# Patient Record
Sex: MALE | Race: WHITE
[De-identification: names, ages, dates, MRNs, and addresses within clinical notes are randomized per-mention and may not be internally consistent; named-entity substitution may affect disease eponyms.]

---

## 2017-01-17 ENCOUNTER — HOSPITAL ENCOUNTER (INPATIENT)
Dept: HOSPITAL 74 - JER | Age: 77
LOS: 10 days | Discharge: NURSING, REHAB OR LONG-TERM CARE | DRG: 871 | End: 2017-01-27
Attending: INTERNAL MEDICINE | Admitting: INTERNAL MEDICINE
Payer: COMMERCIAL

## 2017-01-17 VITALS — BODY MASS INDEX: 35.7 KG/M2

## 2017-01-17 DIAGNOSIS — D69.6: ICD-10-CM

## 2017-01-17 DIAGNOSIS — E11.22: ICD-10-CM

## 2017-01-17 DIAGNOSIS — E11.65: ICD-10-CM

## 2017-01-17 DIAGNOSIS — E87.0: ICD-10-CM

## 2017-01-17 DIAGNOSIS — G93.41: ICD-10-CM

## 2017-01-17 DIAGNOSIS — E66.9: ICD-10-CM

## 2017-01-17 DIAGNOSIS — N17.9: ICD-10-CM

## 2017-01-17 DIAGNOSIS — I25.10: ICD-10-CM

## 2017-01-17 DIAGNOSIS — J45.909: ICD-10-CM

## 2017-01-17 DIAGNOSIS — N18.3: ICD-10-CM

## 2017-01-17 DIAGNOSIS — F03.90: ICD-10-CM

## 2017-01-17 DIAGNOSIS — J18.9: ICD-10-CM

## 2017-01-17 DIAGNOSIS — A41.9: Primary | ICD-10-CM

## 2017-01-17 DIAGNOSIS — E78.5: ICD-10-CM

## 2017-01-17 DIAGNOSIS — N40.0: ICD-10-CM

## 2017-01-17 DIAGNOSIS — R33.9: ICD-10-CM

## 2017-01-17 DIAGNOSIS — I50.9: ICD-10-CM

## 2017-01-17 DIAGNOSIS — E11.319: ICD-10-CM

## 2017-01-17 DIAGNOSIS — I13.0: ICD-10-CM

## 2017-01-17 DIAGNOSIS — E86.0: ICD-10-CM

## 2017-01-17 DIAGNOSIS — Z79.4: ICD-10-CM

## 2017-01-17 DIAGNOSIS — I12.9: ICD-10-CM

## 2017-01-17 DIAGNOSIS — Z95.1: ICD-10-CM

## 2017-01-17 NOTE — PDOC
History of Present Illness





- General


History Source: Family, Spouse (wife)


Exam Limitations: Dementia





- History of Present Illness


Initial Comments: 


01/18/17 00:16


The patient is a 76 year old male with significant past medical history of 

dementia, CAD, hyperlipidemia, hypertension, chronic kidney disease, diabetes, 

asthma who presents to the ED BIBA from home with worsening AMS. As per family, 

at bedside, patient is confused and AMS at baseline. However, over the past 24 

hours, they noted patients AMS has worsened. Wife also noted frequent 

urination. According to the family, patient is administered tylenol every 4 

hours for pain, but note that the patient felt warm at home. Patients 

temperature was checked here in the ED and patient was found to have a rectal 

temp of 100.2. 





Allergies: NKDA


Social History: No alcohol, tobacco, or drug use reported. 


Past Surgical History: CABG, ORIF right hip


PCP: Dr. Adrienne Truong 


Cardio: Dr. Felicity Rust 


Nephro: Dr. Addie Cruz 











<Kelsey Alcala - Last Filed: 01/18/17 04:34>





<Maude Berrios - Last Filed: 01/18/17 06:22>





- General


Chief Complaint: SIRS, Suspected/Possible


Stated Complaint: FEVER


Time Seen by Provider: 01/17/17 23:48





Past History





<Kelsey Alcala - Last Filed: 01/18/17 04:34>





- Past Medical History


Anemia: No


Asthma: Yes


Cancer: No


Cardiac Disorders: Yes (cabg)


CVA: No


COPD: No


CHF: No


Dementia: No


Diabetes: Yes


GI Disorders: No


 Disorders: No


HTN: Yes


Hypercholesterolemia: Yes


Liver Disease: No


Suicide Attempt (Hx): No


Seizures: No


Thyroid Disease: No





- Surgical History


Abdominal Surgery: No


Appendectomy: No


Cardiac Surgery: Yes ('96)


Cholecystectomy: No


Lung Surgery: No


Neurologic Surgery: No


Orthopedic Surgery: No (rt  hip orif)





- Immunization History


Immunization Up to Date: Yes





- Psycho/Social/Smoking Cessation Hx


Anxiety: No


Suicidal Ideation: No


Smoking Status: No


Smoking History: Never smoked


Have you smoked in the past 12 months: No


Number of Cigarettes Smoked Daily: 0


If you are a former smoker, when did you quit?: '96


Hx Alcohol Use: No


Drug/Substance Use Hx: No


Substance Use Type: None


Hx Substance Use Treatment: No





<Maude Berrios - Last Filed: 01/18/17 06:22>





- Past Medical History


Allergies/Adverse Reactions: 


 Allergies











Allergy/AdvReac Type Severity Reaction Status Date / Time


 


No Known Allergies Allergy   Verified 01/17/17 23:28











Home Medications: 


Ambulatory Orders





Clopidogrel Bisulfate [Plavix -] 75 mg PO DAILY 04/01/12 


Nebivolol HCl [Bystolic] 5 mg PO HS 07/28/14 


Tamsulosin HCl 0.4 mg PO DAILY 07/28/14 


Aspirin [ASA -] 81 mg PO DAILY 01/18/17 


Atorvastatin Ca [Lipitor] 80 mg PO HS 01/18/17 


Bimatoprost [Lumigan] 1 drop IO DAILY 01/18/17 


Cyanocobalamin [Vitamin B12 -] 1,000 mcg PO DAILY 01/18/17 


Dorzolamide HCl [Trusopt 2% -] 1 drop OU TID 01/18/17 


Ezetimibe [Zetia] 10 mg PO DAILY 01/18/17 


Furosemide [Lasix] 80 mg PO DAILY 01/18/17 











**Review of Systems





- Review of Systems


Able to Perform ROS?: No


Comments:: 





01/18/17 00:16


Unable to obtain as patient has dementia





<Kelsey Alcala - Last Filed: 01/18/17 04:34>





*Physical Exam





- Vital Signs


 Last Vital Signs











Temp Pulse Resp BP Pulse Ox


 


 98.1 F   85   18   135/65   92 L


 


 01/17/17 23:28  01/17/17 23:28  01/17/17 23:28  01/17/17 23:28  01/17/17 23:28














- Physical Exam


Comments: 


01/18/17 01:51


GENERAL:


Pt is febrile. Well developed, well nourished. No acute distress.


HEENT:


Normocephalic, atraumatic. PERRLA, EOMI. Right eye cloudy which is chronic per 

wife. No conjunctival pallor. Sclera are non-icteric. Moist mucous membranes. 

Oropharynx is clear.


NECK: 


Supple. Full ROM. No JVD. Carotid pulses 2+ and symmetric, without bruits. No 

thyromegaly. No lymphadenopathy.


CARDIOVASCULAR:


Regular rate and rhythm. No murmurs, rubs, or gallops. Distal pulses are 2+ and 

symmetric. 


PULMONARY: 


No evidence of respiratory distress. Lungs clear to auscultation bilaterally. 

No wheezing, rales or rhonchi.


ABDOMINAL:


Obese. Non-tender. Non-distended. No rebound or guarding. No organomegaly. 

Normoactive bowel sounds. 


MUSCULOSKELETAL 


Normal range of motion at all joints. No bony deformities or tenderness. No CVA 

tenderness.


EXTREMITIES: 


No cyanosis. No clubbing. No edema. No calf tenderness.


SKIN: 


Warm and dry. Normal capillary refill. No rashes. No jaundice. 


NEUROLOGICAL: 


Confused and somnolent, but arousable. Unable to answer questions. Moving all 

extremities. 


PSYCHIATRIC: 


Cooperative. Good eye contact. Appropriate mood and affect.











<Kelsey Alcala - Last Filed: 01/18/17 04:34>





- Vital Signs


 Last Vital Signs











Temp Pulse Resp BP Pulse Ox


 


 98.1 F   85   18   135/65   92 L


 


 01/17/17 23:28  01/17/17 23:28  01/17/17 23:28  01/17/17 23:28  01/17/17 23:28














<Maude Berrios - Last Filed: 01/18/17 06:22>





**Heart Score/ECG Review





- ECG Impressions


Comment:: 





01/18/17 03:59


NSR @84bpm


Nonspecific ST abnormality 


Abnormal ECG 





<Kelsey Alcala - Last Filed: 01/18/17 04:34>





ED Treatment Course





- LABORATORY


CBC & Chemistry Diagram: 


 01/18/17 00:01





 01/18/17 03:40





- RADIOLOGY


Radiograph Interpretation: 


01/18/17 04:32


EXAM: X-RAY CHEST


Reviewed by Imaging on Call: Exam slightly motion degraded and rotated since 4/ 18/16. No definite lung 


consolidation or pleural effusions. Cardiomegaly and/or pericardial effusion. 


Median sternotomy.











<Kelsey Alcala - Last Filed: 01/18/17 04:34>





- LABORATORY


CBC & Chemistry Diagram: 


 01/18/17 00:01





 01/18/17 03:40





<Maude Berrios - Last Filed: 01/18/17 06:22>





Medical Decision Making





- Medical Decision Making


01/18/17 01:33


Pt comes with worsening mental status. He also has a fever. Family takes care 

of him at home. Pt has  a baseline dementia.





01/18/17 03:02


Pt's urine is clear.  Only glucose and ketones.





01/18/17 06:17


Pt has no UTI on straight cath specimen even though his wife notes that he has 

been urinating frequently. Pt has RLL haziness on cxr.  Pt will be treated with 

levaquin.  He received tylenol ofirmev IV. We treated him with zosyn.





01/18/17 06:21


Pt was admitted to hospitalist and they hydrated him.





<Maude Berrios - Last Filed: 01/18/17 06:22>





*DC/Admit/Observation/Transfer





- Attestations


Scribe Attestion: 





01/18/17 00:16





Documentation prepared by Kelsey Alcala, acting as medical scribe for Maude Berrios MD





<Kelsey Alcala - Last Filed: 01/18/17 04:34>





- Discharge Dispostion


Admit: Yes





<Maude Berrios - Last Filed: 01/18/17 06:22>


Diagnosis at time of Disposition: 


 Sepsis, Type 2 diabetes mellitus, Pneumonia





- Referrals

## 2017-01-18 LAB
ALBUMIN SERPL-MCNC: 3.9 G/DL (ref 3.4–5)
ALBUMIN SERPL-MCNC: 4 G/DL (ref 3.4–5)
ALP SERPL-CCNC: 134 U/L (ref 45–117)
ALP SERPL-CCNC: 136 U/L (ref 45–117)
ALT SERPL-CCNC: 22 U/L (ref 12–78)
ALT SERPL-CCNC: 25 U/L (ref 12–78)
ANION GAP SERPL CALC-SCNC: 12 MMOL/L (ref 8–16)
ANION GAP SERPL CALC-SCNC: 13 MMOL/L (ref 8–16)
ANION GAP SERPL CALC-SCNC: 16 MMOL/L (ref 8–16)
APPEARANCE UR: CLEAR
AST SERPL-CCNC: 15 U/L (ref 15–37)
AST SERPL-CCNC: 27 U/L (ref 15–37)
BASOPHILS # BLD: 0.4 % (ref 0–2)
BASOPHILS # BLD: 0.5 % (ref 0–2)
BILIRUB SERPL-MCNC: 0.8 MG/DL (ref 0.2–1)
BILIRUB SERPL-MCNC: 0.8 MG/DL (ref 0.2–1)
BILIRUB UR STRIP.AUTO-MCNC: NEGATIVE MG/DL
CALCIUM SERPL-MCNC: 9.6 MG/DL (ref 8.5–10.1)
CALCIUM SERPL-MCNC: 9.7 MG/DL (ref 8.5–10.1)
CALCIUM SERPL-MCNC: 9.9 MG/DL (ref 8.5–10.1)
CO2 SERPL-SCNC: 26 MMOL/L (ref 21–32)
CO2 SERPL-SCNC: 27 MMOL/L (ref 21–32)
CO2 SERPL-SCNC: 27 MMOL/L (ref 21–32)
COLOR UR: (no result)
CREAT SERPL-MCNC: 2.1 MG/DL (ref 0.7–1.3)
CREAT SERPL-MCNC: 2.2 MG/DL (ref 0.7–1.3)
CREAT SERPL-MCNC: 2.3 MG/DL (ref 0.7–1.3)
DEPRECATED RDW RBC AUTO: 13.4 % (ref 11.9–15.9)
DEPRECATED RDW RBC AUTO: 13.5 % (ref 11.9–15.9)
EOSINOPHIL # BLD: 0 % (ref 0–4.5)
EOSINOPHIL # BLD: 0.1 % (ref 0–4.5)
GLUCOSE SERPL-MCNC: 480 MG/DL (ref 74–106)
GLUCOSE SERPL-MCNC: 491 MG/DL (ref 74–106)
GLUCOSE SERPL-MCNC: 595 MG/DL (ref 74–106)
INR BLD: 1.44 (ref 0.82–1.09)
KETONES UR QL STRIP: (no result)
LEUKOCYTE ESTERASE UR QL STRIP.AUTO: NEGATIVE
MCH RBC QN AUTO: 29.8 PG (ref 25.7–33.7)
MCH RBC QN AUTO: 30.5 PG (ref 25.7–33.7)
MCHC RBC AUTO-ENTMCNC: 32.7 G/DL (ref 32–35.9)
MCHC RBC AUTO-ENTMCNC: 33.4 G/DL (ref 32–35.9)
MCV RBC: 91.1 FL (ref 80–96)
MCV RBC: 91.4 FL (ref 80–96)
NEUTROPHILS # BLD: 83.2 % (ref 42.8–82.8)
NEUTROPHILS # BLD: 83.2 % (ref 42.8–82.8)
NITRITE UR QL STRIP: NEGATIVE
PH UR: 5 [PH] (ref 5–8)
PLATELET # BLD AUTO: 102 K/MM3 (ref 134–434)
PLATELET # BLD AUTO: 91 K/MM3 (ref 134–434)
PMV BLD: 10.6 FL (ref 7.5–11.1)
PMV BLD: 10.7 FL (ref 7.5–11.1)
PROT SERPL-MCNC: 6.9 G/DL (ref 6.4–8.2)
PROT SERPL-MCNC: 7.1 G/DL (ref 6.4–8.2)
PROT UR QL STRIP: (no result)
PROT UR QL STRIP: NEGATIVE
PT PNL PPP: 16 SEC (ref 9.98–11.88)
RBC # UR STRIP: NEGATIVE /UL
SP GR UR: 1.02 (ref 1–1.03)
TROPONIN I SERPL-MCNC: 0.04 NG/ML (ref 0–0.05)
UROBILINOGEN UR STRIP-MCNC: NEGATIVE E.U./DL (ref 0.2–1)
WBC # BLD AUTO: 14.5 K/MM3 (ref 4–10)
WBC # BLD AUTO: 15.5 K/MM3 (ref 4–10)

## 2017-01-18 RX ADMIN — INSULIN ASPART SCH UNITS: 100 INJECTION, SOLUTION INTRAVENOUS; SUBCUTANEOUS at 08:59

## 2017-01-18 RX ADMIN — DORZOLAMIDE HYDROCHLORIDE SCH DROP: 20 SOLUTION/ DROPS OPHTHALMIC at 22:16

## 2017-01-18 RX ADMIN — INSULIN ASPART SCH UNITS: 100 INJECTION, SOLUTION INTRAVENOUS; SUBCUTANEOUS at 11:43

## 2017-01-18 RX ADMIN — EZETIMIBE SCH: 10 TABLET ORAL at 11:21

## 2017-01-18 RX ADMIN — INSULIN ASPART SCH UNITS: 100 INJECTION, SOLUTION INTRAVENOUS; SUBCUTANEOUS at 18:46

## 2017-01-18 RX ADMIN — INSULIN ASPART SCH UNITS: 100 INJECTION, SOLUTION INTRAVENOUS; SUBCUTANEOUS at 22:02

## 2017-01-18 RX ADMIN — ASPIRIN 81 MG SCH: 81 TABLET ORAL at 11:20

## 2017-01-18 RX ADMIN — ACETAMINOPHEN PRN MG: 325 TABLET ORAL at 13:21

## 2017-01-18 RX ADMIN — INSULIN ASPART SCH UNITS: 100 INJECTION, SOLUTION INTRAVENOUS; SUBCUTANEOUS at 18:45

## 2017-01-18 RX ADMIN — DORZOLAMIDE HYDROCHLORIDE SCH: 20 SOLUTION/ DROPS OPHTHALMIC at 06:18

## 2017-01-18 RX ADMIN — LATANOPROST SCH: 50 SOLUTION OPHTHALMIC at 11:20

## 2017-01-18 RX ADMIN — NEBIVOLOL HYDROCHLORIDE SCH MG: 5 TABLET ORAL at 22:01

## 2017-01-18 RX ADMIN — ATORVASTATIN CALCIUM SCH MG: 80 TABLET, FILM COATED ORAL at 22:01

## 2017-01-18 RX ADMIN — INSULIN DETEMIR SCH UNITS: 100 INJECTION, SOLUTION SUBCUTANEOUS at 22:01

## 2017-01-18 RX ADMIN — CEFTRIAXONE SODIUM SCH MLS/HR: 2 INJECTION, POWDER, FOR SOLUTION INTRAMUSCULAR; INTRAVENOUS at 11:01

## 2017-01-18 RX ADMIN — INSULIN ASPART SCH UNITS: 100 INJECTION, SOLUTION INTRAVENOUS; SUBCUTANEOUS at 16:09

## 2017-01-18 RX ADMIN — SODIUM CHLORIDE SCH MLS/HR: 9 INJECTION, SOLUTION INTRAVENOUS at 09:04

## 2017-01-18 RX ADMIN — TAMSULOSIN HYDROCHLORIDE SCH: 0.4 CAPSULE ORAL at 09:04

## 2017-01-18 RX ADMIN — DORZOLAMIDE HYDROCHLORIDE SCH: 20 SOLUTION/ DROPS OPHTHALMIC at 17:19

## 2017-01-18 RX ADMIN — CLOPIDOGREL BISULFATE SCH: 75 TABLET, FILM COATED ORAL at 11:20

## 2017-01-18 RX ADMIN — SODIUM CHLORIDE SCH MLS/HR: 9 INJECTION, SOLUTION INTRAVENOUS at 17:18

## 2017-01-18 NOTE — PN
<Francisco Javier Bhat - Last Filed: 01/18/17 10:45>


Physical Exam: 


ATTENDING PHYSICIAN STATEMENT





I saw and evaluated the patient.


I reviewed the resident's note and discussed the case with the resident.


I agree with the resident's findings and plan as documented.








SUBJECTIVE:


seen and evaluated at the bedside





OBJECTIVE:


as per family pt is not yet at his baseline mental status





ASSESSMENT AND PLAN:


6 year old male with a significant PMHx of CAD, CHF, HLD, HTN, DM II (

noncompliant and uncontrolled), CKD, asthma, and Dementia admitted for sepsis 

due to community acquired pneumonia causing metabolic encephalopathy





Pneumonia


-currently afebrile and hemodynamically stable


-was given single doses of abx overnight


-start ceftriaxone and azithromycin 


-lactate was still elevated; start IVF and trend lactic acid


-follow up blood/urine cultures





Hyperglycemia


-no anion gap


-sliding scale insulin


-await swallow eval before starting standing levemir (would require half the 

usual dose if not able to eat)





Acute kidney injury


-due to hypoperfusion secondary to sepsis


-start IVF


-trend creat





<Ariana Leal - Last Filed: 01/18/17 13:57>


Physical Exam: 


SUBJECTIVE: Patient seen and examined





Patient resting in bed, in mild distress. Fever 100.2, hemodynamically stable. 

O2 sat 100% on 2L NC. awake and alert but disoriented and mumbling 

incoherently. Follows some commands with prompting. History obtained from wife 

and record. She states that he is still well below mental status baseline, 

which is aao x 3. She states that his current issues include urinary 

incontinence, confusion, cough with clear sputum and fever x1-2 days. she 

denies him complaining of h/a, n/v, chest pain, sob, abd pain, diarrhea or 

dysuria. 





OBJECTIVE:





 Vital Signs











 Period  Temp  Pulse  Resp  BP Sys/Ramírez  Pulse Ox


 


 Last 24 Hr    78  18  122/71  100











GENERAL: Awake, somewhat responsive to verbal command, lethargic, disoriented 


HEAD: Normal with no signs of trauma.


EYES: Sclera anicteric and mildly injected b/l PERRLA


EARS, NOSE, THROAT: oropharynx clear without exudates. Moist mucous membranes.


NECK: supple without lymphadenopathy, JVD, or masses.


LUNGS: bibasilar crackles. diffusely coarse breath sounds 


HEART: Regular rate and rhythm, normal S1 and S2 


ABDOMEN: Soft, not distended, normoactive bowel sounds, no guarding. mild 

suprapubic tenderness 


MUSCULOSKELETAL: No CVA tenderness.


UPPER EXTREMITIES: 2+ pulses, warm, well perfused, No peripheral edema.


LOWER EXTREMITIES: venous stasis discoloration of b/l ankles, trace b/l edema 


NEUROLOGICAL:  Disoriented, lethargic 











 Laboratory Results - last 24 hr











  01/18/17 01/18/17 01/18/17





  05:00 06:15 06:15


 


WBC   15.5 H 


 


RBC   4.18 


 


Hgb   12.7 


 


Hct   38.2 


 


MCV   91.4 


 


MCHC   33.4 


 


RDW   13.5 


 


Plt Count   91 L 


 


MPV   10.7 


 


Neutrophils %   83.2 H 


 


Lymphocytes %   5.1 L 


 


Monocytes %   11.3 H 


 


Eosinophils %   0.0  D 


 


Basophils %   0.4 


 


Sodium    138


 


Potassium    4.2


 


Chloride    99


 


Carbon Dioxide    27


 


Anion Gap    12


 


BUN    49 H


 


Creatinine    2.1 H


 


Creat Clearance w eGFR    30.86


 


Random Glucose    480 H*


 


Lactic Acid  2.793 H*  


 


Calcium    9.9


 


Total Bilirubin    0.8


 


AST    27  D


 


ALT    25


 


Alkaline Phosphatase    136 H


 


Total Protein    7.1


 


Albumin    4.0








Active Medications











Generic Name Dose Route Start Last Admin





  Trade Name Freq  PRN Reason Stop Dose Admin


 


Albuterol Sulfate  1 amp 01/18/17 08:23  





  Ventolin 0.083% Nebulizer Soln -  NEB   





  Q4H PRN   





  SHORT OF BREATH/WHEEZING   


 


Aspirin  81 mg 01/18/17 10:00  





  Asa -  PO   





  DAILY [facility]   


 


Atorvastatin Calcium  80 mg 01/18/17 22:00  





  Lipitor -  PO   





  HS [facility]   


 


Clopidogrel Bisulfate  75 mg 01/18/17 10:00  





  Plavix -  PO   





  DAILY [facility]   


 


Dorzolamide HCl  1 drop 01/18/17 06:00 01/18/17 06:18





  Trusopt 2%  OU   Not Given





  TID MARY   


 


Ezetimibe  10 mg 01/18/17 10:00  





  Zetia -  PO   





  DAILY MARY   


 


Azithromycin 250 mg/ Dextrose  250 mls @ 250 mls/hr 01/19/17 10:00  





  IVPB   





  DAILY MARY   


 


Ceftriaxone Sodium  100 mls @ 200 mls/hr 01/18/17 10:00  





  Rocephin 2gm Ivpb (Pre-Docked)  IVPB   





  DAILY [facility]   


 


Sodium Chloride  1,000 mls @ 125 mls/hr 01/18/17 08:45 01/18/17 09:04





  Normal Saline -  IV   125 mls/hr





  ASDIR MARY   Administration


 


Insulin Aspart  1 vial 01/18/17 06:00 01/18/17 08:59





  Novolog Vial Sliding Scale -  SQ   10 units





  Q4HPO MARY   Administration





  Protocol   


 


Latanoprost  1 drop 01/18/17 10:00  





  Xalatan 0.005% Eye Drops -  OU   





  DAILY MARY   


 


Nebivolol  5 mg 01/18/17 22:00  





  Bystolic -  PO   





  HS MARY   


 


Tamsulosin HCl  0.4 mg 01/18/17 08:30 01/18/17 09:04





  Flomax -  PO   Not Given





  DAILY@0830 [facility]   











ASSESSMENT/PLAN:


This is a 75 yo M with PMH of CAD, CHF, HLD, HTN, uncontrolled IDDM II, CKD, 

asthma, BPH and Dementia who is admitted due to CAP: altered mental status, 

productive cough, fever, urinary frequency/incontinence, hyperglycemia x 3d.





Altered mental status due to Sepsis


-grupo source: RLL Community Acquired Pneumonia


-CXR: RLL infiltrate 


-lactic acidosis resolved 


-s/p Zosyn in ED, now on Azithro and Rocephin 


-Blood and urine cultures p/d


-Sputum cultures p/d


-Urine Legionella/pneumococcal 


-Flu negative 


-febrile


-Tylenol PO PRN


-NS @ 125


-albuterol nebs prn 


-Speech and Swallow consult: pureed diet 





Hyperglycemia in setting of uncontrolled IDDM 


-medication noncompliant 


-Glucose >500 on admission 


-s/p 20 u insulin in ED; gluc over 400


-Insulin Sliding scale


-start basal insulin once tolerates meal, at a gentle dose Levemir 18 HS and 

Novolog 8 bid


-fingerstick TID AC 


-BG goal 250 to avoid hypoglycemia 


-A1C





Diabetic retinopathy


-severe


-f/u opthalmology outpatient





DEB vs CKD


-likely due to dehydration in setting of uncontrolled DM 


-Creatinine 2.1 trending down from 2.3 


-IV hydration 


-Urine electrolytes p/d


-trend creat 





Thrombocytopenia 


-Likely associated with PNA


-trend CBC 





CHF


-no sign of acute failure at this time 


-hold Lasix in setting of DEB


-Continue Asa, Plavix





HTN


-Continue Bystolic





HLD


-Continue Zetia, Lipitor





Asthma


-stable


-nebs prn 


 


BPH


-Tamsulosin 





FEN


NS @ 125


lytes stable


DVT GI PPX: SCD, diet 


Dysphagia Pureed, diabetic diet nectar liquids 





Dispo: monitor in [facility]














Problem List





- Problems


(1) Acute renal failure


Code(s): N17.9 - ACUTE KIDNEY FAILURE, UNSPECIFIED   





(2) Dehydration


Code(s): E86.0 - DEHYDRATION





(3) Pneumonia


Code(s): J18.9 - PNEUMONIA, UNSPECIFIED ORGANISM   





(4) Sepsis


Code(s): A41.9 - SEPSIS, UNSPECIFIED ORGANISM   





(5) Weakness


Code(s): R53.1 - WEAKNESS





(6) Asthma


Code(s): J45.909 - UNSPECIFIED ASTHMA, UNCOMPLICATED   





(7) CAD (coronary artery disease)


Code(s): I25.10 - ATHSCL HEART DISEASE OF NATIVE CORONARY ARTERY W/O ANG PCTRS 

  





(8) CKD stage 3 due to type 2 diabetes mellitus


Code(s): E11.22 - TYPE 2 DIABETES MELLITUS W DIABETIC CHRONIC KIDNEY DISEASE


N18.3 - CHRONIC KIDNEY DISEASE, STAGE 3 (MODERATE)





(9) Hyperlipidemia


Code(s): E78.5 - HYPERLIPIDEMIA, UNSPECIFIED   





(10) Hypertension


Code(s): I10 - ESSENTIAL (PRIMARY) HYPERTENSION   





(11) Obesity (BMI 30-39.9)


Code(s): E66.9 - OBESITY, UNSPECIFIED





(12) Type 2 diabetes mellitus


Code(s): E11.9 - TYPE 2 DIABETES MELLITUS WITHOUT COMPLICATIONS   





(13) Community acquired pneumonia


Code(s): J18.9 - PNEUMONIA, UNSPECIFIED ORGANISM








Visit type





- Emergency Visit


Emergency Visit: Yes


ED Registration Date: 01/18/17


Care time: The patient presented to the Emergency Department on the above date 

and was hospitalized for further evaluation of their emergent condition.





- New Patient


This patient is new to me today: Yes


Date on this admission: 01/18/17





- Critical Care


Critical Care patient: No





- Discharge Referral


Referred to [facility] No

## 2017-01-18 NOTE — HP
CHIEF COMPLAINT: Altered Mental Status 





PCP: Dr. Adrienne Truong 





HISTORY OF PRESENT ILLNESS:


 Patient poor historian and unable to obtain history due to clinical condition. 

All information obtained from wife and records


Patient is a 76 year old male with a significant PMHx of CAD, CHF, HLD, HTN, DM 

II (noncompliant and uncontrolled), CKD, asthma, and Dementia who was brought 

to the ED by his wife and son for worsening altered mental status for the last 2

-3 days.  At baseline, patient is awake, alert, and oriented.  She does report 

that he has worsening vision due to his uncontrolled diabetes, which worsened 

his gait in the last month.  Patient's wife also reports a productive cough 

with white sputum for the last 2-3 days associated with fever and increasing 

urinary frequency.  According to wife, she has been administering Tylenol every 

four hours for the fever. In the ED patient was found to have a rectal 

temperature of 100.2.  However, she denies any chills, nausea, vomiting, chest 

pain, palpitations, shortness of breath.  





ER course was notable for:


(1) Chest x-ray


(2) Vancomycin and Zosyn 


(3) 10 Units of insulin 





Recent Travel: Denies





PAST MEDICAL HISTORY: CAD, HLD, HTN, DM II (noncompliant and uncontrolled), CKD

, asthma, and Dementia





PAST SURGICAL HISTORY: CABG, ORIF of right hip





Social History:


Smoking: Denies


Alcohol: Denies


Drugs: Denies





Family History: Denies


Allergies: No Known Allergies Allergy (Verified 01/17/17 23:28)


 








HOME MEDICATIONS:


 











 Medication  Instructions  Recorded


 


Clopidogrel Bisulfate [Plavix -] 75 mg PO DAILY 04/01/12


 


Albuterol Sulfate Inhaler - 2 inh IH Q4H PRN #0 inh 04/04/12





[Ventolin HFA Inhaler -]  


 


Atorvastatin Ca [Lipitor] 40 mg PO HS #0 tab 04/04/12


 


Dorzolamide HCl [Trusopt 2% -] 1 drop OU BID #0 drops 04/04/12


 


Allopurinol [Zyloprim -] 100 mg PO DAILY 07/28/14


 


Docusate Sodium [Colace -] 300 mg PO BID 07/28/14


 


Nebivolol HCl [Bystolic] 5 mg PO HS 07/28/14


 


Spironolactone 50 mg PO DAILY 07/28/14


 


Tamsulosin HCl 0.4 mg PO DAILY 07/28/14


 


Albuterol 0.083% Nebulizer Sol 1 neb NEB QID 11/03/15





[Ventolin 0.083% Nebulizer Soln -]  


 


Hydralazine HCl [Apresoline -] 10 mg PO TID  tablet 04/21/16


 


Salmeterol/Fluticasone [Advair 1 puff IH BID #0 inhaler 04/21/16





100Mcg/50Mcg -]  


 


Albuterol Sulfate Inhaler - 2 inh IH Q4H PRN #1 inh 04/27/16





[Ventolin HFA Inhaler -]  


 


Hydralazine HCl [Apresoline -] 10 mg PO TID #90 tablet 04/27/16


 


Insulin Detemir [Levemir Flextouch] 22 unit SQ BID #5 syringe 04/27/16


 


Salmeterol/Fluticasone [Advair 1 puff IH BID #1 inhaler 04/27/16





100Mcg/50Mcg -]  


 


Wheel Chair 0 unit AD ASDIR #1  04/27/16








REVIEW OF SYSTEMS: Obtained from patient's wife 


CONSTITUTIONAL: Present: fever


Absent: chills, diaphoresis, generalized weakness, malaise, loss of appetite, 

weight change


HEENT: Present: rhinorrhea, nasal congestion, visual changes


Absent: throat pain, throat swelling, difficulty swallowing, mouth swelling, 

ear pain, eye pain


CARDIOVASCULAR: 


Absent: chest pain, syncope, palpitations, irregular heart rate, lightheadedness

, peripheral edema


RESPIRATORY: Present: cough


Absent: shortness of breath, dyspnea with exertion, orthopnea, wheezing, stridor

, hemoptysis


GASTROINTESTINAL:


Absent: abdominal pain, abdominal distension, nausea, vomiting, diarrhea, 

constipation, melena, hematochezia


GENITOURINARY: Present: frequency


Absent: dysuria, urgency, hesitancy, hematuria, flank pain, genital pain


MUSCULOSKELETAL: 


Absent: myalgia, arthralgia, joint swelling, back pain, neck pain


SKIN: 


Absent: rash, itching, pallor


HEMATOLOGIC/IMMUNOLOGIC: 


Absent: easy bleeding, easy bruising, lymphadenopathy, frequent infections


ENDOCRINE:


Absent: unexplained weight gain, unexplained weight loss, heat intolerance, 

cold intolerance


NEUROLOGIC: Present: mental status changes


Absent: headache, focal weakness or paresthesias, dizziness, unsteady gait, 

seizure, bladder or bowel incontinence


PSYCHIATRIC: 


Absent: anxiety, depression, suicidal or homicidal ideation, hallucinations.








PHYSICAL EXAMINATION


 Vital Signs - 24 hr











  01/17/17 01/18/17





  23:28 00:50


 


Temperature 98.1 F 100.2 F H


 


Pulse Rate 85 


 


Respiratory 18 





Rate  


 


Blood Pressure 135/65 


 


O2 Sat by Pulse 92 L 





Oximetry (%)  











GENERAL: Awake, lethargic, disoriented 


HEAD: Normal with no signs of trauma.


EYES: Sclera anicteric, bilateral conjunctivitis, No lid lag.


EARS, NOSE, THROAT: Ears normal, nares patent, oropharynx clear without 

exudates. Moist mucous membranes.


NECK: Normal range of motion, supple without lymphadenopathy, JVD, or masses.


LUNGS: Bilateral crackles throughout lung bases No accessory muscle use.


HEART: Regular rate and rhythm, normal S1 and S2 without murmur, rub or gallop.


ABDOMEN: Soft, nontender, not distended, normoactive bowel sounds, no guarding, 

no rebound, no masses.  No hepatomegaly or  splenomegaly. 


MUSCULOSKELETAL: Normal range of motion at all joints. No bony deformities or 

tenderness. No CVA tenderness.


UPPER EXTREMITIES: Left upper arm insulin pump No peripheral edema.


LOWER EXTREMITIES: Chronic venous stasis of bilateral lower extremities. No 

peripheral edema. 


NEUROLOGICAL:  Disoriented and Agitated 














 Laboratory Results - last 24 hr











  01/18/17 01/18/17 01/18/17





  00:01 00:01 00:01


 


WBC  14.5 H D  


 


RBC  4.27  D  


 


Hgb  12.7  D  


 


Hct  38.9  D  


 


MCV  91.1  


 


MCHC  32.7  


 


RDW  13.4  


 


Plt Count  102 L D  


 


MPV  10.6  D  


 


Neutrophils %  83.2 H D  


 


Lymphocytes %  4.8 L D  


 


Monocytes %  11.4 H  


 


Eosinophils %  0.1  D  


 


Basophils %  0.5  D  


 


INR   1.44 H 


 


Sodium    136


 


Potassium    4.7


 


Chloride    94 L D


 


Carbon Dioxide    26


 


Anion Gap    16


 


BUN    56 H D


 


Creatinine    2.2 H D


 


Creat Clearance w eGFR    29.25


 


Random Glucose    595 H* D


 


Lactic Acid   


 


Calcium    9.7


 


Total Bilirubin    0.8  D


 


AST    15  D


 


ALT    22


 


Alkaline Phosphatase    134 H D


 


Creatine Kinase    128


 


Troponin I    0.04  D


 


Total Protein    6.9


 


Albumin    3.9


 


Urine Color   


 


Urine Appearance   


 


Urine pH   


 


Ur Specific Gravity   


 


Urine Protein   


 


Urine Glucose (UA)   


 


Urine Ketones   


 


Urine Blood   


 


Urine Nitrite   


 


Urine Bilirubin   


 


Urine Urobilinogen   


 


Ur Leukocyte Esterase   














  01/18/17 01/18/17





  00:01 02:35


 


WBC  


 


RBC  


 


Hgb  


 


Hct  


 


MCV  


 


MCHC  


 


RDW  


 


Plt Count  


 


MPV  


 


Neutrophils %  


 


Lymphocytes %  


 


Monocytes %  


 


Eosinophils %  


 


Basophils %  


 


INR  


 


Sodium  


 


Potassium  


 


Chloride  


 


Carbon Dioxide  


 


Anion Gap  


 


BUN  


 


Creatinine  


 


Creat Clearance w eGFR  


 


Random Glucose  


 


Lactic Acid  2.606 H* 


 


Calcium  


 


Total Bilirubin  


 


AST  


 


ALT  


 


Alkaline Phosphatase  


 


Creatine Kinase  


 


Troponin I  


 


Total Protein  


 


Albumin  


 


Urine Color   Straw


 


Urine Appearance   Clear


 


Urine pH   5.0


 


Ur Specific Gravity   1.016


 


Urine Protein   Negative


 


Urine Glucose (UA)   3+ H


 


Urine Ketones   1+ H


 


Urine Blood   Negative


 


Urine Nitrite   Negative


 


Urine Bilirubin   Negative


 


Urine Urobilinogen   Negative


 


Ur Leukocyte Esterase   Negative











ASSESSMENT/PLAN:


Patient is a 76 year old male with a PMHx of CAD, CHF, HLD, HTN, DM II (

noncompliant and uncontrolled), CKD, asthma, BPH and Dementia who presents to 

the ED for altered mental status that worsened in the last two days associated 

with a productive cough, fever, and urinary frequency.  Patient admitted for 

further monitoring and management. 





Sepsis Likely Secondary to Community Acquired Pneumonia


-Chest X-ray ordered that revealed a possible right lobe infiltrate


-Repeat Lactic sent 


-Zosyn and Vancomycin given


-Blood and urine cultures ordered


-Sputum cultures sent


-Urine Legionella/pneumococcal ordered


-Flu Swab ordered


-IV Tylenol 1000mg ordered 


-Light hydration with IV NS half bolus 


-Duo-Neb ordered 


-Speech and Swallow consult placed 





Hyperglycemia


-Likely from uncontrolled DM II 


-Glucose >500


-Anion Gap 16. Will repeat 


-10 units of regular insulin given in ED


-Insulin Sliding scale


-BGM Q4H 


-Repeat BMP sent to monitor Anion Gap


-Continue to Trend BMP





DEB on CKD


-Creatinine 2.2


-IV NS half a bolus 


-Urine electrolytes ordered 


-Continue to trend BMP





Thrombocytopenia 


-Likely from current infection


-Continue to trend CBC daily 





CHF


-No acute exacerbation 


-Will hold Lasix due to patients creatinine 


-Continue Aspirin 81mg 


-Continue Plavix 75mg 








HTN


-Continue Bystolic 5mg daily


-Continue to monitor BP 





HLD


-Continue Zetia 10mg 


-Continue Lipitor 80mg 





Asthma


-Currently has no exacerbation


-DuoNeb 1 amp ordered


 


BPH


-Continue Tamsulosin 0.4mg 





F/E/N


-IVF with NS half bolus given


-Hyperglycemia 


-NPO until speech and swallow evaluation 





Prophylaxis


-SCD's for DVT


-No GI prophylaxis 





Disposition


-Full code 


-Will remain inpatient overnight.  





Visit type





- Emergency Visit


Emergency Visit: Yes


ED Registration Date: 01/18/17


Care time: The patient presented to the Emergency Department on the above date 

and was hospitalized for further evaluation of their emergent condition.





- New Patient


This patient is new to me today: Yes


Date on this admission: 01/19/17





- Critical Care


Critical Care patient: No

## 2017-01-18 NOTE — CONSULT
Admitting History and Physical





- Primary Care Physician


PCP: Francisco Javier Bhat





- Admission


History of Present Illness: 


Per EMR:


 "Patient poor historian and unable to obtain history due to clinical 

condition. All information obtained from wife and records


Patient is a 76 year old male with a significant PMHx of CAD, CHF, HLD, HTN, DM 

II (noncompliant and uncontrolled), CKD, asthma, and Dementia who was brought 

to the ED by his wife and son for worsening altered mental status for the last 2

-3 days.  At baseline, patient is awake, alert, and oriented.  She does report 

that he has worsening vision due to his uncontrolled diabetes, which worsened 

his gait in the last month.  Patient's wife also reports a productive cough 

with white sputum for the last 2-3 days associated with fever and increasing 

urinary frequency.  According to wife, she has been administering Tylenol every 

four hours for the fever. In the ED patient was found to have a rectal 

temperature of 100.2.  However, she denies any chills, nausea, vomiting, chest 

pain, palpitations, shortness of breath.  





ER course was notable for:


(1) Chest x-ray


(2) Vancomycin and Zosyn 


(3) 10 Units of insulin "





Snoring sounds while sleeping and when alert and reclined. Frequent cough.


History Source: Significant Other (limited.), Medical Record


Limitations to Obtaining History: Clinical Condition, Language Barrier





- Past Medical History


Cardiovascular: Yes: CAD, HTN, Hyperlipdemia


Pulmonary: Yes: Asthma, COPD





- Past Surgical History


Past Surgical History: Yes: CABG





- Smoking History


Smoking history: Never smoked


Have you smoked in the past 12 months: No


Aproximately how many cigarettes per day: 0


If you are a former smoker, when did you quit?: '96





- Alcohol/Substance Use


Hx Alcohol Use: No





- Social History


History of Recent Travel: No





History





- Admission


Reason For Visit: SEPSIS PNEUMONIA TYPE 2 DIABETES MELLITUS





- Diagnostics


X-ray: Report Reviewed





- General


Mental Status: Awake and Alert, Able to Follow Commands, Intermittently 

Confused (difficult to assess due to language barrier and limited ability of pt'

s wife to translate/assist.)


Attention: Distractible (eyes closed throughout assessment)


Ability to Follow Directions: Fair





- Hearing


Hearing: Functional





Speech Evaluation





- Communication


Primary Language: KIARA


Communication: Yes: Simple Responses





- Speech Characteristics


Voice Loudness: Normal


Voice Pitch: Yes: Normal


Voice Phonatory-based Quality: Yes: Dysphonia





- Swallow Evaluation/Bedside Assessment


Current Nutritional Intake: NPO


Dentition: Yes: Missing Teeth


Facial Symmetry at Rest: Facial Droop Right (possibly at rest? Limited 

cooperation with examination)


Lingual Movement: Symmetric


Labial Seal: WFL


Oral Prep Time: WFL


A-P Transit: WFL


Pocketing: None


Coughing/Throat Clear: No


Change in Voice: No





Recommendations





- Speech Evaluation, Impression/Plan


Impression: Eyes closed/language barrier/coughing throughout assessment.  

Swallow seems brisk.





- Dysphagia Impressions/Plan


Dysphagia Impressions: Ongoing Evaluation


*Silent aspiration: cannot be R/O at bedside


Dysphagia Treatment Plan: Chin Tuck/Down, Safe Rate, 1/2 tsp. at a time, 

Elevate HOB during feed


Recommendations: Modified Barium Swallow (if aspiration is suspected, once pt 

is less congested), Other (NPO if coughing noted during PO intake, or if CXR 

worsens.)





- Recommendations


Diet Consistency: Dysphagia Pureed


Medication Administration: Crushed with applesauce


Liquids: Nectar Thick

## 2017-01-18 NOTE — PN
<Zenaida Vergara - Last Filed: 01/18/17 02:47>





Teaching Attending Note


Name of Resident: Quita Mendez





ATTENDING PHYSICIAN STATEMENT





I saw and evaluated the patient.


I reviewed the resident's note and discussed the case with the resident.


I agree with the resident's findings and plan as documented.








SUBJECTIVE:








OBJECTIVE:








ASSESSMENT AND PLAN:








<Sanjay Weber - Last Filed: 01/18/17 04:42>





Teaching Attending Note


ATTENDING PHYSICIAN STATEMENT





I saw and evaluated the patient.


I reviewed the resident's note and discussed the case with the resident.


I agree with the resident's findings and plan as documented.








SUBJECTIVE:


The patient is a 76 year old male with significant past medical history of 

dementia, CAD, hyperlipidemia, hypertension, chronic kidney disease, type 2 

diabetes, and asthma who presents to the ED with worsening AMS. History 

obtained from family at bedside. As per the patient family, he is alert and 

oriented at baseline. The patients wife also notes a productive cough with 

white sputum for one week. The patient denies fever, chills, nausea, vomiting, 

chest pain, and shortness of breath. As per family, patient has received a flu 

shot. 





OBJECTIVE:


Vital Signs:


 Last Vital Signs











Temp Pulse Resp BP Pulse Ox


 


 100.2 F H  85   18   135/65   92 L


 


 01/18/17 00:50  01/17/17 23:28  01/17/17 23:28  01/17/17 23:28  01/17/17 23:28














Physical Exam:


GEN: NAD. Elderly male A&O x1


HEENT: NCAT, PERRL


CARD: RRR, S1 S2


RESP: Course bilateral crackles at bases of lungs 


ABD: NT, BWS x4


EXT: - Left upper arm insulin pump. Chronic venous stasis of bilateral lower 

extremities





Labs:


 CBCD











WBC  14.5 K/mm3 (4.0-10.0)  H D 01/18/17  00:01    


 


RBC  4.27 M/mm3 (4.00-5.60)  D 01/18/17  00:01    


 


Hgb  12.7 GM/dL (11.7-16.9)  D 01/18/17  00:01    


 


Hct  38.9 % (35.4-49)  D 01/18/17  00:01    


 


MCV  91.1 fl (80-96)   01/18/17  00:01    


 


MCHC  32.7 g/dl (32.0-35.9)   01/18/17  00:01    


 


RDW  13.4 % (11.9-15.9)   01/18/17  00:01    


 


Plt Count  102 K/MM3 (134-434)  L D 01/18/17  00:01    


 


MPV  10.6 fl (7.5-11.1)  D 01/18/17  00:01    








 CMP











Sodium  136 mmol/L (136-145)   01/18/17  00:01    


 


Potassium  4.7 mmol/L (3.5-5.1)   01/18/17  00:01    


 


Chloride  94 mmol/L ()  L D 01/18/17  00:01    


 


Carbon Dioxide  26 mmol/L (21-32)   01/18/17  00:01    


 


Anion Gap  16  (8-16)   01/18/17  00:01    


 


BUN  56 mg/dL (7-18)  H D 01/18/17  00:01    


 


Creatinine  2.2 mg/dL (0.7-1.3)  H D 01/18/17  00:01    


 


Creat Clearance w eGFR  29.25  (>60)   01/18/17  00:01    


 


Calcium  9.7 mg/dL (8.5-10.1)   01/18/17  00:01    


 


Total Bilirubin  0.8 mg/dL (0.2-1.0)  D 01/18/17  00:01    


 


AST  15 U/L (15-37)  D 01/18/17  00:01    


 


ALT  22 U/L (12-78)   01/18/17  00:01    


 


Alkaline Phosphatase  134 U/L ()  H D 01/18/17  00:01    


 


Total Protein  6.9 g/dl (6.4-8.2)   01/18/17  00:01    


 


Albumin  3.9 g/dl (3.4-5.0)   01/18/17  00:01    














Imaging:





ASSESSMENT AND PLAN:


Patient is a 76 year old male with significant past medical history of dementia

, CAD, hyperlipidemia, hypertension, chronic kidney disease, type 2 diabetes, 

and asthma who presents to the ED with AMS and frequent urination being 

admitted for sepsis secondary to pneumonia. 





1.Sepsis


-Most likely secondary to community acquired pneumonia 


-Vancomycin and Zosyn received in ED


-Repeat lactic acid


-Pan culture


-Stat flu swab


-Obed IVF


-Sputum culture


-Urine legionella/pneumococcal  








2. Uncontrolled diabetes


-s/p insulin regular received in ED


-Continue with Levemir rapid acting


-Finger sticks 


 


3. Acute on chronic renal failure


-Obed hydration


-Urine lytes


-Base creatinine around 2 





4. CHF


-Continue home meds


-Hold lasix as patient is dehydrated


-Sputum culture





5. Thrombocytopenia 


-most likely due to infection 





6. Asthma 


Not currently in exacerbation


Nebulizer as needed





7. HTN


Continue beta blocker and flomax





Admit to med surg. 





Documentation prepared by Sanjay Weber, acting as medical scribe for Dr. Jai MD.

## 2017-01-18 NOTE — EKG
Test Reason : 

Blood Pressure : ***/*** mmHG

Vent. Rate : 084 BPM     Atrial Rate : 084 BPM

   P-R Int : 170 ms          QRS Dur : 096 ms

    QT Int : 396 ms       P-R-T Axes : 049 054 065 degrees

   QTc Int : 467 ms

 

NORMAL SINUS RHYTHM

NONSPECIFIC ST ABNORMALITY

ABNORMAL ECG

WHEN COMPARED WITH ECG OF 15-APR-2016 07:13,

SINUS RHYTHM HAS REPLACED JUNCTIONAL RHYTHM

Confirmed by DURAN WALSH, MUSA ([id number]) on 1/18/2017 10:30:10 AM

 

Referred By:             Confirmed By:MUSA GARZON MD

## 2017-01-19 LAB
ANION GAP SERPL CALC-SCNC: 7 MMOL/L (ref 8–16)
ANION GAP SERPL CALC-SCNC: 7 MMOL/L (ref 8–16)
CALCIUM SERPL-MCNC: 9.1 MG/DL (ref 8.5–10.1)
CALCIUM SERPL-MCNC: 9.3 MG/DL (ref 8.5–10.1)
CO2 SERPL-SCNC: 28 MMOL/L (ref 21–32)
CO2 SERPL-SCNC: 30 MMOL/L (ref 21–32)
CREAT SERPL-MCNC: 1.4 MG/DL (ref 0.7–1.3)
CREAT SERPL-MCNC: 1.8 MG/DL (ref 0.7–1.3)
DEPRECATED RDW RBC AUTO: 13.4 % (ref 11.9–15.9)
GLUCOSE SERPL-MCNC: 146 MG/DL (ref 74–106)
GLUCOSE SERPL-MCNC: 550 MG/DL (ref 74–106)
MAGNESIUM SERPL-MCNC: 2.8 MG/DL (ref 1.8–2.4)
MCH RBC QN AUTO: 30.6 PG (ref 25.7–33.7)
MCHC RBC AUTO-ENTMCNC: 33.4 G/DL (ref 32–35.9)
MCV RBC: 91.5 FL (ref 80–96)
PHOSPHATE SERPL-MCNC: 1.5 MG/DL (ref 2.5–4.9)
PLATELET # BLD AUTO: 92 K/MM3 (ref 134–434)
PMV BLD: 10.1 FL (ref 7.5–11.1)
WBC # BLD AUTO: 13 K/MM3 (ref 4–10)

## 2017-01-19 RX ADMIN — INSULIN ASPART SCH UNITS: 100 INJECTION, SOLUTION INTRAVENOUS; SUBCUTANEOUS at 21:04

## 2017-01-19 RX ADMIN — INSULIN ASPART SCH UNITS: 100 INJECTION, SOLUTION INTRAVENOUS; SUBCUTANEOUS at 13:00

## 2017-01-19 RX ADMIN — NEBIVOLOL HYDROCHLORIDE SCH MG: 5 TABLET ORAL at 21:06

## 2017-01-19 RX ADMIN — INSULIN ASPART SCH UNITS: 100 INJECTION, SOLUTION INTRAVENOUS; SUBCUTANEOUS at 06:20

## 2017-01-19 RX ADMIN — IPRATROPIUM BROMIDE AND ALBUTEROL SULFATE SCH AMP: .5; 3 SOLUTION RESPIRATORY (INHALATION) at 14:14

## 2017-01-19 RX ADMIN — INSULIN ASPART SCH UNITS: 100 INJECTION, SOLUTION INTRAVENOUS; SUBCUTANEOUS at 18:37

## 2017-01-19 RX ADMIN — ATORVASTATIN CALCIUM SCH MG: 80 TABLET, FILM COATED ORAL at 21:06

## 2017-01-19 RX ADMIN — IPRATROPIUM BROMIDE AND ALBUTEROL SULFATE SCH AMP: .5; 3 SOLUTION RESPIRATORY (INHALATION) at 21:33

## 2017-01-19 RX ADMIN — INSULIN DETEMIR SCH UNITS: 100 INJECTION, SOLUTION SUBCUTANEOUS at 22:51

## 2017-01-19 RX ADMIN — INSULIN ASPART SCH UNITS: 100 INJECTION, SOLUTION INTRAVENOUS; SUBCUTANEOUS at 18:38

## 2017-01-19 RX ADMIN — IPRATROPIUM BROMIDE AND ALBUTEROL SULFATE SCH AMP: .5; 3 SOLUTION RESPIRATORY (INHALATION) at 17:08

## 2017-01-19 RX ADMIN — EZETIMIBE SCH MG: 10 TABLET ORAL at 09:53

## 2017-01-19 RX ADMIN — ACETAMINOPHEN PRN MG: 325 TABLET ORAL at 21:06

## 2017-01-19 RX ADMIN — INSULIN ASPART SCH: 100 INJECTION, SOLUTION INTRAVENOUS; SUBCUTANEOUS at 15:47

## 2017-01-19 RX ADMIN — SODIUM CHLORIDE SCH MLS/HR: 9 INJECTION, SOLUTION INTRAVENOUS at 02:02

## 2017-01-19 RX ADMIN — INSULIN ASPART SCH: 100 INJECTION, SOLUTION INTRAVENOUS; SUBCUTANEOUS at 02:02

## 2017-01-19 RX ADMIN — CLOPIDOGREL BISULFATE SCH MG: 75 TABLET, FILM COATED ORAL at 09:53

## 2017-01-19 RX ADMIN — CEFTRIAXONE SODIUM SCH MLS/HR: 2 INJECTION, POWDER, FOR SOLUTION INTRAMUSCULAR; INTRAVENOUS at 09:53

## 2017-01-19 RX ADMIN — DORZOLAMIDE HYDROCHLORIDE SCH DROP: 20 SOLUTION/ DROPS OPHTHALMIC at 06:19

## 2017-01-19 RX ADMIN — ASPIRIN 81 MG SCH MG: 81 TABLET ORAL at 09:53

## 2017-01-19 RX ADMIN — TAMSULOSIN HYDROCHLORIDE SCH MG: 0.4 CAPSULE ORAL at 09:53

## 2017-01-19 RX ADMIN — POTASSIUM & SODIUM PHOSPHATES POWDER PACK 280-160-250 MG SCH PACKET: 280-160-250 PACK at 21:10

## 2017-01-19 RX ADMIN — AZITHROMYCIN DIHYDRATE SCH MLS/HR: 500 INJECTION, POWDER, LYOPHILIZED, FOR SOLUTION INTRAVENOUS at 10:00

## 2017-01-19 RX ADMIN — DORZOLAMIDE HYDROCHLORIDE SCH DROP: 20 SOLUTION/ DROPS OPHTHALMIC at 21:05

## 2017-01-19 RX ADMIN — INSULIN ASPART SCH: 100 INJECTION, SOLUTION INTRAVENOUS; SUBCUTANEOUS at 22:50

## 2017-01-19 NOTE — MSN
Progress Note (SOAP)





- Subjective


History of Present Illness: 


Nicolas key is a 77 y/o male with a pmh of CAD, CHF, HTN, uncontrolled DM

, CKD, asthma, and dementia who was brought in by his wife and son on 1/17 for 

a 2 day history of altered mental status and productive cough. 


This morning, he is more oriented than yesterday, responds to his name and to 

questions in a more clear manner. He speaks in both mariam and English. He is 

mostly blind and reaches out to me to hold my arm to listen more closely. While 

he does state that his breathing is much better and his cough slightly better, 

he is quite lethargic. He denies any sob, f/c, n/v, h/a, chest pain, dizziness, 

or dysuria. 





- Current Medications


Current Medications: 


Active Medications





Acetaminophen (Tylenol -)  650 mg PO Q4H PRN


   PRN Reason: FEVER OR PAIN


   Last Admin: 01/18/17 13:21 Dose:  650 mg


Albuterol/Ipratropium (Duoneb -)  1 amp NEB Q4H [facility]


   Last Admin: 01/19/17 10:29 Dose:  Not Given


Aspirin (Asa -)  81 mg PO DAILY [facility]


   Last Admin: 01/19/17 09:53 Dose:  81 mg


Atorvastatin Calcium (Lipitor -)  80 mg PO HS [facility]


   Last Admin: 01/18/17 22:01 Dose:  80 mg


Clopidogrel Bisulfate (Plavix -)  75 mg PO DAILY [facility]


   Last Admin: 01/19/17 09:53 Dose:  75 mg


Dorzolamide HCl (Trusopt 2%)  1 drop OU TID [facility]


   Last Admin: 01/19/17 06:19 Dose:  1 drop


Ezetimibe (Zetia -)  10 mg PO DAILY [facility]


   Last Admin: 01/19/17 09:53 Dose:  10 mg


Azithromycin 250 mg/ Dextrose  250 mls @ 250 mls/hr IVPB DAILY [facility]


Ceftriaxone Sodium (Rocephin 2gm Ivpb (Pre-Docked))  100 mls @ 200 mls/hr IVPB 

DAILY [facility]


   Last Admin: 01/19/17 09:53 Dose:  200 mls/hr


Sodium Chloride (Normal Saline -)  1,000 mls @ 50 mls/hr IV ASDIR [facility]


Insulin Aspart (Novolog Vial Sliding Scale -)  1 vial SQ Q4HPO [facility]


   PRN Reason: Protocol


   Last Admin: 01/19/17 06:20 Dose:  4 units


Insulin Aspart (Novolog)  8 units SQ BID@0700,1630 [facility]


   Last Admin: 01/19/17 06:20 Dose:  8 units


Insulin Detemir (Levemir Vial)  18 units SQ [facility]


   Last Admin: 01/18/17 22:01 Dose:  18 units


Latanoprost (Xalatan 0.005% Eye Drops -)  1 drop OU DAILY [facility]


   Last Admin: 01/18/17 11:20 Dose:  Not Given


Nebivolol (Bystolic -)  5 mg PO [facility]


   Last Admin: 01/18/17 22:01 Dose:  5 mg


Tamsulosin HCl (Flomax -)  0.4 mg PO DAILY@0830 [facility]


   Last Admin: 01/19/17 09:53 Dose:  0.4 mg











- Objective


Vital Signs: 


 Vital Signs











Temperature  97.8 F   01/19/17 09:31


 


Pulse Rate  72   01/19/17 10:27


 


Respiratory Rate  20   01/19/17 09:31


 


Blood Pressure  123/88   01/19/17 09:31


 


O2 Sat by Pulse Oximetry (%)  96   01/19/17 10:27











Constitutional: Yes: Mild Distress (some agitation)


Neck: Yes: WNL (no lymphadenopathy)


Cardiovascular: Yes: Regular Rate and Rhythm, S1, S2


Respiratory: Yes: Other (bibasilar crackles, coarse breath sounds more on 

right. Significant upper respiratory congestion)


Gastrointestinal: Yes: Normal Bowel Sounds, Soft (normoactive bowel sounds)


Extremities: Yes: Other (b/l LE venous stasis)


Peripheral Pulses WNL: Yes


Edema: No


Neurological: Yes: Alert, Oriented (oriented to person and place. respionsive 

to commands and name)


...Motor Strength: Yes: WNL (5/5 but only for 1-2 seconds. Lethargic so loses 

force after a couple of seconds)





Labs


Lab Results: 


 CBC, BMP





 01/19/17 08:05 





 01/19/17 08:05 











Assessment/Plan


Sepsis 2/2 to CAP


-elevated white count down to 13 today from 15.5, productive cough, and right 

lower lobe infiltrate on cxr 1/17


-is afebrile now byut had a Tmax of 101.2 and was then given acetominophen 650


-Given Zosyn in the ED, started on azithro and rocephin


-Urine Legionella and pneumococcal


Influenza was negative


-Speech and swallow ordered and showed some dysphagia. started on puree diet


-F/u cbc. Lactic acid stopped-last was 1.66





Hyperglycemia


-glucose elevated above 500 on arrival


-urine glucose 3+, 1+ ketone


-Started on levemir 18 hs with novolog scheduled 8 bid


-Since then glucose has been better controlled in the 200s





DEB


-Cr of 1.4 currently


-baseline on previous admissions has been in 1.5 range


- iv hydration at 125


-diuretic held

## 2017-01-19 NOTE — PN
<Ariana Leal - Last Filed: 01/19/17 12:22>


Physical Exam: 


SUBJECTIVE: Patient seen and examined





Patient resting in bed, in mild distress. Afebrile and hemodynamically stable. 

O2 sat 98% on 2L NC. awake and alert, responsive, improved mental status from 

yesterday. Wife states he is still below baseline. He still feels weak, howeer 

states that he feels better, has less cough, no pain and improved breathing. 

Nurse states that his lungs initially soudned better overnight but began sto 

sound worse again this morning, possibly due to fluids. He deneis pelvic pain. 

He denies h/a, n/v, chest pain, sob, abd pain, diarrhea or dysuria. 





OBJECTIVE:





 Vital Signs











 Period  Temp  Pulse  Resp  BP Sys/Ramírez  Pulse Ox


 


 Last 24 Hr  97.8 F-101.2 F  69-73  20-20  122-137/55-88  











GENERAL: Awake, alert oriented but below baseline 


HEAD: Normal with no signs of trauma.


EYES: Sclera anicteric and mildly injected b/l PERRLA


EARS, NOSE, THROAT: oropharynx clear without exudates. Moist mucous membranes.


NECK: supple without lymphadenopathy, JVD, or masses.


LUNGS: bibasilar crackles. diffusely coarse breath sounds unimproved from 

yesterday. 


HEART: Regular rate and rhythm, normal S1 and S2 


ABDOMEN: Soft, not distended, normoactive bowel sounds, no guarding. No 

suprapubic tenderness 


MUSCULOSKELETAL: No CVA tenderness.


UPPER EXTREMITIES: 2+ pulses, warm, well perfused, No peripheral edema.


LOWER EXTREMITIES: venous stasis discoloration of b/l ankles, trace b/l edema 


NEUROLOGICAL: weak but more alert 














 Laboratory Results - last 24 hr











  01/18/17 01/18/17 01/18/17





  06:16 11:40 12:00


 


WBC   


 


RBC   


 


Hgb   


 


Hct   


 


MCV   


 


MCHC   


 


RDW   


 


Plt Count   


 


MPV   


 


Sodium   


 


Potassium   


 


Chloride   


 


Carbon Dioxide   


 


Anion Gap   


 


BUN   


 


Creatinine   


 


POC Glucometer  > 400  [phone] 


 


Random Glucose   


 


Hemoglobin A1c %   


 


Lactic Acid    1.664


 


Calcium   


 


Phosphorus   


 


Magnesium   














  01/18/17 01/18/17 01/18/17





  16:05 17:45 21:58


 


WBC   


 


RBC   


 


Hgb   


 


Hct   


 


MCV   


 


MCHC   


 


RDW   


 


Plt Count   


 


MPV   


 


Sodium   


 


Potassium   


 


Chloride   


 


Carbon Dioxide   


 


Anion Gap   


 


BUN   


 


Creatinine   


 


POC Glucometer  355  350  233


 


Random Glucose   


 


Hemoglobin A1c %   


 


Lactic Acid   


 


Calcium   


 


Phosphorus   


 


Magnesium   














  01/19/17 01/19/17 01/19/17





  02:01 06:00 06:18


 


WBC   


 


RBC   


 


Hgb   


 


Hct   


 


MCV   


 


MCHC   


 


RDW   


 


Plt Count   


 


MPV   


 


Sodium   


 


Potassium   


 


Chloride   


 


Carbon Dioxide   


 


Anion Gap   


 


BUN   


 


Creatinine   


 


POC Glucometer  140   208


 


Random Glucose   


 


Hemoglobin A1c %   5.1  D 


 


Lactic Acid   


 


Calcium   


 


Phosphorus   


 


Magnesium   














  01/19/17 01/19/17





  08:05 08:05


 


WBC  13.0 H 


 


RBC  4.18 


 


Hgb  12.8 


 


Hct  38.3 


 


MCV  91.5 


 


MCHC  33.4 


 


RDW  13.4 


 


Plt Count  92 L 


 


MPV  10.1 


 


Sodium   151 H


 


Potassium   4.1


 


Chloride   114 H D


 


Carbon Dioxide   30


 


Anion Gap   7 L


 


BUN   30 H D


 


Creatinine   1.4 H D


 


POC Glucometer  


 


Random Glucose   146 H D


 


Hemoglobin A1c %  


 


Lactic Acid  


 


Calcium   9.3


 


Phosphorus   1.5 L D


 


Magnesium   2.8 H








Active Medications











Generic Name Dose Route Start Last Admin





  Trade Name Freq  PRN Reason Stop Dose Admin


 


Acetaminophen  650 mg 01/18/17 13:15 01/18/17 13:21





  Tylenol -  PO   650 mg





  Q4H PRN   Administration





  FEVER OR PAIN   


 


Albuterol/Ipratropium  1 amp 01/19/17 10:30  





  Duoneb -  NEB   





  Q4H JENNIFER   


 


Aspirin  81 mg 01/18/17 10:00 01/19/17 09:53





  Asa -  PO   81 mg





  DAILY JENNIFER   Administration


 


Atorvastatin Calcium  80 mg 01/18/17 22:00 01/18/17 22:01





  Lipitor -  PO   80 mg





  HS JENNIFER   Administration


 


Clopidogrel Bisulfate  75 mg 01/18/17 10:00 01/19/17 09:53





  Plavix -  PO   75 mg





  DAILY JENNIFER   Administration


 


Dorzolamide HCl  1 drop 01/18/17 06:00 01/19/17 06:19





  Trusopt 2%  OU   1 drop





  TID JENNIFER   Administration


 


Ezetimibe  10 mg 01/18/17 10:00 01/19/17 09:53





  Zetia -  PO   10 mg





  DAILY JENNIFER   Administration


 


Azithromycin 250 mg/ Dextrose  250 mls @ 250 mls/hr 01/19/17 10:00  





  IVPB   





  DAILY JENNIFER   


 


Ceftriaxone Sodium  100 mls @ 200 mls/hr 01/18/17 10:00 01/19/17 09:53





  Rocephin 2gm Ivpb (Pre-Docked)  IVPB   200 mls/hr





  DAILY JENNIFER   Administration


 


Sodium Chloride  1,000 mls @ 50 mls/hr 01/19/17 10:24  





  Normal Saline -  IV   





  ASDIR [facility]   


 


Insulin Aspart  1 vial 01/18/17 06:00 01/19/17 06:20





  Novolog Vial Sliding Scale -  SQ   4 units





  Q4HPO JENNIFER   Administration





  Protocol   


 


Insulin Aspart  8 units 01/18/17 16:30 01/19/17 06:20





  Novolog  SQ   8 units





  BID@0700,1630 JENNIFER   Administration


 


Insulin Detemir  18 units 01/18/17 22:00 01/18/17 22:01





  Levemir Vial  SQ   18 units





  HS JENNIFER   Administration


 


Latanoprost  1 drop 01/18/17 10:00 01/18/17 11:20





  Xalatan 0.005% Eye Drops -  OU   Not Given





  DAILY [facility]   


 


Nebivolol  5 mg 01/18/17 22:00 01/18/17 22:01





  Bystolic -  PO   5 mg





  HS JENNIFER   Administration


 


Tamsulosin HCl  0.4 mg 01/18/17 08:30 01/19/17 09:53





  Flomax -  PO   0.4 mg





  DAILY@0830 JENNIFER   Administration











ASSESSMENT/PLAN:


This is a 77 yo M with PMH of CAD, CHF, HLD, HTN, uncontrolled IDDM II, CKD, 

asthma, BPH and Dementia who is admitted due to CAP: altered mental status, 

productive cough, fever, urinary frequency/incontinence, hyperglycemia x 3d.





Altered mental status due to Sepsis


-grupo source: RLL Community Acquired Pneumonia


-CXR: RLL infiltrate 


-lactic acidosis resolved 


-s/p Zosyn in ED, now on Azithro and Rocephin day 2


-Blood culture no growth day 1 and urine cultures p/d


-Sputum cultures p/d


-Flu negative 


-Tylenol PO PRN


-duonebs q4h [facility]


-Speech and Swallow consult: pureed diet 





Hyperglycemia in setting of uncontrolled IDDM 


-medication noncompliant 


-Glucose >500 on admission 


-Insulin Sliding scale


-tolerates meals,  Levemir 18 HS and Novolog 8 bid


-fingerstick TID AC 


-BG goal 250 to avoid hypoglycemia 


-A1C





Diabetic retinopathy


-severe


-f/u opthalmology outpatient





DEB vs CKD


-likely due to dehydration in setting of uncontrolled DM 


-Creatinine 1.4 (baseline) down from 2.3 


-IV hydration can be reduced to 50cc/hr


-trend creat 





Thrombocytopenia 


-Likely associated with PNA


-resolved 





CHF


-no sign of acute failure at this time 


-hold Lasix in setting of DEB


-Continue Asa, Plavix





HTN


-Continue Bystolic





HLD


-Continue Zetia, Lipitor





Asthma


-stable


-duonebs jennifer


 


BPH


-Tamsulosin 





FEN


no ivf


lytes stable


DVT GI PPX: SCD, diet 


Dysphagia Pureed, diabetic diet nectar liquids 





Dispo: monitor in [facility]





Problem List





- Problems


(1) Acute renal failure


Code(s): N17.9 - ACUTE KIDNEY FAILURE, UNSPECIFIED   





(2) Dehydration


Code(s): E86.0 - DEHYDRATION





(3) Pneumonia


Code(s): J18.9 - PNEUMONIA, UNSPECIFIED ORGANISM   





(4) Sepsis


Code(s): A41.9 - SEPSIS, UNSPECIFIED ORGANISM   





(5) Weakness


Code(s): R53.1 - WEAKNESS





(6) Asthma


Code(s): J45.909 - UNSPECIFIED ASTHMA, UNCOMPLICATED   





(7) CAD (coronary artery disease)


Code(s): I25.10 - ATHSCL HEART DISEASE OF NATIVE CORONARY ARTERY W/O ANG PCTRS 

  





(8) CKD stage 3 due to type 2 diabetes mellitus


Code(s): E11.22 - TYPE 2 DIABETES MELLITUS W DIABETIC CHRONIC KIDNEY DISEASE


N18.3 - CHRONIC KIDNEY DISEASE, STAGE 3 (MODERATE)





(9) Hyperlipidemia


Code(s): E78.5 - HYPERLIPIDEMIA, UNSPECIFIED   





(10) Hypertension


Code(s): I10 - ESSENTIAL (PRIMARY) HYPERTENSION   





(11) Obesity (BMI 30-39.9)


Code(s): E66.9 - OBESITY, UNSPECIFIED





(12) Type 2 diabetes mellitus


Code(s): E11.9 - TYPE 2 DIABETES MELLITUS WITHOUT COMPLICATIONS   





(13) Community acquired pneumonia


Code(s): J18.9 - PNEUMONIA, UNSPECIFIED ORGANISM








Visit type





- Emergency Visit


Emergency Visit: Yes


ED Registration Date: 01/18/17


Care time: The patient presented to the Emergency Department on the above date 

and was hospitalized for further evaluation of their emergent condition.





- New Patient


This patient is new to me today: No





- Critical Care


Critical Care patient: No





- Discharge Referral


Referred to [facility] No





<Francisco Javier Bhat - Last Filed: 01/19/17 12:35>


Physical Exam: 


ATTENDING PHYSICIAN STATEMENT





I saw and evaluated the patient.


I reviewed the resident's note and discussed the case with the resident.


I agree with the resident's findings and plan as documented.








SUBJECTIVE:


seen and evaluated at the bedside





OBJECTIVE:


as per family pt is not yet at his baseline mental status





ASSESSMENT AND PLAN:


6 year old male with a significant PMHx of CAD, CHF, HLD, HTN, DM II (

noncompliant and uncontrolled), CKD, asthma, and Dementia admitted for sepsis 

due to community acquired pneumonia causing metabolic encephalopathy





Pneumonia


-currently afebrile and hemodynamically stable


-ceftriaxone and azithromycin 


-lactate now WNL


-follow up blood/urine cultures





Hyperglycemia


-no anion gap


-sliding scale insulin


-cont levemir





Acute kidney injury


-due to hypoperfusion secondary to sepsis


-started IVF


-creat trending down

## 2017-01-19 NOTE — PN
Progress Note, SLP





- Note


Progress Note: 


 Selected Entries











  01/18/17 01/18/17 01/18/17





  00:50 12:27 21:00


 


Breakfast   


 


Diet Tolerated   


 


Temperature 100.2 F H 101.2 F H 97.9 F














  01/19/17 01/19/17 01/19/17





  05:22 09:31 09:53


 


Breakfast   50%


 


Diet Tolerated   Fair


 


Temperature 97.8 F 97.8 F 








 Laboratory Tests











  01/19/17





  08:05


 


WBC  13.0 H








Loud snoring sound when sleeping. Wife reports this is pt's baseline. o2 sat 

wnl.


Dysphonic. Confused. [place] and visually impaired. Pt's brother in law reports pt 

was verbal, conversant without memory, speech/language/swallowing deficits a 

week ago when he saw him.


Overtly tolerating puree/nectar. Continue modified diet for now due to lethargy.

## 2017-01-20 LAB
ANION GAP SERPL CALC-SCNC: 14 MMOL/L (ref 8–16)
ANION GAP SERPL CALC-SCNC: 7 MMOL/L (ref 8–16)
CALCIUM SERPL-MCNC: 9 MG/DL (ref 8.5–10.1)
CALCIUM SERPL-MCNC: 9.3 MG/DL (ref 8.5–10.1)
CO2 SERPL-SCNC: 28 MMOL/L (ref 21–32)
CO2 SERPL-SCNC: 30 MMOL/L (ref 21–32)
CREAT SERPL-MCNC: 1.3 MG/DL (ref 0.7–1.3)
CREAT SERPL-MCNC: 1.6 MG/DL (ref 0.7–1.3)
DEPRECATED RDW RBC AUTO: 13.4 % (ref 11.9–15.9)
DEPRECATED RDW RBC AUTO: 13.8 % (ref 11.9–15.9)
GLUCOSE SERPL-MCNC: 203 MG/DL (ref 74–106)
MAGNESIUM SERPL-MCNC: 2.8 MG/DL (ref 1.8–2.4)
MCH RBC QN AUTO: 29.9 PG (ref 25.7–33.7)
MCH RBC QN AUTO: 30.1 PG (ref 25.7–33.7)
MCHC RBC AUTO-ENTMCNC: 32.5 G/DL (ref 32–35.9)
MCHC RBC AUTO-ENTMCNC: 33 G/DL (ref 32–35.9)
MCV RBC: 91.2 FL (ref 80–96)
MCV RBC: 91.8 FL (ref 80–96)
PHOSPHATE SERPL-MCNC: 2 MG/DL (ref 2.5–4.9)
PLATELET # BLD AUTO: 106 K/MM3 (ref 134–434)
PLATELET # BLD AUTO: 94 K/MM3 (ref 134–434)
PMV BLD: 10.2 FL (ref 7.5–11.1)
PMV BLD: 10.4 FL (ref 7.5–11.1)
WBC # BLD AUTO: 10.9 K/MM3 (ref 4–10)
WBC # BLD AUTO: 12.2 K/MM3 (ref 4–10)

## 2017-01-20 RX ADMIN — INSULIN ASPART SCH: 100 INJECTION, SOLUTION INTRAVENOUS; SUBCUTANEOUS at 22:00

## 2017-01-20 RX ADMIN — INSULIN ASPART SCH UNITS: 100 INJECTION, SOLUTION INTRAVENOUS; SUBCUTANEOUS at 12:18

## 2017-01-20 RX ADMIN — DORZOLAMIDE HYDROCHLORIDE SCH DROP: 20 SOLUTION/ DROPS OPHTHALMIC at 14:05

## 2017-01-20 RX ADMIN — CEFTRIAXONE SODIUM SCH MLS/HR: 2 INJECTION, POWDER, FOR SOLUTION INTRAMUSCULAR; INTRAVENOUS at 11:18

## 2017-01-20 RX ADMIN — POTASSIUM & SODIUM PHOSPHATES POWDER PACK 280-160-250 MG SCH PACKET: 280-160-250 PACK at 11:18

## 2017-01-20 RX ADMIN — NEBIVOLOL HYDROCHLORIDE SCH: 5 TABLET ORAL at 21:50

## 2017-01-20 RX ADMIN — EZETIMIBE SCH MG: 10 TABLET ORAL at 11:17

## 2017-01-20 RX ADMIN — CLOPIDOGREL BISULFATE SCH MG: 75 TABLET, FILM COATED ORAL at 11:17

## 2017-01-20 RX ADMIN — INSULIN ASPART SCH: 100 INJECTION, SOLUTION INTRAVENOUS; SUBCUTANEOUS at 18:19

## 2017-01-20 RX ADMIN — IPRATROPIUM BROMIDE AND ALBUTEROL SULFATE SCH AMP: .5; 3 SOLUTION RESPIRATORY (INHALATION) at 18:25

## 2017-01-20 RX ADMIN — DORZOLAMIDE HYDROCHLORIDE SCH: 20 SOLUTION/ DROPS OPHTHALMIC at 08:15

## 2017-01-20 RX ADMIN — IPRATROPIUM BROMIDE AND ALBUTEROL SULFATE SCH AMP: .5; 3 SOLUTION RESPIRATORY (INHALATION) at 21:50

## 2017-01-20 RX ADMIN — LATANOPROST SCH DROP: 50 SOLUTION OPHTHALMIC at 11:18

## 2017-01-20 RX ADMIN — ACETAMINOPHEN PRN MG: 325 TABLET ORAL at 17:11

## 2017-01-20 RX ADMIN — DORZOLAMIDE HYDROCHLORIDE SCH DROP: 20 SOLUTION/ DROPS OPHTHALMIC at 06:15

## 2017-01-20 RX ADMIN — ATORVASTATIN CALCIUM SCH: 80 TABLET, FILM COATED ORAL at 21:51

## 2017-01-20 RX ADMIN — INSULIN ASPART SCH UNITS: 100 INJECTION, SOLUTION INTRAVENOUS; SUBCUTANEOUS at 15:21

## 2017-01-20 RX ADMIN — INSULIN ASPART SCH UNITS: 100 INJECTION, SOLUTION INTRAVENOUS; SUBCUTANEOUS at 06:15

## 2017-01-20 RX ADMIN — IPRATROPIUM BROMIDE AND ALBUTEROL SULFATE SCH AMP: .5; 3 SOLUTION RESPIRATORY (INHALATION) at 10:47

## 2017-01-20 RX ADMIN — IPRATROPIUM BROMIDE AND ALBUTEROL SULFATE SCH AMP: .5; 3 SOLUTION RESPIRATORY (INHALATION) at 06:55

## 2017-01-20 RX ADMIN — DORZOLAMIDE HYDROCHLORIDE SCH DROP: 20 SOLUTION/ DROPS OPHTHALMIC at 22:43

## 2017-01-20 RX ADMIN — AZITHROMYCIN DIHYDRATE SCH MLS/HR: 500 INJECTION, POWDER, LYOPHILIZED, FOR SOLUTION INTRAVENOUS at 12:19

## 2017-01-20 RX ADMIN — POTASSIUM & SODIUM PHOSPHATES POWDER PACK 280-160-250 MG SCH: 280-160-250 PACK at 21:51

## 2017-01-20 RX ADMIN — INSULIN ASPART SCH UNITS: 100 INJECTION, SOLUTION INTRAVENOUS; SUBCUTANEOUS at 02:04

## 2017-01-20 RX ADMIN — ASPIRIN 81 MG SCH MG: 81 TABLET ORAL at 11:17

## 2017-01-20 RX ADMIN — IPRATROPIUM BROMIDE AND ALBUTEROL SULFATE SCH AMP: .5; 3 SOLUTION RESPIRATORY (INHALATION) at 14:58

## 2017-01-20 RX ADMIN — LATANOPROST SCH: 50 SOLUTION OPHTHALMIC at 08:15

## 2017-01-20 RX ADMIN — CLINDAMYCIN PHOSPHATE SCH MLS/HR: 150 INJECTION, SOLUTION INTRAMUSCULAR; INTRAVENOUS at 19:12

## 2017-01-20 RX ADMIN — TAMSULOSIN HYDROCHLORIDE SCH MG: 0.4 CAPSULE ORAL at 11:17

## 2017-01-20 NOTE — HOSP
Physical Examination


Vital Signs: 


 Vital Signs











Temperature  98.3 F   01/20/17 20:33


 


Pulse Rate  94 H  01/20/17 20:33


 


Respiratory Rate  22   01/20/17 20:33


 


Blood Pressure  120/48   01/20/17 20:33


 


O2 Sat by Pulse Oximetry (%)  96   01/20/17 20:33











Labs: 


 CBC, BMP





 01/20/17 17:00 





 01/20/17 15:30 











Hospitalist Encounter


Assessment: 


I was notified by the RN that patient seems to be more lethargic and drowsy and 

asked to come evaluate patient.  When I arrived patient was sleeping but 

responded to sternal rub and moved by hands.  He was able to speak to his wife 

in his language and went back to sleep.  Patient's vitals were wnl with a an o2 

sat of 98% on 2L, Temperature of 98.3, BP of 127/63, and hear rate of 76.  I 

was also notified that patients lactic acid returned and was above 3.4





PLAN:


-1 bolus of Normal Saline


-1 amp of DuoNeb


-Repeat Lactic acid after fluids are given


-Tylenol PRN 








Visit type





- Emergency Visit


Emergency Visit: No





- New Patient


This patient is new to me today: No





- Critical Care


Critical Care patient: No

## 2017-01-20 NOTE — PN
<Francisco Javier Bhat - Last Filed: 01/20/17 15:12>


Physical Exam: 


ATTENDING PHYSICIAN STATEMENT





I saw and evaluated the patient.


I reviewed the resident's note and discussed the case with the resident.


I agree with the resident's findings and plan as documented.








SUBJECTIVE:


seen and evaluated at the bedside





OBJECTIVE:


as per family pt is not yet at his baseline mental status





ASSESSMENT AND PLAN:


6 year old male with a significant PMHx of CAD, CHF, HLD, HTN, DM II (

noncompliant and uncontrolled), CKD, asthma, and Dementia admitted for sepsis 

due to community acquired pneumonia causing metabolic encephalopathy





Pneumonia


-currently afebrile and hemodynamically stable


-ceftriaxone and azithromycin 


-lactate now WNL


-follow up blood/urine cultures





Hyperglycemia


-no anion gap


-sliding scale insulin


-cont levemir





Acute kidney injury


-due to hypoperfusion secondary to sepsis


-started IVF


-creat trending down





CHF


-has Bibasilar rales on exam and CXR shows some vascular congestion with new 

effusion on the left


-give lasix 40 IV today and restart home dose in AM


-cont nebivolol





<Ariana Leal - Last Filed: 01/20/17 16:00>


Physical Exam: 


SUBJECTIVE: Patient seen and examined





Patient resting in bed, in no distress. Afebrile and hemodynamically stable. O2 

sat 100% on 2L NC. Poorly controlled glucose overnight, over 400. awake and 

alert, responsive, improved mental status close to baseline. He states that he 

feels better, has less cough, no pain and improved breathing. He denies pelvic 

pain. He denies h/a, n/v, chest pain, sob, abd pain, diarrhea or dysuria. 





OBJECTIVE:





 Vital Signs











 Period  Temp  Pulse  Resp  BP Sys/Ramírez  Pulse Ox


 


 Last 24 Hr  98.1 F-101.0 F  72-90  20-20  112-153/56-68  97











GENERAL: Awake, alert oriented but below baseline 


HEAD: Normal with no signs of trauma.


EYES: Sclera anicteric and mildly injected b/l PERRLA


EARS, NOSE, THROAT: oropharynx clear without exudates. Moist mucous membranes.


NECK: supple without lymphadenopathy, JVD, or masses.


LUNGS: diffuse ronchi


HEART: Regular rate and rhythm, normal S1 and S2 


ABDOMEN: Soft, not distended, normoactive bowel sounds, no guarding. No 

suprapubic tenderness 


MUSCULOSKELETAL: No CVA tenderness.


UPPER EXTREMITIES: 2+ pulses, warm, well perfused, No peripheral edema.


LOWER EXTREMITIES: venous stasis discoloration of b/l ankles, trace b/l edema 


NEUROLOGICAL: weak but more alert 

















 Laboratory Results - last 24 hr











  01/19/17 01/19/17 01/19/17





  12:19 17:59 18:50


 


WBC   


 


RBC   


 


Hgb   


 


Hct   


 


MCV   


 


MCHC   


 


RDW   


 


Plt Count   


 


MPV   


 


Sodium   


 


Potassium   


 


Chloride   


 


Carbon Dioxide   


 


Anion Gap   


 


BUN   


 


Creatinine   


 


POC Glucometer  324  501 


 


Random Glucose    591 H* D


 


Calcium   


 


Phosphorus   


 


Magnesium   














  01/19/17 01/19/17 01/19/17





  20:20 21:47 22:43


 


WBC   


 


RBC   


 


Hgb   


 


Hct   


 


MCV   


 


MCHC   


 


RDW   


 


Plt Count   


 


MPV   


 


Sodium   146 H 


 


Potassium   4.6 


 


Chloride   111 H 


 


Carbon Dioxide   28 


 


Anion Gap   7 L 


 


BUN   32 H 


 


Creatinine   1.8 H D 


 


POC Glucometer  524   415


 


Random Glucose   550 H* 


 


Calcium   9.1 


 


Phosphorus   


 


Magnesium   














  01/20/17 01/20/17 01/20/17





  01:55 06:13 06:25


 


WBC    12.2 H


 


RBC    4.26


 


Hgb    12.7


 


Hct    39.1


 


MCV    91.8


 


MCHC    32.5


 


RDW    13.4


 


Plt Count    94 L


 


MPV    10.2


 


Sodium   


 


Potassium   


 


Chloride   


 


Carbon Dioxide   


 


Anion Gap   


 


BUN   


 


Creatinine   


 


POC Glucometer  289  205 


 


Random Glucose   


 


Calcium   


 


Phosphorus   


 


Magnesium   














  01/20/17





  06:25


 


WBC 


 


RBC 


 


Hgb 


 


Hct 


 


MCV 


 


MCHC 


 


RDW 


 


Plt Count 


 


MPV 


 


Sodium  152 H


 


Potassium  4.0


 


Chloride  115 H


 


Carbon Dioxide  30


 


Anion Gap  7 L


 


BUN  24 H D


 


Creatinine  1.3  D


 


POC Glucometer 


 


Random Glucose  203 H D


 


Calcium  9.3


 


Phosphorus  2.0 L D


 


Magnesium  2.8 H








Active Medications











Generic Name Dose Route Start Last Admin





  Trade Name Freq  PRN Reason Stop Dose Admin


 


Acetaminophen  650 mg 01/18/17 13:15 01/19/17 21:06





  Tylenol -  PO   650 mg





  Q4H PRN   Administration





  FEVER OR PAIN   


 


Albuterol/Ipratropium  1 amp 01/19/17 11:10 01/20/17 10:47





  Duoneb -  NEB   1 amp





  Q4HWA JENNIFER   Administration


 


Aspirin  81 mg 01/18/17 10:00 01/20/17 11:17





  Asa -  PO   81 mg





  DAILY JENNIFER   Administration


 


Atorvastatin Calcium  80 mg 01/18/17 22:00 01/19/17 21:06





  Lipitor -  PO   80 mg





  HS JENNIFER   Administration


 


Clopidogrel Bisulfate  75 mg 01/18/17 10:00 01/20/17 11:17





  Plavix -  PO   75 mg





  DAILY JENNIFER   Administration


 


Dorzolamide HCl  1 drop 01/18/17 06:00 01/20/17 08:15





  Trusopt 2%  OU   Not Given





  TID JENNIFER   


 


Ezetimibe  10 mg 01/18/17 10:00 01/20/17 11:17





  Zetia -  PO   10 mg





  DAILY JENNIFER   Administration


 


Furosemide  40 mg 01/20/17 11:24  





  Lasix Injection -  IVPUSH 01/20/17 11:25  





  ONCE ONE   


 


Azithromycin 250 mg/ Dextrose  250 mls @ 250 mls/hr 01/19/17 10:00 01/19/17 10:

00





  IVPB   250 mls/hr





  DAILY JENNIFER   Administration


 


Ceftriaxone Sodium  100 mls @ 200 mls/hr 01/18/17 10:00 01/20/17 11:18





  Rocephin 2gm Ivpb (Pre-Docked)  IVPB   200 mls/hr





  DAILY JENNIFER   Administration


 


Insulin Aspart  1 vial 01/18/17 06:00 01/20/17 06:15





  Novolog Vial Sliding Scale -  SQ   4 units





  Q4HPO JENNIFER   Administration





  Protocol   


 


Insulin Detemir  22 units 01/20/17 22:00  





  Levemir Vial  SQ   





  BID JENNIFER   


 


Latanoprost  1 drop 01/18/17 10:00 01/20/17 11:18





  Xalatan 0.005% Eye Drops -  OU   1 drop





  DAILY JENNIFER   Administration


 


Nebivolol  5 mg 01/18/17 22:00 01/19/17 21:06





  Bystolic -  PO   5 mg





  HS JENNIFER   Administration


 


Non-Formulary Medication  80 mg 01/21/17 10:00  





  Furosemide [Lasix]  PO   





  DAILY [facility]   


 


Potassium Phos/Sodium Phos  1 packet 01/19/17 22:00 01/20/17 11:18





  Phos-Nak Packet -  PO   1 packet





  BID JENNIFER   Administration


 


Tamsulosin HCl  0.4 mg 01/18/17 08:30 01/20/17 11:17





  Flomax -  PO   0.4 mg





  DAILY@0830 JENNIFER   Administration











ASSESSMENT/PLAN:


This is a 75 yo M with PMH of CAD, CHF, HLD, HTN, uncontrolled IDDM II, CKD, 

asthma, BPH and Dementia who is admitted due to CAP: altered mental status, 

productive cough, fever, urinary frequency/incontinence, hyperglycemia x 3d.





Altered mental status due to Sepsis


-grupo source: RLL Community Acquired Pneumonia


-CXR: RLL infiltrate 


-lactic acidosis resolved 


-s/p Zosyn in ED, now on Azithro and Rocephin day 3


-Blood culture no growth day 2 and urine cultures no growth


-Sputum cultures p/d


-Flu negative 


-Tylenol PO PRN


-duonebs q4h jennifer


-Speech and Swallow consult: pureed diet 


-CXR: patchy infitrates/congestion b/l, possible L base effusion


-lasix iv 40 now, restart po 80 tomorrow 





Hyperglycemia in setting of uncontrolled IDDM 


-medication noncompliant 


-Glucose >500 on admission 


-glucose poorly controlled at this time, BG >400 last night, requiring 40 u 

sliding scale per day 


-Insulin Sliding scale


-Levemir 22 BID


-fingerstick TID AC 


-BG goal 250 to avoid hypoglycemia 


-A1C 5.1 must be error 





Diabetic retinopathy


-severe


-f/u opthalmology outpatient





DEB vs CKD


-likely due to dehydration in setting of uncontrolled DM 


-Creatinine 1.3 (baseline) down from 2.3 


-stopped IVF


-trend creat 





Thrombocytopenia 


-Likely associated with PNA


-resolved 





CHF


-maybe in milvoloverload based on curretn cxr 


-lasix iv 40 now, then 80 po daily 


-Continue Asa, Plavix





HTN


-Continue Bystolic





HLD


-Continue Zetia, Lipitor





Asthma


-stable


-duonebs jennifer


 


BPH


-Tamsulosin 





FEN


no ivf


hypernatremia 152 monitor 


DVT GI PPX: SCD, diet 


Dysphagia Pureed, diabetic diet nectar liquids 





Dispo: monitor in [facility]








Problem List





- Problems


(1) Acute renal failure


Code(s): N17.9 - ACUTE KIDNEY FAILURE, UNSPECIFIED   





(2) Dehydration


Code(s): E86.0 - DEHYDRATION





(3) Pneumonia


Code(s): J18.9 - PNEUMONIA, UNSPECIFIED ORGANISM   





(4) Sepsis


Code(s): A41.9 - SEPSIS, UNSPECIFIED ORGANISM   





(5) Weakness


Code(s): R53.1 - WEAKNESS





(6) Asthma


Code(s): J45.909 - UNSPECIFIED ASTHMA, UNCOMPLICATED   





(7) CAD (coronary artery disease)


Code(s): I25.10 - ATHSCL HEART DISEASE OF NATIVE CORONARY ARTERY W/O ANG PCTRS 

  





(8) CKD stage 3 due to type 2 diabetes mellitus


Code(s): E11.22 - TYPE 2 DIABETES MELLITUS W DIABETIC CHRONIC KIDNEY DISEASE


N18.3 - CHRONIC KIDNEY DISEASE, STAGE 3 (MODERATE)





(9) Hyperlipidemia


Code(s): E78.5 - HYPERLIPIDEMIA, UNSPECIFIED   





(10) Hypertension


Code(s): I10 - ESSENTIAL (PRIMARY) HYPERTENSION   





(11) Obesity (BMI 30-39.9)


Code(s): E66.9 - OBESITY, UNSPECIFIED





(12) Type 2 diabetes mellitus


Code(s): E11.9 - TYPE 2 DIABETES MELLITUS WITHOUT COMPLICATIONS   





(13) Community acquired pneumonia


Code(s): J18.9 - PNEUMONIA, UNSPECIFIED ORGANISM








Visit type





- Emergency Visit


Emergency Visit: Yes


ED Registration Date: 01/18/17


Care time: The patient presented to the Emergency Department on the above date 

and was hospitalized for further evaluation of their emergent condition.





- New Patient


This patient is new to me today: No





- Critical Care


Critical Care patient: No





- Discharge Referral


Referred to [facility] No

## 2017-01-20 NOTE — HOSP
Physical Examination


Vital Signs: 


 Vital Signs











Temperature  102.5 F H  01/20/17 17:03


 


Pulse Rate  106 H  01/20/17 17:03


 


Respiratory Rate  36 H  01/20/17 17:03


 


Blood Pressure  128/58   01/20/17 17:03


 


O2 Sat by Pulse Oximetry (%)  95   01/20/17 09:00











Labs: 


 CBC, BMP





 01/20/17 17:00 





 01/20/17 15:30 











Hospitalist Encounter


Assessment: 


Resident was notified by RN that pt was more drousy than this morning and 

febrile to 102. Pt was given tylenol, blood cultures were sent, CXR was 

repeated which showed no change from yesterday. Will expand coverage with 

clindamycin to cover gram positives and anerobes and monitor closely.

## 2017-01-21 LAB
ANION GAP SERPL CALC-SCNC: 8 MMOL/L (ref 8–16)
ANION GAP SERPL CALC-SCNC: 9 MMOL/L (ref 8–16)
APPEARANCE UR: CLEAR
BACTERIA #/AREA URNS HPF: (no result) /HPF
BILIRUB UR STRIP.AUTO-MCNC: NEGATIVE MG/DL
CALCIUM SERPL-MCNC: 8.5 MG/DL (ref 8.5–10.1)
CALCIUM SERPL-MCNC: 9.3 MG/DL (ref 8.5–10.1)
CO2 SERPL-SCNC: 30 MMOL/L (ref 21–32)
CO2 SERPL-SCNC: 30 MMOL/L (ref 21–32)
COLOR UR: (no result)
CREAT SERPL-MCNC: 1.5 MG/DL (ref 0.7–1.3)
CREAT SERPL-MCNC: 1.6 MG/DL (ref 0.7–1.3)
DEPRECATED RDW RBC AUTO: 13.6 % (ref 11.9–15.9)
GLUCOSE SERPL-MCNC: 314 MG/DL (ref 74–106)
GLUCOSE SERPL-MCNC: 351 MG/DL (ref 74–106)
KETONES UR QL STRIP: NEGATIVE
LEUKOCYTE ESTERASE UR QL STRIP.AUTO: NEGATIVE
MAGNESIUM SERPL-MCNC: 2.7 MG/DL (ref 1.8–2.4)
MCH RBC QN AUTO: 30.8 PG (ref 25.7–33.7)
MCHC RBC AUTO-ENTMCNC: 33.7 G/DL (ref 32–35.9)
MCV RBC: 91.4 FL (ref 80–96)
MUCOUS THREADS URNS QL MICRO: (no result)
NITRITE UR QL STRIP: NEGATIVE
PH UR: 6 [PH] (ref 5–8)
PHOSPHATE SERPL-MCNC: 2.6 MG/DL (ref 2.5–4.9)
PLATELET # BLD AUTO: 97 K/MM3 (ref 134–434)
PMV BLD: 9.5 FL (ref 7.5–11.1)
PROT UR QL STRIP: (no result)
PROT UR QL STRIP: NEGATIVE
RBC # BLD AUTO: (no result) /HPF (ref 0–3)
RBC # UR STRIP: (no result) /UL
SP GR UR: 1.02 (ref 1–1.03)
UROBILINOGEN UR STRIP-MCNC: NEGATIVE E.U./DL (ref 0.2–1)
WBC # BLD AUTO: 10.2 K/MM3 (ref 4–10)
WBC # UR AUTO: <1 /HPF (ref 3–5)

## 2017-01-21 RX ADMIN — AZITHROMYCIN DIHYDRATE SCH MLS/HR: 500 INJECTION, POWDER, LYOPHILIZED, FOR SOLUTION INTRAVENOUS at 16:46

## 2017-01-21 RX ADMIN — INSULIN ASPART SCH UNITS: 100 INJECTION, SOLUTION INTRAVENOUS; SUBCUTANEOUS at 22:02

## 2017-01-21 RX ADMIN — INSULIN DETEMIR SCH UNITS: 100 INJECTION, SOLUTION SUBCUTANEOUS at 11:36

## 2017-01-21 RX ADMIN — CLINDAMYCIN PHOSPHATE SCH MLS/HR: 150 INJECTION, SOLUTION INTRAMUSCULAR; INTRAVENOUS at 10:59

## 2017-01-21 RX ADMIN — INSULIN ASPART SCH UNITS: 100 INJECTION, SOLUTION INTRAVENOUS; SUBCUTANEOUS at 15:19

## 2017-01-21 RX ADMIN — POTASSIUM & SODIUM PHOSPHATES POWDER PACK 280-160-250 MG SCH PACKET: 280-160-250 PACK at 11:00

## 2017-01-21 RX ADMIN — INSULIN DETEMIR SCH UNITS: 100 INJECTION, SOLUTION SUBCUTANEOUS at 17:15

## 2017-01-21 RX ADMIN — TAMSULOSIN HYDROCHLORIDE SCH MG: 0.4 CAPSULE ORAL at 11:00

## 2017-01-21 RX ADMIN — IPRATROPIUM BROMIDE AND ALBUTEROL SULFATE SCH AMP: .5; 3 SOLUTION RESPIRATORY (INHALATION) at 05:34

## 2017-01-21 RX ADMIN — IPRATROPIUM BROMIDE AND ALBUTEROL SULFATE SCH AMP: .5; 3 SOLUTION RESPIRATORY (INHALATION) at 22:50

## 2017-01-21 RX ADMIN — INSULIN ASPART SCH: 100 INJECTION, SOLUTION INTRAVENOUS; SUBCUTANEOUS at 06:29

## 2017-01-21 RX ADMIN — IPRATROPIUM BROMIDE AND ALBUTEROL SULFATE SCH AMP: .5; 3 SOLUTION RESPIRATORY (INHALATION) at 16:46

## 2017-01-21 RX ADMIN — INSULIN ASPART SCH UNITS: 100 INJECTION, SOLUTION INTRAVENOUS; SUBCUTANEOUS at 17:17

## 2017-01-21 RX ADMIN — POTASSIUM & SODIUM PHOSPHATES POWDER PACK 280-160-250 MG SCH PACKET: 280-160-250 PACK at 21:49

## 2017-01-21 RX ADMIN — IPRATROPIUM BROMIDE AND ALBUTEROL SULFATE SCH AMP: .5; 3 SOLUTION RESPIRATORY (INHALATION) at 18:29

## 2017-01-21 RX ADMIN — DORZOLAMIDE HYDROCHLORIDE SCH DROP: 20 SOLUTION/ DROPS OPHTHALMIC at 16:50

## 2017-01-21 RX ADMIN — DORZOLAMIDE HYDROCHLORIDE SCH DROP: 20 SOLUTION/ DROPS OPHTHALMIC at 21:49

## 2017-01-21 RX ADMIN — CEFTRIAXONE SODIUM SCH MLS/HR: 2 INJECTION, POWDER, FOR SOLUTION INTRAMUSCULAR; INTRAVENOUS at 11:00

## 2017-01-21 RX ADMIN — INSULIN ASPART SCH: 100 INJECTION, SOLUTION INTRAVENOUS; SUBCUTANEOUS at 02:00

## 2017-01-21 RX ADMIN — CLINDAMYCIN PHOSPHATE SCH MLS/HR: 150 INJECTION, SOLUTION INTRAMUSCULAR; INTRAVENOUS at 21:55

## 2017-01-21 RX ADMIN — ENOXAPARIN SODIUM SCH MG: 40 INJECTION SUBCUTANEOUS at 17:09

## 2017-01-21 RX ADMIN — NEBIVOLOL HYDROCHLORIDE SCH MG: 5 TABLET ORAL at 21:48

## 2017-01-21 RX ADMIN — DORZOLAMIDE HYDROCHLORIDE SCH DROP: 20 SOLUTION/ DROPS OPHTHALMIC at 06:30

## 2017-01-21 RX ADMIN — CLINDAMYCIN PHOSPHATE SCH MLS/HR: 150 INJECTION, SOLUTION INTRAMUSCULAR; INTRAVENOUS at 18:36

## 2017-01-21 RX ADMIN — INSULIN ASPART SCH: 100 INJECTION, SOLUTION INTRAVENOUS; SUBCUTANEOUS at 17:09

## 2017-01-21 RX ADMIN — CLINDAMYCIN PHOSPHATE SCH MLS/HR: 150 INJECTION, SOLUTION INTRAMUSCULAR; INTRAVENOUS at 02:39

## 2017-01-21 RX ADMIN — CLOPIDOGREL BISULFATE SCH MG: 75 TABLET, FILM COATED ORAL at 11:00

## 2017-01-21 RX ADMIN — ATORVASTATIN CALCIUM SCH MG: 80 TABLET, FILM COATED ORAL at 21:48

## 2017-01-21 RX ADMIN — EZETIMIBE SCH MG: 10 TABLET ORAL at 11:01

## 2017-01-21 RX ADMIN — ASPIRIN 81 MG SCH MG: 81 TABLET ORAL at 11:00

## 2017-01-21 RX ADMIN — LATANOPROST SCH DROP: 50 SOLUTION OPHTHALMIC at 16:43

## 2017-01-21 NOTE — PN
Addendum entered and electronically signed by Ariana Leal RES  01/21/17 11:

01: 





add mucomyst to break up respiratory secretions. PT consult





Original Note:








<Ariana Leal - Last Filed: 01/21/17 10:56>


Physical Exam: 


SUBJECTIVE: Patient seen and examined


Patient resting in bed, in no distress. Afebrile and hemodynamically stable, Bp 

today higher than usual 160/72. O2 sat 100% on 2L NC. Poorly controlled glucose 

overnight, 473-388. was febfile 102F and elthargic last night with stable o2 

sat and stable vitals. Wa also hyperglycemic as mentioned earlier, with lactic 

acidosis of over 3 and mild gap of 14. white count was trending down. CXR 

showed bibasilar effusions. family states he may have choked on food while 

lethargic. was treated with IVF bolus, nebs and clindamycin. Now awake and alert

, responsive, mental status baseline. lactic acidosis and gap resolved. 

toletrated breakfast w/o choking. He states that he feels better, has less cough

, no pain and improved breathing. He denies pelvic pain. He denies h/a, n/v, 

chest pain, sob, abd pain, diarrhea or dysuria. 








OBJECTIVE:





 Vital Signs











 Period  Temp  Pulse  Resp  BP Sys/Ramírez  Pulse Ox


 


 Last 24 Hr  97.9 F-102.5 F    20-36  120-160/48-72  96











GENERAL: Awake, alert oriented but below baseline 


HEAD: Normal with no signs of trauma.


EYES: Sclera anicteric and mildly injected b/l PERRLA


EARS, NOSE, THROAT: oropharynx clear without exudates. Moist mucous membranes.


NECK: supple without lymphadenopathy, JVD, or masses.


LUNGS: diffuse ronchi


HEART: Regular rate and rhythm, normal S1 and S2 


ABDOMEN: Soft, not distended, normoactive bowel sounds, no guarding. No 

suprapubic tenderness 


MUSCULOSKELETAL: No CVA tenderness.


UPPER EXTREMITIES: 2+ pulses, warm, well perfused, No peripheral edema.


LOWER EXTREMITIES: venous stasis discoloration of b/l ankles, trace b/l edema 


NEUROLOGICAL: weak but more alert 














 Laboratory Results - last 24 hr











  01/20/17 01/20/17 01/20/17





  12:05 15:00 15:30


 


WBC   


 


RBC   


 


Hgb   


 


Hct   


 


MCV   


 


MCHC   


 


RDW   


 


Plt Count   


 


MPV   


 


Sodium   150 H 


 


Potassium   4.2 


 


Chloride   108 H 


 


Carbon Dioxide   28 


 


Anion Gap   14 


 


BUN   26 H 


 


Creatinine   1.6 H D 


 


POC Glucometer  299  


 


Random Glucose   Not Reportable  473 H* D


 


Lactic Acid   


 


Calcium   9.0 


 


Phosphorus   


 


Magnesium   














  01/20/17 01/20/17 01/20/17





  17:00 18:13 18:30


 


WBC  10.9 H  


 


RBC  4.02  


 


Hgb  12.1  


 


Hct  36.7  


 


MCV  91.2  


 


MCHC  33.0  


 


RDW  13.8  


 


Plt Count  106 L  


 


MPV  10.4  


 


Sodium   


 


Potassium   


 


Chloride   


 


Carbon Dioxide   


 


Anion Gap   


 


BUN   


 


Creatinine   


 


POC Glucometer   360 


 


Random Glucose   


 


Lactic Acid    3.439 H*


 


Calcium   


 


Phosphorus   


 


Magnesium   














  01/20/17 01/21/17 01/21/17





  20:30 02:32 05:00


 


WBC   


 


RBC   


 


Hgb   


 


Hct   


 


MCV   


 


MCHC   


 


RDW   


 


Plt Count   


 


MPV   


 


Sodium   


 


Potassium   


 


Chloride   


 


Carbon Dioxide   


 


Anion Gap   


 


BUN   


 


Creatinine   


 


POC Glucometer   388 


 


Random Glucose   


 


Lactic Acid  3.195 H*   1.363


 


Calcium   


 


Phosphorus   


 


Magnesium   














  01/21/17 01/21/17





  06:00 06:00


 


WBC  10.2 H 


 


RBC  4.05 


 


Hgb  12.5 


 


Hct  37.1 


 


MCV  91.4 


 


MCHC  33.7 


 


RDW  13.6 


 


Plt Count  97 L 


 


MPV  9.5 


 


Sodium   155 H


 


Potassium   4.1


 


Chloride   116 H


 


Carbon Dioxide   30


 


Anion Gap   9


 


BUN   27 H


 


Creatinine   1.5 H


 


POC Glucometer  


 


Random Glucose   351 H* D


 


Lactic Acid  


 


Calcium   9.3


 


Phosphorus   2.6  D


 


Magnesium   2.7 H








Active Medications











Generic Name Dose Route Start Last Admin





  Trade Name Freq  PRN Reason Stop Dose Admin


 


Acetaminophen  650 mg 01/18/17 13:15 01/20/17 17:11





  Tylenol -  PO   650 mg





  Q4H PRN   Administration





  FEVER OR PAIN   


 


Albuterol/Ipratropium  1 amp 01/19/17 11:10 01/21/17 05:34





  Duoneb -  NEB   1 amp





  Q4HWA JENNIFER   Administration


 


Aspirin  81 mg 01/18/17 10:00 01/20/17 11:17





  Asa -  PO   81 mg





  DAILY JENNIFER   Administration


 


Atorvastatin Calcium  80 mg 01/18/17 22:00 01/20/17 21:51





  Lipitor -  PO   Not Given





  [facility]   


 


Clopidogrel Bisulfate  75 mg 01/18/17 10:00 01/20/17 11:17





  Plavix -  PO   75 mg





  DAILY JENNIFER   Administration


 


Dorzolamide HCl  1 drop 01/18/17 06:00 01/21/17 06:30





  Trusopt 2%  OU   1 drop





  TID JENNIFER   Administration


 


Ezetimibe  10 mg 01/18/17 10:00 01/20/17 11:17





  Zetia -  PO   10 mg





  DAILY JENNIFER   Administration


 


Furosemide  80 mg 01/21/17 10:00  





  Lasix -  PO   





  DAILY JENNIFER   


 


Azithromycin 250 mg/ Dextrose  250 mls @ 250 mls/hr 01/19/17 10:00 01/20/17 12:

19





  IVPB   250 mls/hr





  DAILY JENNIFER   Administration


 


Ceftriaxone Sodium  100 mls @ 200 mls/hr 01/18/17 10:00 01/20/17 11:18





  Rocephin 2gm Ivpb (Pre-Docked)  IVPB   200 mls/hr





  DAILY JENNIFER   Administration


 


Clindamycin Phosphate 300 mg/  50 mls @ 104 mls/hr 01/20/17 18:45 01/21/17 02:39





  Dextrose  IVPB   104 mls/hr





  Q6H-IV JENNIFER   Administration


 


Insulin Aspart  1 vial 01/18/17 06:00 01/21/17 06:29





  Novolog Vial Sliding Scale -  SQ   Not Given





  Q4HPO [facility]   





  Protocol   


 


Insulin Detemir  11 units 01/21/17 08:00  





  Levemir Vial  SQ   





  BIDI [facility]   


 


Latanoprost  1 drop 01/18/17 10:00 01/20/17 11:18





  Xalatan 0.005% Eye Drops -  OU   1 drop





  DAILY JENNIFRE   Administration


 


Nebivolol  5 mg 01/18/17 22:00 01/20/17 21:50





  Bystolic -  PO   Not Given





  [facility]   


 


Potassium Phos/Sodium Phos  1 packet 01/19/17 22:00 01/20/17 21:51





  Phos-Nak Packet -  PO   Not Given





  BID JENNIFER   


 


Tamsulosin HCl  0.4 mg 01/18/17 08:30 01/20/17 11:17





  Flomax -  PO   0.4 mg





  DAILY@0830 [facility]   Administration











ASSESSMENT/PLAN:


This is a 75 yo M with PMH of CAD, CHF, HLD, HTN, uncontrolled IDDM II, CKD, 

asthma, BPH and Dementia who is admitted due to CAP: altered mental status, 

productive cough, fever, urinary frequency/incontinence, hyperglycemia x 3d.





Altered mental status due to Sepsis


-grupo source: RLL Community Acquired Pneumonia


-CXR admissin: RLL infiltrate 


-episode of lethary, fever 102 and lactic acidosis >3 last night. no white 

count. vitals stable 


-lactic acidosis resolved, mental status at baseline this AM. 


-s/p Zosyn in ED, now on Azithro and Rocephin day 3, clinda day 2


-reepat blood cultures p/d 


-Sputum cultures p/d


-Flu negative 


-Tylenol PO PRN


-duonebs q4h jennifer


-Speech and Swallow consult: pureed diet 


-CXR 1/20: patchy infitrates/congestion b/l, bibasilar effusions 


-hold lasix 





Hyperglycemia in setting of uncontrolled IDDM 


-medication noncompliant 


-Glucose >500 on admission 


-glucose poorly controlled at this time, BG >400 last night, requiring 40 u 

sliding scale per day 


-Insulin Sliding scale


-Levemir 22 BID


-fingerstick TID AC 


-BG goal 250 to avoid hypoglycemia 


-A1C 5.1 must be error; repeat 





Diabetic retinopathy


-severe


-f/u opthalmology outpatient





DEB vs CKD


-likely due to dehydration in setting of uncontrolled DM 


-Creatinine 1.5 (baseline) down from 2.3 


-stopped IVF


-trend creat 





Thrombocytopenia 


-Likely associated with PNA


-resolved 





CHF


-stable


-hold lasix


-Continue Asa, Plavix





HTN


-Continue Bystolic





HLD


-Continue Zetia, Lipitor





Asthma


-stable


-duonebs jennifer


 


BPH


-Tamsulosin 





FEN


no ivf


hypernatremia 152 corrected; monitor 


DVT GI PPX: SCD, diet, lov 


Dysphagia Pureed, diabetic diet nectar liquids 





Dispo: monitor in med [facility]





Problem List





- Problems


(1) Acute renal failure


Code(s): N17.9 - ACUTE KIDNEY FAILURE, UNSPECIFIED   





(2) Dehydration


Code(s): E86.0 - DEHYDRATION





(3) Pneumonia


Code(s): J18.9 - PNEUMONIA, UNSPECIFIED ORGANISM   





(4) Sepsis


Code(s): A41.9 - SEPSIS, UNSPECIFIED ORGANISM   





(5) Weakness


Code(s): R53.1 - WEAKNESS





(6) Asthma


Code(s): J45.909 - UNSPECIFIED ASTHMA, UNCOMPLICATED   





(7) CAD (coronary artery disease)


Code(s): I25.10 - ATHSCL HEART DISEASE OF NATIVE CORONARY ARTERY W/O ANG PCTRS 

  





(8) CKD stage 3 due to type 2 diabetes mellitus


Code(s): E11.22 - TYPE 2 DIABETES MELLITUS W DIABETIC CHRONIC KIDNEY DISEASE


N18.3 - CHRONIC KIDNEY DISEASE, STAGE 3 (MODERATE)





(9) Hyperlipidemia


Code(s): E78.5 - HYPERLIPIDEMIA, UNSPECIFIED   





(10) Hypertension


Code(s): I10 - ESSENTIAL (PRIMARY) HYPERTENSION   





(11) Obesity (BMI 30-39.9)


Code(s): E66.9 - OBESITY, UNSPECIFIED





(12) Type 2 diabetes mellitus


Code(s): E11.9 - TYPE 2 DIABETES MELLITUS WITHOUT COMPLICATIONS   





(13) Community acquired pneumonia


Code(s): J18.9 - PNEUMONIA, UNSPECIFIED ORGANISM








Visit type





- Emergency Visit


Emergency Visit: Yes


ED Registration Date: 01/18/17


Care time: The patient presented to the Emergency Department on the above date 

and was hospitalized for further evaluation of their emergent condition.





- New Patient


This patient is new to me today: No





- Critical Care


Critical Care patient: No





- Discharge Referral


Referred to [facility] No





<Francisco Javier Bhat - Last Filed: 01/21/17 11:13>


Physical Exam: 


ATTENDING PHYSICIAN STATEMENT





I saw and evaluated the patient.


I reviewed the resident's note and discussed the case with the resident.


I agree with the resident's findings and plan as documented.








SUBJECTIVE:


seen and evaluated at the bedside





OBJECTIVE:


as per family pt is not yet at his baseline mental status





ASSESSMENT AND PLAN:


6 year old male with a significant PMHx of CAD, CHF, HLD, HTN, DM II (

noncompliant and uncontrolled), CKD, asthma, and Dementia admitted for sepsis 

due to community acquired pneumonia causing metabolic encephalopathy





Pneumonia


-was febrile and more lethargic early last night with elevated lactate


-was treated with IVF, tylenol, and clindamycin was added after wife stated 

that pt choked on food yesterday


-currently afebrile and hemodynamically stable


-cont clindamycin ceftriaxone and azithromycin 


-lactate now WNL


-follow up blood/urine cultures





Hyperglycemia


-no anion gap


-sliding scale insulin


-cont levemir





Acute kidney injury


-due to hypoperfusion secondary to sepsis


-started IVF


-creat trending down





CHF


-had Bibasilar rales on exam and CXR shows some vascular congestion with new 

effusion on the left yesterday


-gave lasix 40 IV but then required small bolus for lactic acidosis yesterday


-saturating well so hold off on restarting home of lasix for now


-cont nebivolol

## 2017-01-22 LAB
ANION GAP SERPL CALC-SCNC: 6 MMOL/L (ref 8–16)
CALCIUM SERPL-MCNC: 8.7 MG/DL (ref 8.5–10.1)
CO2 SERPL-SCNC: 30 MMOL/L (ref 21–32)
CREAT SERPL-MCNC: 1.3 MG/DL (ref 0.7–1.3)
DEPRECATED RDW RBC AUTO: 13.5 % (ref 11.9–15.9)
GLUCOSE SERPL-MCNC: 136 MG/DL (ref 74–106)
MAGNESIUM SERPL-MCNC: 2.9 MG/DL (ref 1.8–2.4)
MCH RBC QN AUTO: 30.2 PG (ref 25.7–33.7)
MCHC RBC AUTO-ENTMCNC: 32.9 G/DL (ref 32–35.9)
MCV RBC: 92 FL (ref 80–96)
PHOSPHATE SERPL-MCNC: 2.9 MG/DL (ref 2.5–4.9)
PLATELET # BLD AUTO: 108 K/MM3 (ref 134–434)
PMV BLD: 9.7 FL (ref 7.5–11.1)
WBC # BLD AUTO: 10.2 K/MM3 (ref 4–10)

## 2017-01-22 RX ADMIN — CEFTRIAXONE SODIUM SCH MLS/HR: 2 INJECTION, POWDER, FOR SOLUTION INTRAMUSCULAR; INTRAVENOUS at 10:29

## 2017-01-22 RX ADMIN — EZETIMIBE SCH MG: 10 TABLET ORAL at 10:00

## 2017-01-22 RX ADMIN — INSULIN ASPART SCH UNITS: 100 INJECTION, SOLUTION INTRAVENOUS; SUBCUTANEOUS at 17:30

## 2017-01-22 RX ADMIN — POTASSIUM & SODIUM PHOSPHATES POWDER PACK 280-160-250 MG SCH PACKET: 280-160-250 PACK at 21:26

## 2017-01-22 RX ADMIN — DORZOLAMIDE HYDROCHLORIDE SCH DROP: 20 SOLUTION/ DROPS OPHTHALMIC at 17:30

## 2017-01-22 RX ADMIN — CLINDAMYCIN PHOSPHATE SCH MLS/HR: 150 INJECTION, SOLUTION INTRAMUSCULAR; INTRAVENOUS at 21:26

## 2017-01-22 RX ADMIN — AZITHROMYCIN DIHYDRATE SCH MLS/HR: 500 INJECTION, POWDER, LYOPHILIZED, FOR SOLUTION INTRAVENOUS at 10:00

## 2017-01-22 RX ADMIN — IPRATROPIUM BROMIDE AND ALBUTEROL SULFATE SCH AMP: .5; 3 SOLUTION RESPIRATORY (INHALATION) at 14:07

## 2017-01-22 RX ADMIN — LATANOPROST SCH DROP: 50 SOLUTION OPHTHALMIC at 10:29

## 2017-01-22 RX ADMIN — IPRATROPIUM BROMIDE AND ALBUTEROL SULFATE SCH AMP: .5; 3 SOLUTION RESPIRATORY (INHALATION) at 11:00

## 2017-01-22 RX ADMIN — NEBIVOLOL HYDROCHLORIDE SCH MG: 5 TABLET ORAL at 21:26

## 2017-01-22 RX ADMIN — ENOXAPARIN SODIUM SCH MG: 40 INJECTION SUBCUTANEOUS at 10:28

## 2017-01-22 RX ADMIN — IPRATROPIUM BROMIDE AND ALBUTEROL SULFATE SCH AMP: .5; 3 SOLUTION RESPIRATORY (INHALATION) at 18:02

## 2017-01-22 RX ADMIN — IPRATROPIUM BROMIDE AND ALBUTEROL SULFATE SCH AMP: .5; 3 SOLUTION RESPIRATORY (INHALATION) at 22:55

## 2017-01-22 RX ADMIN — CLINDAMYCIN PHOSPHATE SCH MLS/HR: 150 INJECTION, SOLUTION INTRAMUSCULAR; INTRAVENOUS at 17:30

## 2017-01-22 RX ADMIN — DORZOLAMIDE HYDROCHLORIDE SCH DROP: 20 SOLUTION/ DROPS OPHTHALMIC at 21:26

## 2017-01-22 RX ADMIN — CLINDAMYCIN PHOSPHATE SCH MLS/HR: 150 INJECTION, SOLUTION INTRAMUSCULAR; INTRAVENOUS at 02:03

## 2017-01-22 RX ADMIN — ASPIRIN 81 MG SCH MG: 81 TABLET ORAL at 10:28

## 2017-01-22 RX ADMIN — INSULIN ASPART SCH UNITS: 100 INJECTION, SOLUTION INTRAVENOUS; SUBCUTANEOUS at 10:29

## 2017-01-22 RX ADMIN — INSULIN ASPART SCH: 100 INJECTION, SOLUTION INTRAVENOUS; SUBCUTANEOUS at 05:56

## 2017-01-22 RX ADMIN — INSULIN DETEMIR SCH UNITS: 100 INJECTION, SOLUTION SUBCUTANEOUS at 06:28

## 2017-01-22 RX ADMIN — DORZOLAMIDE HYDROCHLORIDE SCH DROP: 20 SOLUTION/ DROPS OPHTHALMIC at 05:59

## 2017-01-22 RX ADMIN — IPRATROPIUM BROMIDE AND ALBUTEROL SULFATE SCH AMP: .5; 3 SOLUTION RESPIRATORY (INHALATION) at 06:02

## 2017-01-22 RX ADMIN — CLOPIDOGREL BISULFATE SCH MG: 75 TABLET, FILM COATED ORAL at 10:29

## 2017-01-22 RX ADMIN — TAMSULOSIN HYDROCHLORIDE SCH MG: 0.4 CAPSULE ORAL at 10:28

## 2017-01-22 RX ADMIN — INSULIN DETEMIR SCH UNITS: 100 INJECTION, SOLUTION SUBCUTANEOUS at 17:54

## 2017-01-22 RX ADMIN — POTASSIUM & SODIUM PHOSPHATES POWDER PACK 280-160-250 MG SCH PACKET: 280-160-250 PACK at 10:29

## 2017-01-22 RX ADMIN — CLINDAMYCIN PHOSPHATE SCH MLS/HR: 150 INJECTION, SOLUTION INTRAMUSCULAR; INTRAVENOUS at 10:28

## 2017-01-22 RX ADMIN — ATORVASTATIN CALCIUM SCH MG: 80 TABLET, FILM COATED ORAL at 21:26

## 2017-01-22 RX ADMIN — INSULIN ASPART SCH UNITS: 100 INJECTION, SOLUTION INTRAVENOUS; SUBCUTANEOUS at 21:27

## 2017-01-22 RX ADMIN — INSULIN ASPART SCH: 100 INJECTION, SOLUTION INTRAVENOUS; SUBCUTANEOUS at 01:58

## 2017-01-22 NOTE — PN
Progress Note, Physician





- Current Medication List


Current Medications: 


Active Medications





Acetaminophen (Tylenol -)  650 mg PO Q4H PRN


   PRN Reason: FEVER OR PAIN


   Last Admin: 01/20/17 17:11 Dose:  650 mg


Albuterol Sulfate (Ventolin 0.083% Nebulizer Soln -)  1 amp NEB Q4H PRN


   PRN Reason: SHORT OF BREATH/WHEEZING


Albuterol/Ipratropium (Duoneb -)  1 amp NEB Q4HWA [facility]


   Last Admin: 01/22/17 06:02 Dose:  1 amp


Aspirin (Asa -)  81 mg PO DAILY [facility]


   Last Admin: 01/22/17 10:28 Dose:  81 mg


Atorvastatin Calcium (Lipitor -)  80 mg PO HS [facility]


   Last Admin: 01/21/17 21:48 Dose:  80 mg


Clopidogrel Bisulfate (Plavix -)  75 mg PO DAILY [facility]


   Last Admin: 01/22/17 10:29 Dose:  75 mg


Dorzolamide HCl (Trusopt 2%)  1 drop OU TID [facility]


   Last Admin: 01/22/17 05:59 Dose:  1 drop


Ezetimibe (Zetia -)  10 mg PO DAILY [facility]


   Last Admin: 01/21/17 11:01 Dose:  10 mg


Enoxaparin Sodium (Lovenox -)  40 mg SQ DAILY [facility]


   Last Admin: 01/22/17 10:28 Dose:  40 mg


Furosemide (Lasix -)  80 mg PO DAILY [facility]


Azithromycin 250 mg/ Dextrose  250 mls @ 250 mls/hr IVPB DAILY [facility]


   Last Admin: 01/21/17 16:46 Dose:  250 mls/hr


Ceftriaxone Sodium (Rocephin 2gm Ivpb (Pre-Docked))  100 mls @ 200 mls/hr IVPB 

DAILY [facility]


   Last Admin: 01/22/17 10:29 Dose:  200 mls/hr


Clindamycin Phosphate 300 mg/ (Dextrose)  50 mls @ 104 mls/hr IVPB Q6H-IV [facility]


   Last Admin: 01/22/17 10:28 Dose:  104 mls/hr


Dextrose (D5w -)  1,000 mls @ 42 mls/hr IV ASDIR [facility]


Insulin Aspart (Novolog Vial Sliding Scale -)  1 vial SQ Q4HPO MARY


   PRN Reason: Protocol


   Last Admin: 01/22/17 10:29 Dose:  4 units


Insulin Detemir (Levemir Vial)  22 units SQ BIDI [facility]


   Last Admin: 01/22/17 06:28 Dose:  22 units


Latanoprost (Xalatan 0.005% Eye Drops -)  1 drop OU DAILY [facility]


   Last Admin: 01/22/17 10:29 Dose:  1 drop


Nebivolol (Bystolic -)  5 mg PO HS [facility]


   Last Admin: 01/21/17 21:48 Dose:  5 mg


Potassium Phos/Sodium Phos (Phos-Nak Packet -)  1 packet PO BID [facility]


   Last Admin: 01/22/17 10:29 Dose:  1 packet


Tamsulosin HCl (Flomax -)  0.4 mg PO DAILY@0830 [facility]


   Last Admin: 01/22/17 10:28 Dose:  0.4 mg











- Objective


Vital Signs: 


 Vital Signs











Temperature  99.2 F   01/22/17 05:36


 


Pulse Rate  76   01/22/17 05:36


 


Respiratory Rate  19   01/22/17 05:36


 


Blood Pressure  134/62   01/22/17 05:36


 


O2 Sat by Pulse Oximetry (%)  95   01/21/17 21:00











Constitutional: Yes: Well Nourished, No Distress, Calm


Eyes: Yes: WNL, Conjunctiva Clear


HENT: Yes: WNL, Atraumatic, Normocephalic


Neck: Yes: WNL, Supple, Trachea Midline


Cardiovascular: Yes: WNL, Regular Rate and Rhythm


Respiratory: Yes: WNL, Regular, CTA Bilaterally


Gastrointestinal: Yes: WNL, Normal Bowel Sounds


Genitourinary: Yes: Camacho Present


Musculoskeletal: Yes: WNL


Extremities: Yes: WNL


Edema: No


Integumentary: Yes: WNL


Neurological: Yes: WNL, Alert, Oriented


...Motor Strength: WNL


Psychiatric: Yes: WNL


Labs: 


 CBC, BMP





 01/22/17 06:00 





 01/22/17 06:00 





 INR, PTT











INR  1.44  (0.82-1.09)  H  01/18/17  00:01    














Impression/Plan


Impression/Plan: 


6 year old male with a significant PMHx of CAD, CHF, HLD, HTN, DM II (

noncompliant and uncontrolled), CKD, asthma, and Dementia admitted for sepsis 

due to community acquired pneumonia causing metabolic encephalopathy





metabolic encephalopathy


-now mentating well and back to baseline





Pneumonia


-was febrile and more lethargic early last night with elevated lactate


-was treated with IVF, tylenol, and clindamycin was added after wife stated 

that pt choked on food yesterday


-currently afebrile and hemodynamically stable


-cont clindamycin ceftriaxone and azithromycin 


-lactate now WNL


-follow up blood/urine cultures





Hyperglycemia


-no anion gap


-sliding scale insulin


-cont levemir





Acute kidney injury


-due to hypoperfusion secondary to sepsis


-creat trending down





Hypernatremia


-start gently hydration with D5 water paying close attention to hyperglycemia 

and volume status as pt has CHF





Urinary Retention


-was found to have approx 1 liter 


-camacho placed and now draining urine well





CHF


-saturating well and no rales on exam so hold off on restarting home of lasix 

for now due to hypernatremia


-cont nebivolol





Visit type





- Emergency Visit


Emergency Visit: Yes


ED Registration Date: 01/18/17


Care time: The patient presented to the Emergency Department on the above date 

and was hospitalized for further evaluation of their emergent condition.





- New Patient


This patient is new to me today: No





- Critical Care


Critical Care patient: No

## 2017-01-23 LAB
ANION GAP SERPL CALC-SCNC: 7 MMOL/L (ref 8–16)
BASOPHILS # BLD: 0.4 % (ref 0–2)
CALCIUM SERPL-MCNC: 8.8 MG/DL (ref 8.5–10.1)
CO2 SERPL-SCNC: 26 MMOL/L (ref 21–32)
CREAT SERPL-MCNC: 1.2 MG/DL (ref 0.7–1.3)
DEPRECATED RDW RBC AUTO: 13.7 % (ref 11.9–15.9)
EOSINOPHIL # BLD: 5 % (ref 0–4.5)
GLUCOSE SERPL-MCNC: 142 MG/DL (ref 74–106)
MCH RBC QN AUTO: 29.9 PG (ref 25.7–33.7)
MCHC RBC AUTO-ENTMCNC: 32.8 G/DL (ref 32–35.9)
MCV RBC: 91 FL (ref 80–96)
NEUTROPHILS # BLD: 69.4 % (ref 42.8–82.8)
PLATELET # BLD AUTO: 123 K/MM3 (ref 134–434)
PMV BLD: 9.1 FL (ref 7.5–11.1)
WBC # BLD AUTO: 11.7 K/MM3 (ref 4–10)

## 2017-01-23 RX ADMIN — INSULIN ASPART SCH UNITS: 100 INJECTION, SOLUTION INTRAVENOUS; SUBCUTANEOUS at 18:00

## 2017-01-23 RX ADMIN — TAMSULOSIN HYDROCHLORIDE SCH MG: 0.4 CAPSULE ORAL at 22:07

## 2017-01-23 RX ADMIN — IPRATROPIUM BROMIDE AND ALBUTEROL SULFATE SCH AMP: .5; 3 SOLUTION RESPIRATORY (INHALATION) at 17:32

## 2017-01-23 RX ADMIN — INSULIN DETEMIR SCH UNITS: 100 INJECTION, SOLUTION SUBCUTANEOUS at 18:03

## 2017-01-23 RX ADMIN — INSULIN ASPART SCH: 100 INJECTION, SOLUTION INTRAVENOUS; SUBCUTANEOUS at 06:16

## 2017-01-23 RX ADMIN — AZITHROMYCIN DIHYDRATE SCH: 500 INJECTION, POWDER, LYOPHILIZED, FOR SOLUTION INTRAVENOUS at 10:00

## 2017-01-23 RX ADMIN — INSULIN ASPART SCH UNITS: 100 INJECTION, SOLUTION INTRAVENOUS; SUBCUTANEOUS at 12:23

## 2017-01-23 RX ADMIN — TAMSULOSIN HYDROCHLORIDE SCH MG: 0.4 CAPSULE ORAL at 09:43

## 2017-01-23 RX ADMIN — IPRATROPIUM BROMIDE AND ALBUTEROL SULFATE SCH AMP: .5; 3 SOLUTION RESPIRATORY (INHALATION) at 14:12

## 2017-01-23 RX ADMIN — CLINDAMYCIN PHOSPHATE SCH MLS/HR: 150 INJECTION, SOLUTION INTRAMUSCULAR; INTRAVENOUS at 02:53

## 2017-01-23 RX ADMIN — INSULIN ASPART SCH: 100 INJECTION, SOLUTION INTRAVENOUS; SUBCUTANEOUS at 14:00

## 2017-01-23 RX ADMIN — CLINDAMYCIN PHOSPHATE SCH MLS/HR: 150 INJECTION, SOLUTION INTRAMUSCULAR; INTRAVENOUS at 12:18

## 2017-01-23 RX ADMIN — ASPIRIN 81 MG SCH MG: 81 TABLET ORAL at 18:01

## 2017-01-23 RX ADMIN — DORZOLAMIDE HYDROCHLORIDE SCH DROP: 20 SOLUTION/ DROPS OPHTHALMIC at 06:18

## 2017-01-23 RX ADMIN — NEBIVOLOL HYDROCHLORIDE SCH MG: 5 TABLET ORAL at 22:07

## 2017-01-23 RX ADMIN — IPRATROPIUM BROMIDE AND ALBUTEROL SULFATE SCH AMP: .5; 3 SOLUTION RESPIRATORY (INHALATION) at 06:17

## 2017-01-23 RX ADMIN — CEFTRIAXONE SODIUM SCH MLS/HR: 2 INJECTION, POWDER, FOR SOLUTION INTRAMUSCULAR; INTRAVENOUS at 13:17

## 2017-01-23 RX ADMIN — ENOXAPARIN SODIUM SCH MG: 40 INJECTION SUBCUTANEOUS at 09:49

## 2017-01-23 RX ADMIN — ATORVASTATIN CALCIUM SCH MG: 80 TABLET, FILM COATED ORAL at 22:07

## 2017-01-23 RX ADMIN — CLINDAMYCIN PHOSPHATE SCH MLS/HR: 150 INJECTION, SOLUTION INTRAMUSCULAR; INTRAVENOUS at 22:07

## 2017-01-23 RX ADMIN — INSULIN ASPART SCH UNITS: 100 INJECTION, SOLUTION INTRAVENOUS; SUBCUTANEOUS at 22:18

## 2017-01-23 RX ADMIN — EZETIMIBE SCH MG: 10 TABLET ORAL at 09:43

## 2017-01-23 RX ADMIN — CLINDAMYCIN PHOSPHATE SCH MLS/HR: 150 INJECTION, SOLUTION INTRAMUSCULAR; INTRAVENOUS at 16:30

## 2017-01-23 RX ADMIN — DORZOLAMIDE HYDROCHLORIDE SCH DROP: 20 SOLUTION/ DROPS OPHTHALMIC at 14:37

## 2017-01-23 RX ADMIN — LATANOPROST SCH DROP: 50 SOLUTION OPHTHALMIC at 09:49

## 2017-01-23 RX ADMIN — IPRATROPIUM BROMIDE AND ALBUTEROL SULFATE SCH AMP: .5; 3 SOLUTION RESPIRATORY (INHALATION) at 10:15

## 2017-01-23 RX ADMIN — INSULIN DETEMIR SCH UNITS: 100 INJECTION, SOLUTION SUBCUTANEOUS at 06:17

## 2017-01-23 RX ADMIN — CLOPIDOGREL BISULFATE SCH MG: 75 TABLET, FILM COATED ORAL at 09:43

## 2017-01-23 RX ADMIN — DORZOLAMIDE HYDROCHLORIDE SCH DROP: 20 SOLUTION/ DROPS OPHTHALMIC at 22:08

## 2017-01-23 RX ADMIN — IPRATROPIUM BROMIDE AND ALBUTEROL SULFATE SCH AMP: .5; 3 SOLUTION RESPIRATORY (INHALATION) at 22:29

## 2017-01-23 RX ADMIN — INSULIN ASPART SCH: 100 INJECTION, SOLUTION INTRAVENOUS; SUBCUTANEOUS at 02:58

## 2017-01-23 RX ADMIN — POTASSIUM & SODIUM PHOSPHATES POWDER PACK 280-160-250 MG SCH PACKET: 280-160-250 PACK at 09:45

## 2017-01-23 RX ADMIN — INSULIN ASPART SCH UNITS: 100 INJECTION, SOLUTION INTRAVENOUS; SUBCUTANEOUS at 18:01

## 2017-01-23 NOTE — PN
Progress Note, Physician





- Current Medication List


Current Medications: 


Active Medications





Acetaminophen (Tylenol -)  650 mg PO Q4H PRN


   PRN Reason: FEVER OR PAIN


   Last Admin: 01/20/17 17:11 Dose:  650 mg


Albuterol Sulfate (Ventolin 0.083% Nebulizer Soln -)  1 amp NEB Q4H PRN


   PRN Reason: SHORT OF BREATH/WHEEZING


Albuterol/Ipratropium (Duoneb -)  1 amp NEB Q4HWA [facility]


   Last Admin: 01/23/17 06:17 Dose:  1 amp


Aspirin (Asa -)  81 mg PO DAILY [facility]


   Last Admin: 01/22/17 10:28 Dose:  81 mg


Atorvastatin Calcium (Lipitor -)  80 mg PO HS [facility]


   Last Admin: 01/22/17 21:26 Dose:  80 mg


Clopidogrel Bisulfate (Plavix -)  75 mg PO DAILY [facility]


   Last Admin: 01/23/17 09:43 Dose:  75 mg


Dorzolamide HCl (Trusopt 2%)  1 drop OU TID [facility]


   Last Admin: 01/23/17 06:18 Dose:  1 drop


Ezetimibe (Zetia -)  10 mg PO DAILY [facility]


   Last Admin: 01/23/17 09:43 Dose:  10 mg


Enoxaparin Sodium (Lovenox -)  40 mg SQ DAILY [facility]


   Last Admin: 01/23/17 09:49 Dose:  40 mg


Ceftriaxone Sodium (Rocephin 2gm Ivpb (Pre-Docked))  100 mls @ 200 mls/hr IVPB 

DAILY [facility]


   Last Admin: 01/22/17 10:29 Dose:  200 mls/hr


Clindamycin Phosphate 300 mg/ (Dextrose)  50 mls @ 104 mls/hr IVPB Q6H-IV [facility]


   Last Admin: 01/23/17 02:53 Dose:  104 mls/hr


Dextrose (D5w -)  1,000 mls @ 42 mls/hr IV ASDIR [facility]


   Last Admin: 01/22/17 17:29 Dose:  42 mls/hr


Insulin Aspart (Novolog Vial Sliding Scale -)  1 vial SQ Q4HPO [facility]


   PRN Reason: Protocol


   Last Admin: 01/23/17 06:16 Dose:  Not Given


Insulin Detemir (Levemir Vial)  22 units SQ BIDI [facility]


   Last Admin: 01/23/17 06:17 Dose:  22 units


Latanoprost (Xalatan 0.005% Eye Drops -)  1 drop OU DAILY [facility]


   Last Admin: 01/23/17 09:49 Dose:  1 drop


Nebivolol (Bystolic -)  5 mg PO HS [facility]


   Last Admin: 01/22/17 21:26 Dose:  5 mg


Tamsulosin HCl (Flomax -)  0.4 mg PO BID [facility]











- Objective


Vital Signs: 


 Vital Signs











Temperature  97.9 F   01/23/17 09:00


 


Pulse Rate  74   01/23/17 09:00


 


Respiratory Rate  20   01/23/17 09:00


 


Blood Pressure  136/60   01/23/17 09:00


 


O2 Sat by Pulse Oximetry (%)  95   01/22/17 21:00











Constitutional: Yes: Well Nourished, No Distress, Calm


Eyes: Yes: Conjunctiva Clear, Other (legally blind)


HENT: Yes: WNL, Atraumatic, Normocephalic


Neck: Yes: WNL, Supple, Trachea Midline


Cardiovascular: Yes: WNL, Regular Rate and Rhythm


Respiratory: Yes: WNL, Regular, CTA Bilaterally.  No: Rales, Rhonchi, Wheezes


Gastrointestinal: Yes: WNL, Normal Bowel Sounds


Musculoskeletal: Yes: WNL


Extremities: Yes: WNL


Edema: No


Integumentary: Yes: WNL


Neurological: Yes: WNL, Alert, Oriented


...Motor Strength: WNL


Psychiatric: Yes: WNL


Labs: 


 CBC, BMP





 01/23/17 06:20 





 01/23/17 06:20 





 INR, PTT











INR  1.44  (0.82-1.09)  H  01/18/17  00:01    














Impression/Plan


Impression/Plan: 


6 year old male with a significant PMHx of CAD, CHF, HLD, HTN, DM II (

noncompliant and uncontrolled), CKD, asthma, and Dementia admitted for sepsis 

due to community acquired pneumonia causing metabolic encephalopathy





metabolic encephalopathy


-now mentating well and back to baseline





Pneumonia


-was febrile and more lethargic last week with elevated lactate


-was treated with IVF, tylenol, and clindamycin was added after wife stated 

that pt choked on food that day in the hospital


-currently afebrile and hemodynamically stable


-cont clindamycin ceftriaxone; completed azithromycin 


-blood/urine cultures negative





Hyperglycemia


-improved


-sliding scale insulin


-cont levemir





Acute kidney injury


-due to hypoperfusion secondary to sepsis


-creat trending down after gentle IVF





Hypernatremia


-started gently hydration with D5 water paying close attention to hyperglycemia 

and volume status as pt has CHF


-lungs clear on exam but developing left sided effusion on chest xray today so 

will stop IVF at this time


-follow up renal consult for assistance in management





Urinary Retention


-was found to have approx 1 liter 


-camacho placed and now draining urine well


-increase tamsulosin 0.4 form daily to BID


-voiding trial in next day or 2





CHF


-saturating well and no rales on exam so hold off on restarting home of lasix 

for now due to hypernatremia


-lungs clear on exam but developing left sided effusion on chest xray today so 

will stop IVF at this time


-cont nebivolol





Visit type





- Emergency Visit


Emergency Visit: Yes


ED Registration Date: 01/18/17


Care time: The patient presented to the Emergency Department on the above date 

and was hospitalized for further evaluation of their emergent condition.





- New Patient


This patient is new to me today: No





- Critical Care


Critical Care patient: No

## 2017-01-23 NOTE — PN
Progress Note, SLP





- Note


Progress Note: 


 Selected Entries











  01/22/17 01/22/17 01/22/17





  05:36 10:00 13:26


 


Breakfast   


 


Supper   


 


Temperature 99.2 F 99.3 F 99.2 F














  01/22/17 01/22/17 01/23/17





  18:00 21:00 01:39


 


Breakfast   


 


Supper 75%  


 


Temperature 98.6 F 99.1 F 98.2 F














  01/23/17 01/23/17 01/23/17





  06:01 09:00 10:32


 


Breakfast   100%


 


Supper   


 


Temperature 98.2 F 97.9 F 








On puree and nectar thick liquid.

## 2017-01-24 LAB
ANION GAP SERPL CALC-SCNC: 7 MMOL/L (ref 8–16)
ANION GAP SERPL CALC-SCNC: 7 MMOL/L (ref 8–16)
BASOPHILS # BLD: 0.5 % (ref 0–2)
CALCIUM SERPL-MCNC: 8 MG/DL (ref 8.5–10.1)
CALCIUM SERPL-MCNC: 8.2 MG/DL (ref 8.5–10.1)
CO2 SERPL-SCNC: 25 MMOL/L (ref 21–32)
CO2 SERPL-SCNC: 27 MMOL/L (ref 21–32)
CREAT SERPL-MCNC: 1.2 MG/DL (ref 0.7–1.3)
CREAT SERPL-MCNC: 1.5 MG/DL (ref 0.7–1.3)
DEPRECATED RDW RBC AUTO: 13.6 % (ref 11.9–15.9)
EOSINOPHIL # BLD: 5.1 % (ref 0–4.5)
GLUCOSE SERPL-MCNC: 153 MG/DL (ref 74–106)
GLUCOSE SERPL-MCNC: 373 MG/DL (ref 74–106)
MAGNESIUM SERPL-MCNC: 2.8 MG/DL (ref 1.8–2.4)
MCH RBC QN AUTO: 30.3 PG (ref 25.7–33.7)
MCHC RBC AUTO-ENTMCNC: 33.4 G/DL (ref 32–35.9)
MCV RBC: 90.7 FL (ref 80–96)
NEUTROPHILS # BLD: 71.8 % (ref 42.8–82.8)
PHOSPHATE SERPL-MCNC: 3.4 MG/DL (ref 2.5–4.9)
PLATELET # BLD AUTO: 128 K/MM3 (ref 134–434)
PMV BLD: 9.3 FL (ref 7.5–11.1)
WBC # BLD AUTO: 11.9 K/MM3 (ref 4–10)

## 2017-01-24 RX ADMIN — DORZOLAMIDE HYDROCHLORIDE SCH DROP: 20 SOLUTION/ DROPS OPHTHALMIC at 21:52

## 2017-01-24 RX ADMIN — INSULIN ASPART SCH UNITS: 100 INJECTION, SOLUTION INTRAVENOUS; SUBCUTANEOUS at 12:55

## 2017-01-24 RX ADMIN — TAMSULOSIN HYDROCHLORIDE SCH MG: 0.4 CAPSULE ORAL at 09:47

## 2017-01-24 RX ADMIN — CLOPIDOGREL BISULFATE SCH MG: 75 TABLET, FILM COATED ORAL at 09:47

## 2017-01-24 RX ADMIN — IPRATROPIUM BROMIDE AND ALBUTEROL SULFATE SCH AMP: .5; 3 SOLUTION RESPIRATORY (INHALATION) at 06:32

## 2017-01-24 RX ADMIN — TAMSULOSIN HYDROCHLORIDE SCH MG: 0.4 CAPSULE ORAL at 21:51

## 2017-01-24 RX ADMIN — CLINDAMYCIN PHOSPHATE SCH MLS/HR: 150 INJECTION, SOLUTION INTRAMUSCULAR; INTRAVENOUS at 21:51

## 2017-01-24 RX ADMIN — EZETIMIBE SCH MG: 10 TABLET ORAL at 09:47

## 2017-01-24 RX ADMIN — INSULIN ASPART SCH UNITS: 100 INJECTION, SOLUTION INTRAVENOUS; SUBCUTANEOUS at 14:59

## 2017-01-24 RX ADMIN — DORZOLAMIDE HYDROCHLORIDE SCH DROP: 20 SOLUTION/ DROPS OPHTHALMIC at 15:00

## 2017-01-24 RX ADMIN — INSULIN ASPART SCH: 100 INJECTION, SOLUTION INTRAVENOUS; SUBCUTANEOUS at 01:46

## 2017-01-24 RX ADMIN — CEFTRIAXONE SODIUM SCH MLS/HR: 2 INJECTION, POWDER, FOR SOLUTION INTRAMUSCULAR; INTRAVENOUS at 09:48

## 2017-01-24 RX ADMIN — ACETAMINOPHEN PRN MG: 325 TABLET ORAL at 21:52

## 2017-01-24 RX ADMIN — IPRATROPIUM BROMIDE AND ALBUTEROL SULFATE SCH AMP: .5; 3 SOLUTION RESPIRATORY (INHALATION) at 10:11

## 2017-01-24 RX ADMIN — ASPIRIN 81 MG SCH MG: 81 TABLET ORAL at 09:47

## 2017-01-24 RX ADMIN — ENOXAPARIN SODIUM SCH MG: 40 INJECTION SUBCUTANEOUS at 09:47

## 2017-01-24 RX ADMIN — INSULIN ASPART SCH: 100 INJECTION, SOLUTION INTRAVENOUS; SUBCUTANEOUS at 06:14

## 2017-01-24 RX ADMIN — NEBIVOLOL HYDROCHLORIDE SCH: 5 TABLET ORAL at 21:51

## 2017-01-24 RX ADMIN — CLINDAMYCIN PHOSPHATE SCH MLS/HR: 150 INJECTION, SOLUTION INTRAMUSCULAR; INTRAVENOUS at 03:21

## 2017-01-24 RX ADMIN — ATORVASTATIN CALCIUM SCH MG: 80 TABLET, FILM COATED ORAL at 21:51

## 2017-01-24 RX ADMIN — INSULIN ASPART SCH UNITS: 100 INJECTION, SOLUTION INTRAVENOUS; SUBCUTANEOUS at 21:55

## 2017-01-24 RX ADMIN — CLINDAMYCIN PHOSPHATE SCH MLS/HR: 150 INJECTION, SOLUTION INTRAMUSCULAR; INTRAVENOUS at 09:48

## 2017-01-24 RX ADMIN — LATANOPROST SCH DROP: 50 SOLUTION OPHTHALMIC at 12:55

## 2017-01-24 RX ADMIN — CLINDAMYCIN PHOSPHATE SCH MLS/HR: 150 INJECTION, SOLUTION INTRAMUSCULAR; INTRAVENOUS at 16:08

## 2017-01-24 RX ADMIN — IPRATROPIUM BROMIDE AND ALBUTEROL SULFATE SCH AMP: .5; 3 SOLUTION RESPIRATORY (INHALATION) at 13:45

## 2017-01-24 RX ADMIN — IPRATROPIUM BROMIDE AND ALBUTEROL SULFATE SCH AMP: .5; 3 SOLUTION RESPIRATORY (INHALATION) at 21:25

## 2017-01-24 RX ADMIN — INSULIN DETEMIR SCH UNITS: 100 INJECTION, SOLUTION SUBCUTANEOUS at 06:13

## 2017-01-24 RX ADMIN — DORZOLAMIDE HYDROCHLORIDE SCH DROP: 20 SOLUTION/ DROPS OPHTHALMIC at 06:14

## 2017-01-24 RX ADMIN — INSULIN DETEMIR SCH UNITS: 100 INJECTION, SOLUTION SUBCUTANEOUS at 17:38

## 2017-01-24 RX ADMIN — IPRATROPIUM BROMIDE AND ALBUTEROL SULFATE SCH AMP: .5; 3 SOLUTION RESPIRATORY (INHALATION) at 17:34

## 2017-01-24 RX ADMIN — INSULIN ASPART SCH UNITS: 100 INJECTION, SOLUTION INTRAVENOUS; SUBCUTANEOUS at 17:29

## 2017-01-24 NOTE — DS
Addendum entered and electronically signed by Ariana Leal RES  01/25/17 10:

51: 





Patient aaox3, nad, afebrile and hemodynamically stable, no acute events 

overnight. He voided at 3 am: full diaper with 220 cc residual urine in 

bladder. another void followed with full diaper and 250cc residual. Glucose is 

well controlled. Labs unremarkable. He is not complaining of pain or discomfort 

and physical exam is benign. Discharge is pending acceptance to a 

rehabilitation facility. 





Original Note:





Physical Exam: 


SUBJECTIVE: Patient seen and examined





Patient resting in bed, in no distress. Afebrile day 3 and hemodynamically 

stable O2 sat 98% on room air. Glucose well controlled. Mental status at 

baseline. Still weak overall strength 4/5. toletrating Puree diet . He states 

that he feels better, has less cough, no pain and improved breathing. He denies 

pelvic pain. He denies h/a, n/v, chest pain, sob, abd pain, diarrhea or 

dysuria. 


OBJECTIVE:





 Vital Signs











 Period  Temp  Pulse  Resp  BP Sys/Ramírez  Pulse Ox


 


 Last 24 Hr  98.1 F-99 F  70-78  18-20  116-141/54-80  








PHYSICAL EXAM





GENERAL: Awake, alert oriented but below baseline 


HEAD: Normal with no signs of trauma.


EYES: Sclera anicteric and mildly injected b/l PERRLA


EARS, NOSE, THROAT: oropharynx clear without exudates. Moist mucous membranes.


NECK: supple without lymphadenopathy, JVD, or masses.


LUNGS: diffuse ronchi, mostly coming from upper respiratory area 


HEART: Regular rate and rhythm, normal S1 and S2 


ABDOMEN: Soft, not distended, normoactive bowel sounds, no guarding. No 

suprapubic tenderness 


MUSCULOSKELETAL: No CVA tenderness.


UPPER EXTREMITIES: 2+ pulses, warm, well perfused, No peripheral edema.


LOWER EXTREMITIES: venous stasis discoloration of b/l ankles, trace b/l edema 


NEUROLOGICAL: weak but more alert 








LABS


 Laboratory Results - last 24 hr











  01/23/17 01/23/17 01/24/17





  16:59 22:17 01:45


 


WBC   


 


RBC   


 


Hgb   


 


Hct   


 


MCV   


 


MCHC   


 


RDW   


 


Plt Count   


 


MPV   


 


Neutrophils %   


 


Lymphocytes %   


 


Monocytes %   


 


Eosinophils %   


 


Basophils %   


 


Sodium   


 


Potassium   


 


Chloride   


 


Carbon Dioxide   


 


Anion Gap   


 


BUN   


 


Creatinine   


 


POC Glucometer  462  284  129


 


Random Glucose   


 


Calcium   


 


Phosphorus   


 


Magnesium   














  01/24/17 01/24/17 01/24/17





  06:11 07:00 07:00


 


WBC   11.9 H 


 


RBC   3.84 L 


 


Hgb   11.7 


 


Hct   34.9 L 


 


MCV   90.7 


 


MCHC   33.4 


 


RDW   13.6 


 


Plt Count   128 L 


 


MPV   9.3 


 


Neutrophils %   71.8 


 


Lymphocytes %   15.0 


 


Monocytes %   7.6 


 


Eosinophils %   5.1 H 


 


Basophils %   0.5 


 


Sodium    150 H


 


Potassium    4.2


 


Chloride    118 H


 


Carbon Dioxide    25


 


Anion Gap    7 L


 


BUN    26 H


 


Creatinine    1.2


 


POC Glucometer  130  


 


Random Glucose    153 H


 


Calcium    8.0 L


 


Phosphorus    3.4


 


Magnesium    2.8 H














  01/24/17 01/24/17





  12:19 13:45


 


WBC  


 


RBC  


 


Hgb  


 


Hct  


 


MCV  


 


MCHC  


 


RDW  


 


Plt Count  


 


MPV  


 


Neutrophils %  


 


Lymphocytes %  


 


Monocytes %  


 


Eosinophils %  


 


Basophils %  


 


Sodium   145


 


Potassium   4.3


 


Chloride   111 H


 


Carbon Dioxide   27


 


Anion Gap   7 L


 


BUN   25 H


 


Creatinine   1.5 H D


 


POC Glucometer  310 


 


Random Glucose   373 H* D


 


Calcium   8.2 L


 


Phosphorus  


 


Magnesium  











HOSPITAL COURSE:





Date of Admission:01/18/17





Patient is a 76 year old male with a significant PMHx of CAD, CHF, HLD, HTN, DM 

II (noncompliant and uncontrolled), CKD, asthma, and Dementia who was brought 

to the ED by his wife and son for worsening altered mental status for the last 2

-3 days.  At baseline, patient is awake, alert, and oriented.  She does report 

that he has worsening vision due to his uncontrolled diabetes, which worsened 

his gait in the last month.  Patient's wife also reports a productive cough 

with white sputum for the last 2-3 days associated with fever and increasing 

urinary frequency.  According to wife, she has been administering Tylenol every 

four hours for the fever. In the ED patient was found to have a rectal 

temperature of 100.2.  However, she denies any chills, nausea, vomiting, chest 

pain, palpitations, shortness of breath.  He was admitted due to Altered mental 

status due to Sepsis, with likley source: RLL Community Acquired Pneumonia. CXR 

on admissin showed RLL infiltrate. He was treated with fluids and antibiotics (

azithro, rocephin and clinda). Over hospital course, his lethary, fever white 

count and lactic acidosis resolved. His blood cultures were negative. During 

admisison, he developed urinary retention, was treated with camacho catheter and 

incresed dose of tamsulosin (0.4 BID). His blood glucose, initially high, was 

controlled with Insulin Sliding scale and Levemir 22 BID. His createninge 

normalized to baseline. He was discharged to nursing home in stable condition, 

for further physical therapy. He did not require furher antibiotics on 

discharge. 





Date of Discharge: 01/24/17











Minutes to complete discharge: 30 (na)





Discharge Summary


Reason For Visit: SEPSIS PNEUMONIA TYPE 2 DIABETES MELLITUS


Current Active Problems





Acute renal failure (Acute) 


Community acquired pneumonia (Acute) 


Dehydration (Acute) 


Influenza B (Acute) 


Pneumonia (Acute) 


Sepsis (Acute) 


Weakness (Acute) 


Asthma (Chronic) 


CAD (coronary artery disease) (Chronic) 


CKD stage 3 due to type 2 diabetes mellitus (Chronic) 


Hyperlipidemia (Chronic) 


Hypertension (Chronic) 


Obesity (BMI 30-39.9) (Chronic) 


Type 2 diabetes mellitus (Chronic) 








Condition: Stable





- Instructions


Diet, Activity, Other Instructions: 


You were in the hospital because of pneumonia. Your mental status was initially 

altered because of fever and infection but has improved as the infection was 

successfully treated with antibiotics. During your stay you also developed 

urinary retention (not being able to pee on your own) because of an enlarged 

prostate and were treated with a catheter and an increased dose of tamsulosin. 

Tamsulosin should shrink your prostate and resolve the urinary retention. 

Before you were discharged, we made sure you can pee on your own. You finished 

your course of antibiotics and no longer require further treatment for the 

pneumonia. 


You are still weak and require rehabilitation in a nursing home.  Your sugar 

was well controlled on levemir 23 units twice a day and sliding scale. For now 

you require puree and nectar thick liquid diet, as per swallow specialist. 


Please see your primary care physician in a week


return to hospital if symptoms worsen 


Referrals: 


Adrienne Truong MD [Primary Care Provider] - 1 Week


Disposition: SKILLED NURSING FACILITY





- Home Medications


Comprehensive Discharge Medication List: 


Ambulatory Orders





Clopidogrel Bisulfate [Plavix -] 75 mg PO DAILY 04/01/12 


Nebivolol HCl [Bystolic] 5 mg PO HS 07/28/14 


Aspirin [ASA -] 81 mg PO DAILY 01/18/17 


Atorvastatin Ca [Lipitor] 80 mg PO HS 01/18/17 


Bimatoprost [Lumigan] 1 drop IO DAILY 01/18/17 


Cyanocobalamin [Vitamin B12 -] 1,000 mcg PO DAILY 01/18/17 


Dorzolamide HCl [Trusopt 2% -] 1 drop OU TID 01/18/17 


Ezetimibe [Zetia] 10 mg PO DAILY 01/18/17 


Furosemide [Lasix] 80 mg PO DAILY 01/18/17 


Albuterol 0.083% Nebulizer Sol [Ventolin 0.083% Nebulizer Soln -] 1 amp NEB Q4H 

PRN #5 amp 01/24/17 


Insulin (Levemir) [Levemir Vial] 22 units SQ BIDI #10 ml 01/24/17 


Insulin Sliding Scale [Novolog Vial Sliding Scale -] 1 vial SQ Q4HPO #10 units 

01/24/17 


Tamsulosin HCl [Flomax -] 0.4 mg PO BID #120 cap.er.24h 01/24/17 











Problem List





- Problems


(1) Acute renal failure


Code(s): N17.9 - ACUTE KIDNEY FAILURE, UNSPECIFIED   





(2) Dehydration


Code(s): E86.0 - DEHYDRATION





(3) Pneumonia


Code(s): J18.9 - PNEUMONIA, UNSPECIFIED ORGANISM   





(4) Sepsis


Code(s): A41.9 - SEPSIS, UNSPECIFIED ORGANISM   





(5) Weakness


Code(s): R53.1 - WEAKNESS





(6) Asthma


Code(s): J45.909 - UNSPECIFIED ASTHMA, UNCOMPLICATED   





(7) CAD (coronary artery disease)


Code(s): I25.10 - ATHSCL HEART DISEASE OF NATIVE CORONARY ARTERY W/O ANG PCTRS 

  





(8) CKD stage 3 due to type 2 diabetes mellitus


Code(s): E11.22 - TYPE 2 DIABETES MELLITUS W DIABETIC CHRONIC KIDNEY DISEASE


N18.3 - CHRONIC KIDNEY DISEASE, STAGE 3 (MODERATE)





(9) Hyperlipidemia


Code(s): E78.5 - HYPERLIPIDEMIA, UNSPECIFIED   





(10) Hypertension


Code(s): I10 - ESSENTIAL (PRIMARY) HYPERTENSION   





(11) Obesity (BMI 30-39.9)


Code(s): E66.9 - OBESITY, UNSPECIFIED





(12) Type 2 diabetes mellitus


Code(s): E11.9 - TYPE 2 DIABETES MELLITUS WITHOUT COMPLICATIONS   





(13) Community acquired pneumonia


Code(s): J18.9 - PNEUMONIA, UNSPECIFIED ORGANISM





This patient is new to me today: No


Emergency Visit: Yes


ED Registration Date: 01/18/17


Care time: The patient presented to the Emergency Department on the above date 

and was hospitalized for further evaluation of their emergent condition.


Critical Care patient: No





- Discharge Referral


Referred to [facility] No

## 2017-01-24 NOTE — PN
Teaching Attending Note


Name of Resident: Ariana Leal


ATTENDING PHYSICIAN STATEMENT





I saw and evaluated the patient.


I reviewed the resident's note and discussed the case with the resident.


I agree with the resident's findings and plan as documented.








SUBJECTIVE:currently asymptomatic. denies CP,SOb,fever, chills, N/V/C/D








OBJECTIVE:





 Last Vital Signs











Temp Pulse Resp BP Pulse Ox


 


 99 F   74   18   128/61   98 


 


 01/24/17 06:24  01/24/17 10:11  01/24/17 06:24  01/24/17 06:24  01/24/17 10:11








General NAD


CV S1 S2 RRR no murmur/rub/gallop


Lungs coarse breath sounds diffusely. no wheezing or crackles





ASSESSMENT AND PLAN:


75yo M with PMH dementia, CKD, HTN, dyslipidemia, DM, CAD presented to the ER 

and was admitted for further evaluation of their emergent condition


1. Sepsis due to PNA- concerns for aspiration. completed course of azithromycin

, today day 7 of Ceftriaxone and day 5 of Clindamycin. will d/c all abx 

today.evaluated by swallow pathologist and on puree diet. 


2. Hyperlgycemia- now improved. concern pt was not getting accurate insulin 

coverage at home as reports no home insulin but last year was on levemir 

22units BID.. currently on levemir 22units BID and iss.


3. Hypernatremia- appears to be may be chronic as presented multiple times with 

this but corrects with hydration. only mild improvement since admission. 

suspicion is from osmotic diuresis from hyperglycemia. may need to have further 

workup as outpatient. encourage po intake of water


4. Acute on CKD- likely deyhdration and sepsis. now improved


5. urinary retention- flomax dosing increased. will attempt to remove camacho. 

will need to f/u with urology as outpatient


6. d/c planning to [facility]. awaiting authorization

## 2017-01-25 LAB
ANION GAP SERPL CALC-SCNC: 5 MMOL/L (ref 8–16)
CALCIUM SERPL-MCNC: 8.3 MG/DL (ref 8.5–10.1)
CO2 SERPL-SCNC: 28 MMOL/L (ref 21–32)
CREAT SERPL-MCNC: 1.3 MG/DL (ref 0.7–1.3)
DEPRECATED RDW RBC AUTO: 13.6 % (ref 11.9–15.9)
GLUCOSE SERPL-MCNC: 162 MG/DL (ref 74–106)
MAGNESIUM SERPL-MCNC: 2.8 MG/DL (ref 1.8–2.4)
MCH RBC QN AUTO: 30.6 PG (ref 25.7–33.7)
MCHC RBC AUTO-ENTMCNC: 33.6 G/DL (ref 32–35.9)
MCV RBC: 90.9 FL (ref 80–96)
PHOSPHATE SERPL-MCNC: 3.2 MG/DL (ref 2.5–4.9)
PLATELET # BLD AUTO: 128 K/MM3 (ref 134–434)
PMV BLD: 9.5 FL (ref 7.5–11.1)
WBC # BLD AUTO: 10.7 K/MM3 (ref 4–10)

## 2017-01-25 RX ADMIN — INSULIN ASPART SCH UNITS: 100 INJECTION, SOLUTION INTRAVENOUS; SUBCUTANEOUS at 17:42

## 2017-01-25 RX ADMIN — CLINDAMYCIN PHOSPHATE SCH MLS/HR: 150 INJECTION, SOLUTION INTRAMUSCULAR; INTRAVENOUS at 03:04

## 2017-01-25 RX ADMIN — TAMSULOSIN HYDROCHLORIDE SCH MG: 0.4 CAPSULE ORAL at 10:22

## 2017-01-25 RX ADMIN — INSULIN ASPART SCH: 100 INJECTION, SOLUTION INTRAVENOUS; SUBCUTANEOUS at 23:36

## 2017-01-25 RX ADMIN — ASPIRIN 81 MG SCH MG: 81 TABLET ORAL at 10:21

## 2017-01-25 RX ADMIN — INSULIN ASPART SCH UNITS: 100 INJECTION, SOLUTION INTRAVENOUS; SUBCUTANEOUS at 11:11

## 2017-01-25 RX ADMIN — EZETIMIBE SCH MG: 10 TABLET ORAL at 11:12

## 2017-01-25 RX ADMIN — NYSTATIN SCH UNITS: 100000 SUSPENSION ORAL at 23:40

## 2017-01-25 RX ADMIN — NEBIVOLOL HYDROCHLORIDE SCH MG: 5 TABLET ORAL at 23:40

## 2017-01-25 RX ADMIN — IPRATROPIUM BROMIDE AND ALBUTEROL SULFATE SCH AMP: .5; 3 SOLUTION RESPIRATORY (INHALATION) at 10:25

## 2017-01-25 RX ADMIN — IPRATROPIUM BROMIDE AND ALBUTEROL SULFATE SCH AMP: .5; 3 SOLUTION RESPIRATORY (INHALATION) at 13:45

## 2017-01-25 RX ADMIN — INSULIN DETEMIR SCH UNITS: 100 INJECTION, SOLUTION SUBCUTANEOUS at 18:56

## 2017-01-25 RX ADMIN — IPRATROPIUM BROMIDE AND ALBUTEROL SULFATE SCH: .5; 3 SOLUTION RESPIRATORY (INHALATION) at 06:45

## 2017-01-25 RX ADMIN — INSULIN DETEMIR SCH: 100 INJECTION, SOLUTION SUBCUTANEOUS at 06:08

## 2017-01-25 RX ADMIN — INSULIN DETEMIR SCH UNITS: 100 INJECTION, SOLUTION SUBCUTANEOUS at 10:22

## 2017-01-25 RX ADMIN — INSULIN ASPART SCH: 100 INJECTION, SOLUTION INTRAVENOUS; SUBCUTANEOUS at 06:08

## 2017-01-25 RX ADMIN — NYSTATIN SCH UNITS: 100000 SUSPENSION ORAL at 18:57

## 2017-01-25 RX ADMIN — CLOPIDOGREL BISULFATE SCH MG: 75 TABLET, FILM COATED ORAL at 10:22

## 2017-01-25 RX ADMIN — DORZOLAMIDE HYDROCHLORIDE SCH DROP: 20 SOLUTION/ DROPS OPHTHALMIC at 14:49

## 2017-01-25 RX ADMIN — CEFTRIAXONE SODIUM SCH MLS/HR: 2 INJECTION, POWDER, FOR SOLUTION INTRAMUSCULAR; INTRAVENOUS at 10:22

## 2017-01-25 RX ADMIN — IPRATROPIUM BROMIDE AND ALBUTEROL SULFATE SCH AMP: .5; 3 SOLUTION RESPIRATORY (INHALATION) at 22:00

## 2017-01-25 RX ADMIN — DORZOLAMIDE HYDROCHLORIDE SCH DROP: 20 SOLUTION/ DROPS OPHTHALMIC at 23:36

## 2017-01-25 RX ADMIN — IPRATROPIUM BROMIDE AND ALBUTEROL SULFATE SCH AMP: .5; 3 SOLUTION RESPIRATORY (INHALATION) at 17:27

## 2017-01-25 RX ADMIN — DORZOLAMIDE HYDROCHLORIDE SCH DROP: 20 SOLUTION/ DROPS OPHTHALMIC at 05:39

## 2017-01-25 RX ADMIN — INSULIN ASPART SCH: 100 INJECTION, SOLUTION INTRAVENOUS; SUBCUTANEOUS at 01:35

## 2017-01-25 RX ADMIN — ENOXAPARIN SODIUM SCH MG: 40 INJECTION SUBCUTANEOUS at 10:22

## 2017-01-25 RX ADMIN — ATORVASTATIN CALCIUM SCH MG: 80 TABLET, FILM COATED ORAL at 23:40

## 2017-01-25 RX ADMIN — TAMSULOSIN HYDROCHLORIDE SCH MG: 0.4 CAPSULE ORAL at 23:40

## 2017-01-25 RX ADMIN — CLINDAMYCIN PHOSPHATE SCH MLS/HR: 150 INJECTION, SOLUTION INTRAMUSCULAR; INTRAVENOUS at 10:21

## 2017-01-25 RX ADMIN — INSULIN ASPART SCH UNITS: 100 INJECTION, SOLUTION INTRAVENOUS; SUBCUTANEOUS at 15:32

## 2017-01-25 RX ADMIN — LATANOPROST SCH DROP: 50 SOLUTION OPHTHALMIC at 10:22

## 2017-01-25 NOTE — PN
Teaching Attending Note


Name of Resident: Ariana Leal


ATTENDING PHYSICIAN STATEMENT





I saw and evaluated the patient.


I reviewed the resident's note and discussed the case with the resident.


I agree with the resident's findings and plan as documented.








SUBJECTIVE:currently asymptomatic. denies Cp, SOB,fever, chills, N/V/C/D








OBJECTIVE:


 Last Vital Signs











Temp Pulse Resp BP Pulse Ox


 


 98.1 F   73   20   129/64   96 


 


 01/25/17 09:45  01/25/17 10:15  01/25/17 09:45  01/25/17 09:45  01/25/17 10:15








General NAD


CV S1 S2 RRR no murmur/rub/gallop


Lungs coarse breath sounds diffusely.crackles B/L bases no wheezing





ASSESSMENT AND PLAN:


77yo M with PMH dementia, CKD, HTN, dyslipidemia, DM, CAD presented to the ER 

and was admitted for further evaluation of their emergent condition


1. Sepsis due to PNA- concerns for aspiration. Completed course of abx here. 

evaluated by swallow therapist and tolerating puree diet


2. Hyperlgycemia- now improved. concern pt was not getting accurate insulin 

coverage at home as reports no home insulin but last year was on levemir 

22units BID. currently on levemir 22units BID and iss.


3. Hypernatremia-slight trend down. ivf d/c due to developing pleural effusions

, will start reduced home dose of lasix. encourage po intake of water


4. Acute on CKD- likely deyhdration and sepsis. now improved


5. urinary retention- flomax dosing increased. camacho removed and voiding 

freely. will need to f/u with urology as outpatient


6. d/c planning to [facility]. awaiting authorization. case d/w son present at 

bedside. answered all questions, agrees with plan

## 2017-01-25 NOTE — PN
Physical Exam: 


SUBJECTIVE: Patient seen and examined





Patient resting in bed, in no distress. Afebrile and hemodynamically stable O2 

sat 98% on room air. Glucose well controlled. Mental status at baseline. Still 

weak overall strength 4/5. He states that he feels well, has less cough, no 

pain and improved breathing. He denies pelvic pain. He denies h/a, n/v, chest 

pain, sob, abd pain, diarrhea or dysuria. He voided at 3 am: full diaper with 

220 cc residual urine in bladder. another void followed with full diaper and 

250cc residual. then 400 cc residual. Discharge is pending acceptance to a 

rehabilitation facility. 





OBJECTIVE:





 Vital Signs











 Period  Temp  Pulse  Resp  BP Sys/Ramírez  Pulse Ox


 


 Last 24 Hr  97.8 F-98.5 F  68-88  20-20  106-131/0-64  96-98











GENERAL: Awake, alert oriented but below baseline 


HEAD: Normal with no signs of trauma.


EYES: Sclera anicteric and mildly injected b/l PERRLA


EARS, NOSE, THROAT: oropharynx clear without exudates. Moist mucous membranes.


NECK: supple without lymphadenopathy, JVD, or masses.


LUNGS: diffuse ronchi, mostly coming from upper respiratory area 


HEART: Regular rate and rhythm, normal S1 and S2 


ABDOMEN: Soft, not distended, normoactive bowel sounds, no guarding. No 

suprapubic tenderness 


MUSCULOSKELETAL: No CVA tenderness.


UPPER EXTREMITIES: 2+ pulses, warm, well perfused, No peripheral edema.


LOWER EXTREMITIES: venous stasis discoloration of b/l ankles, trace b/l edema 


NEUROLOGICAL: weak but more alert 

















 Laboratory Results - last 24 hr











  01/24/17 01/24/17 01/25/17





  17:25 21:55 01:34


 


WBC   


 


RBC   


 


Hgb   


 


Hct   


 


MCV   


 


MCHC   


 


RDW   


 


Plt Count   


 


MPV   


 


Sodium   


 


Potassium   


 


Chloride   


 


Carbon Dioxide   


 


Anion Gap   


 


BUN   


 


Creatinine   


 


POC Glucometer  240  245  108


 


Random Glucose   


 


Calcium   


 


Phosphorus   


 


Magnesium   














  01/25/17 01/25/17 01/25/17





  05:31 06:00 06:00


 


WBC   10.7 H 


 


RBC   3.39 L 


 


Hgb   10.4 L D 


 


Hct   30.9 L 


 


MCV   90.9 


 


MCHC   33.6 


 


RDW   13.6 


 


Plt Count   128 L 


 


MPV   9.5 


 


Sodium    148 H


 


Potassium    4.5


 


Chloride    115 H


 


Carbon Dioxide    28


 


Anion Gap    5 L


 


BUN    23 H


 


Creatinine    1.3


 


POC Glucometer  55  


 


Random Glucose    162 H D


 


Calcium    8.3 L


 


Phosphorus    3.2


 


Magnesium    2.8 H














  01/25/17 01/25/17 01/25/17





  06:20 10:39 15:20


 


WBC   


 


RBC   


 


Hgb   


 


Hct   


 


MCV   


 


MCHC   


 


RDW   


 


Plt Count   


 


MPV   


 


Sodium   


 


Potassium   


 


Chloride   


 


Carbon Dioxide   


 


Anion Gap   


 


BUN   


 


Creatinine   


 


POC Glucometer  139  336  401


 


Random Glucose   


 


Calcium   


 


Phosphorus   


 


Magnesium   








Active Medications











Generic Name Dose Route Start Last Admin





  Trade Name Freq  PRN Reason Stop Dose Admin


 


Acetaminophen  650 mg 01/18/17 13:15 01/24/17 21:52





  Tylenol -  PO   650 mg





  Q4H PRN   Administration





  FEVER OR PAIN   


 


Albuterol Sulfate  1 amp 01/21/17 13:23  





  Ventolin 0.083% Nebulizer Soln -  NEB   





  Q4H PRN   





  SHORT OF BREATH/WHEEZING   


 


Albuterol/Ipratropium  1 amp 01/19/17 11:10 01/25/17 13:45





  Duoneb -  NEB   1 amp





  Q4HWA JENNIFER   Administration


 


Aspirin  81 mg 01/18/17 10:00 01/25/17 10:21





  Asa -  PO   81 mg





  DAILY JENNIFER   Administration


 


Atorvastatin Calcium  80 mg 01/18/17 22:00 01/24/17 21:51





  Lipitor -  PO   80 mg





  HS JENNIFER   Administration


 


Clopidogrel Bisulfate  75 mg 01/18/17 10:00 01/25/17 10:22





  Plavix -  PO   75 mg





  DAILY JENNIFER   Administration


 


Dorzolamide HCl  1 drop 01/18/17 06:00 01/25/17 14:49





  Trusopt 2%  OU   1 drop





  TID JENNIFER   Administration


 


Ezetimibe  10 mg 01/18/17 10:00 01/25/17 11:12





  Zetia -  PO   10 mg





  DAILY JENNIFER   Administration


 


Enoxaparin Sodium  40 mg 01/21/17 11:30 01/25/17 10:22





  Lovenox -  SQ   40 mg





  DAILY JENNIFER   Administration


 


Insulin Aspart  1 vial 01/18/17 06:00 01/25/17 15:32





  Novolog Vial Sliding Scale -  SQ   10 units





  Q4HPO JENNIFER   Administration





  Protocol   


 


Insulin Detemir  22 units 01/21/17 10:09 01/25/17 10:22





  Levemir Vial  SQ   22 units





  BIDI JENNIFER   Administration


 


Latanoprost  1 drop 01/18/17 10:00 01/25/17 10:22





  Xalatan 0.005% Eye Drops -  OU   1 drop





  DAILY JENNIFER   Administration


 


Nebivolol  5 mg 01/18/17 22:00 01/24/17 21:51





  Bystolic -  PO   Not Given





  HS [facility]   


 


Nystatin  500,000 units 01/25/17 18:00  





  Nystatin Oral Suspension -  PO   





  Q6HPO [facility]   


 


Tamsulosin HCl  0.4 mg 01/23/17 22:00 01/25/17 10:22





  Flomax -  PO   0.4 mg





  BID JENNIFER   Administration











ASSESSMENT/PLAN:


This is a 77 yo M with PMH of CAD, CHF, HLD, HTN, uncontrolled IDDM II, CKD, 

asthma, BPH and Dementia who is admitted due to CAP: altered mental status, 

productive cough, fever, urinary frequency/incontinence, hyperglycemia x 3d.





Altered mental status due to Sepsis


-grupo source: RLL Community Acquired Pneumonia


-CXR admissin: RLL infiltrate 


-mental status now baseline 


-lactic acidosis resolved, mental status at baseline this AM. 


-finished abx 


-Tylenol PO PRN


-duonebs q4h jennifer


-Speech and Swallow consult: pureed diet 


-CXR 1/20: patchy infitrates/congestion b/l, bibasilar effusions 





Hyperglycemia in setting of uncontrolled IDDM 


-medication noncompliant 


-Glucose >500 on admission 


-glucose well controlled 


-Insulin Sliding scale


-Levemir 22 BID


-fingerstick TID AC 





Diabetic retinopathy


-severe


-f/u opthalmology outpatient





DEB vs CKD


-likely due to dehydration in setting of uncontrolled DM 


-Creatinine 1.3 (baseline)


-stopped IVF





Thrombocytopenia 


-Likely associated with PNA


-resolved 





CHF


-stable


-hold lasix


-Continue Asa, Plavix





HTN


-Continue Bystolic





HLD


-Continue Zetia, Lipitor





Asthma


-stable


-duonebs [facility]


 


BPH


-Tamsulosin dose increased, still retaining 400 cc post void


-insert camacho


-f/u with urology outpatient 





FEN


no ivf


hypernatremia 152 corrected; monitor 


DVT GI PPX: SCD, diet, lov 


Dysphagia Pureed, diabetic diet nectar liquids 





Dispo: monitor in [facility]








Problem List





- Problems


(1) Acute renal failure


Code(s): N17.9 - ACUTE KIDNEY FAILURE, UNSPECIFIED   





(2) Dehydration


Code(s): E86.0 - DEHYDRATION





(3) Pneumonia


Code(s): J18.9 - PNEUMONIA, UNSPECIFIED ORGANISM   





(4) Sepsis


Code(s): A41.9 - SEPSIS, UNSPECIFIED ORGANISM   





(5) Weakness


Code(s): R53.1 - WEAKNESS





(6) Asthma


Code(s): J45.909 - UNSPECIFIED ASTHMA, UNCOMPLICATED   





(7) CAD (coronary artery disease)


Code(s): I25.10 - ATHSCL HEART DISEASE OF NATIVE CORONARY ARTERY W/O ANG PCTRS 

  





(8) CKD stage 3 due to type 2 diabetes mellitus


Code(s): E11.22 - TYPE 2 DIABETES MELLITUS W DIABETIC CHRONIC KIDNEY DISEASE


N18.3 - CHRONIC KIDNEY DISEASE, STAGE 3 (MODERATE)





(9) Hyperlipidemia


Code(s): E78.5 - HYPERLIPIDEMIA, UNSPECIFIED   





(10) Hypertension


Code(s): I10 - ESSENTIAL (PRIMARY) HYPERTENSION   





(11) Obesity (BMI 30-39.9)


Code(s): E66.9 - OBESITY, UNSPECIFIED





(12) Type 2 diabetes mellitus


Code(s): E11.9 - TYPE 2 DIABETES MELLITUS WITHOUT COMPLICATIONS   





(13) Community acquired pneumonia


Code(s): J18.9 - PNEUMONIA, UNSPECIFIED ORGANISM








Visit type





- Emergency Visit


Emergency Visit: Yes


ED Registration Date: 01/18/17


Care time: The patient presented to the Emergency Department on the above date 

and was hospitalized for further evaluation of their emergent condition.





- New Patient


This patient is new to me today: No





- Critical Care


Critical Care patient: No





- Discharge Referral


Referred to [facility] No

## 2017-01-26 LAB
ANION GAP SERPL CALC-SCNC: 11 MMOL/L (ref 8–16)
CALCIUM SERPL-MCNC: 8.7 MG/DL (ref 8.5–10.1)
CO2 SERPL-SCNC: 24 MMOL/L (ref 21–32)
CREAT SERPL-MCNC: 1.1 MG/DL (ref 0.7–1.3)
GLUCOSE SERPL-MCNC: 123 MG/DL (ref 74–106)

## 2017-01-26 RX ADMIN — TAMSULOSIN HYDROCHLORIDE SCH MG: 0.4 CAPSULE ORAL at 21:39

## 2017-01-26 RX ADMIN — INSULIN ASPART SCH UNITS: 100 INJECTION, SOLUTION INTRAVENOUS; SUBCUTANEOUS at 12:09

## 2017-01-26 RX ADMIN — INSULIN DETEMIR SCH UNITS: 100 INJECTION, SOLUTION SUBCUTANEOUS at 17:56

## 2017-01-26 RX ADMIN — IPRATROPIUM BROMIDE AND ALBUTEROL SULFATE SCH AMP: .5; 3 SOLUTION RESPIRATORY (INHALATION) at 19:05

## 2017-01-26 RX ADMIN — DORZOLAMIDE HYDROCHLORIDE SCH DROP: 20 SOLUTION/ DROPS OPHTHALMIC at 21:40

## 2017-01-26 RX ADMIN — IPRATROPIUM BROMIDE AND ALBUTEROL SULFATE SCH AMP: .5; 3 SOLUTION RESPIRATORY (INHALATION) at 09:54

## 2017-01-26 RX ADMIN — DORZOLAMIDE HYDROCHLORIDE SCH DROP: 20 SOLUTION/ DROPS OPHTHALMIC at 16:29

## 2017-01-26 RX ADMIN — INSULIN ASPART SCH UNITS: 100 INJECTION, SOLUTION INTRAVENOUS; SUBCUTANEOUS at 21:40

## 2017-01-26 RX ADMIN — NYSTATIN SCH UNITS: 100000 SUSPENSION ORAL at 12:06

## 2017-01-26 RX ADMIN — INSULIN DETEMIR SCH UNITS: 100 INJECTION, SOLUTION SUBCUTANEOUS at 06:49

## 2017-01-26 RX ADMIN — NYSTATIN SCH APPLIC: 100000 CREAM TOPICAL at 21:39

## 2017-01-26 RX ADMIN — INSULIN ASPART SCH: 100 INJECTION, SOLUTION INTRAVENOUS; SUBCUTANEOUS at 17:55

## 2017-01-26 RX ADMIN — INSULIN ASPART SCH: 100 INJECTION, SOLUTION INTRAVENOUS; SUBCUTANEOUS at 06:48

## 2017-01-26 RX ADMIN — DORZOLAMIDE HYDROCHLORIDE SCH DROP: 20 SOLUTION/ DROPS OPHTHALMIC at 06:49

## 2017-01-26 RX ADMIN — TAMSULOSIN HYDROCHLORIDE SCH MG: 0.4 CAPSULE ORAL at 12:06

## 2017-01-26 RX ADMIN — NYSTATIN SCH UNITS: 100000 SUSPENSION ORAL at 21:39

## 2017-01-26 RX ADMIN — NYSTATIN SCH UNITS: 100000 SUSPENSION ORAL at 06:48

## 2017-01-26 RX ADMIN — ENOXAPARIN SODIUM SCH MG: 40 INJECTION SUBCUTANEOUS at 12:06

## 2017-01-26 RX ADMIN — IPRATROPIUM BROMIDE AND ALBUTEROL SULFATE SCH: .5; 3 SOLUTION RESPIRATORY (INHALATION) at 06:19

## 2017-01-26 RX ADMIN — ASPIRIN 81 MG SCH MG: 81 TABLET ORAL at 12:06

## 2017-01-26 RX ADMIN — CLOPIDOGREL BISULFATE SCH MG: 75 TABLET, FILM COATED ORAL at 12:06

## 2017-01-26 RX ADMIN — IPRATROPIUM BROMIDE AND ALBUTEROL SULFATE SCH AMP: .5; 3 SOLUTION RESPIRATORY (INHALATION) at 14:15

## 2017-01-26 RX ADMIN — EZETIMIBE SCH MG: 10 TABLET ORAL at 12:06

## 2017-01-26 RX ADMIN — IPRATROPIUM BROMIDE AND ALBUTEROL SULFATE SCH AMP: .5; 3 SOLUTION RESPIRATORY (INHALATION) at 22:02

## 2017-01-26 RX ADMIN — INSULIN ASPART SCH: 100 INJECTION, SOLUTION INTRAVENOUS; SUBCUTANEOUS at 02:50

## 2017-01-26 RX ADMIN — LATANOPROST SCH DROP: 50 SOLUTION OPHTHALMIC at 12:10

## 2017-01-26 RX ADMIN — ATORVASTATIN CALCIUM SCH MG: 80 TABLET, FILM COATED ORAL at 21:38

## 2017-01-26 RX ADMIN — NEBIVOLOL HYDROCHLORIDE SCH MG: 5 TABLET ORAL at 21:39

## 2017-01-26 RX ADMIN — INSULIN ASPART SCH UNITS: 100 INJECTION, SOLUTION INTRAVENOUS; SUBCUTANEOUS at 17:56

## 2017-01-26 NOTE — PN
Physical Exam: 


SUBJECTIVE: Patient seen and examined


Patient resting in bed, in no distress. Afebrile and hemodynamically stable O2 

sat 99% on room air. Glucose well controlled. Mental status at baseline. Still 

weak overall strength 4/5. voiding on his own, full diaper, with residual 

amount of 100 cc in bladder. He states that he feels well, has less cough, no 

pain and improved breathing. He denies pelvic pain. He denies h/a, n/v, chest 

pain, sob, abd pain, diarrhea or dysuria.  Discharge is pending acceptance to a 

rehabilitation facility. 





OBJECTIVE:





 Vital Signs











 Period  Temp  Pulse  Resp  BP Sys/Ramírez  Pulse Ox


 


 Last 24 Hr  97.7 F-98.4 F  64-77  18-20  115-124/48-62  98-99











GENERAL: Awake, alert oriented but below baseline 


HEAD: Normal with no signs of trauma.


EYES: Sclera anicteric and mildly injected b/l PERRLA


EARS, NOSE, THROAT: oropharynx clear without exudates. Moist mucous membranes.


NECK: supple without lymphadenopathy, JVD, or masses.


LUNGS: diffuse ronchi, mostly coming from upper respiratory area 


HEART: Regular rate and rhythm, normal S1 and S2 


ABDOMEN: Soft, not distended, normoactive bowel sounds, no guarding. No 

suprapubic tenderness 


MUSCULOSKELETAL: No CVA tenderness.


UPPER EXTREMITIES: 2+ pulses, warm, well perfused, No peripheral edema.


LOWER EXTREMITIES: venous stasis discoloration of b/l ankles, trace b/l edema 


NEUROLOGICAL: weak but more alert 

















 Laboratory Results - last 24 hr











  01/25/17 01/25/17 01/25/17





  15:20 17:13 23:35


 


Sodium   


 


Potassium   


 


Chloride   


 


Carbon Dioxide   


 


Anion Gap   


 


BUN   


 


Creatinine   


 


POC Glucometer  401  311  79


 


Random Glucose   


 


Calcium   














  01/26/17 01/26/17 01/26/17





  02:49 03:46 06:00


 


Sodium    145


 


Potassium    4.5


 


Chloride    110 H


 


Carbon Dioxide    24


 


Anion Gap    11


 


BUN    18  D


 


Creatinine    1.1


 


POC Glucometer  47  148 


 


Random Glucose    123 H D


 


Calcium    8.7














  01/26/17 01/26/17 01/26/17





  06:46 12:06 12:13


 


Sodium   


 


Potassium   


 


Chloride   


 


Carbon Dioxide   


 


Anion Gap   


 


BUN   


 


Creatinine   


 


POC Glucometer  126  258  267


 


Random Glucose   


 


Calcium   








Active Medications











Generic Name Dose Route Start Last Admin





  Trade Name Freq  PRN Reason Stop Dose Admin


 


Acetaminophen  650 mg 01/18/17 13:15 01/24/17 21:52





  Tylenol -  PO   650 mg





  Q4H PRN   Administration





  FEVER OR PAIN   


 


Albuterol Sulfate  1 amp 01/21/17 13:23  





  Ventolin 0.083% Nebulizer Soln -  NEB   





  Q4H PRN   





  SHORT OF BREATH/WHEEZING   


 


Albuterol/Ipratropium  1 amp 01/19/17 11:10 01/26/17 09:54





  Duoneb -  NEB   1 amp





  Q4HWA JENNIFER   Administration


 


Aspirin  81 mg 01/18/17 10:00 01/26/17 12:06





  Asa -  PO   81 mg





  DAILY JENNIFER   Administration


 


Atorvastatin Calcium  80 mg 01/18/17 22:00 01/25/17 23:40





  Lipitor -  PO   80 mg





  HS JENNIFER   Administration


 


Clopidogrel Bisulfate  75 mg 01/18/17 10:00 01/26/17 12:06





  Plavix -  PO   75 mg





  DAILY JENNIFRE   Administration


 


Dorzolamide HCl  1 drop 01/18/17 06:00 01/26/17 06:49





  Trusopt 2%  OU   1 drop





  TID JENNIFER   Administration


 


Ezetimibe  10 mg 01/18/17 10:00 01/26/17 12:06





  Zetia -  PO   10 mg





  DAILY JENNIFER   Administration


 


Enoxaparin Sodium  40 mg 01/21/17 11:30 01/26/17 12:06





  Lovenox -  SQ   40 mg





  DAILY JENNIFER   Administration


 


Insulin Aspart  1 vial 01/18/17 06:00 01/26/17 12:09





  Novolog Vial Sliding Scale -  SQ   6 units





  Q4HPO JENNIFER   Administration





  Protocol   


 


Insulin Detemir  22 units 01/21/17 10:09 01/26/17 06:49





  Levemir Vial  SQ   22 units





  BIDI JENNIFER   Administration


 


Latanoprost  1 drop 01/18/17 10:00 01/25/17 10:22





  Xalatan 0.005% Eye Drops -  OU   1 drop





  DAILY JENNIFER   Administration


 


Nebivolol  5 mg 01/18/17 22:00 01/25/17 23:40





  Bystolic -  PO   5 mg





  HS JENNIFER   Administration


 


Nystatin  500,000 units 01/25/17 18:00 01/26/17 12:06





  Nystatin Oral Suspension -  PO   500,000 units





  Q6HPO JENNIFER   Administration


 


Tamsulosin HCl  0.4 mg 01/23/17 22:00 01/26/17 12:06





  Flomax -  PO   0.4 mg





  BID JENNIFER   Administration











ASSESSMENT/PLAN:


This is a 77 yo M with PMH of CAD, CHF, HLD, HTN, uncontrolled IDDM II, CKD, 

asthma, BPH and Dementia who is admitted due to CAP: altered mental status, 

productive cough, fever, urinary frequency/incontinence, hyperglycemia x 3d.





Altered mental status due to Sepsis


-grupo source: RLL Community Acquired Pneumonia


-CXR admissin: RLL infiltrate 


-mental status now baseline 


-lactic acidosis resolved, mental status at baseline this AM. 


-finished abx 


-Tylenol PO PRN


-duonebs q4h jennifer


-Speech and Swallow consult: pureed diet 


-CXR 1/20: patchy infitrates/congestion b/l, bibasilar effusions 





Hyperglycemia in setting of uncontrolled IDDM 


-medication noncompliant 


-Glucose >500 on admission 


-glucose well controlled 


-Insulin Sliding scale


-Levemir 22 BID


-fingerstick TID AC 





Diabetic retinopathy


-severe


-f/u opthalmology outpatient





DEB vs CKD


-likely due to dehydration in setting of uncontrolled DM 


-Creatinine  at baseline





Oral Thrush


-nystatin suspension





Thrombocytopenia 


-Likely associated with PNA


-resolved 





CHF


-stable


-Continue Asa, Plavix





HTN


-Continue Bystolic





HLD


-Continue Zetia, Lipitor





Asthma


-stable


-duonebs jennifer


 


BPH


-Tamsulosin dose increased


-voiding well, bladder scan post void residual 100cc


-f/u with urology outpatient 





FEN


no ivf


hypernatremia resolved  


DVT GI PPX: SCD, diet, lov 


Dysphagia Pureed, diabetic diet nectar liquids 





Dispo: monitor in [facility], d/c pending insurance approval for rehab 








Problem List





- Problems


(1) Acute renal failure


Code(s): N17.9 - ACUTE KIDNEY FAILURE, UNSPECIFIED   





(2) Dehydration


Code(s): E86.0 - DEHYDRATION





(3) Pneumonia


Code(s): J18.9 - PNEUMONIA, UNSPECIFIED ORGANISM   





(4) Sepsis


Code(s): A41.9 - SEPSIS, UNSPECIFIED ORGANISM   





(5) Weakness


Code(s): R53.1 - WEAKNESS





(6) Asthma


Code(s): J45.909 - UNSPECIFIED ASTHMA, UNCOMPLICATED   





(7) CAD (coronary artery disease)


Code(s): I25.10 - ATHSCL HEART DISEASE OF NATIVE CORONARY ARTERY W/O ANG PCTRS 

  





(8) CKD stage 3 due to type 2 diabetes mellitus


Code(s): E11.22 - TYPE 2 DIABETES MELLITUS W DIABETIC CHRONIC KIDNEY DISEASE


N18.3 - CHRONIC KIDNEY DISEASE, STAGE 3 (MODERATE)





(9) Hyperlipidemia


Code(s): E78.5 - HYPERLIPIDEMIA, UNSPECIFIED   





(10) Hypertension


Code(s): I10 - ESSENTIAL (PRIMARY) HYPERTENSION   





(11) Obesity (BMI 30-39.9)


Code(s): E66.9 - OBESITY, UNSPECIFIED





(12) Type 2 diabetes mellitus


Code(s): E11.9 - TYPE 2 DIABETES MELLITUS WITHOUT COMPLICATIONS   





(13) Community acquired pneumonia


Code(s): J18.9 - PNEUMONIA, UNSPECIFIED ORGANISM








Visit type





- Emergency Visit


Emergency Visit: Yes


ED Registration Date: 01/18/17


Care time: The patient presented to the Emergency Department on the above date 

and was hospitalized for further evaluation of their emergent condition.





- New Patient


This patient is new to me today: No





- Critical Care


Critical Care patient: No





- Discharge Referral


Referred to [facility] No

## 2017-01-26 NOTE — PN
Teaching Attending Note


Name of Resident: Ariana Leal


ATTENDING PHYSICIAN STATEMENT





I saw and evaluated the patient.


I reviewed the resident's note and discussed the case with the resident.


I agree with the resident's findings and plan as documented.








SUBJECTIVE:currently asymptomatic. denies CP, SOB,fever, chills, N/V/C/D








OBJECTIVE:


 Last Vital Signs











Temp Pulse Resp BP Pulse Ox


 


 98 F   66   20   124/62   98 


 


 01/26/17 08:35  01/26/17 09:54  01/26/17 08:35  01/26/17 08:35  01/26/17 09:54








General NAD


CV S1 S2 RRR no murmur/rub/gallop


Lungs coarse breath sounds diffusely.crackles B/L bases no wheezing





ASSESSMENT AND PLAN:


77yo M with PMH dementia, CKD, HTN, dyslipidemia, DM, CAD presented to the ER 

and was admitted for further evaluation of their emergent condition


1. Sepsis due to PNA- concerns for aspiration. Completed course of abx here. 

evaluated by swallow therapist and tolerating puree diet


2. Hyperlgycemia- now improved. concern pt was not getting accurate insulin 

coverage at home as reports no home insulin but last year was on levemir 

22units BID. currently on levemir 22units BID and iss.


3. Hypernatremia-now resolved.


4. Acute on CKD- likely deyhdration and sepsis. now improved


5. urinary retention- flomax dosing increased. camacho removed and voiding 

freely. will need to f/u with urology as outpatient


6. d/c planning to [facility]. awaiting authorization.

## 2017-01-27 VITALS — SYSTOLIC BLOOD PRESSURE: 133 MMHG | DIASTOLIC BLOOD PRESSURE: 58 MMHG | HEART RATE: 66 BPM | TEMPERATURE: 98 F

## 2017-01-27 RX ADMIN — IPRATROPIUM BROMIDE AND ALBUTEROL SULFATE SCH AMP: .5; 3 SOLUTION RESPIRATORY (INHALATION) at 10:10

## 2017-01-27 RX ADMIN — IPRATROPIUM BROMIDE AND ALBUTEROL SULFATE SCH AMP: .5; 3 SOLUTION RESPIRATORY (INHALATION) at 06:35

## 2017-01-27 RX ADMIN — LATANOPROST SCH DROP: 50 SOLUTION OPHTHALMIC at 10:07

## 2017-01-27 RX ADMIN — INSULIN DETEMIR SCH UNITS: 100 INJECTION, SOLUTION SUBCUTANEOUS at 06:29

## 2017-01-27 RX ADMIN — CLOPIDOGREL BISULFATE SCH MG: 75 TABLET, FILM COATED ORAL at 09:59

## 2017-01-27 RX ADMIN — NYSTATIN SCH: 100000 SUSPENSION ORAL at 01:00

## 2017-01-27 RX ADMIN — INSULIN ASPART SCH: 100 INJECTION, SOLUTION INTRAVENOUS; SUBCUTANEOUS at 06:28

## 2017-01-27 RX ADMIN — INSULIN ASPART SCH UNITS: 100 INJECTION, SOLUTION INTRAVENOUS; SUBCUTANEOUS at 17:33

## 2017-01-27 RX ADMIN — ENOXAPARIN SODIUM SCH MG: 40 INJECTION SUBCUTANEOUS at 09:58

## 2017-01-27 RX ADMIN — IPRATROPIUM BROMIDE AND ALBUTEROL SULFATE SCH AMP: .5; 3 SOLUTION RESPIRATORY (INHALATION) at 17:20

## 2017-01-27 RX ADMIN — DORZOLAMIDE HYDROCHLORIDE SCH DROP: 20 SOLUTION/ DROPS OPHTHALMIC at 14:17

## 2017-01-27 RX ADMIN — TAMSULOSIN HYDROCHLORIDE SCH MG: 0.4 CAPSULE ORAL at 09:59

## 2017-01-27 RX ADMIN — IPRATROPIUM BROMIDE AND ALBUTEROL SULFATE SCH: .5; 3 SOLUTION RESPIRATORY (INHALATION) at 13:45

## 2017-01-27 RX ADMIN — NYSTATIN SCH APPLIC: 100000 CREAM TOPICAL at 10:00

## 2017-01-27 RX ADMIN — NYSTATIN SCH UNITS: 100000 SUSPENSION ORAL at 06:28

## 2017-01-27 RX ADMIN — INSULIN ASPART SCH: 100 INJECTION, SOLUTION INTRAVENOUS; SUBCUTANEOUS at 10:07

## 2017-01-27 RX ADMIN — INSULIN ASPART SCH UNITS: 100 INJECTION, SOLUTION INTRAVENOUS; SUBCUTANEOUS at 12:55

## 2017-01-27 RX ADMIN — INSULIN DETEMIR SCH UNITS: 100 INJECTION, SOLUTION SUBCUTANEOUS at 17:34

## 2017-01-27 RX ADMIN — NYSTATIN SCH UNITS: 100000 SUSPENSION ORAL at 18:55

## 2017-01-27 RX ADMIN — EZETIMIBE SCH MG: 10 TABLET ORAL at 09:58

## 2017-01-27 RX ADMIN — INSULIN ASPART SCH: 100 INJECTION, SOLUTION INTRAVENOUS; SUBCUTANEOUS at 17:23

## 2017-01-27 RX ADMIN — DORZOLAMIDE HYDROCHLORIDE SCH DROP: 20 SOLUTION/ DROPS OPHTHALMIC at 06:28

## 2017-01-27 RX ADMIN — NYSTATIN SCH UNITS: 100000 SUSPENSION ORAL at 12:56

## 2017-01-27 RX ADMIN — INSULIN ASPART SCH: 100 INJECTION, SOLUTION INTRAVENOUS; SUBCUTANEOUS at 18:54

## 2017-01-27 RX ADMIN — ASPIRIN 81 MG SCH MG: 81 TABLET ORAL at 09:59

## 2017-01-27 NOTE — PN
Physical Exam: 


SUBJECTIVE: Patient seen and examined





Patient resting in bed, NAD. Afebrile and hemodynamically stable O2 sat 98% on 

room air. Glucose well controlled. Mental status at baseline. Still weak 

overall strength 4/5. voiding on his own. States that he feels well, has less 

cough, no pain or sob. He denies pelvic pain. He denies h/a, n/v, chest pain, 

sob, abd pain, diarrhea or dysuria.  Discharge is pending acceptance to a 

rehabilitation facility. has oral, periorbital and groin fold thrush treated 

with nystatin 








OBJECTIVE:





 Vital Signs











 Period  Temp  Pulse  Resp  BP Sys/Ramírez  Pulse Ox


 


 Last 24 Hr  97.7 F-98.6 F  57-86  20-20  114-132/56-69  97-98











GENERAL: Awake, alert oriented but below baseline 


HEAD: Normal with no signs of trauma.


EYES: Sclera anicteric and mildly injected b/l PERRLA


EARS, NOSE, THROAT: oropharynx clear without exudates. Moist mucous membranes.


NECK: supple without lymphadenopathy, JVD, or masses.


LUNGS: diffuse ronchi, mostly coming from upper respiratory area 


HEART: Regular rate and rhythm, normal S1 and S2 


ABDOMEN: Soft, not distended, normoactive bowel sounds, no guarding. No 

suprapubic tenderness 


MUSCULOSKELETAL: No CVA tenderness.


UPPER EXTREMITIES: 2+ pulses, warm, well perfused, No peripheral edema.


LOWER EXTREMITIES: venous stasis discoloration of b/l ankles, trace b/l edema 


NEUROLOGICAL: weak but more alert 














 Laboratory Results - last 24 hr











  01/26/17 01/26/17 01/27/17





  15:15 20:57 05:56


 


POC Glucometer  283  276  119














  01/27/17





  11:54


 


POC Glucometer  343








Active Medications











Generic Name Dose Route Start Last Admin





  Trade Name Freq  PRN Reason Stop Dose Admin


 


Acetaminophen  650 mg 01/18/17 13:15 01/24/17 21:52





  Tylenol -  PO   650 mg





  Q4H PRN   Administration





  FEVER OR PAIN   


 


Albuterol/Ipratropium  1 amp 01/19/17 11:10 01/27/17 10:10





  Duoneb -  NEB   1 amp





  Q4HWA JENNIFER   Administration


 


Aspirin  81 mg 01/18/17 10:00 01/27/17 09:59





  Asa -  PO   81 mg





  DAILY JENNIFER   Administration


 


Atorvastatin Calcium  80 mg 01/18/17 22:00 01/26/17 21:38





  Lipitor -  PO   80 mg





  HS JENNIFER   Administration


 


Clopidogrel Bisulfate  75 mg 01/18/17 10:00 01/27/17 09:59





  Plavix -  PO   75 mg





  DAILY JENNIFER   Administration


 


Dorzolamide HCl  1 drop 01/18/17 06:00 01/27/17 14:17





  Trusopt 2%  OU   1 drop





  TID JENNIFER   Administration


 


Ezetimibe  10 mg 01/18/17 10:00 01/27/17 09:58





  Zetia -  PO   10 mg





  DAILY JENNIFER   Administration


 


Enoxaparin Sodium  40 mg 01/21/17 11:30 01/27/17 09:58





  Lovenox -  SQ   40 mg





  DAILY JENNIFER   Administration


 


Insulin Aspart  1 vial 01/18/17 06:00 01/27/17 12:55





  Novolog Vial Sliding Scale -  SQ   8 units





  Q4HPO JENNIFER   Administration





  Protocol   


 


Insulin Detemir  22 units 01/21/17 10:09 01/27/17 06:29





  Levemir Vial  SQ   22 units





  BIDI JENNIFER   Administration


 


Latanoprost  1 drop 01/18/17 10:00 01/27/17 10:07





  Xalatan 0.005% Eye Drops -  OU   1 drop





  DAILY JENNIFER   Administration


 


Nebivolol  5 mg 01/18/17 22:00 01/26/17 21:39





  Bystolic -  PO   5 mg





  HS JENNIFER   Administration


 


Nystatin  500,000 units 01/25/17 18:00 01/27/17 12:56





  Nystatin Oral Suspension -  PO   500,000 units





  Q6HPO JENNIFER   Administration


 


Nystatin  1 applic 01/26/17 16:45 01/27/17 09:59





  Nystop Powder -  TP   1 applic





  DAILY JENNIFER   Administration


 


Nystatin/Triamcinolone Acetonide  1 applic 01/26/17 22:00 01/27/17 10:00





  Mycolog Ii Cream -  TP   1 applic





  BID JENNIFER   Administration


 


Tamsulosin HCl  0.4 mg 01/23/17 22:00 01/27/17 09:59





  Flomax -  PO   0.4 mg





  BID JENNIFER   Administration











ASSESSMENT/PLAN:


This is a 77 yo M with PMH of CAD, CHF, HLD, HTN, uncontrolled IDDM II, CKD, 

asthma, BPH and Dementia who is admitted due to CAP: altered mental status, 

productive cough, fever, urinary frequency/incontinence, hyperglycemia x 3d.





Altered mental status due to Sepsis


-grupo source: RLL Community Acquired Pneumonia


-CXR admissin: RLL infiltrate 


-mental status now baseline 


-lactic acidosis resolved, mental status at baseline this AM. 


-finished abx 


-Tylenol PO PRN


-duonebs q4h jennifer


-Speech and Swallow consult: pureed diet 


-CXR 1/20: patchy infitrates/congestion b/l, bibasilar effusions 





Hyperglycemia in setting of uncontrolled IDDM 


-medication noncompliant 


-Glucose >500 on admission 


-glucose well controlled 


-Insulin Sliding scale


-Levemir 22 BID


-fingerstick TID AC 





Diabetic retinopathy


-severe


-f/u opthalmology outpatient





DEB vs CKD


-likely due to dehydration in setting of uncontrolled DM 


-Creatinine  at baseline





Thrush (oral, periorbital, groin folds)


-nystatin suspension





Thrombocytopenia 


-Likely associated with PNA


-resolved 





CHF


-stable


-Continue Asa, Plavix





HTN


-Continue Bystolic





HLD


-Continue Zetia, Lipitor





Asthma


-stable


-duonebs jennifer


 


BPH


-Tamsulosin dose increased


-voiding well, bladder scan post void residual 100cc


-f/u with urology outpatient 





FEN


no ivf


hypernatremia resolved  


DVT GI PPX: SCD, diet, lov 


Dysphagia Pureed, diabetic diet nectar liquids 





Dispo: monitor in [facility], d/c pending insurance approval for rehab 








Problem List





- Problems


(1) Acute renal failure


Code(s): N17.9 - ACUTE KIDNEY FAILURE, UNSPECIFIED   





(2) Dehydration


Code(s): E86.0 - DEHYDRATION





(3) Pneumonia


Code(s): J18.9 - PNEUMONIA, UNSPECIFIED ORGANISM   





(4) Sepsis


Code(s): A41.9 - SEPSIS, UNSPECIFIED ORGANISM   





(5) Weakness


Code(s): R53.1 - WEAKNESS





(6) Asthma


Code(s): J45.909 - UNSPECIFIED ASTHMA, UNCOMPLICATED   





(7) CAD (coronary artery disease)


Code(s): I25.10 - ATHSCL HEART DISEASE OF NATIVE CORONARY ARTERY W/O ANG PCTRS 

  





(8) CKD stage 3 due to type 2 diabetes mellitus


Code(s): E11.22 - TYPE 2 DIABETES MELLITUS W DIABETIC CHRONIC KIDNEY DISEASE


N18.3 - CHRONIC KIDNEY DISEASE, STAGE 3 (MODERATE)





(9) Hyperlipidemia


Code(s): E78.5 - HYPERLIPIDEMIA, UNSPECIFIED   





(10) Hypertension


Code(s): I10 - ESSENTIAL (PRIMARY) HYPERTENSION   





(11) Obesity (BMI 30-39.9)


Code(s): E66.9 - OBESITY, UNSPECIFIED





(12) Type 2 diabetes mellitus


Code(s): E11.9 - TYPE 2 DIABETES MELLITUS WITHOUT COMPLICATIONS   





(13) Community acquired pneumonia


Code(s): J18.9 - PNEUMONIA, UNSPECIFIED ORGANISM








Visit type





- Emergency Visit


Emergency Visit: Yes


ED Registration Date: 01/18/17


Care time: The patient presented to the Emergency Department on the above date 

and was hospitalized for further evaluation of their emergent condition.





- New Patient


This patient is new to me today: No





- Critical Care


Critical Care patient: No





- Discharge Referral


Referred to [facility] No

## 2017-01-27 NOTE — PN
Teaching Attending Note


Name of Resident: Ariana Leal


ATTENDING PHYSICIAN STATEMENT





I saw and evaluated the patient.


I reviewed the resident's note and discussed the case with the resident.


I agree with the resident's findings and plan as documented.








SUBJECTIVE:currently asymptomatic. denies CP, SOB,fever, chills, N/V/C/D








OBJECTIVE:





 Last Vital Signs











Temp Pulse Resp BP Pulse Ox


 


 98.2 F   74   20   121/63   97 


 


 01/27/17 10:18  01/27/17 11:58  01/27/17 10:18  01/27/17 10:18  01/27/17 11:58








General NAD


CV S1 S2 RRR no murmur/rub/gallop


Lungs CTA B/L no wheezing/rales/rhonchi





ASSESSMENT AND PLAN:


75yo M with PMH dementia, CKD, HTN, dyslipidemia, DM, CAD presented to the ER 

and was admitted for further evaluation of their emergent condition


1. Sepsis due to PNA- concerns for aspiration. Completed course of abx here. 

evaluated by swallow therapist and tolerating puree diet


2. Hyperlgycemia- now improved. continue current management. levemir 22 units 

BID and iss 


3. Hypernatremia-now resolved.


4. Acute on CKD- likely deyhdration and sepsis. now improved


5. urinary retention- sp camacho removal and improvement. on flomax. will need to 

f/u with urology as outpatient


6. d/c planning to [facility]. awaiting authorization.

## 2017-06-12 ENCOUNTER — HOSPITAL ENCOUNTER (EMERGENCY)
Dept: HOSPITAL 74 - JER | Age: 77
Discharge: HOME | End: 2017-06-12
Payer: COMMERCIAL

## 2017-06-12 VITALS — TEMPERATURE: 97.1 F

## 2017-06-12 VITALS — BODY MASS INDEX: 39 KG/M2

## 2017-06-12 VITALS — SYSTOLIC BLOOD PRESSURE: 161 MMHG | HEART RATE: 58 BPM | DIASTOLIC BLOOD PRESSURE: 97 MMHG

## 2017-06-12 DIAGNOSIS — Z79.4: ICD-10-CM

## 2017-06-12 DIAGNOSIS — I12.9: ICD-10-CM

## 2017-06-12 DIAGNOSIS — E78.00: ICD-10-CM

## 2017-06-12 DIAGNOSIS — E11.22: ICD-10-CM

## 2017-06-12 DIAGNOSIS — I25.10: ICD-10-CM

## 2017-06-12 DIAGNOSIS — N18.9: ICD-10-CM

## 2017-06-12 DIAGNOSIS — Z95.1: ICD-10-CM

## 2017-06-12 DIAGNOSIS — Z79.82: ICD-10-CM

## 2017-06-12 DIAGNOSIS — E16.2: Primary | ICD-10-CM

## 2017-06-12 DIAGNOSIS — J45.909: ICD-10-CM

## 2017-06-12 LAB
ALBUMIN SERPL-MCNC: 3.5 G/DL (ref 3.4–5)
ALP SERPL-CCNC: 100 U/L (ref 45–117)
ALT SERPL-CCNC: 28 U/L (ref 12–78)
ANION GAP SERPL CALC-SCNC: 8 MMOL/L (ref 8–16)
APPEARANCE UR: CLEAR
AST SERPL-CCNC: 23 U/L (ref 15–37)
BASOPHILS # BLD: 0.7 % (ref 0–2)
BILIRUB SERPL-MCNC: 0.4 MG/DL (ref 0.2–1)
BILIRUB UR STRIP.AUTO-MCNC: NEGATIVE MG/DL
CALCIUM SERPL-MCNC: 8.9 MG/DL (ref 8.5–10.1)
CO2 SERPL-SCNC: 29 MMOL/L (ref 21–32)
COCKROFT - GAULT: 57.59
COLOR UR: (no result)
CREAT SERPL-MCNC: 1.4 MG/DL (ref 0.7–1.3)
DEPRECATED RDW RBC AUTO: 14.4 % (ref 11.9–15.9)
EOSINOPHIL # BLD: 1.8 % (ref 0–4.5)
GLUCOSE SERPL-MCNC: 173 MG/DL (ref 74–106)
KETONES UR QL STRIP: NEGATIVE
LEUKOCYTE ESTERASE UR QL STRIP.AUTO: NEGATIVE
MCH RBC QN AUTO: 29.7 PG (ref 25.7–33.7)
MCHC RBC AUTO-ENTMCNC: 33.7 G/DL (ref 32–35.9)
MCV RBC: 88 FL (ref 80–96)
NEUTROPHILS # BLD: 74.8 % (ref 42.8–82.8)
NITRITE UR QL STRIP: NEGATIVE
PH UR: 5 [PH] (ref 5–8)
PLATELET # BLD AUTO: 92 K/MM3 (ref 134–434)
PMV BLD: 9.6 FL (ref 7.5–11.1)
PROT SERPL-MCNC: 6.6 G/DL (ref 6.4–8.2)
PROT UR QL STRIP: (no result)
PROT UR QL STRIP: NEGATIVE
RBC # UR STRIP: NEGATIVE /UL
SP GR UR: <= 1.005 (ref 1–1.02)
UROBILINOGEN UR STRIP-MCNC: NEGATIVE E.U./DL (ref 0.2–1)
WBC # BLD AUTO: 8.6 K/MM3 (ref 4–10)

## 2017-06-12 NOTE — PDOC
History of Present Illness





- General


History Source: Patient, Spouse, Old Records


Exam Limitations: No Limitations





- History of Present Illness


Initial Comments: 


06/12/17 08:35


The patient is a 76-year-old man, accompanied by his wife, with a significant 

past medical history of  hypertension, hypercholesterolemia, coronary artery 

disease status post CABG, diabetes mellitus, asthma and chronic kidney disease 

who presents to the emergency department via EMS for hypoglycemia. As per 

patients wife, the patient has not eaten today. She routinely checked his 

blood sugar and reports a blood glucose measurement at home of 42. HPI is 

limited, patient's wife  is a poor historian. 


 


Allergies: No Known Drug Allergies.


Past Surgical History: Right hip ORIF. CABG


Social History: No tobacco, EtOH and recreational drug use.


Primary Care Physician: Dr. Adrienne Truong








<Bina Smith - Last Filed: 06/12/17 12:56>





<Mily Chapman - Last Filed: 06/12/17 15:59>





- General


Chief Complaint: Blood Sugar Problem


Stated Complaint: DIABETIC


Time Seen by Provider: 06/12/17 08:35





Past History





<Bina Smith - Last Filed: 06/12/17 12:56>





- Past Medical History


Anemia: No


Asthma: Yes


Cancer: No


Cardiac Disorders: Yes (cabg)


CVA: No


COPD: No


CHF: No


Dementia: No


Diabetes: Yes


GI Disorders: No


 Disorders: No


HTN: Yes


Hypercholesterolemia: Yes


Liver Disease: No


Suicide Attempt (Hx): No


Seizures: No


Thyroid Disease: No





- Surgical History


Abdominal Surgery: No


Appendectomy: No


Cardiac Surgery: Yes ('96)


Cholecystectomy: No


Lung Surgery: No


Neurologic Surgery: No


Orthopedic Surgery: No (rt  hip orif)





- Immunization History


Immunization Up to Date: Yes





- Psycho/Social/Smoking Cessation Hx


Anxiety: No


Suicidal Ideation: No


Smoking Status: No


Smoking History: Never smoked


Have you smoked in the past 12 months: No


Number of Cigarettes Smoked Daily: 0


If you are a former smoker, when did you quit?: '96


Hx Alcohol Use: No


Drug/Substance Use Hx: No


Substance Use Type: None


Hx Substance Use Treatment: No





<Mily Chapman - Last Filed: 06/12/17 15:59>





- Past Medical History


Allergies/Adverse Reactions: 


 Allergies











Allergy/AdvReac Type Severity Reaction Status Date / Time


 


No Known Allergies Allergy   Verified 06/12/17 08:49











Home Medications: 


Ambulatory Orders





Clopidogrel Bisulfate [Plavix -] 75 mg PO DAILY 04/01/12 


Nebivolol HCl [Bystolic] 5 mg PO HS 07/28/14 


Aspirin [ASA -] 81 mg PO DAILY 01/18/17 


Atorvastatin Ca [Lipitor] 80 mg PO HS 01/18/17 


Bimatoprost [Lumigan] 1 drop IO DAILY 01/18/17 


Cyanocobalamin [Vitamin B12 -] 1,000 mcg PO DAILY 01/18/17 


Dorzolamide HCl [Trusopt 2% -] 1 drop OU TID 01/18/17 


Ezetimibe [Zetia] 10 mg PO DAILY 01/18/17 


Insulin (Levemir) [Levemir Vial] 22 units SQ BIDI #10 ml 01/24/17 


Insulin Sliding Scale [Novolog Vial Sliding Scale -] 1 vial SQ Q4HPO #10 units 

01/24/17 


Tamsulosin HCl [Flomax -] 0.4 mg PO BID #120 cap.er.24h 01/24/17 


Furosemide [Lasix -] 40 mg PO DAILY #60 tablet 01/25/17 


Memantine HCl 1 tab PO BID 06/12/17 











**Review of Systems





- Review of Systems


Able to Perform ROS?: Yes


Comments:: 


06/12/17 08:36


GENERAL/CONSTITUTIONAL: No fever or chills. No weakness.


CARDIOVASCULAR: No chest pain or shortness of breath.


RESPIRATORY: No cough, wheezing, or hemoptysis.


GASTROINTESTINAL: No nausea, vomiting, diarrhea or constipation.


NEUROLOGIC: No headache, vertigo, loss of consciousness, or change in strength/

sensation.


ENDOCRINE: Yes: Hypoglycemic. No increased thirst. No abnormal weight change.


HEMATOLOGIC/LYMPHATIC: No anemia, easy bleeding, or history of blood clots.


ALLERGIC/IMMUNOLOGIC: No hives or skin allergy.








<Bina Smith - Last Filed: 06/12/17 12:56>





*Physical Exam





- Physical Exam


Comments: 


GENERAL: Sleeping, awakens to voice.


HEAD: No signs of trauma


EYES: PERRLA, EOMI, sclera anicteric, conjunctiva clear


ENT: Auricles normal inspection, hearing grossly normal, nares patent, 

oropharynx clear without exudates. Dry mucosa


NECK: Normal ROM, supple, no lymphadenopathy, JVD, or masses


LUNGS: Breath sounds equal, clear to auscultation bilaterally.  No wheezes, and 

no crackles


HEART: Regular rate and rhythm, normal S1 and S2, no murmurs, rubs or gallops


ABDOMEN: Soft, nontender, normoactive bowel sounds.  No guarding, no rebound.  

No masses


EXTREMITIES: Normal range of motion, no edema.  No clubbing or cyanosis. No 

cords, erythema, or tenderness


NEUROLOGICAL: Limited by mental status.


SKIN: Warm, Dry, normal turgor, no rashes or lesions noted.








<Mily Chapman - Last Filed: 06/12/17 15:59>





ED Treatment Course





- LABORATORY


CBC & Chemistry Diagram: 


 06/12/17 09:10





 06/12/17 09:10





<Bina Smith - Last Filed: 06/12/17 12:56>





- LABORATORY


CBC & Chemistry Diagram: 


 06/12/17 09:10





 06/12/17 09:10





<Mily Chapman - Last Filed: 06/12/17 15:59>





Medical Decision Making





- Medical Decision Making





06/12/17 15:57


Pt observed in ED while awaiting remainder of results and the UA. No further 

episodes of hypoglycemia. No signs of any infection. Stable for DC home. 





<Mily Chapman - Last Filed: 06/12/17 15:59>





*DC/Admit/Observation/Transfer





- Attestations


Scribe Attestion: 





06/12/17 08:36





Documentation prepared by Bina Smith, acting as medical scribe for 

Mily Chapman MD.





<Bina Smith - Last Filed: 06/12/17 12:56>





- Discharge Dispostion


Admit: No





<Mily Chapman - Last Filed: 06/12/17 15:59>


Diagnosis at time of Disposition: 


 Hypoglycemia





- Discharge Dispostion


Disposition: HOME


Condition at time of disposition: Stable





- Referrals


Referrals: 


Rodney Oviedo MD [Primary Care Provider] - 





- Patient Instructions


Printed Discharge Instructions:  DI for Hypoglycemia

## 2017-06-12 NOTE — EKG
Test Reason : 

Blood Pressure : ***/*** mmHG

Vent. Rate : 056 BPM     Atrial Rate : 056 BPM

   P-R Int : 164 ms          QRS Dur : 090 ms

    QT Int : 452 ms       P-R-T Axes : 026 051 058 degrees

   QTc Int : 436 ms

 

SINUS BRADYCARDIA

OTHERWISE NORMAL ECG

WHEN COMPARED WITH ECG OF 18-JAN-2017 00:20,

VENT. RATE HAS DECREASED BY  28 BPM



Confirmed by ESTRELLITA HARPER MD ([id number]) on 6/12/2017 2:02:23 PM

 

Referred By:             Confirmed By:ESTRELLITA HARPER MD

## 2018-11-25 ENCOUNTER — HOSPITAL ENCOUNTER (EMERGENCY)
Dept: HOSPITAL 74 - JER | Age: 78
Discharge: HOME | End: 2018-11-25
Payer: COMMERCIAL

## 2018-11-25 VITALS — DIASTOLIC BLOOD PRESSURE: 69 MMHG | TEMPERATURE: 97.8 F | HEART RATE: 67 BPM | SYSTOLIC BLOOD PRESSURE: 129 MMHG

## 2018-11-25 VITALS — BODY MASS INDEX: 31.2 KG/M2

## 2018-11-25 DIAGNOSIS — N40.1: ICD-10-CM

## 2018-11-25 DIAGNOSIS — N18.3: ICD-10-CM

## 2018-11-25 DIAGNOSIS — D72.829: ICD-10-CM

## 2018-11-25 DIAGNOSIS — I13.10: ICD-10-CM

## 2018-11-25 DIAGNOSIS — R32: ICD-10-CM

## 2018-11-25 DIAGNOSIS — T38.3X5A: ICD-10-CM

## 2018-11-25 DIAGNOSIS — I25.10: ICD-10-CM

## 2018-11-25 DIAGNOSIS — Z96.41: ICD-10-CM

## 2018-11-25 DIAGNOSIS — Z79.4: ICD-10-CM

## 2018-11-25 DIAGNOSIS — E09.649: Primary | ICD-10-CM
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
## 2018-11-25 DIAGNOSIS — Z95.1: ICD-10-CM

## 2018-11-25 DIAGNOSIS — J45.909: ICD-10-CM

## 2018-11-25 DIAGNOSIS — E78.5: ICD-10-CM

## 2018-11-25 LAB
ALBUMIN SERPL-MCNC: 3.3 G/DL (ref 3.4–5)
ALP SERPL-CCNC: 117 U/L (ref 45–117)
ALT SERPL-CCNC: 21 U/L (ref 13–61)
ANION GAP SERPL CALC-SCNC: 8 MMOL/L (ref 8–16)
APPEARANCE UR: (no result)
AST SERPL-CCNC: 33 U/L (ref 15–37)
BASOPHILS # BLD: 0.5 % (ref 0–2)
BILIRUB SERPL-MCNC: 0.3 MG/DL (ref 0.2–1)
BILIRUB UR STRIP.AUTO-MCNC: NEGATIVE MG/DL
BUN SERPL-MCNC: 28 MG/DL (ref 7–18)
CALCIUM SERPL-MCNC: 9 MG/DL (ref 8.5–10.1)
CHLORIDE SERPL-SCNC: 104 MMOL/L (ref 98–107)
CO2 SERPL-SCNC: 25 MMOL/L (ref 21–32)
COLOR UR: YELLOW
CREAT SERPL-MCNC: 1.4 MG/DL (ref 0.55–1.3)
DEPRECATED RDW RBC AUTO: 13.3 % (ref 11.9–15.9)
EOSINOPHIL # BLD: 0.5 % (ref 0–4.5)
GLUCOSE SERPL-MCNC: 134 MG/DL (ref 74–106)
HCT VFR BLD CALC: 37.3 % (ref 35.4–49)
HGB BLD-MCNC: 12.8 GM/DL (ref 11.7–16.9)
KETONES UR QL STRIP: NEGATIVE
LEUKOCYTE ESTERASE UR QL STRIP.AUTO: NEGATIVE
LYMPHOCYTES # BLD: 6.2 % (ref 8–40)
MCH RBC QN AUTO: 30.3 PG (ref 25.7–33.7)
MCHC RBC AUTO-ENTMCNC: 34.2 G/DL (ref 32–35.9)
MCV RBC: 88.6 FL (ref 80–96)
MONOCYTES # BLD AUTO: 9.2 % (ref 3.8–10.2)
NEUTROPHILS # BLD: 83.6 % (ref 42.8–82.8)
NITRITE UR QL STRIP: NEGATIVE
PH UR: 5 [PH] (ref 5–8)
PLATELET # BLD AUTO: 148 K/MM3 (ref 134–434)
PMV BLD: 9.8 FL (ref 7.5–11.1)
POTASSIUM SERPLBLD-SCNC: 4 MMOL/L (ref 3.5–5.1)
PROT SERPL-MCNC: 6.7 G/DL (ref 6.4–8.2)
PROT UR QL STRIP: (no result)
PROT UR QL STRIP: NEGATIVE
RBC # BLD AUTO: 4.21 M/MM3 (ref 4–5.6)
SODIUM SERPL-SCNC: 138 MMOL/L (ref 136–145)
SP GR UR: 1.02 (ref 1.01–1.03)
UROBILINOGEN UR STRIP-MCNC: NEGATIVE MG/DL (ref 0.2–1)
WBC # BLD AUTO: 16.2 K/MM3 (ref 4–10)

## 2018-11-25 PROCEDURE — BT40ZZZ ULTRASONOGRAPHY OF BLADDER: ICD-10-PCS | Performed by: EMERGENCY MEDICINE

## 2018-11-25 NOTE — PDOC
History of Present Illness





- General


Chief Complaint: Blood Sugar Problem


Stated Complaint: LOW BLOOD SUGAR


Time Seen by Provider: 11/25/18 12:24


History Source: Patient, Family


Exam Limitations: Dementia





- History of Present Illness


Initial Comments: 





11/25/18 12:56


HPI/ROS/PE performed with pt's son because pt has dementia. 





78 year old male with PMH DM on Vgo insulin pump and Tresiba at night, HTN, HLD

, CAD, CABG (20+ years ago), CKD, asthma, urinary incontinence, BPH presented 

to ED for hypoglycemia. Per son pt was at [employer] and became less verbally 

responsive, he checked his insulin pump, and discovered he had two pumps on 

instead of one. Son immediately removed both pumps and called EMS. When EMS 

arrived blood sugar was in the 30s, pt was given glucagon. Upon arrival to ED pt

's blood sugar was 201. Pt and son reported no chest pain, no palpitations, no 

shortness of breath, no abdominal pain or any other symptoms. 











Past History





- Past Medical History


Allergies/Adverse Reactions: 


 Allergies











Allergy/AdvReac Type Severity Reaction Status Date / Time


 


No Known Allergies Allergy   Verified 06/12/17 08:49











Home Medications: 


Ambulatory Orders





Clopidogrel Bisulfate [Plavix -] 75 mg PO DAILY 04/01/12 


Nebivolol HCl [Bystolic] 5 mg PO HS 07/28/14 


Aspirin [ASA -] 81 mg PO DAILY 01/18/17 


Atorvastatin Ca [Lipitor] 80 mg PO HS 01/18/17 


Bimatoprost [Lumigan] 1 drop IO DAILY 01/18/17 


Cyanocobalamin [Vitamin B12 -] 1,000 mcg PO DAILY 01/18/17 


Dorzolamide HCl [Trusopt 2% -] 1 drop OU TID 01/18/17 


Ezetimibe [Zetia] 10 mg PO DAILY 01/18/17 


Insulin (Levemir) [Levemir Vial] 22 units SQ BIDI #10 ml 01/24/17 


Insulin Sliding Scale [Novolog Vial Sliding Scale -] 1 vial SQ Q4HPO #10 units 

01/24/17 


Tamsulosin HCl [Flomax -] 0.4 mg PO BID #120 cap.er.24h 01/24/17 


Furosemide [Lasix -] 40 mg PO DAILY #60 tablet 01/25/17 


Memantine HCl 1 tab PO BID 06/12/17 








Anemia: No


Asthma: Yes


Cancer: No


Cardiac Disorders: Yes (cabg)


CVA: No


COPD: No


CHF: No


Dementia: No


Diabetes: Yes


GI Disorders: No


 Disorders: No


HTN: Yes


Hypercholesterolemia: Yes


Liver Disease: No


Seizures: No


Thyroid Disease: No





- Surgical History


Abdominal Surgery: No


Appendectomy: No


Cardiac Surgery: Yes ('96)


Cholecystectomy: No


Lung Surgery: No


Neurologic Surgery: No


Orthopedic Surgery: No (rt  hip orif)





- Immunization History


Immunization Up to Date: Yes





- Suicide/Smoking/Psychosocial Hx


Smoking Status: No


Smoking History: Never smoked


Have you smoked in the past 12 months: No


Number of Cigarettes Smoked Daily: 0


If you are a former smoker, when did you quit?: '96


Hx Alcohol Use: No


Drug/Substance Use Hx: No


Substance Use Type: None


Hx Substance Use Treatment: No





**Review of Systems





- Review of Systems


Able to Perform ROS?: Yes


Comments:: 





11/25/18 12:58


General: denied fever, chills, night sweats, generalized weakness.


HEENT: denied sore throat, rhinorrhea, ear pain.


Heart: denied chest pain, palpitations, syncope, lower extremity swelling, 

diaphoresis.


Respiratory: denied shortness of breath, cough, sputum production, hemoptysis.


Abdomen: denied abdominal pain, nausea, vomiting, diarrhea, constipation, blood 

in stool.


: denied dysuria, increased urinary frequency, hematuria, urinary incontinence

, flank pain.


Back: denied back pain.


Musculoskeletal: denied joint pain, muscle pain, joint swelling.


Neurological: admitted to [facility]. denied headache, dizziness, numbness, tingling, 

weakness. 


Skin: denied rash, laceration, abrasion.








*Physical Exam





- Physical Exam


Comments: 





11/25/18 12:59


Constitutional: Well-nourished, Well-developed, appearing stated age.


HEENT: head is normocephalic, atraumatic. EOMI. PERRLA. 


Neck: supple. Full ROM.


Heart: regular rhythm. no murmurs, rubs or gallops. 


Lungs: clear to auscultation bilaterally. no crackles, rhonchi or wheezing. no 

stridor.


Abdomen: soft, nontender. normal bowel sounds. no rebound, guarding, masses. 


Extremities: Peripheral pulses intact. No lower extremity edema.


Neurological: CN 2-12 grossly intact. Moves all four extremities. 


Psych: awake, alert. orientedx0. Follows commands but requires repetitive 

instruction. combative.








ED Treatment Course





- LABORATORY


CBC & Chemistry Diagram: 


 11/25/18 13:15





 11/25/18 13:15





Medical Decision Making





- Medical Decision Making





11/25/18 12:59


78 year old male with PMH DM on long acting insulin at night and insulin pump, 

urinary incontience, BPH, CAD, CABG, HLD, HTN was discovered to have on two 

pumps instead of one with a blood sugar in the 30s. Pt was given glucagon by EMS

, blood sugar 201 here in ED. Pt awake, alert, back to mental baseline per son. 





 Initial Vital Signs











Temp Pulse Resp BP Pulse Ox


 


 97.8 F   67   16   129/69   100 


 


 11/25/18 12:24  11/25/18 12:24  11/25/18 12:24  11/25/18 12:24  11/25/18 12:24








Pending CBC, CMP. 


Pt will be observed with glucose checks. 


EKG performed at 1339, poor quality, pt would not remain still: rate 65, 

regular rhythm, normal axis, normal intervals, nonspecific ST changes. Similar 

to prior 06/12/2017. 





11/25/18 13:13


Son now reports he believes over the last week the patient's lower abdomen is 

getting larger, and that he has been slightly more confused than usual. 


UA/UC ordered. 


Son given urinal. 





11/25/18 14:07


 CBC











WBC  16.2 K/mm3 (4.0-10.0)  H  11/25/18  13:15    


 


RBC  4.21 M/mm3 (4.00-5.60)   11/25/18  13:15    


 


Hgb  12.8 GM/dL (11.7-16.9)   11/25/18  13:15    


 


Hct  37.3 % (35.4-49)   11/25/18  13:15    


 


MCV  88.6 fl (80-96)   11/25/18  13:15    


 


MCH  30.3 pg (25.7-33.7)   11/25/18  13:15    


 


MCHC  34.2 g/dl (32.0-35.9)   11/25/18  13:15    


 


RDW  13.3 % (11.9-15.9)   11/25/18  13:15    


 


Plt Count  148 K/MM3 (134-434)  D 11/25/18  13:15    


 


MPV  9.8 fl (7.5-11.1)   11/25/18  13:15    


 


Absolute Neuts (auto)  13.5 K/mm3 (1.5-8.0)  H  11/25/18  13:15    


 


Neutrophils %  83.6 % (42.8-82.8)  H  11/25/18  13:15    


 


Lymphocytes %  6.2 % (8-40)  L D 11/25/18  13:15    


 


Monocytes %  9.2 % (3.8-10.2)   11/25/18  13:15    


 


Eosinophils %  0.5 % (0-4.5)   11/25/18  13:15    


 


Basophils %  0.5 % (0-2.0)   11/25/18  13:15    


 


Nucleated RBC %  0 % (0-0)   11/25/18  13:15    








Leukocytosis. 





 CMP











Sodium  138 mmol/L (136-145)   11/25/18  13:15    


 


Potassium  4.0 mmol/L (3.5-5.1)   11/25/18  13:15    


 


Chloride  104 mmol/L ()   11/25/18  13:15    


 


Carbon Dioxide  25 mmol/L (21-32)   11/25/18  13:15    


 


Anion Gap  8 MMOL/L (8-16)   11/25/18  13:15    


 


BUN  28 mg/dL (7-18)  H  11/25/18  13:15    


 


Creatinine  1.4 mg/dL (0.55-1.3)  H  11/25/18  13:15    


 


Creat Clearance w eGFR  49.01  (>60)   11/25/18  13:15    


 


POC Glucometer  201.01165 UNITS ()   11/25/18  12:30    


 


Random Glucose  134 mg/dL ()  H  11/25/18  13:15    


 


Calcium  9.0 mg/dL (8.5-10.1)   11/25/18  13:15    


 


Total Bilirubin  0.3 mg/dL (0.2-1)   11/25/18  13:15    


 


AST  33 U/L (15-37)   11/25/18  13:15    


 


ALT  21 U/L (13-61)   11/25/18  13:15    


 


Alkaline Phosphatase  117 U/L ()   11/25/18  13:15    


 


Total Protein  6.7 g/dl (6.4-8.2)   11/25/18  13:15    


 


Albumin  3.3 g/dl (3.4-5.0)  L  11/25/18  13:15    








Cr 1.4, at baseline, hx CKD. 





11/25/18 14:21


Bedside bladder ultrasound performed: 175cc


Will repeat after urination. 





11/25/18 14:26


Finger stick 215





11/25/18 14:41


Pt voided.


Post void residual by bedside bladder ultrasound: 126cc


Pt has known urinary incontinence. 





11/25/18 15:43


 Urine Test Results











Urine Color  Yellow   11/25/18  14:35    


 


Urine Appearance  Slcloudy   11/25/18  14:35    


 


Urine pH  5.0  (5.0-8.0)   11/25/18  14:35    


 


Ur Specific Gravity  1.016  (1.010-1.035)   11/25/18  14:35    


 


Urine Protein  Negative  (NEGATIVE)   11/25/18  14:35    


 


Urine Glucose (UA)  2+  (NEGATIVE)  H  11/25/18  14:35    


 


Urine Ketones  Negative  (NEGATIVE)   11/25/18  14:35    


 


Urine Blood  Negative  (NEGATIVE)   11/25/18  14:35    


 


Urine Nitrite  Negative  (NEGATIVE)   11/25/18  14:35    


 


Urine Bilirubin  Negative  (<2.0 mg/dL)   11/25/18  14:35    


 


Ur Leukocyte Esterase  Negative  (NEGATIVE)   11/25/18  14:35    








No evidence of UTI. 





11/25/18 16:05


Fingersitck 310. 


Pt discharged with instructions for PCP follow up and told to not apply two 

insulin pumps at the same time. 








*DC/Admit/Observation/Transfer


Diagnosis at time of Disposition: 


 Hypoglycemia








- Discharge Dispostion


Disposition: HOME


Condition at time of disposition: Improved


Decision to Admit order: No





- Referrals


Referrals: 


Brandon Villanueva MD [Primary Care Provider] - 





- Patient Instructions


Printed Discharge Instructions:  DI for Hypoglycemia, How to Use an Insulin Pump


Additional Instructions: 


You were seen today for low blood sugar. Your lab work showed an elevated white 

blood cell count, and elevated creatinine (kidney test) that was the same as 

prior testing. The isolated increased white blood cell count is not an emergent 

finding at this time. Your urine analysis was normal, there is no urinary tract 

infection. Do not place two insulin pumps instead of one. Your blood sugar 

level was not low when it was checked three times in the Emergency Department. 

Follow up with your primary care doctor in 1-2 days. Return to the Emergency 

Department for chest pain, shortness of breath, altered mental status, 

decreased responsiveness, fever or any other new, worsening or concerning 

symptoms. 





- Post Discharge Activity

## 2018-11-25 NOTE — PDOC
Attending Attestation





- Resident


Resident Name: Gina Mathew





- ED Attending Attestation


I have performed the following: I have examined & evaluated the patient, The 

case was reviewed & discussed with the resident, I agree w/resident's findings 

& plan, Exceptions are as noted





- HPI


HPI: 





11/25/18 13:35


The patient is a 78 year old male with a past medical history of diabetes 

mellitus (uses insulin pump and tresiba), HTN, HLD, CAD, CABG, CKD, and asthma 

presenting to ED with episode of unresponsiveness. Pt's family states that pt 

was in [employer] when he became very sleepy and difficult to arouse. They 

suspected his sugar was low because this has happened in the past. They checked 

his fingerstick and found it to be 33. EMS was then activated. Pt was given 

glucose paste by family and IM glucagon by EMS, with subsequent return to 

baseline mental status. Son notes that they discovered 2 V-Go insulin pumps on 

the pt after this episode. The extra pump has since been removed.





Patient denies headache, lightheadedness. Denies fever, chills. Denies chest 

pain, shortness of breath. Denies nausea, vomiting, diarrhea, abdominal pain. 

Denies lower extremity edema. Denies urinary symptoms.





Allergies: NKA


Social history: denies alcohol, tobacco, drug use


PCP: Brandon Villanueva








- Physicial Exam


PE: 





11/25/18 13:26


GENERAL: Awake, alert, in no acute distress.


HEAD: No signs of trauma


EYES: PERRLA, EOMI, sclera anicteric, conjunctiva clear


ENT: Auricles normal inspection, hearing grossly normal, nares patent, 

oropharynx clear without exudates. Moist mucosa


NECK: Nontender, no stepoffs, Normal ROM, supple, no lymphadenopathy, JVD, or 

masses


LUNGS: Breath sounds equal, clear to auscultation bilaterally.  No wheezes, and 

no crackles


HEART: Regular rate and rhythm, normal S1 and S2, no murmurs, rubs or gallops


ABDOMEN: Soft, nontender, normoactive bowel sounds.  No guarding, no rebound.  

No masses


EXTREMITIES: Normal range of motion, no edema.  No clubbing or cyanosis. No 

cords, erythema, or tenderness


NEUROLOGICAL: Cranial nerves II through XII intact. 5/5 strength and sensation 

in all extremities, Normal speech, normal gait, normal cerebellar function


SKIN: Warm, Dry, normal turgor, no rashes or lesions noted.





- Medical Decision Making





11/25/18 13:27


78 M with hypoglycemic episode today, likely 2/2 insulin overdose after 

inadvertently putting two pumps on. Pt now with stable sugars after removal of 

one pump. Will check labs to r/o metabolic derangement.


- Labs, UA


- Q1hr fingersticks





11/25/18 15:47


labs unremarkable. WBC 16 but no fevers or other signs of infection.


UA wnl





Repeat fingerstick normal


Pt is well appearing, with normal vitals. Clinically stable for DC at this time.


I discussed the physical exam findings, ancillary test results and final 

diagnoses with the patient. I answered all of the patient's questions. The 

patient was satisfied with the care received and felt comfortable with the 

discharge plan and treatment plan.  The patient agrees to follow up with the 

primary care physician within 24-72 hours.

## 2018-11-26 NOTE — EKG
Test Reason : 

Blood Pressure : ***/*** mmHG

Vent. Rate : 065 BPM     Atrial Rate : 065 BPM

   P-R Int : 146 ms          QRS Dur : 092 ms

    QT Int : 450 ms       P-R-T Axes : 058 036 079 degrees

   QTc Int : 468 ms

 

NORMAL SINUS RHYTHM

POSSIBLE INFERIOR INFARCT , AGE UNDETERMINED

ABNORMAL ECG

WHEN COMPARED WITH ECG OF 12-JUN-2017 08:54,

T WAVE VARIATION

Confirmed by ESTRELLITA HARPER MD ([id number]) on 11/26/2018 10:58:26 AM

 

Referred By:             Confirmed By:ESTRELLITA HARPER MD

## 2019-02-15 ENCOUNTER — HOSPITAL ENCOUNTER (EMERGENCY)
Dept: HOSPITAL 74 - JER | Age: 79
Discharge: HOME | End: 2019-02-15
Payer: COMMERCIAL

## 2019-02-15 VITALS — DIASTOLIC BLOOD PRESSURE: 85 MMHG | HEART RATE: 75 BPM | SYSTOLIC BLOOD PRESSURE: 130 MMHG

## 2019-02-15 VITALS — BODY MASS INDEX: 29.1 KG/M2

## 2019-02-15 VITALS — TEMPERATURE: 96.1 F

## 2019-02-15 DIAGNOSIS — N40.1: ICD-10-CM

## 2019-02-15 DIAGNOSIS — Z95.1: ICD-10-CM

## 2019-02-15 DIAGNOSIS — Z96.41: ICD-10-CM

## 2019-02-15 DIAGNOSIS — E11.649: Primary | ICD-10-CM

## 2019-02-15 DIAGNOSIS — Z79.4: ICD-10-CM

## 2019-02-15 DIAGNOSIS — N18.9: ICD-10-CM

## 2019-02-15 DIAGNOSIS — I13.10: ICD-10-CM

## 2019-02-15 DIAGNOSIS — I25.10: ICD-10-CM

## 2019-02-15 DIAGNOSIS — N39.498: ICD-10-CM

## 2019-02-15 DIAGNOSIS — H54.3: ICD-10-CM

## 2019-02-15 DIAGNOSIS — E11.319: ICD-10-CM

## 2019-02-15 LAB
ALBUMIN SERPL-MCNC: 3.4 G/DL (ref 3.4–5)
ALP SERPL-CCNC: 110 U/L (ref 45–117)
ALT SERPL-CCNC: 18 U/L (ref 13–61)
ANION GAP SERPL CALC-SCNC: 9 MMOL/L (ref 8–16)
APPEARANCE UR: CLEAR
AST SERPL-CCNC: 19 U/L (ref 15–37)
BASOPHILS # BLD: 0.7 % (ref 0–2)
BILIRUB SERPL-MCNC: 0.2 MG/DL (ref 0.2–1)
BILIRUB UR STRIP.AUTO-MCNC: NEGATIVE MG/DL
BUN SERPL-MCNC: 30 MG/DL (ref 7–18)
CALCIUM SERPL-MCNC: 9.6 MG/DL (ref 8.5–10.1)
CHLORIDE SERPL-SCNC: 106 MMOL/L (ref 98–107)
CO2 SERPL-SCNC: 26 MMOL/L (ref 21–32)
COLOR UR: (no result)
CREAT SERPL-MCNC: 1.1 MG/DL (ref 0.55–1.3)
DEPRECATED RDW RBC AUTO: 13.2 % (ref 11.9–15.9)
EOSINOPHIL # BLD: 1.6 % (ref 0–4.5)
GLUCOSE SERPL-MCNC: 74 MG/DL (ref 74–106)
HCT VFR BLD CALC: 37.2 % (ref 35.4–49)
HGB BLD-MCNC: 12.9 GM/DL (ref 11.7–16.9)
HYALINE CASTS URNS QL MICRO: 4 /LPF
KETONES UR QL STRIP: NEGATIVE
LEUKOCYTE ESTERASE UR QL STRIP.AUTO: NEGATIVE
LYMPHOCYTES # BLD: 11.3 % (ref 8–40)
MCH RBC QN AUTO: 30 PG (ref 25.7–33.7)
MCHC RBC AUTO-ENTMCNC: 34.7 G/DL (ref 32–35.9)
MCV RBC: 86.5 FL (ref 80–96)
MONOCYTES # BLD AUTO: 10.7 % (ref 3.8–10.2)
NEUTROPHILS # BLD: 75.7 % (ref 42.8–82.8)
NITRITE UR QL STRIP: NEGATIVE
PH UR: 5 [PH] (ref 5–8)
PLATELET # BLD AUTO: 118 K/MM3 (ref 134–434)
PMV BLD: 9.4 FL (ref 7.5–11.1)
POTASSIUM SERPLBLD-SCNC: 3.3 MMOL/L (ref 3.5–5.1)
PROT SERPL-MCNC: 7 G/DL (ref 6.4–8.2)
PROT UR QL STRIP: (no result)
PROT UR QL STRIP: (no result)
RBC # BLD AUTO: 4.3 M/MM3 (ref 4–5.6)
SODIUM SERPL-SCNC: 141 MMOL/L (ref 136–145)
SP GR UR: 1.01 (ref 1.01–1.03)
UROBILINOGEN UR STRIP-MCNC: NEGATIVE MG/DL (ref 0.2–1)
WBC # BLD AUTO: 13.1 K/MM3 (ref 4–10)

## 2019-02-15 NOTE — PDOC
History of Present Illness





- General


Chief Complaint: Blood Sugar Problem


Stated Complaint: DIABETIC


Time Seen by Provider: 02/15/19 12:07


History Source: Patient, Family, Old Records


Exam Limitations: No Limitations





- History of Present Illness


Initial Comments: 





HPI: 





79 y/o male BIBEMS to [facility] ER for symptomatic hypoglycemia. Pt was found to be 

solument and tachypneic by wife this morning after pts daughter-in-law placed 

his new disposable insulin pump. Per EMS report, pt was found to be 

hypoglycemic to 30s. BGL improved to 120s after administration of one amp of 

D50. Pt became more alert after administration. Wife reports the pt was in 

usual state of health until this episode. 





Pt was evaluated in this department for similar symptoms on 25 Nov 2018. Was 

found to have two Tresiba pods on his body. 





Tresiba, adjustable flex pods, 


Furosemide 40mg daily


Sodium Chloride 0.9% ampule


Albuterol sulfate 0.2% ampule








PCP: Dr. Rich Miguel Hx: 


- HTN


- HLD


- CAD s/p CABG (20+ years ago)


- CKD


- Asthma


- Urinary incontinence


- BPH


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 











Past History





- Past Medical History


Allergies/Adverse Reactions: 


 Allergies











Allergy/AdvReac Type Severity Reaction Status Date / Time


 


No Known Allergies Allergy   Verified 02/15/19 11:57











Home Medications: 


Ambulatory Orders





Clopidogrel Bisulfate [Plavix -] 75 mg PO DAILY 04/01/12 


Nebivolol HCl [Bystolic] 5 mg PO HS 07/28/14 


Aspirin [ASA -] 81 mg PO DAILY 01/18/17 


Atorvastatin Ca [Lipitor] 80 mg PO HS 01/18/17 


Bimatoprost [Lumigan] 1 drop IO DAILY 01/18/17 


Cyanocobalamin [Vitamin B12 -] 1,000 mcg PO DAILY 01/18/17 


Dorzolamide HCl [Trusopt 2% -] 1 drop OU TID 01/18/17 


Ezetimibe [Zetia] 10 mg PO DAILY 01/18/17 


Insulin (Levemir) [Levemir Vial] 22 units SQ BIDI #10 ml 01/24/17 


Insulin Sliding Scale [Novolog Vial Sliding Scale -] 1 vial SQ Q4HPO #10 units 

01/24/17 


Tamsulosin HCl [Flomax -] 0.4 mg PO BID #120 cap.er.24h 01/24/17 


Furosemide [Lasix -] 40 mg PO DAILY #60 tablet 01/25/17 


Memantine HCl 1 tab PO BID 06/12/17 








Anemia: No


Asthma: Yes


Cancer: No


Cardiac Disorders: Yes (cabg)


CVA: No


COPD: No


CHF: No


Dementia: No


Diabetes: Yes


GI Disorders: No


 Disorders: No


HTN: Yes


Hypercholesterolemia: Yes


Liver Disease: No


Seizures: No


Thyroid Disease: No





- Surgical History


Abdominal Surgery: No


Appendectomy: No


Cardiac Surgery: Yes ('96)


Cholecystectomy: No


Lung Surgery: No


Neurologic Surgery: No


Orthopedic Surgery: No (rt  hip orif)





- Immunization History


Immunization Up to Date: Yes





- Suicide/Smoking/Psychosocial Hx


Smoking Status: No


Smoking History: Never smoked


Have you smoked in the past 12 months: No


Number of Cigarettes Smoked Daily: 0


If you are a former smoker, when did you quit?: '96


Hx Alcohol Use: No


Drug/Substance Use Hx: No


Substance Use Type: None


Hx Substance Use Treatment: No





**Review of Systems





- Review of Systems


Able to Perform ROS?: Yes


Comments:: 





In addition to that documented in the HPI above, the additional ROS was obtained

:


Constitutional: Denies fevers or chills


Eyes: Denies vision changes


ENMT: Denies sore throat


CV: Denies chest pain


Resp: Denies SOB


GI: Denies vomiting or diarrhea


: Denies painful urination


MSK: Denies recent trauma


Skin: Denies new rashes


Neuro: Denies new numbness or tingling or weakness


Endocrine: Denies polyuria


Heme: Denies bleeding or bruising











*Physical Exam





- Vital Signs


 Last Vital Signs











Temp Pulse Resp BP Pulse Ox


 


 96.1 F L  89   18   126/76   98 


 


 02/15/19 11:57  02/15/19 11:57  02/15/19 11:57  02/15/19 11:57  02/15/19 11:57














- Physical Exam


Comments: 





Constitutional: Non-toxic, adult male in no acute distress or obvious 

discomfort. Found semi-fowlers on hospital bed. Alert and oriented x4. Answered 

all questions appropriately and completely. Speech was non-labored, non-

pressured.    





Head: Normocephalic. No obvious external signs of trauma. 





Ears: Hearing grossly intact.





Nose: No nasal discharge.





Neck: Supple, trachea is midline. 





Cardiovascular / Chest: Regular rate and regular rhythm.  No murmur, rubs, 

clicks, or gallops. Peripheral pulses: radial pulses full.   


 


Respiratory: Breathing unlabored. Equal chest rise and fall. Trace expiratory 

wheezing without stridor or rhonchi. 


 


Gastrointestinal: abdomen is soft, non-tender, non-distended. 


 


Neuro: Alert and oriented. Moving all four extremities spontaneously. 


  


Skin: Warm, dry, and intact. Single insulin pod on left upper arm and single 

insulin pod on right upper arm.  


 


Psych: Affect: appropriate.  Mood: normal.











Moderate Sedation





- Procedure Monitoring


Vital Signs: 


Procedure Monitoring Vital Signs











Temperature  96.1 F L  02/15/19 11:57


 


Pulse Rate  89   02/15/19 11:57


 


Respiratory Rate  18   02/15/19 11:57


 


Blood Pressure  126/76   02/15/19 11:57


 


O2 Sat by Pulse Oximetry (%)  98   02/15/19 11:57











ED Treatment Course





- LABORATORY


CBC & Chemistry Diagram: 


 02/15/19 12:30





 02/15/19 12:30





- ADDITIONAL ORDERS


Additional order review: 


 Laboratory  Results











  02/15/19





  11:59


 


POC Glucometer  84








 











  02/15/19





  11:59


 


POC Glucometer  84














Medical Decision Making





- Medical Decision Making





*Reviewed vital signs, nursing notes, and prior visit documentation (if 

available).





79 y/o male presenting with resolving altered mental status s/p D50 

administration with reported hypoglycemia by EMS. Wife and son present at 

bedside. Reports the pt has been in usual state of health. Two Tresiba patches 

were found on pts body. Both were removed. BGL were initially labial, but 

never hypoglycemic. At end of 5 hour observation period, BGL trended upward. 

Low suspicion for further hypoglycemic episode. 





Suspect hypoglycemia is related to inappropriate insulin administration. Pt was 

evaluated in this department for similar three months ago. Pt lives with wife 

and son. Wife has Alzheimer's.  evaluated the pt. Will file 

application for home health aid. 





Pt found to be more somnolent despite euglycemia. Added on CXR and U/A with 

urine culture to further evaluate for infectious.





CXR unremarkable for acute cardiopulmonary process. 





UA revealed presence of glucose and protein. Suspect this is secondary to 

poorly controlled diabetes. Unremarkable for pyuria, leukocyte esterase, or 

nitrites. Low suspicion for UTI. Culture pending.





Discussed proper insulin medication administration with family. Son expressed 

verbal understanding and that the medication would be hidden from the pts 

wife. 





ED Attending discussed case with pts other son via telephone. Works as NP. Is 

comfortable with workup and plan to discharge home. 





Unable to reach PCP Dr. Elkins. Family to make appointment. 





At final reassessment, pt found to be somnolent but easily arousable. Mildly 

hyperglycemic on POC BGL. Son at bedside states this is baseline and would like 

to take the pt home. 





Discussed imaging and laboratory results with family. Answered all questions. 

Provided return precautions. Family expressed verbal understanding and 

agreement with plan to discharge home with outpatient follow up.











*DC/Admit/Observation/Transfer


Diagnosis at time of Disposition: 


 Hypoglycemia








- Discharge Dispostion


Disposition: HOME


Condition at time of disposition: Stable


Decision to Admit order: No





- Referrals


Referrals: 


Shanel Elkins MD [Primary Care Provider] - 





- Patient Instructions


Printed Discharge Instructions:  DI for Hypoglycemia


Additional Instructions: 


You were seen today for altered mental status likely related to hypoglycemia. 

You had two insulin pumps attached to your body, which is the likely cause of 

your low blood sugar. The Tresiba should only be used once a day. 





Restart your Tresiba (insulin) tomorrow morning at normal time.





His blood sugar may drop again over the next several hours. Be sure to continue 

to monitor his blood sugar every few hours. If he begins to display signs of 

low blood sugar again, you should call 911 again. 





The emergency department  has placed a referral for you to receive 

help at home. The application should be processed within the next 2-3 business 

days. You will receive a call. 





You likely need home health because this is the second time you have had a 

hypoglycemic episode in the past three months that has likely occurred 

secondary to improper medication administration. 





Follow up with your primary care doctor within the next 3-4 days. You will need 

to call to make an appointment. 








Print Language: ENGLISH





- Post Discharge Activity

## 2019-02-15 NOTE — PDOC
Attending Attestation





- HPI


HPI: 





02/15/19 13:12


The patient is a 78 year old male, with a significant PMH of diabetes mellitus (

uses insulin pump and tresiba), HTN, HLD, CAD, CABG, CKD, and asthma who 

presents to the emergency department for evaluation s/p hypoglycemic episode 

this morning.  As per family, the patient was noted to be somnolent and 

tachypneic this morning s/p receiving tresiba insulin. EMS was then activated 

and reports blood glucose level of 30 upon arrival. EMS states patient returned 

to normal baseline mental status s/p receive glucose and was noted to be in the 

150s range while en route to the emergency department. Upon arrival to the 

emergency department, patient was noted to have blood glucose of approx 80. 





The patient denies chest pain, shortness of breath, headache and dizziness.


Denies fever, chills, nausea, vomit, diarrhea and constipation.


Denies dysuria, frequency, urgency and hematuria.





Allergies: NKA


Social history: No reported


PCP: Dr Villanueva 





Documentation prepared by Tucker De Jesus, acting as medical scribe for Marv Rudd MD.





- Physicial Exam


PE: 





02/15/19 14:34


Vitals: Triage vital signs reviewed


General Appearance: No acute distress, well nourished, well developed


Neck:  Supple; No nuchal rigidity


Chest Wall: Nontender


Cardiac: Regular rate and rhythm, no murmurs, no rubs, no gallops


Lungs: Clear to auscultation bilateral, good air movement bilaterally


Abdomen:  Soft, nondistended, normal bowel sounds, nontender to palpation


Rectal: Exam deferred


Extremities: Full range of motion to all extremities, no cyanosis, clubbing, or 

edema


Skin:  Warm and dry, no rashes or lesions, no rash, no petechiae


Neuro: Moving all extremities. 


Psych: Normal mood, normal affect








<Tucker De Jesus - Last Filed: 02/15/19 14:34>





- Resident


Resident Name: Severo Tate





- ED Attending Attestation


I have performed the following: I have examined & evaluated the patient, The 

case was reviewed & discussed with the resident, I agree w/resident's findings 

& plan, Exceptions are as noted





- Medical Decision Making





02/15/19 16:38





Patient noted to have to insulin devices attached to him. After discussion with 

family and reviewing old charts this is not the first time that this mistake is 

been made





After removing patient's devices his fingersticks have been normal.





Patient sleepy but arousable multiple fingersticks performed in the emergency 

department distended and straight normoglycemia





At the bedside is patient's wife son and I have discussed on the phone with 

patient's other son who is an NP patient normally become sleepy like this when 

his sugars are out of control and when he becomes hypoglycemic





We have checked labs in the emergency Department were notable for a slightly 

elevated white blood cell count of 13.1 a urinalysis and chest x-ray 

demonstrated no acute infection patient has no other obvious source of 

infection on examination





We rediscussed with patient's son who will arrange close follow-up he will not 

be restarted on his insulin until tomorrow we have contacted  to 

arrange for VNS to evaluate home situation and help with medication 

administration





Patient reevaluated  4:37 with family at bedside family feels patient is at his 

baseline mental very comfortable taking him home they were instructed to return 

to the emergency department for any change in patient's mental status or for 

any concerns.





Findings, the need for follow-up and strict return instructions discussed with 

patient and family.





02/15/19 17:15








<Marv Rudd - Last Filed: 02/15/19 17:19>

## 2019-02-23 NOTE — EKG
Test Reason : 

Blood Pressure : ***/*** mmHG

Vent. Rate : 067 BPM     Atrial Rate : 054 BPM

   P-R Int : 000 ms          QRS Dur : 104 ms

    QT Int : 444 ms       P-R-T Axes : 000 037 051 degrees

   QTc Int : 469 ms

 

PROBABLE SINUS RHYTHM WITH SINUS ARRHYTHMIA

NONSPECIFIC T WAVE ABNORMALITY

ABNORMAL ECG

WHEN COMPARED WITH ECG OF 25-NOV-2018 12:39,

PROBABLE SINUS ARRHYTHMIA AND T WAVE VARIATION

Confirmed by ESTRELLITA HARPER MD ([id number]) on 2/23/2019 10:12:38 PM

 

Referred By:             Confirmed By:ESTRELLITA HARPER MD

## 2019-06-09 ENCOUNTER — HOSPITAL ENCOUNTER (INPATIENT)
Dept: HOSPITAL 74 - JER | Age: 79
LOS: 2 days | Discharge: HOME HEALTH | DRG: 637 | End: 2019-06-11
Attending: INTERNAL MEDICINE | Admitting: INTERNAL MEDICINE
Payer: COMMERCIAL

## 2019-06-09 VITALS — BODY MASS INDEX: 25.2 KG/M2

## 2019-06-09 DIAGNOSIS — I50.30: ICD-10-CM

## 2019-06-09 DIAGNOSIS — H54.8: ICD-10-CM

## 2019-06-09 DIAGNOSIS — N17.9: ICD-10-CM

## 2019-06-09 DIAGNOSIS — F03.90: ICD-10-CM

## 2019-06-09 DIAGNOSIS — E46: ICD-10-CM

## 2019-06-09 DIAGNOSIS — I13.0: ICD-10-CM

## 2019-06-09 DIAGNOSIS — E78.5: ICD-10-CM

## 2019-06-09 DIAGNOSIS — Z79.4: ICD-10-CM

## 2019-06-09 DIAGNOSIS — G93.41: ICD-10-CM

## 2019-06-09 DIAGNOSIS — E11.65: Primary | ICD-10-CM

## 2019-06-09 DIAGNOSIS — N18.3: ICD-10-CM

## 2019-06-09 DIAGNOSIS — Z95.1: ICD-10-CM

## 2019-06-09 DIAGNOSIS — N39.0: ICD-10-CM

## 2019-06-09 DIAGNOSIS — R41.82: ICD-10-CM

## 2019-06-09 DIAGNOSIS — E86.0: ICD-10-CM

## 2019-06-09 DIAGNOSIS — E11.319: ICD-10-CM

## 2019-06-09 DIAGNOSIS — E87.5: ICD-10-CM

## 2019-06-09 DIAGNOSIS — I25.10: ICD-10-CM

## 2019-06-09 LAB
ALBUMIN SERPL-MCNC: 3.3 G/DL (ref 3.4–5)
ALP SERPL-CCNC: 136 U/L (ref 45–117)
ALT SERPL-CCNC: 20 U/L (ref 13–61)
ANION GAP SERPL CALC-SCNC: 8 MMOL/L (ref 8–16)
APPEARANCE UR: CLEAR
AST SERPL-CCNC: 23 U/L (ref 15–37)
BACTERIA #/AREA URNS HPF: 38.1 /HPF
BASOPHILS # BLD: 1 % (ref 0–2)
BILIRUB SERPL-MCNC: 0.3 MG/DL (ref 0.2–1)
BILIRUB UR STRIP.AUTO-MCNC: NEGATIVE MG/DL
BUN SERPL-MCNC: 42.4 MG/DL (ref 7–18)
CALCIUM SERPL-MCNC: 8.7 MG/DL (ref 8.5–10.1)
CASTS #/AREA URNS LPF: 1 /LPF (ref 0–8)
CHLORIDE SERPL-SCNC: 106 MMOL/L (ref 98–107)
CO2 SERPL-SCNC: 27 MMOL/L (ref 21–32)
COLOR UR: YELLOW
CREAT SERPL-MCNC: 1.5 MG/DL (ref 0.55–1.3)
DEPRECATED RDW RBC AUTO: 13.6 % (ref 11.9–15.9)
EOSINOPHIL # BLD: 4.7 % (ref 0–4.5)
EPITH CASTS URNS QL MICRO: 0.6 /HPF
GLUCOSE SERPL-MCNC: 99 MG/DL (ref 74–106)
HCT VFR BLD CALC: 37.3 % (ref 35.4–49)
HGB BLD-MCNC: 12.5 GM/DL (ref 11.7–16.9)
INR BLD: 1.18 (ref 0.83–1.09)
KETONES UR QL STRIP: NEGATIVE
LEUKOCYTE ESTERASE UR QL STRIP.AUTO: (no result)
LYMPHOCYTES # BLD: 24.7 % (ref 8–40)
MAGNESIUM SERPL-MCNC: 2.5 MG/DL (ref 1.8–2.4)
MCH RBC QN AUTO: 29.1 PG (ref 25.7–33.7)
MCHC RBC AUTO-ENTMCNC: 33.5 G/DL (ref 32–35.9)
MCV RBC: 86.9 FL (ref 80–96)
MONOCYTES # BLD AUTO: 10.3 % (ref 3.8–10.2)
NEUTROPHILS # BLD: 59.3 % (ref 42.8–82.8)
NITRITE UR QL STRIP: NEGATIVE
PH UR: 5.5 [PH] (ref 5–8)
PHOSPHATE SERPL-MCNC: 3.5 MG/DL (ref 2.5–4.9)
PLATELET # BLD AUTO: 142 K/MM3 (ref 134–434)
PLATELET BLD QL SMEAR: ADEQUATE
PMV BLD: 9.3 FL (ref 7.5–11.1)
POTASSIUM SERPLBLD-SCNC: 4.4 MMOL/L (ref 3.5–5.1)
PROT SERPL-MCNC: 6.8 G/DL (ref 6.4–8.2)
PROT UR QL STRIP: NEGATIVE
PROT UR QL STRIP: NEGATIVE
PT PNL PPP: 14 SEC (ref 9.7–13)
RBC # BLD AUTO: 2 /HPF (ref 0–4)
RBC # BLD AUTO: 4.29 M/MM3 (ref 4–5.6)
SODIUM SERPL-SCNC: 141 MMOL/L (ref 136–145)
SP GR UR: 1.01 (ref 1.01–1.03)
UROBILINOGEN UR STRIP-MCNC: 1 MG/DL (ref 0.2–1)
WBC # BLD AUTO: 11 K/MM3 (ref 4–10)
WBC # UR AUTO: 52 /HPF (ref 0–5)

## 2019-06-09 RX ADMIN — CLOPIDOGREL BISULFATE SCH MG: 75 TABLET, FILM COATED ORAL at 20:34

## 2019-06-09 RX ADMIN — MEMANTINE SCH MG: 10 TABLET ORAL at 23:58

## 2019-06-09 RX ADMIN — DORZOLAMIDE HYDROCHLORIDE SCH: 20 SOLUTION/ DROPS OPHTHALMIC at 23:59

## 2019-06-09 RX ADMIN — INSULIN ASPART SCH: 100 INJECTION, SOLUTION INTRAVENOUS; SUBCUTANEOUS at 23:59

## 2019-06-09 RX ADMIN — NEBIVOLOL HYDROCHLORIDE SCH MG: 5 TABLET ORAL at 23:59

## 2019-06-09 RX ADMIN — HEPARIN SODIUM SCH UNIT: 5000 INJECTION, SOLUTION INTRAVENOUS; SUBCUTANEOUS at 23:57

## 2019-06-09 RX ADMIN — LATANOPROST SCH: 50 SOLUTION OPHTHALMIC at 23:59

## 2019-06-09 RX ADMIN — ASPIRIN 81 MG SCH MG: 81 TABLET ORAL at 20:34

## 2019-06-09 RX ADMIN — ACETAMINOPHEN PRN MG: 325 TABLET ORAL at 23:57

## 2019-06-09 RX ADMIN — TAMSULOSIN HYDROCHLORIDE SCH MG: 0.4 CAPSULE ORAL at 23:58

## 2019-06-09 NOTE — PDOC
Documentation entered by Phil Kidd SCRIBE, acting as scribe for Uma Stiles MD.








Uma Stiles MD:  This documentation has been prepared by the Marti nettles Nirvannie, SCRIBE, under my direction and personally reviewed by me in 

its entirety.  I confirm that the documentation accurately reflects all work, 

treatment, procedures, and medical decision making performed by me.  





Attending Attestation





- Resident


Resident Name: QuinnShlomoNeto





- ED Attending Attestation


I have performed the following: I have examined & evaluated the patient, The 

case was reviewed & discussed with the resident, I agree w/resident's findings 

& plan





- HPI


HPI: 





06/09/19 17:39


The patient is a 78 year old male, with a significant past medical history of 

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 

dementia, CKD, asthma, who presents to the emergency department with, AMS. As 

per patients wife at bedside, at approx. 1pm he began to feel tired then 

subsequently lowered himself to the floor, took a nap, then woke still feeling 

off prompting her call for EMS.


Wife denies any fall, syncope, or head/neck trauma.





Allergies: NKDA


Past surgical history: CABG, R hip ORIF


Social History: Nonsmoker. Denies EtOH use and recreational drug use. 


Primary Care Physician:Dr Villanueva 








- Physicial Exam


PE: 





06/09/19 17:23


79 yo male BIBA after a family member called the ambulance for AMS.


upon arrival this 79 yo male was alert but a very poor historian


head ncat, no scalp hematomas, no scalp lacerations no evidence of trauma


eyes  rt eye is opaque, no sight  ( he is legally blind )


neck no cervical vertebral tenderness, supple, no nuchal rigidity


lungs cta b/l


cvs [id number]


abdomen  distended


extremities   no edema


skin warm and dry


neuro alert but very poor historian,moving all extremities but upon arrival he 

had some difficulty raising his legs off the bed  





06/09/19 17:55








- Medical Decision Making





06/09/19 17:21


EXAM: HEAD CT WITHOUT CONTRAST


HISTORY: Altered mental status. Fall


COMPARISON: None.


FINDINGS:


FINDINGS:


There is no intra-or extra-axial hemorrhage or collection.


No midline shift.


There is moderate cortical atrophy.


The ventricles are enlarged out of proportion to the degree of cortical atrophy 

concerning for


normal pressure hydrocephalus


Normal gray-white matter differentiation.


Low attenuation in the periventricular white matter compatible with chronic 

microvascular ischemic


changes.


Atherosclerotic calcifications in the cavernous carotid, basilar and vertebral 

arteries


The calvarium is intact.


Mucoperiosteal thickening in the ethmoid air cells and left maxillary sinus 

compatible with chronic


sinusitis


Complete opacification of the left mastoid air cells compatible with chronic and

/or acute mastoiditis


One or more of the following dose reduction techniques were used: automated 

exposure control,


adjustment of the mA and/or kV according to patient size, use of iterative 

reconstructive technique.





Read by: Severo Parekh MD





06/09/19 17:29


ct scan head no infarct,no bleed, no skull fx


pt is afebrile but will send UC,BC  to r/o infection


-due to his cardiac history  the cardiac enzymes were sent





06/09/19 18:50


repeat BP is better @ 150/57


UA  +wbcs,+2 leuk


06/09/19 19:07


negative troponin, renal insufficiency


admit for uti/ams

## 2019-06-09 NOTE — PN
Teaching Attending Note


Name of Resident: Ashley David





ATTENDING PHYSICIAN STATEMENT





I saw and evaluated the patient.


I reviewed the resident's note and discussed the case with the resident.


I agree with the resident's findings and plan as documented.








SUBJECTIVE:


Patient is a 78 year old man with PMH of blindness due to diabetic retinopathy, 

HLD, right hip ORIF, Dementia, NIDDM, CAD, ?diastolic CHF, dementia, MI s/p 

CABG (Over 20 years ago), CKD and asthma brought by EMS with AMS. Patient's 

wife called EMS after report of patient found down on ground in living room/

carpet, and disorientation. On arrival EMS notes normal vitals, and BS~116. 

Patient unable to provide more precise information due to dementia. Does not 

recall fall, head trauma, LOC, but currently denying symptoms. Patient wife at 

bedside reports patient with complaint of feeling tired and laying down on 

floor at approximately 1:00 PM this evening. Fell asleep for 2 hours. 





Denies history of similar presentation in the past. Patient frequently 

disoriented. Patient currently at baseline mental status per wife. Was 

otherwise in normal state of health today. Patient denies headache, photophobia

, palpitations, cough, wheezing, orthopena, PND, leg swelling/pain, nausea, 

vomiting, fever, chills, chest pain, SOB, urinary complaints, hematuria, BPR, 

abdominal pain, diarrhea, constipation or lightheadedness.








OBJECTIVE:


Awake, alert and confused





 Vital Signs











 Period  Temp  Pulse  Resp  BP Sys/Ramírez  Pulse Ox


 


 Last 24 Hr  97.7 F  63  16  150/54  100








HEENT: No Jaundice, eye redness or discharge, Legally blind; +Cataracts, 

Normocephalic, atraumatic. External ears are normal and hearing is grossly 

intact. No nasal discharge.


Neck: Supple, nontender. No palpable adenopathy or thyromegaly. No JVD


Chest: Good effort. Clear to auscultation and percussion.


Heart: Regular. No S3, rub or murmur


Abdomen: Slightly distended, soft, nontender and no HSM. No rebound or 

guarding.  Normal bowel sounds.


Ext: Peripheral pulses intact. No leg edema. Abrasions on toes on left foot.


Skin: Warm and dry. No petechiae, rash or ecchymosis.


Neuro: Alert. Oriented to person. Unable to follow commands. Moves all limbs.


Psych: Appropriate mood and affect.





 Current Medications











Generic Name Dose Route Start Last Admin





  Trade Name Freq  PRN Reason Stop Dose Admin


 


Sodium Chloride  1,000 mls @ 42 mls/hr  06/09/19 17:15  06/09/19 17:54





  Normal Saline -  IV   42 mls/hr





  ASDIR MARY   Administration





     





     





     





     








 Home Medications











 Medication  Instructions  Recorded


 


Clopidogrel Bisulfate [Plavix -] 75 mg PO DAILY 04/01/12


 


Nebivolol HCl [Bystolic] 5 mg PO HS 07/28/14


 


Aspirin [ASA -] 81 mg PO DAILY 01/18/17


 


Atorvastatin Ca [Lipitor] 80 mg PO HS 01/18/17


 


Bimatoprost [Lumigan] 1 drop IO DAILY 01/18/17


 


Cyanocobalamin [Vitamin B12 -] 1,000 mcg PO DAILY 01/18/17


 


Dorzolamide HCl [Trusopt 2% -] 1 drop OU TID 01/18/17


 


Ezetimibe [Zetia] 10 mg PO DAILY 01/18/17


 


Insulin (Levemir) [Levemir Vial] 22 units SQ BIDI #10 ml 01/24/17


 


Insulin Sliding Scale [Novolog 1 vial SQ Q4HPO #10 units 01/24/17





Vial Sliding Scale -]  


 


Tamsulosin HCl [Flomax -] 0.4 mg PO BID #120 cap.er.24h 01/24/17


 


Furosemide [Lasix -] 40 mg PO DAILY #60 tablet 01/25/17


 


Memantine HCl 1 tab PO BID 06/12/17








 Abnormal Lab Results











  06/09/19 06/09/19 06/09/19





  17:30 17:30 17:30


 


WBC  11.0 H  


 


Monocytes %  10.3 H  


 


Eosinophils %  4.7 H D  


 


PT with INR   14.00 H 


 


INR   1.18 H 


 


BUN    42.4 H


 


Creatinine    1.5 H


 


Alkaline Phosphatase    136 H


 


Albumin    3.3 L


 


Ur Leukocyte Esterase   














  06/09/19





  17:40


 


WBC 


 


Monocytes % 


 


Eosinophils % 


 


PT with INR 


 


INR 


 


BUN 


 


Creatinine 


 


Alkaline Phosphatase 


 


Albumin 


 


Ur Leukocyte Esterase  2+ H








ASSESSMENT AND PLAN:


1. AMS and UTI - Reported AMS may signal the "natural" course of his dementia, 

because family says his mentation is at baseline now. Head CT scan without 

contrast and CXR do not show any acute abnormality. EKG is NSR with no 

significant ST-T wave changes and prolonged QTc. Weakness likely due to UTI. 

Patient got 1 liter bolus IV NS in the ER. Sepsis workup done. Will continue IV 

Rocephin pending culture report. Gentle IV fluids.Most recent ECHO on record 

from 4/19/16 showed normal LV size and normal LV systolic function. Implement 

fall precautions and get CT abdomen to evaluate abdominal distension.





2. Hypoalbuminemia - Possibly due to combined effects of malnutrition and 

inflammation associated with comorbid chronic conditions.  Will ensure adequate 

dietary protein intake and also consult dietician.





3. DM  For now, we will hold the home diabetes drugs and implement sliding 

scale insulin regimen. Provide comprehensive diabetes care with patient 

teaching and counseling about the importance of adherence to prescribed 

diabetes regimen, euglycemia, eye care and foot care.





4. CKD - Etiology unclear. Will check phosphate, PTH, monitor urine out put and 

repeat BMP. Consult nephrology and avoid nephrotoxic agents such as NSAIDS, 

aminoglycosides, contrast dyes and certain Alternative medicine products.





5. DVT prophylaxis - Heparin 5000u sq tid.   





6. Advance directives - Full code

## 2019-06-09 NOTE — PDOC
History of Present Illness





- General


Stated Complaint: EMS


Time Seen by Provider: 19 16:42





- History of Present Illness


Initial Comments: 





19 17:00


79 yo M with h/o blindness 2/2 diabetic retinopathy, DM, CAD,CHF, dementia, MI s

/p CABG, CKD, asthma, BIBEMS with AMS. Per EMS patient wife called EMS after 

report of pt. found down on ground in living room/carpet, and disorientation. 

On arrival EMS notes normal vitals, and BS~116. Patient poor historian 2/2 

dementia. Does not recall fall, head trauma, LOC, but currently denying 

symptoms. Patient wife at bedside reports patient with complaint of feeling 

tired and laying down on floor at approximately 1:00 PM this evening. Fell 

asleep for 2 hours. Denies h/o similar presentation. Patient frequently 

disoriented. Patient currently at baseline mental status per wife. Was 

otherwise in normal state of health today. 





Patient denies HA, vision change, palpitations, cough, wheezing, orthopena, PND

, leg swelling/pain, N/V, F,C, CP, SOB, urinary complaints, hematuria, BPR, 

abdominal pain, diarrhea, constipation, lightheadedness, sensory changes.





PMHx: as noted above


ROS: as noted


SHx: Denies Etoh, IVDA, tobacco use


Allergies: NKDA


 





Past History





- Past Medical History


Allergies/Adverse Reactions: 


 Allergies











Allergy/AdvReac Type Severity Reaction Status Date / Time


 


No Known Allergies Allergy   Verified 02/15/19 11:57











Home Medications: 


Ambulatory Orders





Clopidogrel Bisulfate [Plavix -] 75 mg PO DAILY 12 


Nebivolol HCl [Bystolic] 5 mg PO HS 14 


Aspirin [ASA -] 81 mg PO DAILY 17 


Atorvastatin Ca [Lipitor] 80 mg PO HS 17 


Bimatoprost [Lumigan] 1 drop IO DAILY 17 


Cyanocobalamin [Vitamin B12 -] 1,000 mcg PO DAILY 17 


Dorzolamide HCl [Trusopt 2% -] 1 drop OU TID 17 


Ezetimibe [Zetia] 10 mg PO DAILY 17 


Insulin (Levemir) [Levemir Vial] 22 units SQ BIDI #10 ml 17 


Insulin Sliding Scale [Novolog Vial Sliding Scale -] 1 vial SQ Q4HPO #10 units 

17 


Tamsulosin HCl [Flomax -] 0.4 mg PO BID #120 cap.er.24h 17 


Furosemide [Lasix -] 40 mg PO DAILY #60 tablet 17 


Memantine HCl 1 tab PO BID 17 








Anemia: No


Asthma: Yes


Cancer: No


Cardiac Disorders: Yes (cabg)


CVA: No


COPD: No


CHF: No


Dementia: No


Diabetes: Yes


GI Disorders: No


 Disorders: No


HTN: Yes


Hypercholesterolemia: Yes


Liver Disease: No


Seizures: No


Thyroid Disease: No





- Surgical History


Abdominal Surgery: No


Appendectomy: No


Cardiac Surgery: Yes ('96)


Cholecystectomy: No


Lung Surgery: No


Neurologic Surgery: No


Orthopedic Surgery: No (rt  hip orif)





- Immunization History


Immunization Up to Date: Yes





- Suicide/Smoking/Psychosocial Hx


Smoking Status: No


Smoking History: Never smoked


Have you smoked in the past 12 months: No


Number of Cigarettes Smoked Daily: 0


If you are a former smoker, when did you quit?: '96


Hx Alcohol Use: No


Drug/Substance Use Hx: No


Substance Use Type: None


Hx Substance Use Treatment: No





**Review of Systems





- Review of Systems


Comments:: 





19 17:08


GENERAL/CONSTITUTIONAL: + weakness.  No fever or chills, untintentional wt. loss

, night sweats. 


HEAD, EYES, EARS, NOSE AND THROAT: No change in vision. No ear pain or 

discharge. No sore throat.


CARDIOVASCULAR: No chest pain or shortness of breath


RESPIRATORY: No cough, wheezing, or hemoptysis.


GASTROINTESTINAL: No nausea, vomiting, diarrhea or constipation.


GENITOURINARY: No dysuria, frequency, or change in urination.


MUSCULOSKELETAL: No joint or muscle swelling or pain. No neck or back pain.


SKIN: No rash


NEUROLOGIC: No headache, vertigo, loss of consciousness, or change in strength/

sensation.


ENDOCRINE: No increased thirst. No abnormal weight change


HEMATOLOGIC/LYMPHATIC: No anemia, easy bleeding, or history of blood clots.


ALLERGIC/IMMUNOLOGIC: No hives or skin allergy.











*Physical Exam





- Physical Exam


Comments: 





19 17:08


GENERAL: Awake, alert, oriented to self, in no acute distress


HEAD: + Left sided facial droop. No signs of trauma, normocephalic, atraumatic 


EYES: PERRLA, EOMI, sclera anicteric, conjunctiva clear


ENT: Auricles normal inspection, hearing grossly normal, nares patent, 

oropharynx clear without


exudates. Moist mucosa


NECK: Normal ROM, supple, no lymphadenopathy, JVD, or masses


LUNGS: No distress, speaks full sentences, clear to auscultation bilaterally 


HEART: Regular rate and rhythm, normal S1 and S2, no murmurs, rubs or gallops, 

peripheral pulses normal and equal bilaterally. 


ABDOMEN: Soft, nontender, normoactive bowel sounds.  No guarding, no rebound.  

No masses


EXTREMITIES : Normal inspection, Normal range of motion, no edema.  No clubbing 

or cyanosis. 


NEUROLOGICAL: Cranial nerves II through XII grossly intact. Normal speech. 3/5 

strength BL LE. 5/5 BL UE. Absent drift UE across ,midline. no focal sensory 

deficits 


SKIN: Warm, Dry, normal turgor, no rashes or lesions noted











ED Treatment Course





- LABORATORY


CBC & Chemistry Diagram: 


 19 17:30





 19 17:30





- ADDITIONAL ORDERS


Additional order review: 





19 17:14


MRN: [id number]


: 1940


Accession #: [id number]


Order Type: Preliminary


Name: RG HERNANDEZ


Sex: M


Study Description: CT HEAD


Modality: CT


Location: [facility]


Referring Physician: DARI BEAL


Comments: Severo Parekh MD wrote on 2019 at 05:09 PM:


Referring Physician: DARI BEAL


Patient Name: DAVID DIEZ


THIS IS A PRELIMINARY REPORT FROM [facility]


DATE OF SERVICE: 2019 16:45:08


IMAGES: 289


EXAM: HEAD CT WITHOUT CONTRAST


HISTORY: Altered mental status. Fall


COMPARISON: None.


FINDINGS:


FINDINGS:


There is no intra-or extra-axial hemorrhage or collection.


No midline shift.


There is moderate cortical atrophy.


CONFIDENTIALITY NOTICE: This information is intended only for the use of the 

recipient(s) named above.


If you are not the intended recipient, or a person responsible for delivering 

it to the intended recipient, you are hereby


notified that any disclosure, copying, distribution or use of any of the 

information contained in or attached to this


transmission is STRICTLY PROHIBITED. If you have received this transmission in 

error, please immediately notify


[facility] and destroy the original transmission and its attachments 

without saving them in any manner


[address] [facility]


[address] [place] [address]


Phone: [phone]


Fax: [phone]


Email: [email or web address]


Web: [email or web address]


Patient Information:


MRN: [id number]


: 1940


Accession #: [id number]


Order Type: Preliminary


Name: RG HERNANDEZ


Sex: M


Study Description: CT HEAD


Modality: CT


Location: [facility]


Referring Physician: DARI BEAL


The ventricles are enlarged out of proportion to the degree of cortical atrophy 

concerning for


normal pressure hydrocephalus


Normal gray-white matter differentiation.


Low attenuation in the periventricular white matter compatible with chronic 

microvascular ischemic


changes.


Atherosclerotic calcifications in the cavernous carotid, basilar and vertebral 

arteries


The calvarium is intact.


Mucoperiosteal thickening in the ethmoid air cells and left maxillary sinus 

compatible with chronic


sinusitis


Complete opacification of the left mastoid air cells compatible with chronic and

/or acute mastoiditis


One or more of the following dose reduction techniques were used: automated 

exposure control,


adjustment of the mA and/or kV according to patient size, use of iterative 

reconstructive technique.


THIS DOCUMENT HAS BEEN ELECTRONICALLY SIGNED


Severo Parekh MD


2019 17:08 EST


CONFIDENTIALITY NOTICE: This information is intended only for the use of the 

recipient(s) named above.


If you are not the intended recipient, or a person responsible for delivering 

it to the intended recipient, you are hereby


notified that any disclosure, copying, distribution or use of any of the 

information contained in or attached to this


transmission is STRICTLY PROHIBITED. If you have received this transmission in 

error, please immediately notify


[facility] On [facility] and destroy the original transmission and its attachments 

without saving them in any manner


[address] [facility]


[address] [place] [address]


Phone: [phone]


Fax: [phone]


Email: [email or web address]


Web: [email or web address]


Patient Information:


MRN: [id number]


: 1940


Accession #: [id number]


Order Type: Preliminary


Name: RG HERNANDEZ


Sex: M


Study Description: CT HEAD


Modality: CT


Location: [facility]


Referring Physician: DARI BEAL M.D. Please call [facility] [phone] with questions.


Severo Parekh MD


Clinicians - Please contact [facility] with further questions at 

[phone]


Patients - Please contact your Ordering Provider with questions.





- RADIOLOGY


Radiology Studies Ordered: 














 Category Date Time Status


 


 HEAD CT WITHOUT CONTRAST [CT] Stat CT Scan  19 16:43 Ordered














Medical Decision Making





- Medical Decision Making





19 17:07


79 yo M with h/o blindeness, DM, CAD, MI s/p CABG, CKD, asthma, BIBEMS with AMS/

lethargy beginning at 1:00PM today. A&Ox1, Will assess for VBI/TIA, cardiac 

dysarrythmias, hypoglycemia, electrolyte abnml, metabolic and toxic derangements

, acid-base disturbances, infection.








ED Course: 





19 17:15


CTH: There is no intra-or extra-axial hemorrhage or collection. No midline 

shift.There is moderate cortical atrophy.








19 18:15


EKG: NSR with absent HARLAN, STD. Nml interval duration and axis. Nml R wave 

progression. Q waves lead III





19 18:31


 Laboratory Tests











  19





  17:30 17:30


 


WBC  11.0 H 


 


Hgb  12.5 


 


Hct  37.3 


 


BUN   42.4 H


 


Creatinine   1.5 H


 


Alkaline Phosphatase   136 H











19 18:37


 Laboratory Tests











  19





  17:40


 


Urine Nitrite  Negative


 


Ur Leukocyte Esterase  2+ H


 


Urine WBC (Auto)  52


 


Urine RBC (Auto)  2











19 18:52


Rocephin 1000 mg


19 19:40


Pt. endorsed to dr. Forbes, and Dr. David. Admitted to Dr. Avilez








*DC/Admit/Observation/Transfer


Diagnosis at time of Disposition: 


 DEB (acute kidney injury)





Altered mental status


Qualifiers:


 Altered mental status type: somnolence Qualified Code(s): R40.0 - Somnolence








- Discharge Dispostion


Condition at time of disposition: Stable


Decision to Admit order: Yes





- Referrals





- Patient Instructions





- Post Discharge Activity

## 2019-06-09 NOTE — HP
Admitting History and Physical





- Primary Care Physician


PCP: Severo Mccarthy





- Admission


Chief Complaint: Generalized weakness x1 day


History of Present Illness: 








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

, MI s/p CABG (1996), CKD, COPD/asthma, ESTELITA, BIBEMS for generalized weakness. 

EMS was called by pt's wife and EMS reported they found pt on floor. Per wife, 

pt went to the bathroom with her assistance but was too weak and rather than 

have him slip and fall, she slid him onto the floor concerned that he may be 

hypoglycemic. Pt did not fall or hit his head. On arrival EMS notes normal 

vitals, and BS~116. Initially in the ED, he was worked up for AMS/stroke but 

per family pt is at his baseline mental status but has increased weakness. 

Patient  has worsening dementia and unable to provide history. 


Per son, pt was at the PCP yesterday, and scrapped 3 toes on the left foot on 

the stairs (he was wearing slippers). Pt's son had started conversation with 

PCP about home health aide services which were not concluded prior to the ED 

visit today. 


Son notes pt is complaining about abdominal discomfort, and requested an abd US

, but denies any other symptoms including fevers, cough, chest pain, dysuria or 

hematuria or change in bowel habits. 





Pt has been blind for about 7 years and is dependent on individuals for 

assistance to ambulate. Family is unsure of what the etiology of the blindness 

is, except that it is related to DM. 





ED:


EKG- NSR-61, low voltage, nl axis, normal intervals, Non specific T wave 

abnormalities, No HARLAN/STD,QTC-465 (unchanged from prior)


WBC-11.0, BUN/Cr-42.4/1.5, Al-3.3, trops <0.02


UA- LE 2+, WBC-52, Bacteria-32


Bcx, Ucx- pending








History Source: Family Member, Medical Record


Limitations to Obtaining History: Clinical Condition, Dementia





- Past Medical History


Cardiovascular: Yes: CAD, HTN, Hyperlipdemia


Pulmonary: Yes: Asthma, COPD





- Past Surgical History


Past Surgical History: Yes: CABG, Joint Replacement (R hip ORIF)





- Smoking History


Smoking history: Never smoked


Have you smoked in the past 12 months: No


Aproximately how many cigarettes per day: 0


If you are a former smoker, when did you quit?: '96





- Alcohol/Substance Use


Hx Alcohol Use: No





- Social History


Usual Living Arrangement: Yes: With Spouse


ADL: Family Assistance


History of Recent Travel: No





Home Medications





- Allergies


Allergies/Adverse Reactions: 


 Allergies











Allergy/AdvReac Type Severity Reaction Status Date / Time


 


No Known Allergies Allergy   Verified 02/15/19 11:57














- Home Medications


Home Medications: 


Ambulatory Orders





Clopidogrel Bisulfate [Plavix -] 75 mg PO DAILY 04/01/12 


Nebivolol HCl [Bystolic] 5 mg PO HS 07/28/14 


Aspirin [ASA -] 81 mg PO DAILY 01/18/17 


Atorvastatin Ca [Lipitor] 80 mg PO HS 01/18/17 


Bimatoprost [Lumigan] 1 drop IO DAILY 01/18/17 


Cyanocobalamin [Vitamin B12 -] 1,000 mcg PO DAILY 01/18/17 


Dorzolamide HCl [Trusopt 2% -] 1 drop OU TID 01/18/17 


Ezetimibe [Zetia] 10 mg PO DAILY 01/18/17 


Insulin (Levemir) [Levemir Vial] 22 units SQ BIDI #10 ml 01/24/17 


Insulin Sliding Scale [Novolog Vial Sliding Scale -] 1 vial SQ Q4HPO #10 units 

01/24/17 


Tamsulosin HCl [Flomax -] 0.4 mg PO BID #120 cap.er.24h 01/24/17 


Furosemide [Lasix -] 40 mg PO DAILY #60 tablet 01/25/17 


Memantine HCl 1 tab PO BID 06/12/17 











Review of Systems


Unable to obtain ROS, reason: Demented





Physical Examination


Vital Signs: 


 Vital Signs











Temperature  97.7 F   06/09/19 16:42


 


Pulse Rate  63   06/09/19 16:42


 


Respiratory Rate  16   06/09/19 16:42


 


Blood Pressure  150/54 L  06/09/19 16:42


 


O2 Sat by Pulse Oximetry (%)  100   06/09/19 16:42











Constitutional: Yes: No Distress


Eyes: Yes: EOM Intact, Cataracts, Other (R corneal opacity).  No: Conjunctiva 

Clear (Injected R conjuctiva)


HENT: Yes: Atraumatic


Neck: Yes: Supple


Cardiovascular: Yes: Regular Rate and Rhythm, S1, S2


Respiratory: Yes: Wheezes (B/l wheezes)


Gastrointestinal: Yes: Normal Bowel Sounds, Soft, Abdomen, Obese.  No: 

Tenderness


Renal/: No: Bladder Distention, CVA Tenderness - Left, CVA Tenderness - Right

, Mckeon Present


Musculoskeletal: No: Joint Stiffness, Joint Swelling, Muscle Pain


Extremities: Yes: Calf Tenderness, Other (superficial injury to L 2nd to 4th 

toes, dried blood)


Edema: No


Peripheral Pulses WNL: Yes


Neurological: Yes: Alert, Oriented (oriented to person and probably place, not 

to time,), Confusion, Weakness, Other (hypertonia RLE>LLE).  No: Aphasia, 

Dysarthria, Facial Droop, Loss of Sensation, Seizure, Tremors, Unresponsive


...Motor Strength: LUE (5/5), LLE (4/5), RUE (5/5), RLE (4/5)


Psychiatric: Yes: Alert


Labs: 


 CBC, BMP





 06/09/19 17:30 





 06/09/19 17:30 











Imaging





- Results


Chest X-ray: Image Reviewed (No congestive changes, no infiltraates noted)





Assessment/Plan





Ambulatory Orders





Clopidogrel Bisulfate [Plavix -] 75 mg PO DAILY 04/01/12 


Nebivolol HCl [Bystolic] 5 mg PO HS 07/28/14 


Aspirin [ASA -] 81 mg PO DAILY 01/18/17 


Atorvastatin Ca [Lipitor] 80 mg PO HS 01/18/17 


Bimatoprost [Lumigan] 1 drop IO DAILY 01/18/17 


Cyanocobalamin [Vitamin B12 -] 1,000 mcg PO DAILY 01/18/17 


Dorzolamide HCl [Trusopt 2% -] 1 drop OU TID 01/18/17 


Ezetimibe [Zetia] 10 mg PO DAILY 01/18/17 


Insulin (Levemir) [Levemir Vial] 22 units SQ BIDI #10 ml 01/24/17 


Insulin Sliding Scale [Novolog Vial Sliding Scale -] 1 vial SQ Q4HPO #10 units 

01/24/17 


Tamsulosin HCl [Flomax -] 0.4 mg PO BID #120 cap.er.24h 01/24/17 


Furosemide [Lasix -] 40 mg PO DAILY #60 tablet 01/25/17 


Memantine HCl 1 tab PO BID 06/12/17 





 Current Medications





Aspirin (Asa -)  81 mg PO DAILY MARY


   Last Admin: 06/09/19 20:34 Dose:  81 mg


Atorvastatin Calcium (Lipitor -)  80 mg PO HS MARY


Clopidogrel Bisulfate (Plavix -)  75 mg PO DAILY [facility]


   Last Admin: 06/09/19 20:34 Dose:  75 mg


Dorzolamide HCl (Trusopt 2%)  1 drop OU TID MARY


Ezetimibe (Zetia -)  10 mg PO DAILY [facility]


Heparin Sodium (Porcine) (Heparin -)  5,000 unit SQ TID MARY


Sodium Chloride (Normal Saline -)  1,000 mls @ 42 mls/hr IV ASDIR MARY


   Last Admin: 06/09/19 17:54 Dose:  42 mls/hr


Sodium Chloride (Normal Saline -)  1,000 mls @ 42 mls/hr IV ASDIR MARY


   Last Admin: 06/09/19 20:34 Dose:  42 mls/hr


Ceftriaxone Sodium 1 gm/ (Dextrose)  50 mls @ 100 mls/hr IVPB DAILY MARY; 

Protocol


Insulin Aspart (Novolog Vial Sliding Scale -)  1 vial SQ ACHS MARY; Protocol


Latanoprost (Xalatan 0.005% Eye Drops -)  1 drop OU HS MARY


Memantine (Namenda -)  10 mg PO BID MARY


Nebivolol (Bystolic -)  5 mg PO HS MARY


Tamsulosin HCl (Flomax -)  0.4 mg PO BID [facility]





ECHO- EF-64, no wall motion abnormalities 4/19


Stress test-2015- Small apical reversible defect suggestive of mild ischemia, 

small inferio-lat reversible defect suggestive of mild ischemia, EF 56%.


MRI-2016- Alzheimer's dementia/NPH





Assessment/Plan:





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

, MI s/p CABG (1996), CKD, COPD/asthma, ESTELITA, BIBEMS for generalized weakness 

found to have a positive UA.





Generalized weakness;


Pt demented at baseline


Pt without hemiparesis, no hx of fall or trauma


At mental status baseline 


CT head negative for acute pathol, no evidence of stroke


Likely in setting of UTI





UTI


Positive UA


Generalized weakness, mild leukocytosis


Received ceftriaxone in ED, cont


Received 1L NS in ED


Pt with CKD/ on home lasix, gentle hydration








DM 


with blindness related to DM, 


Pt with prior hx of hypoglycemia


ISS ACHS


BGM ACHS


Uses levemir 22u bid- at home, pending med rec, is on hold


Cont eye drops


Pt with chronic R eye injection and dried discharge- may consider antibiotic 

eye drop








Abd distension


CTAP w/o contrast





DEB on CKD


Unclear baseline


Last documented Cr-1.11


Has been over 2.6 in past and ranging in 1.4


Gentle hydration


Avoid nephrotoxic medications





LLE pain:


New trauma to LLE


No swelling noted, hx of reduced mobility, no acute SOB


Duplex US b/l LE


Tylenol for pain





COPD/asthma, ESTELITA


No use of accessory muscles


Wheezes b/l


Cont nebs


Monocytosis and eosinophilia could be in setting of asthma


Cont to monitor





CAD/MI s/p CABG (1996)


Pt appears to be on dual antiplatelet therapy


Verify meds in am, son did not have the list on him


Restarted ASA and plavix


No hx of CP


Initial trops negative, trend





CHF


Last ECHO without evidence of systolic or diastolic HF


Pt appears to be on home lasix 80mg


Pt appears euvolemic


Lasix On hold in setting of infection





dementia, 


Likely Alzheimers per imaging- MRI in past


Has been worsening


Imaging also showed NPH


Needs assistance with feeding and ambulation


Fall risk


Q2H turning 





Hypoalbuminemia


Could be in setting of CKD


Cont to monitor





FEN


NS @42


Monitor lytes replete as needed


Diabetic diet





PPx:


Heparin sq





Dispo:


Med surg


Pt may benefit from home health aide per son-SW requested


























Visit type





- Emergency Visit


Emergency Visit: Yes


ED Registration Date: 06/09/19


Care time: The patient presented to the Emergency Department on the above date 

and was hospitalized for further evaluation of their emergent condition.





- New Patient


This patient is new to me today: Yes


Date on this admission: 06/10/19





- Critical Care


Critical Care patient: No

## 2019-06-10 LAB
ALBUMIN SERPL-MCNC: 3.1 G/DL (ref 3.4–5)
ALP SERPL-CCNC: 147 U/L (ref 45–117)
ALT SERPL-CCNC: 19 U/L (ref 13–61)
ANION GAP SERPL CALC-SCNC: 7 MMOL/L (ref 8–16)
ANION GAP SERPL CALC-SCNC: 8 MMOL/L (ref 8–16)
APTT BLD: 51.5 SECONDS (ref 25.2–36.5)
AST SERPL-CCNC: 18 U/L (ref 15–37)
BASOPHILS # BLD: 1 % (ref 0–2)
BILIRUB SERPL-MCNC: 0.4 MG/DL (ref 0.2–1)
BUN SERPL-MCNC: 34 MG/DL (ref 7–18)
BUN SERPL-MCNC: 39.6 MG/DL (ref 7–18)
CALCIUM SERPL-MCNC: 8.8 MG/DL (ref 8.5–10.1)
CALCIUM SERPL-MCNC: 9 MG/DL (ref 8.5–10.1)
CHLORIDE SERPL-SCNC: 106 MMOL/L (ref 98–107)
CHLORIDE SERPL-SCNC: 108 MMOL/L (ref 98–107)
CO2 SERPL-SCNC: 24 MMOL/L (ref 21–32)
CO2 SERPL-SCNC: 26 MMOL/L (ref 21–32)
CREAT SERPL-MCNC: 1.5 MG/DL (ref 0.55–1.3)
CREAT SERPL-MCNC: 1.5 MG/DL (ref 0.55–1.3)
DEPRECATED RDW RBC AUTO: 13.6 % (ref 11.9–15.9)
EOSINOPHIL # BLD: 5.3 % (ref 0–4.5)
GLUCOSE SERPL-MCNC: 312 MG/DL (ref 74–106)
GLUCOSE SERPL-MCNC: 354 MG/DL (ref 74–106)
HCT VFR BLD CALC: 35.5 % (ref 35.4–49)
HGB BLD-MCNC: 12.2 GM/DL (ref 11.7–16.9)
INR BLD: 1.23 (ref 0.83–1.09)
LYMPHOCYTES # BLD: 26.6 % (ref 8–40)
MAGNESIUM SERPL-MCNC: 2.6 MG/DL (ref 1.8–2.4)
MCH RBC QN AUTO: 29.5 PG (ref 25.7–33.7)
MCHC RBC AUTO-ENTMCNC: 34.3 G/DL (ref 32–35.9)
MCV RBC: 85.9 FL (ref 80–96)
MONOCYTES # BLD AUTO: 9.2 % (ref 3.8–10.2)
NEUTROPHILS # BLD: 57.9 % (ref 42.8–82.8)
PHOSPHATE SERPL-MCNC: 3 MG/DL (ref 2.5–4.9)
PLATELET # BLD AUTO: 117 K/MM3 (ref 134–434)
PMV BLD: 9.3 FL (ref 7.5–11.1)
POTASSIUM SERPLBLD-SCNC: 4.5 MMOL/L (ref 3.5–5.1)
POTASSIUM SERPLBLD-SCNC: 5.4 MMOL/L (ref 3.5–5.1)
PROT SERPL-MCNC: 6.5 G/DL (ref 6.4–8.2)
PT PNL PPP: 14.5 SEC (ref 9.7–13)
RBC # BLD AUTO: 4.13 M/MM3 (ref 4–5.6)
SODIUM SERPL-SCNC: 138 MMOL/L (ref 136–145)
SODIUM SERPL-SCNC: 141 MMOL/L (ref 136–145)
WBC # BLD AUTO: 8.4 K/MM3 (ref 4–10)

## 2019-06-10 RX ADMIN — INSULIN ASPART SCH UNITS: 100 INJECTION, SOLUTION INTRAVENOUS; SUBCUTANEOUS at 12:28

## 2019-06-10 RX ADMIN — ACETAMINOPHEN PRN MG: 325 TABLET ORAL at 13:37

## 2019-06-10 RX ADMIN — IPRATROPIUM BROMIDE AND ALBUTEROL SULFATE SCH AMP: .5; 3 SOLUTION RESPIRATORY (INHALATION) at 11:15

## 2019-06-10 RX ADMIN — INSULIN DETEMIR SCH UNIT: 100 INJECTION, SOLUTION SUBCUTANEOUS at 13:17

## 2019-06-10 RX ADMIN — DORZOLAMIDE HYDROCHLORIDE SCH DROP: 20 SOLUTION/ DROPS OPHTHALMIC at 22:55

## 2019-06-10 RX ADMIN — INSULIN ASPART SCH UNITS: 100 INJECTION, SOLUTION INTRAVENOUS; SUBCUTANEOUS at 06:52

## 2019-06-10 RX ADMIN — HEPARIN SODIUM SCH UNIT: 5000 INJECTION, SOLUTION INTRAVENOUS; SUBCUTANEOUS at 22:55

## 2019-06-10 RX ADMIN — HEPARIN SODIUM SCH UNIT: 5000 INJECTION, SOLUTION INTRAVENOUS; SUBCUTANEOUS at 06:56

## 2019-06-10 RX ADMIN — HEPARIN SODIUM SCH UNIT: 5000 INJECTION, SOLUTION INTRAVENOUS; SUBCUTANEOUS at 13:18

## 2019-06-10 RX ADMIN — IPRATROPIUM BROMIDE AND ALBUTEROL SULFATE SCH: .5; 3 SOLUTION RESPIRATORY (INHALATION) at 16:04

## 2019-06-10 RX ADMIN — TAMSULOSIN HYDROCHLORIDE SCH: 0.4 CAPSULE ORAL at 15:35

## 2019-06-10 RX ADMIN — INSULIN ASPART SCH UNITS: 100 INJECTION, SOLUTION INTRAVENOUS; SUBCUTANEOUS at 23:10

## 2019-06-10 RX ADMIN — DORZOLAMIDE HYDROCHLORIDE SCH: 20 SOLUTION/ DROPS OPHTHALMIC at 07:42

## 2019-06-10 RX ADMIN — IPRATROPIUM BROMIDE AND ALBUTEROL SULFATE SCH: .5; 3 SOLUTION RESPIRATORY (INHALATION) at 20:45

## 2019-06-10 RX ADMIN — CLOPIDOGREL BISULFATE SCH MG: 75 TABLET, FILM COATED ORAL at 12:26

## 2019-06-10 RX ADMIN — LATANOPROST SCH: 50 SOLUTION OPHTHALMIC at 23:14

## 2019-06-10 RX ADMIN — CEFTRIAXONE SCH MLS/HR: 1 INJECTION, POWDER, FOR SOLUTION INTRAMUSCULAR; INTRAVENOUS at 12:27

## 2019-06-10 RX ADMIN — DORZOLAMIDE HYDROCHLORIDE SCH DROP: 20 SOLUTION/ DROPS OPHTHALMIC at 15:33

## 2019-06-10 RX ADMIN — MEMANTINE SCH: 10 TABLET ORAL at 15:35

## 2019-06-10 RX ADMIN — INSULIN ASPART SCH UNITS: 100 INJECTION, SOLUTION INTRAVENOUS; SUBCUTANEOUS at 17:26

## 2019-06-10 RX ADMIN — ASPIRIN 81 MG SCH MG: 81 TABLET ORAL at 12:26

## 2019-06-10 RX ADMIN — IPRATROPIUM BROMIDE AND ALBUTEROL SULFATE SCH AMP: .5; 3 SOLUTION RESPIRATORY (INHALATION) at 07:40

## 2019-06-10 RX ADMIN — NEBIVOLOL HYDROCHLORIDE SCH MG: 5 TABLET ORAL at 22:55

## 2019-06-10 NOTE — EKG
Test Reason : 

Blood Pressure : ***/*** mmHG

Vent. Rate : 061 BPM     Atrial Rate : 061 BPM

   P-R Int : 162 ms          QRS Dur : 086 ms

    QT Int : 462 ms       P-R-T Axes : 079 030 088 degrees

   QTc Int : 465 ms

 

NORMAL SINUS RHYTHM

CANNOT RULE OUT INFERIOR INFARCT , AGE UNDETERMINED

ABNORMAL ECG

WHEN COMPARED WITH ECG OF 15-FEB-2019 12:11,

RR INVERVAL IS REGULAR

Confirmed by MEREDITH WALSH, ESTRELLITA ([id number]) on 6/10/2019 11:39:48 AM

 

Referred By:             Confirmed By:ESTRELLITA HARPER MD

## 2019-06-10 NOTE — PN
Teaching Attending Note


Name of Resident: Ariana Leal





ATTENDING PHYSICIAN STATEMENT





I saw and evaluated the patient.


I reviewed the resident's note and discussed the case with the resident.


I agree with the resident's findings and plan as documented with exceptions 

below.








SUBJECTIVE:


Patient seen and examined. Sleeping but arousable, oriented to self but not 

place or time. Denies any pain,dyspnea or complaints. 





OBJECTIVE:


 Vital Signs











 Period  Temp  Pulse  Resp  BP Sys/Ramírez  Pulse Ox


 


 Last 24 Hr  97.4 F-99.0 F  63-69  16-20  136-150/54-74  100-100








 Intake & Output











 06/07/19 06/08/19 06/09/19 06/10/19





 23:59 23:59 23:59 23:59


 


Intake Total   600 450


 


Balance   600 450


 


Weight   133 lb 6 oz 








General: sleeping but arousable, responds appropriately, no acute distress


Chest: decreased effort, no rales or wheezing appreciated, exam limited by 

habitus and effort


Abdomen:soft, obese, NT


Extremities; no pedal edema


Neuro: sleeping but arousable, oriented to self, not to time or place, opens 

eyes, moves all extremities freely, follows simple commands, no gross deficit 

but limited exam due to lack of full co-operation





 Home Medications











 Medication  Instructions  Recorded


 


Allopurinol [Zyloprim -] 100 mg PO DAILY 06/10/19


 


Atorvastatin Ca [Lipitor] 40 mg PO DAILY 06/10/19


 


Cholecalciferol (Vitamin D3) 1,000 unit PO DAILY 06/10/19





[Vitamin D3]  


 


Clopidogrel Bisulfate [Plavix] 75 mg PO DAILY 06/10/19


 


Furosemide 40 mg PO DAILY 06/10/19


 


Insulin Glargine,Hum.rec.anlog 50 unit SQ DAILY 06/10/19





[Lantus]  


 


Insulin Lispro [Humalog] 1 unit SQ ASDIR 06/10/19


 


Levomefolate/B6/B12/Algal Oil 1 each PO DAILY 06/10/19





[Metanx Capsule]  


 


Losartan Potassium [Cozaar -] 50 mg PO DAILY 06/10/19


 


Nebivolol [Bystolic -] 5 mg PO DAILY 06/10/19


 


[place]-3S/Dha/Epa/Fish Oil [Fish 1 each PO DAILY 06/10/19





Oil Omega-3 Softgel]  


 


Spironolactone [Aldactone] 50 mg PO DAILY 06/10/19


 


Tamsulosin HCl [Flomax] 0.4 mg PO DAILY 06/10/19








Active Medications





Acetaminophen (Tylenol -)  650 mg PO Q4H PRN


   PRN Reason: PAIN LEVEL 1-5


   Last Admin: 06/09/19 23:57 Dose:  650 mg


Albuterol/Ipratropium (Duoneb -)  1 amp NEB RQID [facility]


   Last Admin: 06/10/19 07:40 Dose:  1 amp


Aspirin (Asa -)  81 mg PO DAILY [facility]


   Last Admin: 06/09/19 20:34 Dose:  81 mg


Atorvastatin Calcium (Lipitor -)  80 mg PO HS [facility]


   Last Admin: 06/09/19 23:58 Dose:  80 mg


Clopidogrel Bisulfate (Plavix -)  75 mg PO DAILY [facility]


   Last Admin: 06/09/19 20:34 Dose:  75 mg


Dorzolamide HCl (Trusopt 2%)  1 drop OU TID [facility]


   Last Admin: 06/10/19 07:42 Dose:  Not Given


Ezetimibe (Zetia -)  10 mg PO DAILY [facility]


Heparin Sodium (Porcine) (Heparin -)  5,000 unit SQ TID [facility]


   Last Admin: 06/10/19 06:56 Dose:  5,000 unit


Ceftriaxone Sodium 1 gm/ (Dextrose)  50 mls @ 100 mls/hr IVPB DAILY [facility]; 

Protocol


Insulin Aspart (Novolog Vial Sliding Scale -)  [facility]Q [facility]; Protocol


   Last Admin: 06/10/19 06:52 Dose:  10 units


Insulin Detemir (Levemir Vial)  22 units SQ [facility]


Latanoprost (Xalatan 0.005% Eye Drops -)  1 drop OU HS [facility]


   Last Admin: 06/09/19 23:59 Dose:  Not Given


Memantine (Namenda -)  10 mg PO BID [facility]


   Last Admin: 06/09/19 23:58 Dose:  10 mg


Nebivolol (Bystolic -)  5 mg PO HS [facility]


   Last Admin: 06/09/19 23:59 Dose:  5 mg


Tamsulosin HCl (Flomax -)  0.4 mg PO BID [facility]


   Last Admin: 06/09/19 23:58 Dose:  0.4 mg





 Laboratory Results - last 24 hr











  06/09/19 06/09/19 06/09/19





  08:14 17:30 17:30


 


WBC   11.0 H 


 


RBC   4.29 


 


Hgb   12.5 


 


Hct   37.3 


 


MCV   86.9 


 


MCH   29.1 


 


MCHC   33.5 


 


RDW   13.6 


 


Plt Count   142  D 


 


MPV   9.3 


 


Absolute Neuts (auto)   6.5 


 


Neutrophils %   59.3  D 


 


Lymphocytes %   24.7  D 


 


Monocytes %   10.3 H 


 


Eosinophils %   4.7 H D 


 


Basophils %   1.0 


 


Nucleated RBC %   0 


 


Platelet Estimate   Adequate 


 


Platelet Comment   Plts.reviewed 


 


PT with INR    14.00 H


 


INR    1.18 H


 


PTT (Actin FS)   


 


Sodium   


 


Potassium   


 


Chloride   


 


Carbon Dioxide   


 


Anion Gap   


 


BUN   


 


Creatinine   


 


Est GFR (CKD-EPI)AfAm   


 


Est GFR (CKD-EPI)NonAf   


 


POC Glucometer   


 


Random Glucose   


 


Lactic Acid   


 


Calcium   


 


Phosphorus   


 


Magnesium   


 


Total Bilirubin   


 


AST   


 


ALT   


 


Alkaline Phosphatase   


 


Creatine Kinase   


 


Troponin I   


 


Total Protein   


 


Albumin   


 


Urine Color   


 


Urine Appearance   


 


Urine pH   


 


Ur Specific Gravity   


 


Urine Protein   


 


Urine Glucose (UA)   


 


Urine Ketones   


 


Urine Blood   


 


Urine Nitrite   


 


Urine Bilirubin   


 


Urine Urobilinogen   


 


Ur Leukocyte Esterase   


 


Urine WBC (Auto)   


 


Urine RBC (Auto)   


 


Urine Casts (Auto)   


 


U Epithel Cells (Auto)   


 


Urine Bacteria (Auto)   


 


Blood Type  O POSITIVE  


 


Antibody Screen   














  06/09/19 06/09/19 06/09/19





  17:30 17:30 17:30


 


WBC   


 


RBC   


 


Hgb   


 


Hct   


 


MCV   


 


MCH   


 


MCHC   


 


RDW   


 


Plt Count   


 


MPV   


 


Absolute Neuts (auto)   


 


Neutrophils %   


 


Lymphocytes %   


 


Monocytes %   


 


Eosinophils %   


 


Basophils %   


 


Nucleated RBC %   


 


Platelet Estimate   


 


Platelet Comment   


 


PT with INR   


 


INR   


 


PTT (Actin FS)   


 


Sodium  141  


 


Potassium  4.4  


 


Chloride  106  


 


Carbon Dioxide  27  


 


Anion Gap  8  


 


BUN  42.4 H  


 


Creatinine  1.5 H  


 


Est GFR (CKD-EPI)AfAm  50.95  


 


Est GFR (CKD-EPI)NonAf  43.96  


 


POC Glucometer   


 


Random Glucose  99  


 


Lactic Acid    1.2


 


Calcium  8.7  


 


Phosphorus   


 


Magnesium   


 


Total Bilirubin  0.3  


 


AST  23  


 


ALT  20  


 


Alkaline Phosphatase  136 H  


 


Creatine Kinase  145  


 


Troponin I  < 0.02  


 


Total Protein  6.8  


 


Albumin  3.3 L  


 


Urine Color   


 


Urine Appearance   


 


Urine pH   


 


Ur Specific Gravity   


 


Urine Protein   


 


Urine Glucose (UA)   


 


Urine Ketones   


 


Urine Blood   


 


Urine Nitrite   


 


Urine Bilirubin   


 


Urine Urobilinogen   


 


Ur Leukocyte Esterase   


 


Urine WBC (Auto)   


 


Urine RBC (Auto)   


 


Urine Casts (Auto)   


 


U Epithel Cells (Auto)   


 


Urine Bacteria (Auto)   


 


Blood Type   O POSITIVE 


 


Antibody Screen   Negative 














  06/09/19 06/09/19 06/10/19





  17:40 20:50 05:32


 


WBC   


 


RBC   


 


Hgb   


 


Hct   


 


MCV   


 


MCH   


 


MCHC   


 


RDW   


 


Plt Count   


 


MPV   


 


Absolute Neuts (auto)   


 


Neutrophils %   


 


Lymphocytes %   


 


Monocytes %   


 


Eosinophils %   


 


Basophils %   


 


Nucleated RBC %   


 


Platelet Estimate   


 


Platelet Comment   


 


PT with INR   


 


INR   


 


PTT (Actin FS)   


 


Sodium   


 


Potassium   


 


Chloride   


 


Carbon Dioxide   


 


Anion Gap   


 


BUN   


 


Creatinine   


 


Est GFR (CKD-EPI)AfAm   


 


Est GFR (CKD-EPI)NonAf   


 


POC Glucometer    377


 


Random Glucose   


 


Lactic Acid   


 


Calcium   


 


Phosphorus   3.5 


 


Magnesium   2.5 H 


 


Total Bilirubin   


 


AST   


 


ALT   


 


Alkaline Phosphatase   


 


Creatine Kinase   


 


Troponin I   


 


Total Protein   


 


Albumin   


 


Urine Color  Yellow  


 


Urine Appearance  Clear  


 


Urine pH  5.5  


 


Ur Specific Gravity  1.013  


 


Urine Protein  Negative  


 


Urine Glucose (UA)  Negative  


 


Urine Ketones  Negative  


 


Urine Blood  Negative  


 


Urine Nitrite  Negative  


 


Urine Bilirubin  Negative  


 


Urine Urobilinogen  1.0  


 


Ur Leukocyte Esterase  2+ H  


 


Urine WBC (Auto)  52  


 


Urine RBC (Auto)  2  


 


Urine Casts (Auto)  1  


 


U Epithel Cells (Auto)  0.6  


 


Urine Bacteria (Auto)  38.1  


 


Blood Type   


 


Antibody Screen   














  06/10/19 06/10/19 06/10/19





  06:33 06:33 06:33


 


WBC  8.4  


 


RBC  4.13  


 


Hgb  12.2  


 


Hct  35.5  


 


MCV  85.9  


 


MCH  29.5  


 


MCHC  34.3  


 


RDW  13.6  


 


Plt Count   


 


MPV  9.3  


 


Absolute Neuts (auto)  4.9  


 


Neutrophils %  57.9  


 


Lymphocytes %  26.6  


 


Monocytes %  9.2  


 


Eosinophils %  5.3 H  


 


Basophils %  1.0  


 


Nucleated RBC %  0  


 


Platelet Estimate   


 


Platelet Comment   


 


PT with INR   14.50 H 


 


INR   1.23 H 


 


PTT (Actin FS)   51.5 H 


 


Sodium    138


 


Potassium    5.4 H


 


Chloride    106


 


Carbon Dioxide    24


 


Anion Gap    8


 


BUN    39.6 H


 


Creatinine    1.5 H


 


Est GFR (CKD-EPI)AfAm    50.95


 


Est GFR (CKD-EPI)NonAf    43.96


 


POC Glucometer   


 


Random Glucose    354 H*


 


Lactic Acid   


 


Calcium    8.8


 


Phosphorus    3.0


 


Magnesium    2.6 H


 


Total Bilirubin    0.4


 


AST    18


 


ALT    19


 


Alkaline Phosphatase    147 H


 


Creatine Kinase   


 


Troponin I    < 0.02


 


Total Protein    6.5


 


Albumin    3.1 L


 


Urine Color   


 


Urine Appearance   


 


Urine pH   


 


Ur Specific Gravity   


 


Urine Protein   


 


Urine Glucose (UA)   


 


Urine Ketones   


 


Urine Blood   


 


Urine Nitrite   


 


Urine Bilirubin   


 


Urine Urobilinogen   


 


Ur Leukocyte Esterase   


 


Urine WBC (Auto)   


 


Urine RBC (Auto)   


 


Urine Casts (Auto)   


 


U Epithel Cells (Auto)   


 


Urine Bacteria (Auto)   


 


Blood Type   


 


Antibody Screen   








CT brain/A/P and duplex results reviewed





ASSESSMENT AND PLAN:


78 yom with PMhx of IDDM, legally blind, CD s/p MI/CABG (over 20 years ago), 

Diastolic HF, HTN, HLD, right hip ORIF, Dementia, CKD stage III, asthma 

admitted with weakness and ?AMS.





-Weakness, r/o infectious process


-?AMS, toxic metabolic encephalopathy from Hyperglycemia/dehydration/UTI vs 

progression of dementia with waxing/waning mental status


-IDDM, poorly controlled (last A1c >11 per son)


-Hyperkalemia, ?from hyperglycemia related hydration/DEB vs hemolysis


-CAD s/p CABG/MI


-Diastolic HF


-HTN


-HLD


-CKD stage III


-Dementia





Plan:


CT brain/A/P neg for concerns. 


ua not convincing for infectious process.


Emperic ceftriaxone and follow up urine cultures. 


Unclear if event prior to admission suggestive of progression of his underlying 

dementia with waxing and waning mental status. 


Per son, close to baseline. 


D/c IVF. Hold lasix today. Monitor volume status and K levels. 


Patient on V-GO insulin pump and tresiba at home, currently on hold. 


Will place on levemir 15 units starting now and titrate as needed. ISS. 

diabetic diet. 


Continue bystolic/plavix/statin/flomax.


DVTPPX heparin


PT eval and OOB today


Plan discussed with son in detail, meds reconciled. Son requesting social work 

input to arrange for additional help at home.


DIspo d/c in 24 hours if doing well, w/u neg and no new concerns and 

disposition arranged.

## 2019-06-10 NOTE — PN
Physical Exam: 


SUBJECTIVE: Patient seen and examined


patient resting in bed nad, no acute events, afebrile hemodynamically stable. 

denies abd pain, n/v/d/c. denies f/c. oriented only to self, baseline mental 

status per family 





OBJECTIVE:





 Vital Signs











 Period  Temp  Pulse  Resp  BP Sys/Ramírez  Pulse Ox


 


 Last 24 Hr  97.4 F-99.0 F  63-69  16-20  136-150/54-74  100-100











GENERAL: The patient is awake, oriented only to self, confused. 


HEAD: Normal with no signs of trauma.


EYES: sclera anicteric, conjunctiva clear. No ptosis. 


ENT: moist mucous membranes.


NECK: supple. 


LUNGS: Breath sounds equal, clear to auscultation bilaterally


HEART: Regular rate and rhythm, S1, S2 systolic murmur 


ABDOMEN: Soft, mildly tender LUQ, feels full of stool, nondistended, 

normoactive bowel sounds, no guarding, no 


rebound


EXTREMITIES: 2+ pulses, warm, well-perfused, no edema. 


NEUROLOGICAL: Cranial nerves II through XII grossly intact


PSYCH: calm


SKIN: Warm, dry











 Laboratory Results - last 24 hr











  06/09/19 06/09/19 06/09/19





  08:14 17:30 17:30


 


WBC   11.0 H 


 


RBC   4.29 


 


Hgb   12.5 


 


Hct   37.3 


 


MCV   86.9 


 


MCH   29.1 


 


MCHC   33.5 


 


RDW   13.6 


 


Plt Count   142  D 


 


MPV   9.3 


 


Absolute Neuts (auto)   6.5 


 


Neutrophils %   59.3  D 


 


Lymphocytes %   24.7  D 


 


Monocytes %   10.3 H 


 


Eosinophils %   4.7 H D 


 


Basophils %   1.0 


 


Nucleated RBC %   0 


 


Platelet Estimate   Adequate 


 


Platelet Comment   Plts.reviewed 


 


PT with INR    14.00 H


 


INR    1.18 H


 


PTT (Actin FS)   


 


Sodium   


 


Potassium   


 


Chloride   


 


Carbon Dioxide   


 


Anion Gap   


 


BUN   


 


Creatinine   


 


Est GFR (CKD-EPI)AfAm   


 


Est GFR (CKD-EPI)NonAf   


 


POC Glucometer   


 


Random Glucose   


 


Lactic Acid   


 


Calcium   


 


Phosphorus   


 


Magnesium   


 


Total Bilirubin   


 


AST   


 


ALT   


 


Alkaline Phosphatase   


 


Creatine Kinase   


 


Troponin I   


 


Total Protein   


 


Albumin   


 


Urine Color   


 


Urine Appearance   


 


Urine pH   


 


Ur Specific Gravity   


 


Urine Protein   


 


Urine Glucose (UA)   


 


Urine Ketones   


 


Urine Blood   


 


Urine Nitrite   


 


Urine Bilirubin   


 


Urine Urobilinogen   


 


Ur Leukocyte Esterase   


 


Urine WBC (Auto)   


 


Urine RBC (Auto)   


 


Urine Casts (Auto)   


 


U Epithel Cells (Auto)   


 


Urine Bacteria (Auto)   


 


Blood Type  O POSITIVE  


 


Antibody Screen   














  06/09/19 06/09/19 06/09/19





  17:30 17:30 17:30


 


WBC   


 


RBC   


 


Hgb   


 


Hct   


 


MCV   


 


MCH   


 


MCHC   


 


RDW   


 


Plt Count   


 


MPV   


 


Absolute Neuts (auto)   


 


Neutrophils %   


 


Lymphocytes %   


 


Monocytes %   


 


Eosinophils %   


 


Basophils %   


 


Nucleated RBC %   


 


Platelet Estimate   


 


Platelet Comment   


 


PT with INR   


 


INR   


 


PTT (Actin FS)   


 


Sodium  141  


 


Potassium  4.4  


 


Chloride  106  


 


Carbon Dioxide  27  


 


Anion Gap  8  


 


BUN  42.4 H  


 


Creatinine  1.5 H  


 


Est GFR (CKD-EPI)AfAm  50.95  


 


Est GFR (CKD-EPI)NonAf  43.96  


 


POC Glucometer   


 


Random Glucose  99  


 


Lactic Acid    1.2


 


Calcium  8.7  


 


Phosphorus   


 


Magnesium   


 


Total Bilirubin  0.3  


 


AST  23  


 


ALT  20  


 


Alkaline Phosphatase  136 H  


 


Creatine Kinase  145  


 


Troponin I  < 0.02  


 


Total Protein  6.8  


 


Albumin  3.3 L  


 


Urine Color   


 


Urine Appearance   


 


Urine pH   


 


Ur Specific Gravity   


 


Urine Protein   


 


Urine Glucose (UA)   


 


Urine Ketones   


 


Urine Blood   


 


Urine Nitrite   


 


Urine Bilirubin   


 


Urine Urobilinogen   


 


Ur Leukocyte Esterase   


 


Urine WBC (Auto)   


 


Urine RBC (Auto)   


 


Urine Casts (Auto)   


 


U Epithel Cells (Auto)   


 


Urine Bacteria (Auto)   


 


Blood Type   O POSITIVE 


 


Antibody Screen   Negative 














  06/09/19 06/09/19 06/10/19





  17:40 20:50 05:32


 


WBC   


 


RBC   


 


Hgb   


 


Hct   


 


MCV   


 


MCH   


 


MCHC   


 


RDW   


 


Plt Count   


 


MPV   


 


Absolute Neuts (auto)   


 


Neutrophils %   


 


Lymphocytes %   


 


Monocytes %   


 


Eosinophils %   


 


Basophils %   


 


Nucleated RBC %   


 


Platelet Estimate   


 


Platelet Comment   


 


PT with INR   


 


INR   


 


PTT (Actin FS)   


 


Sodium   


 


Potassium   


 


Chloride   


 


Carbon Dioxide   


 


Anion Gap   


 


BUN   


 


Creatinine   


 


Est GFR (CKD-EPI)AfAm   


 


Est GFR (CKD-EPI)NonAf   


 


POC Glucometer    377


 


Random Glucose   


 


Lactic Acid   


 


Calcium   


 


Phosphorus   3.5 


 


Magnesium   2.5 H 


 


Total Bilirubin   


 


AST   


 


ALT   


 


Alkaline Phosphatase   


 


Creatine Kinase   


 


Troponin I   


 


Total Protein   


 


Albumin   


 


Urine Color  Yellow  


 


Urine Appearance  Clear  


 


Urine pH  5.5  


 


Ur Specific Gravity  1.013  


 


Urine Protein  Negative  


 


Urine Glucose (UA)  Negative  


 


Urine Ketones  Negative  


 


Urine Blood  Negative  


 


Urine Nitrite  Negative  


 


Urine Bilirubin  Negative  


 


Urine Urobilinogen  1.0  


 


Ur Leukocyte Esterase  2+ H  


 


Urine WBC (Auto)  52  


 


Urine RBC (Auto)  2  


 


Urine Casts (Auto)  1  


 


U Epithel Cells (Auto)  0.6  


 


Urine Bacteria (Auto)  38.1  


 


Blood Type   


 


Antibody Screen   














  06/10/19 06/10/19 06/10/19





  06:33 06:33 06:33


 


WBC  8.4  


 


RBC  4.13  


 


Hgb  12.2  


 


Hct  35.5  


 


MCV  85.9  


 


MCH  29.5  


 


MCHC  34.3  


 


RDW  13.6  


 


Plt Count   


 


MPV  9.3  


 


Absolute Neuts (auto)  4.9  


 


Neutrophils %  57.9  


 


Lymphocytes %  26.6  


 


Monocytes %  9.2  


 


Eosinophils %  5.3 H  


 


Basophils %  1.0  


 


Nucleated RBC %  0  


 


Platelet Estimate   


 


Platelet Comment   


 


PT with INR   14.50 H 


 


INR   1.23 H 


 


PTT (Actin FS)   51.5 H 


 


Sodium    138


 


Potassium    5.4 H


 


Chloride    106


 


Carbon Dioxide    24


 


Anion Gap    8


 


BUN    39.6 H


 


Creatinine    1.5 H


 


Est GFR (CKD-EPI)AfAm    50.95


 


Est GFR (CKD-EPI)NonAf    43.96


 


POC Glucometer   


 


Random Glucose    354 H*


 


Lactic Acid   


 


Calcium    8.8


 


Phosphorus    3.0


 


Magnesium    2.6 H


 


Total Bilirubin    0.4


 


AST    18


 


ALT    19


 


Alkaline Phosphatase    147 H


 


Creatine Kinase   


 


Troponin I    < 0.02


 


Total Protein    6.5


 


Albumin    3.1 L


 


Urine Color   


 


Urine Appearance   


 


Urine pH   


 


Ur Specific Gravity   


 


Urine Protein   


 


Urine Glucose (UA)   


 


Urine Ketones   


 


Urine Blood   


 


Urine Nitrite   


 


Urine Bilirubin   


 


Urine Urobilinogen   


 


Ur Leukocyte Esterase   


 


Urine WBC (Auto)   


 


Urine RBC (Auto)   


 


Urine Casts (Auto)   


 


U Epithel Cells (Auto)   


 


Urine Bacteria (Auto)   


 


Blood Type   


 


Antibody Screen   








Active Medications











Generic Name Dose Route Start Last Admin





  Trade Name Freq  PRN Reason Stop Dose Admin


 


Acetaminophen  650 mg  06/09/19 21:39  06/09/19 23:57





  Tylenol -  PO   650 mg





  Q4H PRN   Administration





  PAIN LEVEL 1-5   





     





     





     


 


Albuterol/Ipratropium  1 amp  06/10/19 08:00  06/10/19 07:40





  Duoneb -  NEB   1 amp





  RQID MARY   Administration





     





     





     





     


 


Aspirin  81 mg  06/09/19 20:30  06/09/19 20:34





  Asa -  PO   81 mg





  DAILY MARY   Administration





     





     





     





     


 


Atorvastatin Calcium  80 mg  06/09/19 22:00  06/09/19 23:58





  Lipitor -  PO   80 mg





  HS MARY   Administration





     





     





     





     


 


Clopidogrel Bisulfate  75 mg  06/09/19 20:30  06/09/19 20:34





  Plavix -  PO   75 mg





  DAILY MARY   Administration





     





     





     





     


 


Dorzolamide HCl  1 drop  06/09/19 22:00  06/10/19 07:42





  Trusopt 2%  OU   Not Given





  TID [facility]   





     





     





     





     


 


Ezetimibe  10 mg  06/10/19 10:00  





  Zetia -  PO   





  DAILY [facility]   





     





     





     





     


 


Heparin Sodium (Porcine)  5,000 unit  06/09/19 22:00  06/10/19 06:56





  Heparin -  SQ   5,000 unit





  TID MARY   Administration





     





     





     





     


 


Sodium Chloride  1,000 mls @ 42 mls/hr  06/09/19 17:15  06/09/19 17:54





  Normal Saline -  IV   42 mls/hr





  ASDIR MARY   Administration





     





     





     





     


 


Sodium Chloride  1,000 mls @ 42 mls/hr  06/09/19 20:15  06/09/19 20:34





  Normal Saline -  IV   42 mls/hr





  ASDIR AMRY   Administration





     





     





     





     


 


Ceftriaxone Sodium 1 gm/  50 mls @ 100 mls/hr  06/10/19 10:00  





  Dextrose  IVPB   





  DAILY MARY   





     





     





  Protocol   





     


 


Insulin Aspart  1 vial  06/09/19 22:00  06/10/19 06:52





  Novolog Vial Sliding Scale -  SQ   10 units





  ACHS MARY   Administration





     





     





  Protocol   





     


 


Insulin Detemir  22 units  06/10/19 22:00  





  Levemir Vial  SQ   





  HS MARY   





     





     





     





     


 


Latanoprost  1 drop  06/09/19 22:00  06/09/19 23:59





  Xalatan 0.005% Eye Drops -  OU   Not Given





  HS MARY   





     





     





     





     


 


Memantine  10 mg  06/09/19 22:00  06/09/19 23:58





  Namenda -  PO   10 mg





  BID MARY   Administration





     





     





     





     


 


Nebivolol  5 mg  06/09/19 22:00  06/09/19 23:59





  Bystolic -  PO   5 mg





  HS MARY   Administration





     





     





     





     


 


Tamsulosin HCl  0.4 mg  06/09/19 22:00  06/09/19 23:58





  Flomax -  PO   0.4 mg





  BID MARY   Administration





     





     





     





     











ASSESSMENT/PLAN:


This is a 77 yo m with PMH of IDDM, CAD s/p CABG 20 yrs ago, s/p MI, HFPEF, HTN

, HLD, legally blind due to DM, right hip ORIF, Dementia, CKD stage III, asthma

, who was admitted due to weakness and possible AMS





weakness and AMS


IDDM


CAD s/p CABG, Past MI


HFPEF


CKD III


HTN


HLD


Dementia 


hyperkalemia 


abdominal discomfort 


-r/o infectious process. UTI likely [id number] pyuria. on empiric rocephin; f/

u cultures. 


-ct head unremarkable for acute cva, possible metabolic encephalopathy. patient 

now at mental baseline per family 


-poorly controlled DM. levemir 22 hs, Iss


-mild kyperkalemia likely due to hyperglycemia this morning. no gap, bicarb 

wnl. s/p 500 cc saline bolus. hold lasix. f/u ekg and repeat bmp


-abd ct appreciated, most likely gassy + normal bm 


-continue home nebivolol


-continue zetia


-on dual antiplatelet


-continue flomax


-PT


-hep ppx.


May dc tomorrow 








Problem List





- Problems


(1) DEB (acute kidney injury)


Code(s): N17.9 - ACUTE KIDNEY FAILURE, UNSPECIFIED   





(2) Altered mental status


Code(s): R41.82 - ALTERED MENTAL STATUS, UNSPECIFIED   


Qualifiers: 


   Altered mental status type: somnolence   Qualified Code(s): R40.0 - 

Somnolence   





(3) Dehydration


Code(s): E86.0 - DEHYDRATION   





(4) Hyperglycemia


Code(s): R73.9 - HYPERGLYCEMIA, UNSPECIFIED   





Visit type





- Emergency Visit


Emergency Visit: Yes


ED Registration Date: 06/09/19


Care time: The patient presented to the Emergency Department on the above date 

and was hospitalized for further evaluation of their emergent condition.





- New Patient


This patient is new to me today: No





- Critical Care


Critical Care patient: No





- Discharge Referral


Referred to [facility] No

## 2019-06-11 VITALS — HEART RATE: 70 BPM | DIASTOLIC BLOOD PRESSURE: 60 MMHG | TEMPERATURE: 98.4 F | SYSTOLIC BLOOD PRESSURE: 140 MMHG

## 2019-06-11 LAB
ANION GAP SERPL CALC-SCNC: 6 MMOL/L (ref 8–16)
BASOPHILS # BLD: 1.1 % (ref 0–2)
BUN SERPL-MCNC: 30.3 MG/DL (ref 7–18)
CALCIUM SERPL-MCNC: 9.2 MG/DL (ref 8.5–10.1)
CHLORIDE SERPL-SCNC: 109 MMOL/L (ref 98–107)
CO2 SERPL-SCNC: 27 MMOL/L (ref 21–32)
CREAT SERPL-MCNC: 1.4 MG/DL (ref 0.55–1.3)
DEPRECATED RDW RBC AUTO: 13.6 % (ref 11.9–15.9)
EOSINOPHIL # BLD: 6.4 % (ref 0–4.5)
GLUCOSE SERPL-MCNC: 240 MG/DL (ref 74–106)
HCT VFR BLD CALC: 34.7 % (ref 35.4–49)
HGB BLD-MCNC: 11.8 GM/DL (ref 11.7–16.9)
LYMPHOCYTES # BLD: 25.7 % (ref 8–40)
MAGNESIUM SERPL-MCNC: 2.5 MG/DL (ref 1.8–2.4)
MCH RBC QN AUTO: 29.5 PG (ref 25.7–33.7)
MCHC RBC AUTO-ENTMCNC: 34.1 G/DL (ref 32–35.9)
MCV RBC: 86.5 FL (ref 80–96)
MONOCYTES # BLD AUTO: 9.7 % (ref 3.8–10.2)
NEUTROPHILS # BLD: 57.1 % (ref 42.8–82.8)
PHOSPHATE SERPL-MCNC: 3.5 MG/DL (ref 2.5–4.9)
PLATELET # BLD AUTO: 118 K/MM3 (ref 134–434)
PMV BLD: 9.8 FL (ref 7.5–11.1)
POTASSIUM SERPLBLD-SCNC: 4.6 MMOL/L (ref 3.5–5.1)
RBC # BLD AUTO: 4.01 M/MM3 (ref 4–5.6)
SODIUM SERPL-SCNC: 141 MMOL/L (ref 136–145)
WBC # BLD AUTO: 7.9 K/MM3 (ref 4–10)

## 2019-06-11 RX ADMIN — IPRATROPIUM BROMIDE AND ALBUTEROL SULFATE SCH AMP: .5; 3 SOLUTION RESPIRATORY (INHALATION) at 08:11

## 2019-06-11 RX ADMIN — INSULIN DETEMIR SCH UNIT: 100 INJECTION, SOLUTION SUBCUTANEOUS at 09:55

## 2019-06-11 RX ADMIN — IPRATROPIUM BROMIDE AND ALBUTEROL SULFATE SCH AMP: .5; 3 SOLUTION RESPIRATORY (INHALATION) at 13:48

## 2019-06-11 RX ADMIN — DORZOLAMIDE HYDROCHLORIDE SCH DROP: 20 SOLUTION/ DROPS OPHTHALMIC at 06:53

## 2019-06-11 RX ADMIN — CLOPIDOGREL BISULFATE SCH MG: 75 TABLET, FILM COATED ORAL at 09:55

## 2019-06-11 RX ADMIN — HEPARIN SODIUM SCH UNIT: 5000 INJECTION, SOLUTION INTRAVENOUS; SUBCUTANEOUS at 06:52

## 2019-06-11 RX ADMIN — CEFTRIAXONE SCH MLS/HR: 1 INJECTION, POWDER, FOR SOLUTION INTRAMUSCULAR; INTRAVENOUS at 09:55

## 2019-06-11 RX ADMIN — INSULIN ASPART SCH UNITS: 100 INJECTION, SOLUTION INTRAVENOUS; SUBCUTANEOUS at 12:13

## 2019-06-11 RX ADMIN — HEPARIN SODIUM SCH UNIT: 5000 INJECTION, SOLUTION INTRAVENOUS; SUBCUTANEOUS at 13:40

## 2019-06-11 RX ADMIN — DORZOLAMIDE HYDROCHLORIDE SCH DROP: 20 SOLUTION/ DROPS OPHTHALMIC at 13:41

## 2019-06-11 RX ADMIN — ASPIRIN 81 MG SCH MG: 81 TABLET ORAL at 09:55

## 2019-06-11 RX ADMIN — INSULIN ASPART SCH UNITS: 100 INJECTION, SOLUTION INTRAVENOUS; SUBCUTANEOUS at 06:52

## 2019-06-11 NOTE — DS
Physical Exam: 


SUBJECTIVE: Patient seen and examined


patient resting in bed nad, no acute events, afebrile hemodynamically stable. 

denies abd pain, n/v/d/c. denies f/c. oriented only to self, baseline mental 

status per family 





OBJECTIVE:





 Vital Signs











 Period  Temp  Pulse  Resp  BP Sys/Ramírez  Pulse Ox


 


 Last 24 Hr  97.4 F-98.8 F  60-78  18-20  128-150/55-72  98-99








PHYSICAL EXAM





GENERAL: The patient is awake, oriented only to self, confused. 


HEAD: Normal with no signs of trauma.


EYES: sclera anicteric, conjunctiva clear. No ptosis. 


ENT: moist mucous membranes.


NECK: supple. 


LUNGS: Breath sounds equal, clear to auscultation bilaterally


HEART: Regular rate and rhythm, S1, S2 systolic murmur 


ABDOMEN: Soft, mildly tender LUQ, feels full of stool, nondistended, 

normoactive bowel sounds, no guarding, no 


rebound


EXTREMITIES: 2+ pulses, warm, well-perfused, no edema. 


NEUROLOGICAL: Cranial nerves II through XII grossly intact


PSYCH: calm


SKIN: Warm, dry





LABS


 Laboratory Results - last 24 hr











  06/10/19 06/10/19 06/10/19





  12:12 12:43 17:18


 


WBC   


 


RBC   


 


Hgb   


 


Hct   


 


MCV   


 


MCH   


 


MCHC   


 


RDW   


 


Plt Count   


 


MPV   


 


Absolute Neuts (auto)   


 


Neutrophils %   


 


Lymphocytes %   


 


Monocytes %   


 


Eosinophils %   


 


Basophils %   


 


Nucleated RBC %   


 


Sodium   141 


 


Potassium   4.5 


 


Chloride   108 H 


 


Carbon Dioxide   26 


 


Anion Gap   7 L 


 


BUN   34.0 H 


 


Creatinine   1.5 H 


 


Est GFR (CKD-EPI)AfAm   50.95 


 


Est GFR (CKD-EPI)NonAf   43.96 


 


POC Glucometer  210   422


 


Random Glucose   312 H* 


 


Calcium   9.0 


 


Phosphorus   


 


Magnesium   














  06/10/19 06/11/19 06/11/19





  23:06 06:05 07:15


 


WBC    7.9


 


RBC    4.01


 


Hgb    11.8


 


Hct    34.7 L


 


MCV    86.5


 


MCH    29.5


 


MCHC    34.1


 


RDW    13.6


 


Plt Count    118 L


 


MPV    9.8


 


Absolute Neuts (auto)    4.5


 


Neutrophils %    57.1


 


Lymphocytes %    25.7


 


Monocytes %    9.7


 


Eosinophils %    6.4 H


 


Basophils %    1.1


 


Nucleated RBC %    0


 


Sodium   


 


Potassium   


 


Chloride   


 


Carbon Dioxide   


 


Anion Gap   


 


BUN   


 


Creatinine   


 


Est GFR (CKD-EPI)AfAm   


 


Est GFR (CKD-EPI)NonAf   


 


POC Glucometer  335  210 


 


Random Glucose   


 


Calcium   


 


Phosphorus   


 


Magnesium   














  06/11/19 06/11/19





  07:15 12:06


 


WBC  


 


RBC  


 


Hgb  


 


Hct  


 


MCV  


 


MCH  


 


MCHC  


 


RDW  


 


Plt Count  


 


MPV  


 


Absolute Neuts (auto)  


 


Neutrophils %  


 


Lymphocytes %  


 


Monocytes %  


 


Eosinophils %  


 


Basophils %  


 


Nucleated RBC %  


 


Sodium  141 


 


Potassium  4.6 


 


Chloride  109 H 


 


Carbon Dioxide  27 


 


Anion Gap  6 L 


 


BUN  30.3 H 


 


Creatinine  1.4 H 


 


Est GFR (CKD-EPI)AfAm  55.38 


 


Est GFR (CKD-EPI)NonAf  47.78 


 


POC Glucometer   422


 


Random Glucose  240 H 


 


Calcium  9.2 


 


Phosphorus  3.5 


 


Magnesium  2.5 H 











HOSPITAL COURSE:





Date of Admission:06/09/19





This is a 77 yo m with PMH of IDDM, CAD s/p CABG 20 yrs ago, s/p MI, HFPEF, HTN

, HLD, legally blind due to DM, right hip ORIF, Dementia, CKD stage III, asthma

, who was admitted due to weakness and possible AMS. On hospital day 1 patients 

mental status was back at baseline. His UA was + Pyuria but it wasn't clear 

whether this was a symptomatic UTI. He received 3 bro of IV abx and was sent 

home on 5 days of PO moxicillin for urine culture showing coag negative staph. 





Date of Discharge: 06/11/19











Minutes to complete discharge: 35





Discharge Summary


Reason For Visit: ACUTE KIDNEY INJURY/WEAKNESS/ALTERED MENTAL STATUS


Current Active Problems





DEB (acute kidney injury) (Acute)


Altered mental status (Acute)








Condition: Stable





- Instructions


Diet, Activity, Other Instructions: 


You were seen in the hospital for weakness. Based on your hospital tests and 

labs, there is a possibility that you had a urinary infection. You received 3 

days of IV antibiotics and now will need to continue taking oral antibiotics 

for 5 days





Antibiotic: 


Take Amoxicillin 500 three times a day starting tomorrow for 5 days. 





Continue other medications as before





Return to the hospital if severe symptoms recur 


Disposition: HOME





- Home Medications


Comprehensive Discharge Medication List: 


Ambulatory Orders





Atorvastatin Ca [Lipitor] 80 mg PO DAILY 06/10/19 


Clopidogrel Bisulfate [Plavix] 75 mg PO DAILY 06/10/19 


Ezetimibe 10 mg PO HS 06/10/19 


Furosemide 40 mg PO DAILY 06/10/19 


Insulin Degludec [Tresiba] 6 unit SQ HS 06/10/19 


Insulin Pump [Insulin Pump - (Nf)] 1 each NR DAILY 06/10/19 


Nebivolol HCl [Bystolic] 2.5 mg PO HS 06/10/19 


Tamsulosin HCl 0.4 mg PO DAILY 06/10/19 


Amoxicillin - [Amoxicillin 500mg Capsule -] 500 mg PO TID #15 capsule 06/11/19 











Problem List





- Problems


(1) DEB (acute kidney injury)


Code(s): N17.9 - ACUTE KIDNEY FAILURE, UNSPECIFIED   





(2) Altered mental status


Code(s): R41.82 - ALTERED MENTAL STATUS, UNSPECIFIED   


Qualifiers: 


   Altered mental status type: somnolence   Qualified Code(s): R40.0 - 

Somnolence   





(3) Dehydration


Code(s): E86.0 - DEHYDRATION   





(4) Hyperglycemia


Code(s): R73.9 - HYPERGLYCEMIA, UNSPECIFIED   


This patient is new to me today: No


Emergency Visit: Yes


ED Registration Date: 06/09/19


Care time: The patient presented to the Emergency Department on the above date 

and was hospitalized for further evaluation of their emergent condition.


Critical Care patient: No





- Discharge Referral


Referred to [facility] No

## 2019-06-11 NOTE — PN
Teaching Attending Note


Name of Resident: Ariana Leal





ATTENDING PHYSICIAN STATEMENT





I saw and evaluated the patient.


I reviewed the resident's note and discussed the case with the resident.


I agree with the resident's findings and plan as documented with exceptions 

below.








SUBJECTIVE:


Patient seen and examined. eating, does not want pureed diet. No complaints 

otherwise, oriented to self. 





OBJECTIVE:


 Vital Signs











 Period  Temp  Pulse  Resp  BP Sys/Ramírez  Pulse Ox


 


 Last 24 Hr  97.4 F-98.8 F  60-78  18-20  128-150/55-72  98-99








 Intake & Output











 06/08/19 06/09/19 06/10/19 06/11/19





 23:59 23:59 23:59 23:59


 


Intake Total  600 2200 240


 


Output Total   250 


 


Balance  600 1950 240


 


Weight  133 lb 6 oz  














ASSESSMENT AND PLAN:


78 yom with PMhx of IDDM, legally blind, CD s/p MI/CABG (over 20 years ago), 

Diastolic HF, HTN, HLD, right hip ORIF, Dementia, CKD stage III, asthma 

admitted with weakness and ?AMS.





-Weakness, r/o infectious process


-?AMS, toxic metabolic encephalopathy from Hyperglycemia/dehydration/UTI vs 

progression of dementia with waxing/waning mental status


-IDDM, poorly controlled (last A1c >11 per son)


-Hyperkalemia, ?from hyperglycemia related hydration/DEB vs hemolysis


-CAD s/p CABG/MI


-Diastolic HF


-HTN


-HLD


-CKD stage III


-Dementia





Plan:


Mental status improved, at baseline. 


Urine cx noted. ua not convincing for infection. 


On ceftriaxone inhouse, short course of amoxicillin. 


Resume home meds


Extensive discussion held with zi Burns yesterday. 


Requesting additional home help.


PT shaun noted, Wife declines SNF, wants patient home, has private help.


Home services arranged.


D/c home with family with outpatient Follow up.


Discussed with nursing and social work, all questions answered.

## 2019-06-11 NOTE — EKG
Test Reason : 

Blood Pressure : ***/*** mmHG

Vent. Rate : 078 BPM     Atrial Rate : 078 BPM

   P-R Int : 154 ms          QRS Dur : 086 ms

    QT Int : 412 ms       P-R-T Axes : 007 030 066 degrees

   QTc Int : 469 ms

 

NORMAL SINUS RHYTHM

CANNOT RULE OUT INFERIOR INFARCT (CITED ON OR BEFORE 09-JUN-2019)

ABNORMAL ECG

WHEN COMPARED WITH ECG OF 09-JUN-2019 17:56,

NO SIGNIFICANT CHANGE WAS FOUND

Confirmed by Brenden Muhammad MD ([id number]) on 6/11/2019 12:47:32 PM

 

Referred By: RENATA BAPTISTE DR           Confirmed By:Brenden Muhammad MD

## 2019-06-11 NOTE — PN
Physical Exam: 


SUBJECTIVE: Patient seen and examined





patient resting in bed nad, no acute events, afebrile hemodynamically stable. 

denies abd pain, n/v/d/c. denies f/c. oriented only to self, baseline mental 

status per family 


OBJECTIVE:





 Vital Signs











 Period  Temp  Pulse  Resp  BP Sys/Ramírez  Pulse Ox


 


 Last 24 Hr  97.4 F-98.3 F  60-80  18-20  128-150/55-72  99-99











GENERAL: The patient is awake, oriented only to self, confused. 


HEAD: Normal with no signs of trauma.


EYES: sclera anicteric, conjunctiva clear. No ptosis. 


ENT: moist mucous membranes.


NECK: supple. 


LUNGS: Breath sounds equal, clear to auscultation bilaterally


HEART: Regular rate and rhythm, S1, S2 systolic murmur 


ABDOMEN: Soft, mildly tender LUQ, feels full of stool, nondistended, 

normoactive bowel sounds, no guarding, no 


rebound


EXTREMITIES: 2+ pulses, warm, well-perfused, no edema. 


NEUROLOGICAL: Cranial nerves II through XII grossly intact


PSYCH: calm


SKIN: Warm, dry














 Laboratory Results - last 24 hr











  06/09/19 06/10/19 06/10/19





  08:14 06:33 12:12


 


Plt Count   117 L 


 


Sodium   


 


Potassium   


 


Chloride   


 


Carbon Dioxide   


 


Anion Gap   


 


BUN   


 


Creatinine   


 


Est GFR (CKD-EPI)AfAm   


 


Est GFR (CKD-EPI)NonAf   


 


POC Glucometer    210


 


Random Glucose   


 


Calcium   


 


Blood Type  O POSITIVE  














  06/10/19 06/10/19 06/10/19





  12:43 17:18 23:06


 


Plt Count   


 


Sodium  141  


 


Potassium  4.5  


 


Chloride  108 H  


 


Carbon Dioxide  26  


 


Anion Gap  7 L  


 


BUN  34.0 H  


 


Creatinine  1.5 H  


 


Est GFR (CKD-EPI)AfAm  50.95  


 


Est GFR (CKD-EPI)NonAf  43.96  


 


POC Glucometer   422  335


 


Random Glucose  312 H*  


 


Calcium  9.0  


 


Blood Type   














  06/11/19





  06:05


 


Plt Count 


 


Sodium 


 


Potassium 


 


Chloride 


 


Carbon Dioxide 


 


Anion Gap 


 


BUN 


 


Creatinine 


 


Est GFR (CKD-EPI)AfAm 


 


Est GFR (CKD-EPI)NonAf 


 


POC Glucometer  210


 


Random Glucose 


 


Calcium 


 


Blood Type 








Active Medications











Generic Name Dose Route Start Last Admin





  Trade Name Freq  PRN Reason Stop Dose Admin


 


Acetaminophen  650 mg  06/09/19 21:39  06/10/19 13:37





  Tylenol -  PO   650 mg





  Q4H PRN   Administration





  PAIN LEVEL 1-5   





     





     





     


 


Albuterol/Ipratropium  1 amp  06/10/19 08:00  06/11/19 08:11





  Duoneb -  NEB   1 amp





  RQID MARY   Administration





     





     





     





     


 


Aspirin  81 mg  06/09/19 20:30  06/10/19 12:26





  Asa -  PO   81 mg





  DAILY MARY   Administration





     





     





     





     


 


Atorvastatin Calcium  80 mg  06/10/19 22:00  06/10/19 22:55





  Lipitor -  PO   80 mg





  HS MARY   Administration





     





     





     





     


 


Clopidogrel Bisulfate  75 mg  06/09/19 20:30  06/10/19 12:26





  Plavix -  PO   75 mg





  DAILY MARY   Administration





     





     





     





     


 


Dorzolamide HCl  1 drop  06/09/19 22:00  06/11/19 06:53





  Trusopt 2%  OU   1 drop





  TID MARY   Administration





     





     





     





     


 


Ezetimibe  10 mg  06/11/19 22:00  





  Zetia -  PO   





  HS MARY   





     





     





     





     


 


Heparin Sodium (Porcine)  5,000 unit  06/09/19 22:00  06/11/19 06:52





  Heparin -  SQ   5,000 unit





  TID MARY   Administration





     





     





     





     


 


Ceftriaxone Sodium 1 gm/  50 mls @ 100 mls/hr  06/10/19 10:00  06/10/19 12:27





  Dextrose  IVPB   100 mls/hr





  DAILY MARY   Administration





     





     





  Protocol   





     


 


Insulin Aspart  1 vial  06/09/19 22:00  06/11/19 06:52





  Novolog Vial Sliding Scale -  SQ   4 units





  ACHS MARY   Administration





     





     





  Protocol   





     


 


Insulin Detemir  15 units  06/10/19 11:15  06/10/19 13:17





  Levemir Vial  SQ   15 unit





  DAILY MARY   Administration





     





     





     





     


 


Latanoprost  1 drop  06/09/19 22:00  06/10/19 23:14





  Xalatan 0.005% Eye Drops -  OU   Not Given





  HS MARY   





     





     





     





     


 


Nebivolol  5 mg  06/09/19 22:00  06/10/19 22:55





  Bystolic -  PO   5 mg





  HS MARY   Administration





     





     





     





     


 


Tamsulosin HCl  0.8 mg  06/11/19 08:30  





  Flomax -  PO   





  DAILY@0830 Replaced by [facility]   





     





     





     





     











ASSESSMENT/PLAN:


This is a 79 yo m with PMH of IDDM, CAD s/p CABG 20 yrs ago, s/p MI, HFPEF, HTN

, HLD, legally blind due to DM, right hip ORIF, Dementia, CKD stage III, asthma

, who was admitted due to weakness and possible AMS





weakness and AMS


IDDM


CAD s/p CABG, Past MI


HFPEF


CKD III


HTN


HLD


Dementia 


hyperkalemia 


abdominal discomfort 


-r/o infectious process. UTI likely [id number] pyuria. on empiric rocephin; f/

u cultures. 


-ct head unremarkable for acute cva, possible metabolic encephalopathy. patient 

now at mental baseline per family 


-poorly controlled DM. levemir 22 hs, Iss


-mild kyperkalemia likely due to hyperglycemia this morning. no gap, bicarb 

wnl. s/p 500 cc saline bolus. hold lasix. f/u ekg and repeat bmp


-abd ct appreciated, most likely gassy + normal bm 


-continue home nebivolol


-continue zetia


-on dual antiplatelet


-continue flomax


-PT


-hep ppx.


May dc tomorrow 





Problem List





- Problems


(1) DEB (acute kidney injury)


Code(s): N17.9 - ACUTE KIDNEY FAILURE, UNSPECIFIED   





(2) Altered mental status


Code(s): R41.82 - ALTERED MENTAL STATUS, UNSPECIFIED   


Qualifiers: 


   Altered mental status type: somnolence   Qualified Code(s): R40.0 - 

Somnolence   





(3) Dehydration


Code(s): E86.0 - DEHYDRATION   





(4) Hyperglycemia


Code(s): R73.9 - HYPERGLYCEMIA, UNSPECIFIED

## 2019-10-06 ENCOUNTER — HOSPITAL ENCOUNTER (INPATIENT)
Dept: HOSPITAL 74 - JER | Age: 79
LOS: 31 days | Discharge: NURSING, REHAB OR LONG-TERM CARE | DRG: 987 | End: 2019-11-06
Attending: INTERNAL MEDICINE | Admitting: INTERNAL MEDICINE
Payer: COMMERCIAL

## 2019-10-06 VITALS — BODY MASS INDEX: 26.9 KG/M2

## 2019-10-06 DIAGNOSIS — K59.00: ICD-10-CM

## 2019-10-06 DIAGNOSIS — N17.9: ICD-10-CM

## 2019-10-06 DIAGNOSIS — I13.0: ICD-10-CM

## 2019-10-06 DIAGNOSIS — G91.2: ICD-10-CM

## 2019-10-06 DIAGNOSIS — G93.41: ICD-10-CM

## 2019-10-06 DIAGNOSIS — E11.649: ICD-10-CM

## 2019-10-06 DIAGNOSIS — N30.01: ICD-10-CM

## 2019-10-06 DIAGNOSIS — E11.65: ICD-10-CM

## 2019-10-06 DIAGNOSIS — J45.909: ICD-10-CM

## 2019-10-06 DIAGNOSIS — E11.22: ICD-10-CM

## 2019-10-06 DIAGNOSIS — N28.1: ICD-10-CM

## 2019-10-06 DIAGNOSIS — E11.00: Primary | ICD-10-CM

## 2019-10-06 DIAGNOSIS — N18.3: ICD-10-CM

## 2019-10-06 DIAGNOSIS — E86.0: ICD-10-CM

## 2019-10-06 DIAGNOSIS — E87.2: ICD-10-CM

## 2019-10-06 DIAGNOSIS — E88.09: ICD-10-CM

## 2019-10-06 DIAGNOSIS — I25.10: ICD-10-CM

## 2019-10-06 DIAGNOSIS — J44.9: ICD-10-CM

## 2019-10-06 DIAGNOSIS — I25.2: ICD-10-CM

## 2019-10-06 DIAGNOSIS — G30.9: ICD-10-CM

## 2019-10-06 DIAGNOSIS — Z95.1: ICD-10-CM

## 2019-10-06 DIAGNOSIS — N40.1: ICD-10-CM

## 2019-10-06 DIAGNOSIS — E11.319: ICD-10-CM

## 2019-10-06 DIAGNOSIS — E87.0: ICD-10-CM

## 2019-10-06 DIAGNOSIS — E83.39: ICD-10-CM

## 2019-10-06 DIAGNOSIS — Z79.4: ICD-10-CM

## 2019-10-06 DIAGNOSIS — I50.32: ICD-10-CM

## 2019-10-06 DIAGNOSIS — E66.9: ICD-10-CM

## 2019-10-06 DIAGNOSIS — F02.80: ICD-10-CM

## 2019-10-06 DIAGNOSIS — H54.8: ICD-10-CM

## 2019-10-06 DIAGNOSIS — E87.5: ICD-10-CM

## 2019-10-06 DIAGNOSIS — E87.1: ICD-10-CM

## 2019-10-06 DIAGNOSIS — R33.8: ICD-10-CM

## 2019-10-06 DIAGNOSIS — N13.30: ICD-10-CM

## 2019-10-06 DIAGNOSIS — E11.10: ICD-10-CM

## 2019-10-06 DIAGNOSIS — B95.62: ICD-10-CM

## 2019-10-06 LAB
ACETONE SERPL-MCNC: (no result) MG/DL
ALBUMIN SERPL-MCNC: 3.3 G/DL (ref 3.4–5)
ALP SERPL-CCNC: 248 U/L (ref 45–117)
ALT SERPL-CCNC: 17 U/L (ref 13–61)
ANION GAP SERPL CALC-SCNC: 14 MMOL/L (ref 8–16)
ANION GAP SERPL CALC-SCNC: 16 MMOL/L (ref 8–16)
AST SERPL-CCNC: 13 U/L (ref 15–37)
BACTERIA # UR AUTO: (no result) /HPF
BASOPHILS # BLD: 0.6 % (ref 0–2)
BILIRUB SERPL-MCNC: 0.5 MG/DL (ref 0.2–1)
BUN SERPL-MCNC: 56.2 MG/DL (ref 7–18)
BUN SERPL-MCNC: 57.2 MG/DL (ref 7–18)
CALCIUM SERPL-MCNC: 9.5 MG/DL (ref 8.5–10.1)
CALCIUM SERPL-MCNC: 9.6 MG/DL (ref 8.5–10.1)
CHLORIDE SERPL-SCNC: 91 MMOL/L (ref 98–107)
CHLORIDE SERPL-SCNC: 98 MMOL/L (ref 98–107)
CO2 SERPL-SCNC: 24 MMOL/L (ref 21–32)
CO2 SERPL-SCNC: 24 MMOL/L (ref 21–32)
CREAT SERPL-MCNC: 2.3 MG/DL (ref 0.55–1.3)
CREAT SERPL-MCNC: 2.5 MG/DL (ref 0.55–1.3)
DEPRECATED RDW RBC AUTO: 13.7 % (ref 11.9–15.9)
EOSINOPHIL # BLD: 0.1 % (ref 0–4.5)
EPITH CASTS URNS QL MICRO: (no result) /HPF
GLUCOSE SERPL-MCNC: 744 MG/DL (ref 74–106)
GLUCOSE SERPL-MCNC: 916 MG/DL (ref 74–106)
HCT VFR BLD CALC: 42.1 % (ref 35.4–49)
HGB BLD-MCNC: 13.5 GM/DL (ref 11.7–16.9)
INR BLD: 1.15 (ref 0.83–1.09)
KETONES UR QL STRIP: (no result)
LEUKOCYTE ESTERASE UR QL STRIP.AUTO: (no result)
LIPASE SERPL-CCNC: 114 U/L (ref 73–393)
LYMPHOCYTES # BLD: 5.4 % (ref 8–40)
MCH RBC QN AUTO: 28.9 PG (ref 25.7–33.7)
MCHC RBC AUTO-ENTMCNC: 32.1 G/DL (ref 32–35.9)
MCV RBC: 89.8 FL (ref 80–96)
MONOCYTES # BLD AUTO: 9.3 % (ref 3.8–10.2)
NEUTROPHILS # BLD: 84.6 % (ref 42.8–82.8)
PH UR: 5.5 [PH] (ref 5–8)
PLATELET # BLD AUTO: 191 K/MM3 (ref 134–434)
PMV BLD: 10.3 FL (ref 7.5–11.1)
POTASSIUM SERPLBLD-SCNC: 4.8 MMOL/L (ref 3.5–5.1)
POTASSIUM SERPLBLD-SCNC: 5.6 MMOL/L (ref 3.5–5.1)
PROT SERPL-MCNC: 7.6 G/DL (ref 6.4–8.2)
PROT UR QL STRIP: (no result)
PT PNL PPP: 13.6 SEC (ref 9.7–13)
RBC # BLD AUTO: (no result) /HPF (ref 0–4)
RBC # BLD AUTO: 4.69 M/MM3 (ref 4–5.6)
SODIUM SERPL-SCNC: 131 MMOL/L (ref 136–145)
SODIUM SERPL-SCNC: 137 MMOL/L (ref 136–145)
SP GR UR: <= 1.005 (ref 1.01–1.03)
UROBILINOGEN UR STRIP-MCNC: 0.2 MG/DL (ref 0.2–1)
WBC # BLD AUTO: 18 K/MM3 (ref 4–10)
WBC # UR AUTO: (no result) /HPF (ref 0–5)

## 2019-10-06 PROCEDURE — P9058 RBC, L/R, CMV-NEG, IRRAD: HCPCS

## 2019-10-06 PROCEDURE — P9038 RBC IRRADIATED: HCPCS

## 2019-10-06 NOTE — PDOC
Attending Attestation





- Resident


Resident Name: Makayla Gonzalez





- ED Attending Attestation


I have performed the following: I have examined & evaluated the patient, The 

case was reviewed & discussed with the resident, I agree w/resident's findings 

& plan





- HPI


HPI: 





10/06/19 21:46


Pt comes with altered MS; he has very dry mucus membranes. He is confused.  His 

blood sugar is 600 today and has been that high in the past.





- Physicial Exam


PE: 





10/06/19 21:47


Pt has no fever; rectal temp afebrile. Pt has no pitting edema of ext. He has 

lower abdominal pain that is nonspecific.


No rashes.  Lung sounds clear.  Pt has normal heart rate.





- Medical Decision Making





10/06/19 21:48


IVF and labs and insulin and admit.


10/06/19 21:55


blood sugar is 916.


10/06/19 22:59


Patient Name: DAVID DIEZ


THIS IS A PRELIMINARY REPORT FROM IMAGING ON CALL


DATE OF SERVICE: 2019-10-06 21:44:32


IMAGES: 150


EXAM: HEAD CT WITHOUT CONTRAST


HISTORY: Bilateral weakness.


COMPARISON: Report from June 9, 2019.


FINDINGS:


No acute hemorrhage. Marked enlargement of the lateral ventricles, out of 

proportion to the


degree of cortical atrophy. No extra-axial fluid collection. Extensive vascular 

calcifications.


Periventricular hypodensities. No acute calvarial abnormalities.


IMPRESSION:


1. No evidence for acute intracranial hemorrhage.


2. Constellation of findings which can be seen with normal pressure 

hydrocephalus.


3. Probable chronic ischemic small vessel disease.





10/06/19 22:59


Patient Name: DAVID DIEZ


THIS IS A PRELIMINARY REPORT FROM IMAGING ON CALL


DATE OF SERVICE: 2019-10-06 21:47:07


IMAGES: 447


EXAM: ABDOMEN \T\ PELVIS CT W/O CONTR


HISTORY: Rule out ileus or obstruction


COMPARISON: None.


FINDINGS:


Lung bases without acute abnormality. Coronary artery calcifications.


Limited evaluation of the abdomen due to artifact from patient's arms.


Mild bilateral hydroureteronephrosis likely secondary to distended urinary 

bladder. No appreciable


urinary tract calculi. Bilateral renal cysts.


Hiatal hernia. Large volume of stool seen in the rectosigmoid colon with 

minimal adjacent


inflammatory change about the rectum seen on image 110 of series 4. Stool fills 

the remainder of


the colon. Appendix normal.


Extensive vascular calcifications of the abdomen and pelvis. Postsurgical 

changes of the left hip


without evidence for acute complication. Extensive degenerative changes of the 

osseous


structures without acute osseous abnormality. Small fat-containing umbilical 

hernia.


IMPRESSION:


1. Significant stool retention in the rectosigmoid colon with findings 

suggestive of early stercoral


colitis. Please correlate clinically. No evidence for small bowel obstruction.


2. Mild hydroureteronephrosis secondary to urinary bladder distention.


3. Other incidental and senescent findings, as above


10/06/19 23:33


Levaquin and flagyl for colitis.


Pt also has bladder distension and hydroureter and haziness around the right 

kidney. ? pyelo/UTI/cystitis.


However ua doesn't correspond with this.


10/07/19 01:17


ICU is aware od patient

## 2019-10-06 NOTE — CONSULT
Consult - text type





- Consultation


Consultation Note: 





PULMONARY / CRITICAL CARE CONSULT NOTE:





HPI: Briefly, a 79 y/o male with baseline dementia, Insulin dependant diabetic 

with diabetic retinopathy, CAD, CKD, CHF and COPD who presented to the ED with 

with AMS and abdominal pain. Labs were significant for serum glucose of 916, + 

serum ketones, no acidosis and an anion gap of 16. He also has a leukocytosis 

and DEB. His baseline SCr is 1.5. He was give IV Insulin and IVF. CT of the 

head and abdomen are pending. UA, cultures and CXR are also pending. He is 

being admitted to the ICU.














 Current Medications





Insulin Human Regular 100 (units/ Sodium Chloride)  100 mls @ 7.71 mls/hr IVPB 

TITR MARY; Protocol


   Last Admin: 10/06/19 22:25 Dose:  0.1 units/kg/hr, 7.71 mls/hr


Sodium Chloride (1/2 Normal Saline)  1,000 mls @ 1,000 mls/hr IV ASDIR MARY


   Last Admin: 10/06/19 22:24 Dose:  1,000 mls/hr





 Vital Signs











Temp  98.6 F   10/06/19 20:50


 


Pulse  86   10/06/19 20:50


 


Resp  18   10/06/19 20:50


 


BP  114/56 L  10/06/19 20:50


 


Pulse Ox  100   10/06/19 20:50








 Intake & Output











 10/06/19 10/06/19 10/07/19





 06:59 18:59 06:59


 


Weight   77.111 kg


 


Other:   


 


  Height   5 ft


 


  Body Mass Index (BMI)   33.2


 


  Weight Measurement Method   Est/Stated by Patient











EXAM:


Gen: contracted


Neuro: awake, not alert, moans and withdraws to pain, FATIMA


HEENT: R cataract, no JVD


Lungs: clear


Heart: RRR


Abd: soft, non-tender


Ext: contracted, no edema


Skin: warm, dry








 CBC, BMP





 10/06/19 21:00 





 10/06/19 21:00 








ASSESSMENT/PLAN:


IDDM


Hyperglycemia with Ketones, but no anion gap acidosis


DEB on CKD


UTI


Pseudohyponatremia


Dementia


CAD


COPD


CHF








-Insulin drip - convert when able to tolerate PO


-IV Fluids with frequent lung exams


-Electrolyte replacement


-[place]


-Antibiotics for UTI 


-Follow up CT scans


-DVT PPx


-Remainder of plan per primary team





Thank you for this interesting consult





Collins Bender


Pulm/Critical Care NP

## 2019-10-06 NOTE — PDOC
History of Present Illness





- General


Stated Complaint: DIABETES RELATED


Time Seen by Provider: 10/06/19 20:44


History Source: EMS, Family


Exam Limitations: Clinical Condition





- History of Present Illness


Initial Comments: 


Pt is a 80 yo M, with PMH of IDDM (with intermittent insulin pump usage), CAD, 

MI (s/p CABG and stents), CHF, CKD, asthma, and dementia, who is presenting 

from home via EMS for AMS and blood sugar reading >600 at home. Pt is 

accompanied by his wife and son. They state LWK was last night at 9 pm. Today 

pt seemed more lethargic than usual, and "was dragging his feet when he walks". 

Pt tolerated PO intake without vomiting and has had normal BMs. Son states pts 

BG is frequently >600, and did not take long-acting insulin due to not eating 

this morning. Son applied insulin pump (2 units aspart/hour) for 9 hours, which 

did not improve his symptoms. Family denies any fevers, vomiting, or diarrhea. 

Pt with dementia and due to clinical condition, cannot provide additional ROS. 





Allergies: NKDA


PCP: Paramjit Capellan


Endo: Dr. Boles


Cards: Dr. Storey





Social: Pt denies any cigarette, alcohol, or drug use.


Pt denies any recent travel or sick contacts.


Surgical: L hip surgery, remote; CABG as above


Family: no relevant history.


10/06/19 21:30


10/06/19 22:04








Past History





- Travel


Traveled outside of the country in the last 30 days: No


Close contact w/someone who was outside of country & ill: No





- Past Medical History


Allergies/Adverse Reactions: 


 Allergies











Allergy/AdvReac Type Severity Reaction Status Date / Time


 


No Known Allergies Allergy   Verified 02/15/19 11:57











Home Medications: 


Ambulatory Orders





Atorvastatin Ca [Lipitor] 40 mg PO DAILY 06/10/19 


Clopidogrel Bisulfate [Plavix] 75 mg PO DAILY 06/10/19 


Ezetimibe 10 mg PO HS 06/10/19 


Furosemide 20 mg PO DAILY 06/10/19 


Insulin Degludec [Tresiba] 6 unit SQ HS 06/10/19 


Insulin Pump [Insulin Pump - (Nf)] 1 each NR DAILY 06/10/19 


Nebivolol HCl [Bystolic] 2.5 mg PO HS 06/10/19 


Tamsulosin HCl 0.4 mg PO DAILY 06/10/19 








Anemia: No


Asthma: Yes


Cancer: No


Cardiac Disorders: Yes (cabg)


CVA: No


COPD: No


CHF: No


Dementia: No


Diabetes: Yes


GI Disorders: No


 Disorders: No


HTN: Yes


Hypercholesterolemia: Yes


Liver Disease: No


Seizures: No


Thyroid Disease: No





- Surgical History


Abdominal Surgery: No


Appendectomy: No


Cardiac Surgery: Yes ('96)


Cholecystectomy: No


Lung Surgery: No


Neurologic Surgery: No


Orthopedic Surgery: Yes (rt  hip orif)





- Immunization History


Immunization Up to Date: Yes





- Psycho Social/Smoking Cessation Hx


Smoking Status: No


Smoking History: Never smoked


Have you smoked in the past 12 months: No


Number of Cigarettes Smoked Daily: 0


If you are a former smoker, when did you quit?: '96


Hx Alcohol Use: No


Drug/Substance Use Hx: No


Substance Use Type: None


Hx Substance Use Treatment: No





**Review of Systems





- Review of Systems


Able to Perform ROS?: No (see HPI)


Is the patient limited English proficient: Yes





*Physical Exam





- Physical Exam


Comments: 


Vitals stable, pt afebrile by rectal temp. Obese body habitus. Pt appears ill, 

lethargic. Breath smells fruity. 


Pt will awaken to tactile stimuli, not responsive appropriately to commands or 

questions (worse from baseline)


Pt not responding enough to evaluate full CN exam. Strength diminished but 

equal in all extremities. No obvious facial asymmetry. 


No midline spinal tenderness, step-offs, or crepitus. 


Head normocephalic, atraumatic.


Eyes PERRLA, EOMI. 


Oropharynx without erythema or exudates, no LAD b/l. 


No nasal congestion. 


Hearing intact. 


Clear heart sounds, S1/S2, no JVD, b/l pedal edema, or heart murmur. 


Clear lung sounds, no respiratory distress, wheezes, crackles, or accessory 

muscle use. 


Diffuse upper abdominal TTP with mild distension. Bowel sounds normoactive. 


Skin without jaundice or rash. 


10/06/19 22:07


10/06/19 22:23








ED Treatment Course





- LABORATORY


CBC & Chemistry Diagram: 


 10/06/19 21:00





 10/06/19 23:00





Medical Decision Making





- Medical Decision Making


Pt was seen at bedside, also will be seen by attending Dr. Berrios. Pt presenting 

with BMG >600, lethargic; will evaluate for DKA/HHS vs infection (UTI, pneumonia

), vs electrolyte abnormalities vs CVA vs ACS.





Provided 1 L IV NS and 4 units IV insulin for improvement of hyperglycemia and 

hydration.


Will continue to reassess pt and monitor for symptomatic improvement.





ECG: NSR, intervals WNL (HR 84, , QRS 90, QTc 472). ST flattening in 

inferior leads (unchanged from prior ECG 6/10/2019)


10/06/19 22:26





CBC: WBC 18


CMP: Na 131 (corrected 140s), K 5.6 with no peaked Ts on EKG


Glucose 916 with trace acetone -- likely DKA


lipase WNL





Will start insulin drip and switch to 1/2 NS


ICU made aware


Will draw additional BMP to monitor electrolyte changes q2hrs


Pt in CT scan for CT head and abd/pelvis due to AMS and abdominal TTP.





Will place camacho catheter for I/O and possible retention as pt has not urinated 

after IVF.


10/06/19 22:29


10/06/19 22:53





CT head: no acute pathology; markedly dilated ventricles (present on prior CT 

head)


CT abd/pelvis:


IMPRESSION:


1. Significant stool retention in the rectosigmoid colon with findings 

suggestive of early stercoral


colitis. Please correlate clinically. No evidence for small bowel obstruction.


2. Mild hydroureteronephrosis secondary to urinary bladder distention.


3. Other incidental and senescent findings, as above.


10/06/19 22:55





Starting flagyl and levaquin to cover for potential UTI (camacho being placed) 

and colitis


Paging hospitalist team for admission, placement in ICU for insulin drip and 

further management. 


10/06/19 22:59





UA - +LE, negative nitrites; pt received levaquin


Camacho cath has drained ~700 mL of clear urine. 


10/06/19 23:38





Repeat BMP -- glucose 749, K 4.8 -- providing K rider


Pt will go to ICU. Hemodynamically stable. 


10/06/19 23:43








Discharge





- Discharge Information


Problems reviewed: Yes


Clinical Impression/Diagnosis: 


 DEB (acute kidney injury), Obesity (BMI 30-39.9)





DKA (diabetic ketoacidoses)


Qualifiers:


 Diabetes mellitus type: type 2 Diabetes mellitus complication detail: without 

coma Qualified Code(s): E11.10 - Type 2 diabetes mellitus with ketoacidosis 

without coma





Condition: Guarded





- Admission


Yes





- Follow up/Referral





- Patient Discharge Instructions





- Post Discharge Activity

## 2019-10-07 LAB
ANION GAP SERPL CALC-SCNC: 8 MMOL/L (ref 8–16)
ANION GAP SERPL CALC-SCNC: 9 MMOL/L (ref 8–16)
ARTERIAL BLOOD GAS PCO2: 38.6 MMHG (ref 35–45)
ARTERIAL PATENCY WRIST A: POSITIVE
BASE EXCESS BLDA CALC-SCNC: 0.5 MEQ/L (ref -2–2)
BASOPHILS # BLD: 0.8 % (ref 0–2)
BUN SERPL-MCNC: 45.1 MG/DL (ref 7–18)
BUN SERPL-MCNC: 49.4 MG/DL (ref 7–18)
CALCIUM SERPL-MCNC: 9.7 MG/DL (ref 8.5–10.1)
CALCIUM SERPL-MCNC: 9.8 MG/DL (ref 8.5–10.1)
CHLORIDE SERPL-SCNC: 105 MMOL/L (ref 98–107)
CHLORIDE SERPL-SCNC: 108 MMOL/L (ref 98–107)
CO2 SERPL-SCNC: 30 MMOL/L (ref 21–32)
CO2 SERPL-SCNC: 31 MMOL/L (ref 21–32)
COHGB MFR BLD: 2.2 % (ref 0–2)
CREAT SERPL-MCNC: 1.9 MG/DL (ref 0.55–1.3)
CREAT SERPL-MCNC: 2.2 MG/DL (ref 0.55–1.3)
DEPRECATED RDW RBC AUTO: 13.6 % (ref 11.9–15.9)
DEPRECATED RDW RBC AUTO: 13.6 % (ref 11.9–15.9)
EOSINOPHIL # BLD: 0.3 % (ref 0–4.5)
GLUCOSE SERPL-MCNC: 148 MG/DL (ref 74–106)
GLUCOSE SERPL-MCNC: 351 MG/DL (ref 74–106)
HCT VFR BLD CALC: 35.8 % (ref 35.4–49)
HCT VFR BLD CALC: 37.8 % (ref 35.4–49)
HGB BLD-MCNC: 12 GM/DL (ref 11.7–16.9)
HGB BLD-MCNC: 12.5 GM/DL (ref 11.7–16.9)
LYMPHOCYTES # BLD: 8.4 % (ref 8–40)
MAGNESIUM SERPL-MCNC: 3.1 MG/DL (ref 1.8–2.4)
MAGNESIUM SERPL-MCNC: 3.1 MG/DL (ref 1.8–2.4)
MCH RBC QN AUTO: 28.5 PG (ref 25.7–33.7)
MCH RBC QN AUTO: 29.1 PG (ref 25.7–33.7)
MCHC RBC AUTO-ENTMCNC: 33.1 G/DL (ref 32–35.9)
MCHC RBC AUTO-ENTMCNC: 33.6 G/DL (ref 32–35.9)
MCV RBC: 86 FL (ref 80–96)
MCV RBC: 86.7 FL (ref 80–96)
MONOCYTES # BLD AUTO: 12.2 % (ref 3.8–10.2)
NEUTROPHILS # BLD: 78.3 % (ref 42.8–82.8)
PHOSPHATE SERPL-MCNC: 2 MG/DL (ref 2.5–4.9)
PHOSPHATE SERPL-MCNC: 3.2 MG/DL (ref 2.5–4.9)
PLATELET # BLD AUTO: 159 K/MM3 (ref 134–434)
PLATELET # BLD AUTO: 180 K/MM3 (ref 134–434)
PMV BLD: 9.3 FL (ref 7.5–11.1)
PMV BLD: 9.5 FL (ref 7.5–11.1)
PO2 BLDA: 84.6 MMHG (ref 80–100)
POTASSIUM SERPLBLD-SCNC: 4 MMOL/L (ref 3.5–5.1)
POTASSIUM SERPLBLD-SCNC: 4.3 MMOL/L (ref 3.5–5.1)
RBC # BLD AUTO: 4.14 M/MM3 (ref 4–5.6)
RBC # BLD AUTO: 4.4 M/MM3 (ref 4–5.6)
SAO2 % BLDA: 96.8 % (ref 95–98)
SODIUM SERPL-SCNC: 144 MMOL/L (ref 136–145)
SODIUM SERPL-SCNC: 146 MMOL/L (ref 136–145)
WBC # BLD AUTO: 14.9 K/MM3 (ref 4–10)
WBC # BLD AUTO: 15.8 K/MM3 (ref 4–10)

## 2019-10-07 RX ADMIN — SODIUM CHLORIDE, POTASSIUM CHLORIDE, SODIUM LACTATE AND CALCIUM CHLORIDE SCH MLS/HR: 600; 310; 30; 20 INJECTION, SOLUTION INTRAVENOUS at 15:49

## 2019-10-07 RX ADMIN — HEPARIN SODIUM SCH UNIT: 5000 INJECTION, SOLUTION INTRAVENOUS; SUBCUTANEOUS at 05:35

## 2019-10-07 RX ADMIN — ATORVASTATIN CALCIUM SCH: 40 TABLET, FILM COATED ORAL at 21:29

## 2019-10-07 RX ADMIN — NEBIVOLOL HYDROCHLORIDE SCH: 2.5 TABLET ORAL at 21:29

## 2019-10-07 RX ADMIN — EZETIMIBE SCH: 10 TABLET ORAL at 21:30

## 2019-10-07 RX ADMIN — INSULIN ASPART SCH UNITS: 100 INJECTION, SOLUTION INTRAVENOUS; SUBCUTANEOUS at 17:14

## 2019-10-07 RX ADMIN — HEPARIN SODIUM SCH UNIT: 5000 INJECTION, SOLUTION INTRAVENOUS; SUBCUTANEOUS at 21:05

## 2019-10-07 RX ADMIN — INSULIN ASPART SCH UNITS: 100 INJECTION, SOLUTION INTRAVENOUS; SUBCUTANEOUS at 11:56

## 2019-10-07 RX ADMIN — MUPIROCIN SCH APPLIC: 20 OINTMENT TOPICAL at 21:09

## 2019-10-07 RX ADMIN — INSULIN ASPART SCH UNITS: 100 INJECTION, SOLUTION INTRAVENOUS; SUBCUTANEOUS at 21:06

## 2019-10-07 RX ADMIN — MUPIROCIN SCH APPLIC: 20 OINTMENT TOPICAL at 09:35

## 2019-10-07 RX ADMIN — INSULIN ASPART SCH: 100 INJECTION, SOLUTION INTRAVENOUS; SUBCUTANEOUS at 21:29

## 2019-10-07 RX ADMIN — INSULIN ASPART SCH UNITS: 100 INJECTION, SOLUTION INTRAVENOUS; SUBCUTANEOUS at 06:26

## 2019-10-07 RX ADMIN — HEPARIN SODIUM SCH UNIT: 5000 INJECTION, SOLUTION INTRAVENOUS; SUBCUTANEOUS at 15:49

## 2019-10-07 RX ADMIN — HEPARIN SODIUM SCH: 5000 INJECTION, SOLUTION INTRAVENOUS; SUBCUTANEOUS at 21:29

## 2019-10-07 NOTE — CONSULT
Admitting History and Physical





- Primary Care Physician


PCP: Mark Almaraz





- Admission


History of Present Illness: 





77 y/o male with baseline dementia, Insulin dependant diabetic with diabetic 

retinopathy, CAD, CKD, CHF and COPD,   baseline dementia with recent 

progressive worsening, who presented to the ED with with AMS and abdominal 

pain. Labs were significant for serum glucose of 916, + serum ketones. Given 

insulin bolus and started on insulin drip in ER





Case reviewed with Neurology:


Altered mental status


Toxic metabolic encephalopathy associated with hyperglycemia


Baseline dementia of Alzheimer type with behavioral changes


normal pressure hydrocephalus?





Seen by me in 2017-Dysphonic. Confused. [place] and visually impaired. 


History Source: Family Member


Limitations to Obtaining History: Clinical Condition, Dementia





- Past Medical History


CNS: Yes: Dementia


Cardiovascular: Yes: CAD, HTN, Hyperlipdemia


Pulmonary: Yes: Asthma, COPD





- Past Surgical History


Past Surgical History: Yes: CABG, Joint Replacement (R hip ORIF)





- Smoking History


Smoking history: Former smoker


Have you smoked in the past 12 months: No


Aproximately how many cigarettes per day: 0


If you are a former smoker, when did you quit?: '96





- Alcohol/Substance Use


Hx Alcohol Use: No





- Social History


ADL: Family Assistance


History of Recent Travel: No





History





- Admission


Reason For Visit: ACUTE KIDNEY INJURY, DIABETIC KETOACIDOSIS, COLITI





- Diagnostics


CT Scan: Report Reviewed (CAT scan of the head revealed no evidence of acute 

pathology)





- General


Mental Status: Awake and Alert, Forgetful, Vague, Confused, Flat Affect


Attention: Moderate Impairment


Ability to Follow Directions: Poor


Head/Neck Control: Needs Assist





- Hearing


Hearing: Impaired


Hearing: Impaired


Hearing Aide: No


With Patient: No





Speech Evaluation





- Communication


Primary Language: KIARA


Communication: Yes: Simple Responses


Oral Expression Ability: Yes: Mild Impairment, Moderate Impairment





- Speech Production


Able to Make Needs Known: Yes: Mildly Impaired, Moderately Impaired


Intelligibility: Yes: Mildly Impaired, Moderately Impaired





- Speech Characteristics


Voice Loudness: Mildly Soft/Quiet


Voice Pitch: Yes: Normal


Voice Phonatory-based Quality: Yes: Dysphonia


Speech Pattern: Impaired


Speech Clarity: < 50%


Articulation: Yes: Imprecise


Rate of Speech: Too Fast





- Language/Auditory Comprehension


Observation: Able to respond to yes/no queries: No, Comprehends Conversational 

Speech: Yes (limited. simple), Benefits from Slow Speech: Yes, Benefits from 

Repetiton: Yes





- Language/Verbal Expression


Able to Respond to Simple Queries: Yes: Mildly Impaired, Moderately Impaired


Able to Communicate Wants and Needs: Yes: Mildly Impaired, Moderately Impaired





- Swallow Evaluation/Bedside Assessment


Current Nutritional Intake: NPO


Oral Secretions: Yes: WFL


Dentition: Yes: Adequate


Against Resistance Opening: Weak


Against Resistance Closing: Weak


Lingual Movement: Symmetric, Reduced Protrusion


Lingual Speed of Movement: Reduced


Lingual Movement Strgth Against Opposition: Reduced


Laryngeal Movement: Able to Palpate


Labial Seal: WFL


Chewing: Impaired


Oral Prep Time: Increased


A-P Transit: WFL


Timing of Swallow: Delayed


Coughing/Throat Clear: No


Change in Voice: No





Recommendations





- Speech Evaluation, Impression/Plan


Impression: Confused, [place], visually inpaired, impaired mastication, swallowed 

well with 3 oz water test.





- Dysphagia Impressions/Plan


Dysphagia Impressions: Mild Impairment


*Silent aspiration: cannot be R/O at bedside


Dysphagia Treatment Plan: Small Bites, Chin Tuck/Down, Clear Pocket Food, Trial 

Feedings, Safe Rate, 1/2 tsp. at a time, Elevate HOB during feed





- Recommendations


Diet Consistency: Dysphagia Minced


Medication Administration: Crushed with applesauce


Liquids: Thin Liquids


Supplement: Magic Cup, Glucerna

## 2019-10-07 NOTE — EKG
Test Reason : 

Blood Pressure : ***/*** mmHG

Vent. Rate : 084 BPM     Atrial Rate : 084 BPM

   P-R Int : 162 ms          QRS Dur : 090 ms

    QT Int : 400 ms       P-R-T Axes : 060 042 141 degrees

   QTc Int : 472 ms

 

NORMAL SINUS RHYTHM

POSSIBLE INFERIOR INFARCT (CITED ON OR BEFORE 09-JUN-2019)

ABNORMAL ECG

WHEN COMPARED WITH ECG OF 10-CONSTANZA-2019 12:07,

NO SIGNIFICANT CHANGE WAS FOUND

Confirmed by MEREDITH WALSH, ESTRELLITA ([id number]) on 10/7/2019 10:18:10 AM

 

Referred By:             Confirmed By:ESTRELLITA HARPER MD

## 2019-10-07 NOTE — PN
Teaching Attending Note


Name of Resident: Brenden Pringle





ATTENDING PHYSICIAN STATEMENT





I saw and evaluated the patient.


I reviewed the resident's note and discussed the case with the resident.


I agree with the resident's findings and plan as documented.








SUBJECTIVE:


Patient seen and examined in the ICU.  Lethargic but arousable.  Able to tell 

us his name and that he is in "[facility]". 


Insulin drip DCed overnight. 





Resident spoke to family, Mental status has been declining. 





 Intake & Output











 10/04/19 10/05/19 10/06/19 10/07/19





 23:59 23:59 23:59 23:59


 


Intake Total    820


 


Output Total    1200


 


Balance    -380


 


Weight   170 lb 145 lb 4.554 oz








 Last Vital Signs











Temp Pulse Resp BP Pulse Ox


 


 98.4 F   75   18   120/43 L  100 


 


 10/07/19 06:00  10/07/19 08:00  10/07/19 08:00  10/07/19 08:00  10/07/19 01:57








Active Medications





Atorvastatin Calcium (Lipitor -)  40 mg PO HS [facility]


Chlorhexidine Gluconate (Hibiclens For Decolonization -)  1 applic TP HS [facility]


Clopidogrel Bisulfate (Plavix -)  75 mg PO DAILY [facility]


   Last Admin: 10/07/19 09:34 Dose:  75 mg


Ezetimibe (Zetia -)  10 mg PO HS [facility]


Heparin Sodium (Porcine) (Heparin -)  5,000 unit SQ TID [facility]


   Last Admin: 10/07/19 05:35 Dose:  5,000 unit


Potassium Phosphate 20 mm/ (Sodium Chloride)  256.6667 mls @ 62.5 mls/hr IVPB 

ONCE ONE


   Stop: 10/07/19 13:21


   Last Admin: 10/07/19 10:52 Dose:  62.5 mls/hr


Dextrose/Lactated Ringer's (D5-Lr -)  1,000 mls @ 100 mls/hr IV ASDIR [facility]


   Last Admin: 10/07/19 10:52 Dose:  100 mls/hr


Insulin Aspart (Novolog Vial Sliding Scale -)  1 vial SQ ACHS [facility]; Protocol


   Last Admin: 10/07/19 06:26 Dose:  2 units


Mupirocin (Bactroban Ointment (For Decolonization) -)  1 applic NS BID [facility]


   Stop: 10/12/19 09:59


   Last Admin: 10/07/19 09:35 Dose:  1 applic


Nebivolol (Bystolic -)  2.5 mg PO HS [facility]


Tamsulosin HCl (Flomax -)  0.4 mg PO DAILY@0830 [facility]


   Last Admin: 10/07/19 08:30 Dose:  0.4 mg





CT Head: (?) NPH 








gen: lethargic but arousable, confused, non-focal  


heent -right eye opaque 


chest -clear to auscultation 


cv-s1+s2+rrr


ext - no edema or foot ulcers 


skin - (-) rash 

















  Laboratory Results - last 24 hr











  10/06/19 10/06/19 10/06/19





  20:43 21:00 21:00


 


WBC   18.0 H 


 


RBC   4.69 


 


Hgb   13.5 


 


Hct   42.1  D 


 


MCV   89.8 


 


MCH   28.9 


 


MCHC   32.1 


 


RDW   13.7 


 


Plt Count   191  D 


 


MPV   10.3 


 


Absolute Neuts (auto)   15.3 H 


 


Neutrophils %   84.6 H D 


 


Lymphocytes %   5.4 L D 


 


Monocytes %   9.3 


 


Eosinophils %   0.1  D 


 


Basophils %   0.6 


 


Nucleated RBC %   0 


 


PT with INR   


 


INR   


 


Anticoagulation Therapy   


 


Puncture Site   


 


ABG pH   


 


ABG pCO2 at Pt Temp   


 


ABG pO2 at Pt Temp   


 


ABG HCO3   


 


ABG O2 Sat (Measured)   


 


ABG O2 Content   


 


ABG Base Excess   


 


Gonzalo Test   


 


Carboxyhemoglobin   


 


Methemoglobin   


 


O2 Delivery Device   


 


Oxygen Flow Rate   


 


Vent Mode   


 


Vent Rate   


 


Mechanical Rate   


 


Pressure Support Vent   


 


Sodium    131 L


 


Potassium    5.6 H


 


Chloride    91 L


 


Carbon Dioxide    24


 


Anion Gap    16


 


BUN    56.2 H


 


Creatinine    2.5 H


 


Est GFR (CKD-EPI)AfAm    27.28


 


Est GFR (CKD-EPI)NonAf    23.54


 


POC Glucometer  > 600  


 


Random Glucose    916 H*


 


Lactic Acid   


 


Calcium    9.6


 


Phosphorus   


 


Magnesium   


 


Total Bilirubin    0.5


 


AST    13 L


 


ALT    17


 


Alkaline Phosphatase    248 H


 


Troponin I    0.05


 


Total Protein    7.6


 


Albumin    3.3 L


 


Lipase    114


 


Urine Color   


 


Urine Appearance   


 


Urine pH   


 


Ur Specific Gravity   


 


Urine Protein   


 


Urine Glucose (UA)   


 


Urine Ketones   


 


Urine Blood   


 


Urine Nitrite   


 


Urine Bilirubin   


 


Urine Urobilinogen   


 


Ur Leukocyte Esterase   


 


Urine WBC (Auto)   


 


Urine RBC (Auto)   


 


U Epithel Cells (Auto)   


 


Urine Bacteria (Auto)   


 


Ur Random Sodium   


 


Ur Random Potassium   


 


Ur Random Chloride   


 


Acetone, Qual    Positive small 1+














  10/06/19 10/06/19 10/06/19





  21:00 21:00 21:16


 


WBC   


 


RBC   


 


Hgb   


 


Hct   


 


MCV   


 


MCH   


 


MCHC   


 


RDW   


 


Plt Count   


 


MPV   


 


Absolute Neuts (auto)   


 


Neutrophils %   


 


Lymphocytes %   


 


Monocytes %   


 


Eosinophils %   


 


Basophils %   


 


Nucleated RBC %   


 


PT with INR   13.60 H 


 


INR   1.15 H 


 


Anticoagulation Therapy   


 


Puncture Site   


 


ABG pH   


 


ABG pCO2 at Pt Temp   


 


ABG pO2 at Pt Temp   


 


ABG HCO3   


 


ABG O2 Sat (Measured)   


 


ABG O2 Content   


 


ABG Base Excess   


 


Gonzalo Test   


 


Carboxyhemoglobin   


 


Methemoglobin   


 


O2 Delivery Device   


 


Oxygen Flow Rate   


 


Vent Mode   


 


Vent Rate   


 


Mechanical Rate   


 


Pressure Support Vent   


 


Sodium   


 


Potassium   


 


Chloride   


 


Carbon Dioxide   


 


Anion Gap   


 


BUN   


 


Creatinine   


 


Est GFR (CKD-EPI)AfAm   


 


Est GFR (CKD-EPI)NonAf   


 


POC Glucometer   


 


Random Glucose   


 


Lactic Acid  2.1 H  


 


Calcium   


 


Phosphorus   


 


Magnesium   


 


Total Bilirubin   


 


AST   


 


ALT   


 


Alkaline Phosphatase   


 


Troponin I   


 


Total Protein   


 


Albumin   


 


Lipase   


 


Urine Color    Yellow


 


Urine Appearance    Clear


 


Urine pH    5.5


 


Ur Specific Gravity    <= 1.005 L


 


Urine Protein    Negative


 


Urine Glucose (UA)    3+ H


 


Urine Ketones    2+ H


 


Urine Blood    Trace-intact


 


Urine Nitrite    Negative


 


Urine Bilirubin    Negative


 


Urine Urobilinogen    0.2


 


Ur Leukocyte Esterase    1+ H


 


Urine WBC (Auto)    30-50


 


Urine RBC (Auto)    0-4


 


U Epithel Cells (Auto)    0-5


 


Urine Bacteria (Auto)    Moderate


 


Ur Random Sodium   


 


Ur Random Potassium   


 


Ur Random Chloride   


 


Acetone, Qual   














  10/06/19 10/07/19 10/07/19





  23:00 00:20 00:20


 


WBC   


 


RBC   


 


Hgb   


 


Hct   


 


MCV   


 


MCH   


 


MCHC   


 


RDW   


 


Plt Count   


 


MPV   


 


Absolute Neuts (auto)   


 


Neutrophils %   


 


Lymphocytes %   


 


Monocytes %   


 


Eosinophils %   


 


Basophils %   


 


Nucleated RBC %   


 


PT with INR   


 


INR   


 


Anticoagulation Therapy   


 


Puncture Site   


 


ABG pH   


 


ABG pCO2 at Pt Temp   


 


ABG pO2 at Pt Temp   


 


ABG HCO3   


 


ABG O2 Sat (Measured)   


 


ABG O2 Content   


 


ABG Base Excess   


 


Gonzalo Test   


 


Carboxyhemoglobin   


 


Methemoglobin   


 


O2 Delivery Device   


 


Oxygen Flow Rate   


 


Vent Mode   


 


Vent Rate   


 


Mechanical Rate   


 


Pressure Support Vent   


 


Sodium  137  


 


Potassium  4.8  


 


Chloride  98  


 


Carbon Dioxide  24  


 


Anion Gap  14  


 


BUN  57.2 H  


 


Creatinine  2.3 H  


 


Est GFR (CKD-EPI)AfAm  30.17  


 


Est GFR (CKD-EPI)NonAf  26.04  


 


POC Glucometer   


 


Random Glucose  744 H*  


 


Lactic Acid   3.1 H* 


 


Calcium  9.5  


 


Phosphorus  3.2   Cancelled


 


Magnesium  3.1 H   Cancelled


 


Total Bilirubin   


 


AST   


 


ALT   


 


Alkaline Phosphatase   


 


Troponin I   


 


Total Protein   


 


Albumin   


 


Lipase   


 


Urine Color   


 


Urine Appearance   


 


Urine pH   


 


Ur Specific Gravity   


 


Urine Protein   


 


Urine Glucose (UA)   


 


Urine Ketones   


 


Urine Blood   


 


Urine Nitrite   


 


Urine Bilirubin   


 


Urine Urobilinogen   


 


Ur Leukocyte Esterase   


 


Urine WBC (Auto)   


 


Urine RBC (Auto)   


 


U Epithel Cells (Auto)   


 


Urine Bacteria (Auto)   


 


Ur Random Sodium   


 


Ur Random Potassium   


 


Ur Random Chloride   


 


Acetone, Qual   














  10/07/19 10/07/19 10/07/19





  00:45 01:30 02:33


 


WBC   


 


RBC   


 


Hgb   


 


Hct   


 


MCV   


 


MCH   


 


MCHC   


 


RDW   


 


Plt Count   


 


MPV   


 


Absolute Neuts (auto)   


 


Neutrophils %   


 


Lymphocytes %   


 


Monocytes %   


 


Eosinophils %   


 


Basophils %   


 


Nucleated RBC %   


 


PT with INR   


 


INR   


 


Anticoagulation Therapy  No Result Required.  


 


Puncture Site  Right radial  


 


ABG pH  7.42  


 


ABG pCO2 at Pt Temp  38.6  


 


ABG pO2 at Pt Temp  84.6  


 


ABG HCO3  24.4  


 


ABG O2 Sat (Measured)  96.8  


 


ABG O2 Content  16.3  


 


ABG Base Excess  0.5  


 


Gonzalo Test  Positive  


 


Carboxyhemoglobin  2.2 H  


 


Methemoglobin  < 1.0  


 


O2 Delivery Device  Room air  


 


Oxygen Flow Rate  21%  


 


Vent Mode  No Result Required.  


 


Vent Rate  No Result Required.  


 


Mechanical Rate  No Result Required.  


 


Pressure Support Vent  No Result Required.  


 


Sodium   


 


Potassium   


 


Chloride   


 


Carbon Dioxide   


 


Anion Gap   


 


BUN   


 


Creatinine   


 


Est GFR (CKD-EPI)AfAm   


 


Est GFR (CKD-EPI)NonAf   


 


POC Glucometer   485  363


 


Random Glucose   


 


Lactic Acid   


 


Calcium   


 


Phosphorus   


 


Magnesium   


 


Total Bilirubin   


 


AST   


 


ALT   


 


Alkaline Phosphatase   


 


Troponin I   


 


Total Protein   


 


Albumin   


 


Lipase   


 


Urine Color   


 


Urine Appearance   


 


Urine pH   


 


Ur Specific Gravity   


 


Urine Protein   


 


Urine Glucose (UA)   


 


Urine Ketones   


 


Urine Blood   


 


Urine Nitrite   


 


Urine Bilirubin   


 


Urine Urobilinogen   


 


Ur Leukocyte Esterase   


 


Urine WBC (Auto)   


 


Urine RBC (Auto)   


 


U Epithel Cells (Auto)   


 


Urine Bacteria (Auto)   


 


Ur Random Sodium   


 


Ur Random Potassium   


 


Ur Random Chloride   


 


Acetone, Qual   














  10/07/19 10/07/19 10/07/19





  03:00 03:00 03:00


 


WBC  15.8 H  


 


RBC  4.40  


 


Hgb  12.5  


 


Hct  37.8  


 


MCV  86.0  


 


MCH  28.5  


 


MCHC  33.1  


 


RDW  13.6  


 


Plt Count  180  


 


MPV  9.5  


 


Absolute Neuts (auto)   


 


Neutrophils %   


 


Lymphocytes %   


 


Monocytes %   


 


Eosinophils %   


 


Basophils %   


 


Nucleated RBC %   


 


PT with INR   


 


INR   


 


Anticoagulation Therapy   


 


Puncture Site   


 


ABG pH   


 


ABG pCO2 at Pt Temp   


 


ABG pO2 at Pt Temp   


 


ABG HCO3   


 


ABG O2 Sat (Measured)   


 


ABG O2 Content   


 


ABG Base Excess   


 


Gonzalo Test   


 


Carboxyhemoglobin   


 


Methemoglobin   


 


O2 Delivery Device   


 


Oxygen Flow Rate   


 


Vent Mode   


 


Vent Rate   


 


Mechanical Rate   


 


Pressure Support Vent   


 


Sodium   144 


 


Potassium   4.3 


 


Chloride   105 


 


Carbon Dioxide   31 


 


Anion Gap   9 


 


BUN   49.4 H 


 


Creatinine   2.2 H 


 


Est GFR (CKD-EPI)AfAm   31.84 


 


Est GFR (CKD-EPI)NonAf   27.47 


 


POC Glucometer   


 


Random Glucose   351 H 


 


Lactic Acid    3.4 H*


 


Calcium   9.8 


 


Phosphorus   


 


Magnesium   


 


Total Bilirubin   


 


AST   


 


ALT   


 


Alkaline Phosphatase   


 


Troponin I   0.03 


 


Total Protein   


 


Albumin   


 


Lipase   


 


Urine Color   


 


Urine Appearance   


 


Urine pH   


 


Ur Specific Gravity   


 


Urine Protein   


 


Urine Glucose (UA)   


 


Urine Ketones   


 


Urine Blood   


 


Urine Nitrite   


 


Urine Bilirubin   


 


Urine Urobilinogen   


 


Ur Leukocyte Esterase   


 


Urine WBC (Auto)   


 


Urine RBC (Auto)   


 


U Epithel Cells (Auto)   


 


Urine Bacteria (Auto)   


 


Ur Random Sodium   


 


Ur Random Potassium   


 


Ur Random Chloride   


 


Acetone, Qual   














  10/07/19 10/07/19 10/07/19





  03:00 03:48 04:37


 


WBC   


 


RBC   


 


Hgb   


 


Hct   


 


MCV   


 


MCH   


 


MCHC   


 


RDW   


 


Plt Count   


 


MPV   


 


Absolute Neuts (auto)   


 


Neutrophils %   


 


Lymphocytes %   


 


Monocytes %   


 


Eosinophils %   


 


Basophils %   


 


Nucleated RBC %   


 


PT with INR   


 


INR   


 


Anticoagulation Therapy   


 


Puncture Site   


 


ABG pH   


 


ABG pCO2 at Pt Temp   


 


ABG pO2 at Pt Temp   


 


ABG HCO3   


 


ABG O2 Sat (Measured)   


 


ABG O2 Content   


 


ABG Base Excess   


 


Gonzalo Test   


 


Carboxyhemoglobin   


 


Methemoglobin   


 


O2 Delivery Device   


 


Oxygen Flow Rate   


 


Vent Mode   


 


Vent Rate   


 


Mechanical Rate   


 


Pressure Support Vent   


 


Sodium   


 


Potassium   


 


Chloride   


 


Carbon Dioxide   


 


Anion Gap   


 


BUN   


 


Creatinine   


 


Est GFR (CKD-EPI)AfAm   


 


Est GFR (CKD-EPI)NonAf   


 


POC Glucometer   252  205


 


Random Glucose   


 


Lactic Acid   


 


Calcium   


 


Phosphorus   


 


Magnesium   


 


Total Bilirubin   


 


AST   


 


ALT   


 


Alkaline Phosphatase   


 


Troponin I   


 


Total Protein   


 


Albumin   


 


Lipase   


 


Urine Color   


 


Urine Appearance   


 


Urine pH   


 


Ur Specific Gravity   


 


Urine Protein   


 


Urine Glucose (UA)   


 


Urine Ketones   


 


Urine Blood   


 


Urine Nitrite   


 


Urine Bilirubin   


 


Urine Urobilinogen   


 


Ur Leukocyte Esterase   


 


Urine WBC (Auto)   


 


Urine RBC (Auto)   


 


U Epithel Cells (Auto)   


 


Urine Bacteria (Auto)   


 


Ur Random Sodium  10 L  


 


Ur Random Potassium  22.0 L  


 


Ur Random Chloride  < 11 L  


 


Acetone, Qual   














  10/07/19 10/07/19 10/07/19





  05:38 05:50 06:26


 


WBC   


 


RBC   


 


Hgb   


 


Hct   


 


MCV   


 


MCH   


 


MCHC   


 


RDW   


 


Plt Count   


 


MPV   


 


Absolute Neuts (auto)   


 


Neutrophils %   


 


Lymphocytes %   


 


Monocytes %   


 


Eosinophils %   


 


Basophils %   


 


Nucleated RBC %   


 


PT with INR   


 


INR   


 


Anticoagulation Therapy   


 


Puncture Site   


 


ABG pH   


 


ABG pCO2 at Pt Temp   


 


ABG pO2 at Pt Temp   


 


ABG HCO3   


 


ABG O2 Sat (Measured)   


 


ABG O2 Content   


 


ABG Base Excess   


 


Gonzalo Test   


 


Carboxyhemoglobin   


 


Methemoglobin   


 


O2 Delivery Device   


 


Oxygen Flow Rate   


 


Vent Mode   


 


Vent Rate   


 


Mechanical Rate   


 


Pressure Support Vent   


 


Sodium   146 H 


 


Potassium   4.0 


 


Chloride   108 H 


 


Carbon Dioxide   30 


 


Anion Gap   8 


 


BUN   45.1 H 


 


Creatinine   1.9 H 


 


Est GFR (CKD-EPI)AfAm   38.02 


 


Est GFR (CKD-EPI)NonAf   32.80 


 


POC Glucometer  158   166


 


Random Glucose   148 H 


 


Lactic Acid   


 


Calcium   9.7 


 


Phosphorus   2.0 L 


 


Magnesium   3.1 H 


 


Total Bilirubin   


 


AST   


 


ALT   


 


Alkaline Phosphatase   


 


Troponin I   


 


Total Protein   


 


Albumin   


 


Lipase   


 


Urine Color   


 


Urine Appearance   


 


Urine pH   


 


Ur Specific Gravity   


 


Urine Protein   


 


Urine Glucose (UA)   


 


Urine Ketones   


 


Urine Blood   


 


Urine Nitrite   


 


Urine Bilirubin   


 


Urine Urobilinogen   


 


Ur Leukocyte Esterase   


 


Urine WBC (Auto)   


 


Urine RBC (Auto)   


 


U Epithel Cells (Auto)   


 


Urine Bacteria (Auto)   


 


Ur Random Sodium   


 


Ur Random Potassium   


 


Ur Random Chloride   


 


Acetone, Qual   














  10/07/19 10/07/19 10/07/19





  06:55 08:20 10:57


 


WBC   14.9 H 


 


RBC   4.14 


 


Hgb   12.0 


 


Hct   35.8 


 


MCV   86.7 


 


MCH   29.1 


 


MCHC   33.6 


 


RDW   13.6 


 


Plt Count   159 


 


MPV   9.3 


 


Absolute Neuts (auto)   11.6 H 


 


Neutrophils %   78.3 


 


Lymphocytes %   8.4  D 


 


Monocytes %   12.2 H 


 


Eosinophils %   0.3  D 


 


Basophils %   0.8 


 


Nucleated RBC %   0 


 


PT with INR   


 


INR   


 


Anticoagulation Therapy   


 


Puncture Site   


 


ABG pH   


 


ABG pCO2 at Pt Temp   


 


ABG pO2 at Pt Temp   


 


ABG HCO3   


 


ABG O2 Sat (Measured)   


 


ABG O2 Content   


 


ABG Base Excess   


 


Gonzalo Test   


 


Carboxyhemoglobin   


 


Methemoglobin   


 


O2 Delivery Device   


 


Oxygen Flow Rate   


 


Vent Mode   


 


Vent Rate   


 


Mechanical Rate   


 


Pressure Support Vent   


 


Sodium   


 


Potassium   


 


Chloride   


 


Carbon Dioxide   


 


Anion Gap   


 


BUN   


 


Creatinine   


 


Est GFR (CKD-EPI)AfAm   


 


Est GFR (CKD-EPI)NonAf   


 


POC Glucometer    367


 


Random Glucose   


 


Lactic Acid  1.8  


 


Calcium   


 


Phosphorus   


 


Magnesium   


 


Total Bilirubin   


 


AST   


 


ALT   


 


Alkaline Phosphatase   


 


Troponin I   


 


Total Protein   


 


Albumin   


 


Lipase   


 


Urine Color   


 


Urine Appearance   


 


Urine pH   


 


Ur Specific Gravity   


 


Urine Protein   


 


Urine Glucose (UA)   


 


Urine Ketones   


 


Urine Blood   


 


Urine Nitrite   


 


Urine Bilirubin   


 


Urine Urobilinogen   


 


Ur Leukocyte Esterase   


 


Urine WBC (Auto)   


 


Urine RBC (Auto)   


 


U Epithel Cells (Auto)   


 


Urine Bacteria (Auto)   


 


Ur Random Sodium   


 


Ur Random Potassium   


 


Ur Random Chloride   


 


Acetone, Qual   











ASSESSMENT AND PLAN:


Hyperosmolar Hyperglycemic state


DEB 


AMS: (?) NPH 


Lactic acidosis


Ketonuria


Pseudohyponatremia


Hypoalbuminemia


Leukocytosis: R/O source








Glycemic control 


Aspiration precautions 


O2 as needed


IVF 


Strict I & O 


Replete lytes as needed


Neurology evaluation 


DC camacho 


VTE prophylaxis 


Floor 





Dr Sotelo

## 2019-10-07 NOTE — PN
Teaching Attending Note


Name of Resident: Singh Nascimento





ATTENDING PHYSICIAN STATEMENT





I saw and evaluated the patient.


I reviewed the resident's note and discussed the case with the resident.


I agree with the resident's findings and plan as documented.








SUBJECTIVE:


79 y/o male with baseline dementia, Insulin dependant diabetic with diabetic 

retinopathy, CAD, CKD, CHF and COPD who presented to the ED with with AMS and 

abdominal pain. Labs were significant for serum glucose of 916, + serum 

ketones. Given insulin bolus and started on insulin drip in ER. Family not at 

bedside when I saw patient. 





OBJECTIVE:


 Last Vital Signs











Temp Pulse Resp BP Pulse Ox


 


 98.6 F   84   17   125/65   100 


 


 10/06/19 20:50  10/07/19 00:29  10/07/19 00:29  10/07/19 00:29  10/07/19 00:29








gen 


heent -right eye opaque 


chest -anterior chest scar, b/l breath sounds, clear to auscultation 


cv-s1+s2+rrr


ext - no edema or foot ulcers 


skin -negative for decubitus ulcers as per nursing 











 Abnormal Lab Results











  10/06/19 10/06/19 10/06/19





  21:00 21:00 21:00


 


WBC  18.0 H  


 


Absolute Neuts (auto)  15.3 H  


 


Neutrophils %  84.6 H D  


 


Lymphocytes %  5.4 L D  


 


PT with INR   


 


INR   


 


Carboxyhemoglobin   


 


Sodium   131 L 


 


Potassium   5.6 H 


 


Chloride   91 L 


 


BUN   56.2 H 


 


Creatinine   2.5 H 


 


Random Glucose   916 H* 


 


Lactic Acid    2.1 H


 


Magnesium   


 


AST   13 L 


 


Alkaline Phosphatase   248 H 


 


Albumin   3.3 L 


 


Ur Specific Gravity   


 


Urine Glucose (UA)   


 


Urine Ketones   


 


Ur Leukocyte Esterase   














  10/06/19 10/06/19 10/06/19





  21:00 21:16 23:00


 


WBC   


 


Absolute Neuts (auto)   


 


Neutrophils %   


 


Lymphocytes %   


 


PT with INR  13.60 H  


 


INR  1.15 H  


 


Carboxyhemoglobin   


 


Sodium   


 


Potassium   


 


Chloride   


 


BUN    57.2 H


 


Creatinine    2.3 H


 


Random Glucose    744 H*


 


Lactic Acid   


 


Magnesium    3.1 H


 


AST   


 


Alkaline Phosphatase   


 


Albumin   


 


Ur Specific Gravity   <= 1.005 L 


 


Urine Glucose (UA)   3+ H 


 


Urine Ketones   2+ H 


 


Ur Leukocyte Esterase   1+ H 














  10/07/19 10/07/19





  00:20 00:45


 


WBC  


 


Absolute Neuts (auto)  


 


Neutrophils %  


 


Lymphocytes %  


 


PT with INR  


 


INR  


 


Carboxyhemoglobin   2.2 H


 


Sodium  


 


Potassium  


 


Chloride  


 


BUN  


 


Creatinine  


 


Random Glucose  


 


Lactic Acid  3.1 H* 


 


Magnesium  


 


AST  


 


Alkaline Phosphatase  


 


Albumin  


 


Ur Specific Gravity  


 


Urine Glucose (UA)  


 


Urine Ketones  


 


Ur Leukocyte Esterase  








imaging reviewed head ct just showed some chronic small vessel ischemic disease

, abd/pelvic ct showe  stool retention in recto-sigmoid colon and bladder 

distention 





ASSESSMENT AND PLAN:


#Critically ill man with DKA vs hyperosmolar hyperglycemic state secondary to 

poor glycemic control HAGMA+, lactic acidosis, MODS- matteo on ckd, ketonuria.   

psuedohyponatremia, hypoalbuminemia. Leukocytosis  may be reactive or may be 

secondary to underlying infection. UA+ for pyuria, cannot r/o uti. Noted to 

have urinary retention with copious urine output after camacho catheter 

placement.  


-admit to ICU 


-IV fluid hydration 


-insulin drip 


-BGMs q1-2 hrs 


-monitor electrolytes closely -k, mg , phos 


-a1c 


-maintain on insulin drip until glucose <300mg/dl 


-monitor AG to close 


-maintain camacho catheter 


-i/o 


-daily weights 


-urine culture/blood cultures sent 


-c/w ceftriaxone for now 


-rectal enema for constipation 


-dvt ppx -heparin sc

## 2019-10-07 NOTE — HP
CHIEF COMPLAINT:





PCP: Dr. Paramjit Elkins





HISTORY OF PRESENT ILLNESS:


78 y/o/m with PMHx of IDDM, CAD s/p CABG, MI in 1998, CHF, CKD, COPD, dementia 

here for altered mental status BIB EMS from home. Patient unable to provide 

history secondary to mental status. History was obtained by his son over the 

phone as no one was present at bedside. As per the son the patient has not been 

acting at his baseline today. The patient has been eating, drinking, sitting up

, and speaking at all. Patient has complained of abdominal pain as per family. 

The patient has blood sugars in 600s often as per the son. Today he was not 

given his long acting insulin because he was not eating in the morning. When 

his son checked his sugar, it was in the 600s, and the son started him on his 

insulin pump 2units/hr for 8-9 hours without improvement in the sugar level. 

Son is unsure when the patient last had a bowel movement but has been giving 

him colace twice a day for the last few months. As per the son the patient is 

legally blind. Patient lives at home with his wife (who has memory issues) and 

his other son. At baseline patient is able to vocalize pain and ask for things 

such as food and water, has not been doing that today but was at baseline 

yesterday as per the son. Patient usually wears a diaper but occasionally asks 

for a urinal. 





History from [facility] 


[phone]





ER course was notable for:


(1) BGM >600s, started on fluids and insulin 


(2) camacho catheter placed with drainage of >700ccs of urine 


(3) started on flagyl and levaquin for UTI


(4) patient admitted to ICU





PAST MEDICAL HISTORY:


IDDM, CAD s/p CABG, MI in 1998, CHF, CKD, COPD, dementia





PAST SURGICAL HISTORY:


CABG, R hip surgery





Social History:


Smoking: none


Alcohol: none


Drugs: none





Allergies





No Known Allergies Allergy (Verified 02/15/19 11:57)


 








HOME MEDICATIONS:


 Home Medications











 Medication  Instructions  Recorded


 


Atorvastatin Ca [Lipitor] 40 mg PO DAILY 06/10/19


 


Clopidogrel Bisulfate [Plavix] 75 mg PO DAILY 06/10/19


 


Ezetimibe 10 mg PO HS 06/10/19


 


Furosemide 20 mg PO DAILY 06/10/19


 


Insulin Degludec [Tresiba] 6 unit SQ HS 06/10/19


 


Insulin Pump [Insulin Pump - (Nf)] 1 each NR DAILY 06/10/19


 


Nebivolol HCl [Bystolic] 2.5 mg PO HS 06/10/19


 


Tamsulosin HCl 0.4 mg PO DAILY 06/10/19








REVIEW OF SYSTEMS


Patient unable to provide ROS secondary to mental status 





PHYSICAL EXAMINATION


 Vital Signs - 24 hr











  10/06/19 10/07/19





  20:50 00:29


 


Temperature 98.6 F 


 


Pulse Rate 86 


 


Pulse Rate [  84





Right]  


 


Respiratory 18 17





Rate  


 


Blood Pressure 114/56 L 


 


Blood Pressure  125/65





[Right Arm]  


 


O2 Sat by Pulse 100 100





Oximetry (%)  











GENERAL: AAOx0, responds to painful stimuli 


HEAD: NC/AT


EYES: severe conjunctival injection of right eye, cataracts of right eye, with 

crusting around eyelid. left pupil reactive, round and reactive to light


EARS, NOSE, THROAT: dry mucous membranes, nares patent


NECK: supple, no cervical lymphadenopathy 


LUNGS: limited exam, no wheezes, rales, or rhonchi noted. no accessory muscle 

use


HEART: Regular rate and rhythm, normal S1 and S2 without murmur, rub or gallop.


ABDOMEN: distended abdomen, tenderness to palpation over LLQ. soft, normoactive 

bowel sounds, no guarding, no rebound, no masses


MUSCULOSKELETAL: no CVA tenderness. 


UPPER EXTREMITIES: 2+ pulses, warm, well-perfused. No cyanosis. No clubbing. No 

peripheral edema.


LOWER EXTREMITIES: 2+ pulses, warm, well-perfused. No calf tenderness. No 

peripheral edema. 


NEUROLOGICAL:  normal muscle tone. unable to assess cranial nerves


SKIN: dry leathery skin on B/L feet and ankles. decreased turgor. no rashes 

noted. normal cap refill. 


 Laboratory Results - last 24 hr











  10/06/19 10/06/19 10/06/19





  20:43 21:00 21:00


 


WBC   18.0 H 


 


RBC   4.69 


 


Hgb   13.5 


 


Hct   42.1  D 


 


MCV   89.8 


 


MCH   28.9 


 


MCHC   32.1 


 


RDW   13.7 


 


Plt Count   191  D 


 


MPV   10.3 


 


Absolute Neuts (auto)   15.3 H 


 


Neutrophils %   84.6 H D 


 


Lymphocytes %   5.4 L D 


 


Monocytes %   9.3 


 


Eosinophils %   0.1  D 


 


Basophils %   0.6 


 


Nucleated RBC %   0 


 


PT with INR   


 


INR   


 


Anticoagulation Therapy   


 


Puncture Site   


 


ABG pH   


 


ABG pCO2 at Pt Temp   


 


ABG pO2 at Pt Temp   


 


ABG HCO3   


 


ABG O2 Sat (Measured)   


 


ABG O2 Content   


 


ABG Base Excess   


 


Gonzalo Test   


 


Carboxyhemoglobin   


 


Methemoglobin   


 


O2 Delivery Device   


 


Oxygen Flow Rate   


 


Vent Mode   


 


Vent Rate   


 


Mechanical Rate   


 


Pressure Support Vent   


 


Sodium    131 L


 


Potassium    5.6 H


 


Chloride    91 L


 


Carbon Dioxide    24


 


Anion Gap    16


 


BUN    56.2 H


 


Creatinine    2.5 H


 


Est GFR (CKD-EPI)AfAm    27.28


 


Est GFR (CKD-EPI)NonAf    23.54


 


POC Glucometer  > 600  


 


Random Glucose    916 H*


 


Lactic Acid   


 


Calcium    9.6


 


Phosphorus   


 


Magnesium   


 


Total Bilirubin    0.5


 


AST    13 L


 


ALT    17


 


Alkaline Phosphatase    248 H


 


Troponin I    0.05


 


Total Protein    7.6


 


Albumin    3.3 L


 


Lipase    114


 


Urine Color   


 


Urine Appearance   


 


Urine pH   


 


Ur Specific Gravity   


 


Urine Protein   


 


Urine Glucose (UA)   


 


Urine Ketones   


 


Urine Blood   


 


Urine Nitrite   


 


Urine Bilirubin   


 


Urine Urobilinogen   


 


Ur Leukocyte Esterase   


 


Urine WBC (Auto)   


 


Urine RBC (Auto)   


 


U Epithel Cells (Auto)   


 


Urine Bacteria (Auto)   


 


Acetone, Qual    Positive small 1+














  10/06/19 10/06/19 10/06/19





  21:00 21:00 21:16


 


WBC   


 


RBC   


 


Hgb   


 


Hct   


 


MCV   


 


MCH   


 


MCHC   


 


RDW   


 


Plt Count   


 


MPV   


 


Absolute Neuts (auto)   


 


Neutrophils %   


 


Lymphocytes %   


 


Monocytes %   


 


Eosinophils %   


 


Basophils %   


 


Nucleated RBC %   


 


PT with INR   13.60 H 


 


INR   1.15 H 


 


Anticoagulation Therapy   


 


Puncture Site   


 


ABG pH   


 


ABG pCO2 at Pt Temp   


 


ABG pO2 at Pt Temp   


 


ABG HCO3   


 


ABG O2 Sat (Measured)   


 


ABG O2 Content   


 


ABG Base Excess   


 


Gonzalo Test   


 


Carboxyhemoglobin   


 


Methemoglobin   


 


O2 Delivery Device   


 


Oxygen Flow Rate   


 


Vent Mode   


 


Vent Rate   


 


Mechanical Rate   


 


Pressure Support Vent   


 


Sodium   


 


Potassium   


 


Chloride   


 


Carbon Dioxide   


 


Anion Gap   


 


BUN   


 


Creatinine   


 


Est GFR (CKD-EPI)AfAm   


 


Est GFR (CKD-EPI)NonAf   


 


POC Glucometer   


 


Random Glucose   


 


Lactic Acid  2.1 H  


 


Calcium   


 


Phosphorus   


 


Magnesium   


 


Total Bilirubin   


 


AST   


 


ALT   


 


Alkaline Phosphatase   


 


Troponin I   


 


Total Protein   


 


Albumin   


 


Lipase   


 


Urine Color    Yellow


 


Urine Appearance    Clear


 


Urine pH    5.5


 


Ur Specific Gravity    <= 1.005 L


 


Urine Protein    Negative


 


Urine Glucose (UA)    3+ H


 


Urine Ketones    2+ H


 


Urine Blood    Trace-intact


 


Urine Nitrite    Negative


 


Urine Bilirubin    Negative


 


Urine Urobilinogen    0.2


 


Ur Leukocyte Esterase    1+ H


 


Urine WBC (Auto)    30-50


 


Urine RBC (Auto)    0-4


 


U Epithel Cells (Auto)    0-5


 


Urine Bacteria (Auto)    Moderate


 


Acetone, Qual   














  10/06/19 10/07/19 10/07/19





  23:00 00:20 00:20


 


WBC   


 


RBC   


 


Hgb   


 


Hct   


 


MCV   


 


MCH   


 


MCHC   


 


RDW   


 


Plt Count   


 


MPV   


 


Absolute Neuts (auto)   


 


Neutrophils %   


 


Lymphocytes %   


 


Monocytes %   


 


Eosinophils %   


 


Basophils %   


 


Nucleated RBC %   


 


PT with INR   


 


INR   


 


Anticoagulation Therapy   


 


Puncture Site   


 


ABG pH   


 


ABG pCO2 at Pt Temp   


 


ABG pO2 at Pt Temp   


 


ABG HCO3   


 


ABG O2 Sat (Measured)   


 


ABG O2 Content   


 


ABG Base Excess   


 


Gonzalo Test   


 


Carboxyhemoglobin   


 


Methemoglobin   


 


O2 Delivery Device   


 


Oxygen Flow Rate   


 


Vent Mode   


 


Vent Rate   


 


Mechanical Rate   


 


Pressure Support Vent   


 


Sodium  137  


 


Potassium  4.8  


 


Chloride  98  


 


Carbon Dioxide  24  


 


Anion Gap  14  


 


BUN  57.2 H  


 


Creatinine  2.3 H  


 


Est GFR (CKD-EPI)AfAm  30.17  


 


Est GFR (CKD-EPI)NonAf  26.04  


 


POC Glucometer   


 


Random Glucose  744 H*  


 


Lactic Acid   3.1 H* 


 


Calcium  9.5  


 


Phosphorus  3.2   Cancelled


 


Magnesium  3.1 H   Cancelled


 


Total Bilirubin   


 


AST   


 


ALT   


 


Alkaline Phosphatase   


 


Troponin I   


 


Total Protein   


 


Albumin   


 


Lipase   


 


Urine Color   


 


Urine Appearance   


 


Urine pH   


 


Ur Specific Gravity   


 


Urine Protein   


 


Urine Glucose (UA)   


 


Urine Ketones   


 


Urine Blood   


 


Urine Nitrite   


 


Urine Bilirubin   


 


Urine Urobilinogen   


 


Ur Leukocyte Esterase   


 


Urine WBC (Auto)   


 


Urine RBC (Auto)   


 


U Epithel Cells (Auto)   


 


Urine Bacteria (Auto)   


 


Acetone, Qual   














  10/07/19





  00:45


 


WBC 


 


RBC 


 


Hgb 


 


Hct 


 


MCV 


 


MCH 


 


MCHC 


 


RDW 


 


Plt Count 


 


MPV 


 


Absolute Neuts (auto) 


 


Neutrophils % 


 


Lymphocytes % 


 


Monocytes % 


 


Eosinophils % 


 


Basophils % 


 


Nucleated RBC % 


 


PT with INR 


 


INR 


 


Anticoagulation Therapy  No Result Required.


 


Puncture Site  Right radial


 


ABG pH  7.42


 


ABG pCO2 at Pt Temp  38.6


 


ABG pO2 at Pt Temp  84.6


 


ABG HCO3  24.4


 


ABG O2 Sat (Measured)  96.8


 


ABG O2 Content  16.3


 


ABG Base Excess  0.5


 


Gonzalo Test  Positive


 


Carboxyhemoglobin  2.2 H


 


Methemoglobin  < 1.0


 


O2 Delivery Device  Room air


 


Oxygen Flow Rate  21%


 


Vent Mode  No Result Required.


 


Vent Rate  No Result Required.


 


Mechanical Rate  No Result Required.


 


Pressure Support Vent  No Result Required.


 


Sodium 


 


Potassium 


 


Chloride 


 


Carbon Dioxide 


 


Anion Gap 


 


BUN 


 


Creatinine 


 


Est GFR (CKD-EPI)AfAm 


 


Est GFR (CKD-EPI)NonAf 


 


POC Glucometer 


 


Random Glucose 


 


Lactic Acid 


 


Calcium 


 


Phosphorus 


 


Magnesium 


 


Total Bilirubin 


 


AST 


 


ALT 


 


Alkaline Phosphatase 


 


Troponin I 


 


Total Protein 


 


Albumin 


 


Lipase 


 


Urine Color 


 


Urine Appearance 


 


Urine pH 


 


Ur Specific Gravity 


 


Urine Protein 


 


Urine Glucose (UA) 


 


Urine Ketones 


 


Urine Blood 


 


Urine Nitrite 


 


Urine Bilirubin 


 


Urine Urobilinogen 


 


Ur Leukocyte Esterase 


 


Urine WBC (Auto) 


 


Urine RBC (Auto) 


 


U Epithel Cells (Auto) 


 


Urine Bacteria (Auto) 


 


Acetone, Qual 








Imaging:


CT head: no acute pathology; markedly dilated ventricles (present on prior CT 

head)


CT abd/pelvis:


IMPRESSION:


1. Significant stool retention in the rectosigmoid colon with findings 

suggestive of early stercoral


colitis. Please correlate clinically. No evidence for small bowel obstruction.


2. Mild hydroureteronephrosis secondary to urinary bladder distention.


3. Other incidental and senescent findings, as above.











ASSESSMENT/PLAN:


78 y/o/m with PMHx of IDDM, CAD s/p CABG, MI in 1998, CHF, CKD, COPD, dementia 

here for altered mental status BIB EMS from home. Patient has history of 

elevated sugars in the 600s. 





1)HHS - patient with history of sugar in the 600s at home. initial sugar at 916 

here. 


-Anion gap at 16, ABG without acidosis. Likely HHS instead of DKA


   -Anion gap closed now 


-Patient on Insulin drip


   -Glucose at 252


   -Will start bridging patient on long acting insulin 


-Follow repeat BMP for electrolytes


-BGM monitoring 


-Patient on fluids - 1/2 NS with K+


-Hemoglobin A1C ordered


-Diabetes education for the family 





2)ABD pain - CT shows significant stool retention in the rectosigmoid colon 

with findings suggestive of early stercoral colitis. 


-fleet enema ordered for possible relief of constipation


-consider fecal disimpaction if unsuccessful 





3)DEB on CKD - patient with history of CKD, now with BUN/Cr elevated over 

baseline


-CT shows distended bladder, camacho placed with release of >700cc of urine 


-Maintain camacho


-IVF 


-Holding lasix for now as patient needs fluid rehydration 





4)UTI - UA with 1+ leuk esterase, 2+ ketones


-given levaquin and flagyl in ED


-Rocephin ordered for AM





5)Elevated troponin - initial trop at 0.05


-Trop trending down with second trop 


-EKG without acute ischemic changes 





6)Lactic acidosis - initial lactic at 2.1


-repeat lactic at 3.1 -> 3.4


-monitor


-on IVF





7)FEN


-NPO


-1/2 NS 





8)Prophylaxis 


-Continue patient home med of Plavix





9)Disposition


-Patient admitted to ICU


-Full code 





Visit type





- Emergency Visit


Emergency Visit: Yes


ED Registration Date: 10/06/19


Care time: The patient presented to the Emergency Department on the above date 

and was hospitalized for further evaluation of their emergent condition.





- New Patient


This patient is new to me today: Yes


Date on this admission: 10/07/19





- Critical Care


Critical Care patient: No





ATTENDING PHYSICIAN STATEMENT





I saw and evaluated the patient.


I reviewed the resident's note and discussed the case with the resident.


I agree with the resident's findings and plan as documented.








SUBJECTIVE:








OBJECTIVE:








ASSESSMENT AND PLAN:

## 2019-10-07 NOTE — PN
Teaching Attending Note


Name of Resident: Collins Beal





ATTENDING PHYSICIAN STATEMENT





I saw and evaluated the patient.


I reviewed the resident's note and discussed the case with the resident.


I agree with the resident's findings and plan as documented.








SUBJECTIVE: Patient is lethargic, arousable, and confused. He appears 

comfortable.








OBJECTIVE:


 Vital Signs











 Period  Temp  Pulse  Resp  BP Sys/Ramírez  Pulse Ox


 


 Last 24 Hr  98 F-98.6 F  75-88  17-19  /  100-100








GENERAL: Lethargic, arousable, confused, in no distress


HEART: S1S2, RRR


LUNGS: Clear


ABDOMEN: Soft, non-distended, no apparent tenderness, normal BS


EXTREMITIES: No edema





 Laboratory Results - last 24 hr











  10/06/19 10/06/19 10/06/19





  20:43 21:00 21:00


 


WBC   18.0 H 


 


RBC   4.69 


 


Hgb   13.5 


 


Hct   42.1  D 


 


MCV   89.8 


 


MCH   28.9 


 


MCHC   32.1 


 


RDW   13.7 


 


Plt Count   191  D 


 


MPV   10.3 


 


Absolute Neuts (auto)   15.3 H 


 


Neutrophils %   84.6 H D 


 


Lymphocytes %   5.4 L D 


 


Monocytes %   9.3 


 


Eosinophils %   0.1  D 


 


Basophils %   0.6 


 


Nucleated RBC %   0 


 


PT with INR   


 


INR   


 


Anticoagulation Therapy   


 


Puncture Site   


 


ABG pH   


 


ABG pCO2 at Pt Temp   


 


ABG pO2 at Pt Temp   


 


ABG HCO3   


 


ABG O2 Sat (Measured)   


 


ABG O2 Content   


 


ABG Base Excess   


 


Gonzalo Test   


 


Carboxyhemoglobin   


 


Methemoglobin   


 


O2 Delivery Device   


 


Oxygen Flow Rate   


 


Vent Mode   


 


Vent Rate   


 


Mechanical Rate   


 


Pressure Support Vent   


 


Sodium    131 L


 


Potassium    5.6 H


 


Chloride    91 L


 


Carbon Dioxide    24


 


Anion Gap    16


 


BUN    56.2 H


 


Creatinine    2.5 H


 


Est GFR (CKD-EPI)AfAm    27.28


 


Est GFR (CKD-EPI)NonAf    23.54


 


POC Glucometer  > 600  


 


Random Glucose    916 H*


 


Lactic Acid   


 


Calcium    9.6


 


Phosphorus   


 


Magnesium   


 


Total Bilirubin    0.5


 


AST    13 L


 


ALT    17


 


Alkaline Phosphatase    248 H


 


Troponin I    0.05


 


Total Protein    7.6


 


Albumin    3.3 L


 


Lipase    114


 


Urine Color   


 


Urine Appearance   


 


Urine pH   


 


Ur Specific Gravity   


 


Urine Protein   


 


Urine Glucose (UA)   


 


Urine Ketones   


 


Urine Blood   


 


Urine Nitrite   


 


Urine Bilirubin   


 


Urine Urobilinogen   


 


Ur Leukocyte Esterase   


 


Urine WBC (Auto)   


 


Urine RBC (Auto)   


 


U Epithel Cells (Auto)   


 


Urine Bacteria (Auto)   


 


Ur Random Sodium   


 


Ur Random Potassium   


 


Ur Random Chloride   


 


Acetone, Qual    Positive small 1+














  10/06/19 10/06/19 10/06/19





  21:00 21:00 21:16


 


WBC   


 


RBC   


 


Hgb   


 


Hct   


 


MCV   


 


MCH   


 


MCHC   


 


RDW   


 


Plt Count   


 


MPV   


 


Absolute Neuts (auto)   


 


Neutrophils %   


 


Lymphocytes %   


 


Monocytes %   


 


Eosinophils %   


 


Basophils %   


 


Nucleated RBC %   


 


PT with INR   13.60 H 


 


INR   1.15 H 


 


Anticoagulation Therapy   


 


Puncture Site   


 


ABG pH   


 


ABG pCO2 at Pt Temp   


 


ABG pO2 at Pt Temp   


 


ABG HCO3   


 


ABG O2 Sat (Measured)   


 


ABG O2 Content   


 


ABG Base Excess   


 


Gonzalo Test   


 


Carboxyhemoglobin   


 


Methemoglobin   


 


O2 Delivery Device   


 


Oxygen Flow Rate   


 


Vent Mode   


 


Vent Rate   


 


Mechanical Rate   


 


Pressure Support Vent   


 


Sodium   


 


Potassium   


 


Chloride   


 


Carbon Dioxide   


 


Anion Gap   


 


BUN   


 


Creatinine   


 


Est GFR (CKD-EPI)AfAm   


 


Est GFR (CKD-EPI)NonAf   


 


POC Glucometer   


 


Random Glucose   


 


Lactic Acid  2.1 H  


 


Calcium   


 


Phosphorus   


 


Magnesium   


 


Total Bilirubin   


 


AST   


 


ALT   


 


Alkaline Phosphatase   


 


Troponin I   


 


Total Protein   


 


Albumin   


 


Lipase   


 


Urine Color    Yellow


 


Urine Appearance    Clear


 


Urine pH    5.5


 


Ur Specific Gravity    <= 1.005 L


 


Urine Protein    Negative


 


Urine Glucose (UA)    3+ H


 


Urine Ketones    2+ H


 


Urine Blood    Trace-intact


 


Urine Nitrite    Negative


 


Urine Bilirubin    Negative


 


Urine Urobilinogen    0.2


 


Ur Leukocyte Esterase    1+ H


 


Urine WBC (Auto)    30-50


 


Urine RBC (Auto)    0-4


 


U Epithel Cells (Auto)    0-5


 


Urine Bacteria (Auto)    Moderate


 


Ur Random Sodium   


 


Ur Random Potassium   


 


Ur Random Chloride   


 


Acetone, Qual   














  10/06/19 10/07/19 10/07/19





  23:00 00:20 00:20


 


WBC   


 


RBC   


 


Hgb   


 


Hct   


 


MCV   


 


MCH   


 


MCHC   


 


RDW   


 


Plt Count   


 


MPV   


 


Absolute Neuts (auto)   


 


Neutrophils %   


 


Lymphocytes %   


 


Monocytes %   


 


Eosinophils %   


 


Basophils %   


 


Nucleated RBC %   


 


PT with INR   


 


INR   


 


Anticoagulation Therapy   


 


Puncture Site   


 


ABG pH   


 


ABG pCO2 at Pt Temp   


 


ABG pO2 at Pt Temp   


 


ABG HCO3   


 


ABG O2 Sat (Measured)   


 


ABG O2 Content   


 


ABG Base Excess   


 


Gonzalo Test   


 


Carboxyhemoglobin   


 


Methemoglobin   


 


O2 Delivery Device   


 


Oxygen Flow Rate   


 


Vent Mode   


 


Vent Rate   


 


Mechanical Rate   


 


Pressure Support Vent   


 


Sodium  137  


 


Potassium  4.8  


 


Chloride  98  


 


Carbon Dioxide  24  


 


Anion Gap  14  


 


BUN  57.2 H  


 


Creatinine  2.3 H  


 


Est GFR (CKD-EPI)AfAm  30.17  


 


Est GFR (CKD-EPI)NonAf  26.04  


 


POC Glucometer   


 


Random Glucose  744 H*  


 


Lactic Acid   3.1 H* 


 


Calcium  9.5  


 


Phosphorus  3.2   Cancelled


 


Magnesium  3.1 H   Cancelled


 


Total Bilirubin   


 


AST   


 


ALT   


 


Alkaline Phosphatase   


 


Troponin I   


 


Total Protein   


 


Albumin   


 


Lipase   


 


Urine Color   


 


Urine Appearance   


 


Urine pH   


 


Ur Specific Gravity   


 


Urine Protein   


 


Urine Glucose (UA)   


 


Urine Ketones   


 


Urine Blood   


 


Urine Nitrite   


 


Urine Bilirubin   


 


Urine Urobilinogen   


 


Ur Leukocyte Esterase   


 


Urine WBC (Auto)   


 


Urine RBC (Auto)   


 


U Epithel Cells (Auto)   


 


Urine Bacteria (Auto)   


 


Ur Random Sodium   


 


Ur Random Potassium   


 


Ur Random Chloride   


 


Acetone, Qual   














  10/07/19 10/07/19 10/07/19





  00:45 01:30 02:33


 


WBC   


 


RBC   


 


Hgb   


 


Hct   


 


MCV   


 


MCH   


 


MCHC   


 


RDW   


 


Plt Count   


 


MPV   


 


Absolute Neuts (auto)   


 


Neutrophils %   


 


Lymphocytes %   


 


Monocytes %   


 


Eosinophils %   


 


Basophils %   


 


Nucleated RBC %   


 


PT with INR   


 


INR   


 


Anticoagulation Therapy  No Result Required.  


 


Puncture Site  Right radial  


 


ABG pH  7.42  


 


ABG pCO2 at Pt Temp  38.6  


 


ABG pO2 at Pt Temp  84.6  


 


ABG HCO3  24.4  


 


ABG O2 Sat (Measured)  96.8  


 


ABG O2 Content  16.3  


 


ABG Base Excess  0.5  


 


Gonzalo Test  Positive  


 


Carboxyhemoglobin  2.2 H  


 


Methemoglobin  < 1.0  


 


O2 Delivery Device  Room air  


 


Oxygen Flow Rate  21%  


 


Vent Mode  No Result Required.  


 


Vent Rate  No Result Required.  


 


Mechanical Rate  No Result Required.  


 


Pressure Support Vent  No Result Required.  


 


Sodium   


 


Potassium   


 


Chloride   


 


Carbon Dioxide   


 


Anion Gap   


 


BUN   


 


Creatinine   


 


Est GFR (CKD-EPI)AfAm   


 


Est GFR (CKD-EPI)NonAf   


 


POC Glucometer   485  363


 


Random Glucose   


 


Lactic Acid   


 


Calcium   


 


Phosphorus   


 


Magnesium   


 


Total Bilirubin   


 


AST   


 


ALT   


 


Alkaline Phosphatase   


 


Troponin I   


 


Total Protein   


 


Albumin   


 


Lipase   


 


Urine Color   


 


Urine Appearance   


 


Urine pH   


 


Ur Specific Gravity   


 


Urine Protein   


 


Urine Glucose (UA)   


 


Urine Ketones   


 


Urine Blood   


 


Urine Nitrite   


 


Urine Bilirubin   


 


Urine Urobilinogen   


 


Ur Leukocyte Esterase   


 


Urine WBC (Auto)   


 


Urine RBC (Auto)   


 


U Epithel Cells (Auto)   


 


Urine Bacteria (Auto)   


 


Ur Random Sodium   


 


Ur Random Potassium   


 


Ur Random Chloride   


 


Acetone, Qual   














  10/07/19 10/07/19 10/07/19





  03:00 03:00 03:00


 


WBC  15.8 H  


 


RBC  4.40  


 


Hgb  12.5  


 


Hct  37.8  


 


MCV  86.0  


 


MCH  28.5  


 


MCHC  33.1  


 


RDW  13.6  


 


Plt Count  180  


 


MPV  9.5  


 


Absolute Neuts (auto)   


 


Neutrophils %   


 


Lymphocytes %   


 


Monocytes %   


 


Eosinophils %   


 


Basophils %   


 


Nucleated RBC %   


 


PT with INR   


 


INR   


 


Anticoagulation Therapy   


 


Puncture Site   


 


ABG pH   


 


ABG pCO2 at Pt Temp   


 


ABG pO2 at Pt Temp   


 


ABG HCO3   


 


ABG O2 Sat (Measured)   


 


ABG O2 Content   


 


ABG Base Excess   


 


Gonzalo Test   


 


Carboxyhemoglobin   


 


Methemoglobin   


 


O2 Delivery Device   


 


Oxygen Flow Rate   


 


Vent Mode   


 


Vent Rate   


 


Mechanical Rate   


 


Pressure Support Vent   


 


Sodium   144 


 


Potassium   4.3 


 


Chloride   105 


 


Carbon Dioxide   31 


 


Anion Gap   9 


 


BUN   49.4 H 


 


Creatinine   2.2 H 


 


Est GFR (CKD-EPI)AfAm   31.84 


 


Est GFR (CKD-EPI)NonAf   27.47 


 


POC Glucometer   


 


Random Glucose   351 H 


 


Lactic Acid    3.4 H*


 


Calcium   9.8 


 


Phosphorus   


 


Magnesium   


 


Total Bilirubin   


 


AST   


 


ALT   


 


Alkaline Phosphatase   


 


Troponin I   0.03 


 


Total Protein   


 


Albumin   


 


Lipase   


 


Urine Color   


 


Urine Appearance   


 


Urine pH   


 


Ur Specific Gravity   


 


Urine Protein   


 


Urine Glucose (UA)   


 


Urine Ketones   


 


Urine Blood   


 


Urine Nitrite   


 


Urine Bilirubin   


 


Urine Urobilinogen   


 


Ur Leukocyte Esterase   


 


Urine WBC (Auto)   


 


Urine RBC (Auto)   


 


U Epithel Cells (Auto)   


 


Urine Bacteria (Auto)   


 


Ur Random Sodium   


 


Ur Random Potassium   


 


Ur Random Chloride   


 


Acetone, Qual   














  10/07/19 10/07/19 10/07/19





  03:00 03:48 04:37


 


WBC   


 


RBC   


 


Hgb   


 


Hct   


 


MCV   


 


MCH   


 


MCHC   


 


RDW   


 


Plt Count   


 


MPV   


 


Absolute Neuts (auto)   


 


Neutrophils %   


 


Lymphocytes %   


 


Monocytes %   


 


Eosinophils %   


 


Basophils %   


 


Nucleated RBC %   


 


PT with INR   


 


INR   


 


Anticoagulation Therapy   


 


Puncture Site   


 


ABG pH   


 


ABG pCO2 at Pt Temp   


 


ABG pO2 at Pt Temp   


 


ABG HCO3   


 


ABG O2 Sat (Measured)   


 


ABG O2 Content   


 


ABG Base Excess   


 


Gonzalo Test   


 


Carboxyhemoglobin   


 


Methemoglobin   


 


O2 Delivery Device   


 


Oxygen Flow Rate   


 


Vent Mode   


 


Vent Rate   


 


Mechanical Rate   


 


Pressure Support Vent   


 


Sodium   


 


Potassium   


 


Chloride   


 


Carbon Dioxide   


 


Anion Gap   


 


BUN   


 


Creatinine   


 


Est GFR (CKD-EPI)AfAm   


 


Est GFR (CKD-EPI)NonAf   


 


POC Glucometer   252  205


 


Random Glucose   


 


Lactic Acid   


 


Calcium   


 


Phosphorus   


 


Magnesium   


 


Total Bilirubin   


 


AST   


 


ALT   


 


Alkaline Phosphatase   


 


Troponin I   


 


Total Protein   


 


Albumin   


 


Lipase   


 


Urine Color   


 


Urine Appearance   


 


Urine pH   


 


Ur Specific Gravity   


 


Urine Protein   


 


Urine Glucose (UA)   


 


Urine Ketones   


 


Urine Blood   


 


Urine Nitrite   


 


Urine Bilirubin   


 


Urine Urobilinogen   


 


Ur Leukocyte Esterase   


 


Urine WBC (Auto)   


 


Urine RBC (Auto)   


 


U Epithel Cells (Auto)   


 


Urine Bacteria (Auto)   


 


Ur Random Sodium  10 L  


 


Ur Random Potassium  22.0 L  


 


Ur Random Chloride  < 11 L  


 


Acetone, Qual   














  10/07/19 10/07/19 10/07/19





  05:38 05:50 06:26


 


WBC   


 


RBC   


 


Hgb   


 


Hct   


 


MCV   


 


MCH   


 


MCHC   


 


RDW   


 


Plt Count   


 


MPV   


 


Absolute Neuts (auto)   


 


Neutrophils %   


 


Lymphocytes %   


 


Monocytes %   


 


Eosinophils %   


 


Basophils %   


 


Nucleated RBC %   


 


PT with INR   


 


INR   


 


Anticoagulation Therapy   


 


Puncture Site   


 


ABG pH   


 


ABG pCO2 at Pt Temp   


 


ABG pO2 at Pt Temp   


 


ABG HCO3   


 


ABG O2 Sat (Measured)   


 


ABG O2 Content   


 


ABG Base Excess   


 


Gonzalo Test   


 


Carboxyhemoglobin   


 


Methemoglobin   


 


O2 Delivery Device   


 


Oxygen Flow Rate   


 


Vent Mode   


 


Vent Rate   


 


Mechanical Rate   


 


Pressure Support Vent   


 


Sodium   146 H 


 


Potassium   4.0 


 


Chloride   108 H 


 


Carbon Dioxide   30 


 


Anion Gap   8 


 


BUN   45.1 H 


 


Creatinine   1.9 H 


 


Est GFR (CKD-EPI)AfAm   38.02 


 


Est GFR (CKD-EPI)NonAf   32.80 


 


POC Glucometer  158   166


 


Random Glucose   148 H 


 


Lactic Acid   


 


Calcium   9.7 


 


Phosphorus   2.0 L 


 


Magnesium   3.1 H 


 


Total Bilirubin   


 


AST   


 


ALT   


 


Alkaline Phosphatase   


 


Troponin I   


 


Total Protein   


 


Albumin   


 


Lipase   


 


Urine Color   


 


Urine Appearance   


 


Urine pH   


 


Ur Specific Gravity   


 


Urine Protein   


 


Urine Glucose (UA)   


 


Urine Ketones   


 


Urine Blood   


 


Urine Nitrite   


 


Urine Bilirubin   


 


Urine Urobilinogen   


 


Ur Leukocyte Esterase   


 


Urine WBC (Auto)   


 


Urine RBC (Auto)   


 


U Epithel Cells (Auto)   


 


Urine Bacteria (Auto)   


 


Ur Random Sodium   


 


Ur Random Potassium   


 


Ur Random Chloride   


 


Acetone, Qual   














  10/07/19 10/07/19 10/07/19





  06:55 08:20 10:57


 


WBC   14.9 H 


 


RBC   4.14 


 


Hgb   12.0 


 


Hct   35.8 


 


MCV   86.7 


 


MCH   29.1 


 


MCHC   33.6 


 


RDW   13.6 


 


Plt Count   159 


 


MPV   9.3 


 


Absolute Neuts (auto)   11.6 H 


 


Neutrophils %   78.3 


 


Lymphocytes %   8.4  D 


 


Monocytes %   12.2 H 


 


Eosinophils %   0.3  D 


 


Basophils %   0.8 


 


Nucleated RBC %   0 


 


PT with INR   


 


INR   


 


Anticoagulation Therapy   


 


Puncture Site   


 


ABG pH   


 


ABG pCO2 at Pt Temp   


 


ABG pO2 at Pt Temp   


 


ABG HCO3   


 


ABG O2 Sat (Measured)   


 


ABG O2 Content   


 


ABG Base Excess   


 


Gonzalo Test   


 


Carboxyhemoglobin   


 


Methemoglobin   


 


O2 Delivery Device   


 


Oxygen Flow Rate   


 


Vent Mode   


 


Vent Rate   


 


Mechanical Rate   


 


Pressure Support Vent   


 


Sodium   


 


Potassium   


 


Chloride   


 


Carbon Dioxide   


 


Anion Gap   


 


BUN   


 


Creatinine   


 


Est GFR (CKD-EPI)AfAm   


 


Est GFR (CKD-EPI)NonAf   


 


POC Glucometer    367


 


Random Glucose   


 


Lactic Acid  1.8  


 


Calcium   


 


Phosphorus   


 


Magnesium   


 


Total Bilirubin   


 


AST   


 


ALT   


 


Alkaline Phosphatase   


 


Troponin I   


 


Total Protein   


 


Albumin   


 


Lipase   


 


Urine Color   


 


Urine Appearance   


 


Urine pH   


 


Ur Specific Gravity   


 


Urine Protein   


 


Urine Glucose (UA)   


 


Urine Ketones   


 


Urine Blood   


 


Urine Nitrite   


 


Urine Bilirubin   


 


Urine Urobilinogen   


 


Ur Leukocyte Esterase   


 


Urine WBC (Auto)   


 


Urine RBC (Auto)   


 


U Epithel Cells (Auto)   


 


Urine Bacteria (Auto)   


 


Ur Random Sodium   


 


Ur Random Potassium   


 


Ur Random Chloride   


 


Acetone, Qual   








 Current Medications











Generic Name Dose Route Start Last Admin





  Trade Name Freq  PRN Reason Stop Dose Admin


 


Atorvastatin Calcium  40 mg  10/07/19 22:00  





  Lipitor -  PO   





  [facility]ARY   





     





     





     





     


 


Chlorhexidine Gluconate  1 applic  10/07/19 22:00  





  Hibiclens For Decolonization -  TP   





  [facility]   





     





     





     





     


 


Clopidogrel Bisulfate  75 mg  10/07/19 10:00  10/07/19 09:34





  Plavix -  PO   75 mg





  DAILY MARY   Administration





     





     





     





     


 


Ezetimibe  10 mg  10/07/19 22:00  





  Zetia -  PO   





  [facility]   





     





     





     





     


 


Heparin Sodium (Porcine)  5,000 unit  10/07/19 06:00  10/07/19 05:35





  Heparin -  SQ   5,000 unit





  TID MARY   Administration





     





     





     





     


 


Potassium Phosphate 20 mm/  256.6667 mls @ 62.5 mls/hr  10/07/19 09:15  10/07/

19 10:52





  Sodium Chloride  IVPB  10/07/19 13:21  62.5 mls/hr





  ONCE ONE   Administration





     





     





     





     


 


Dextrose/Lactated Ringer's  1,000 mls @ 100 mls/hr  10/07/19 10:15  10/07/19 10:

52





  D5-Lr -  IV   100 mls/hr





  ASDIR MARY   Administration





     





     





     





     


 


Insulin Aspart  1 vial  10/07/19 07:00  10/07/19 11:56





  Novolog Vial Sliding Scale -  SQ   10 units





  ACHS MARY   Administration





     





     





  Protocol   





     


 


Mupirocin  1 applic  10/07/19 10:00  10/07/19 09:35





  Bactroban Ointment (For Decolonization) -  NS  10/12/19 09:59  1 applic





  BID MARY   Administration





     





     





     





     


 


Nebivolol  2.5 mg  10/07/19 22:00  





  Bystolic -  PO   





  [facility]   





     





     





     





     


 


Tamsulosin HCl  0.4 mg  10/07/19 08:30  10/07/19 08:30





  Flomax -  PO   0.4 mg





  DAILY@0830 MARY   Administration





     





     





     





     














ASSESSMENT AND PLAN:


This is a 79 year old man with a history of HTN, hyperlipidemia, CAD, MI, CABG, 

chronic diastolic heart failure, type 2 DM, stage 3 CKD, COPD, dementia who 

presented to the ED with altered mental status. 





1. Acute metabolic encephalopathy secondary to hyperosmolar hyperglycemic state 

from uncontrolled type 2 DM


   - HbA1c 12.6


   - Insulin drip discontinued


   - Novolog sliding scale started


   - Start Levemir


2. Acute kidney injury


   - Improving


   - Continue IV fluid


   - Continue to hold Lasix


3. Abdominal pain secondary to constipation 


4. UTI


   - Levaquin, Flagyl given in ED


   - Ceftriaxone started


   - Follow-up urine culture


5. Lactic acidosis


   - Improved with IV fluid


6. Hypernatremia


   - Continue IV fluid


7. Hypophosphatemia


   - Supplement phosphorus


8. HTN


   - Continue Bystolic


9. Hyperlipidemia


   - Continue Lipitor, Zetia


10. CAD, history of MI, CABG


   - Continue Bystolic, Plavix, Lipitor, Zetia


11. Chronic diastolic heart failure


   - Stable


12. Stage 3 CKD


13. COPD


   - Stable


14. Alzheimer dementia

## 2019-10-07 NOTE — PN
Progress Note (short form)





- Note


Progress Note: 





HPI: Limited due to patients clinical condition. Insulin gtt was discontinued 

on previous night


 


 Vital Signs











Temperature  98.4 F   10/07/19 06:00


 


Pulse Rate  83   10/07/19 12:00


 


Respiratory Rate  18   10/07/19 12:12


 


Blood Pressure  129/53 L  10/07/19 12:00


 


O2 Sat by Pulse Oximetry (%)  100   10/07/19 12:12





PE:


Gen: NAD, somnolent, nonverbal, contracted, oriented to self


HEENT: NC/AT, opacification of R eye, sclera anicteric, dry-to-moist mucosa


Neck: No JVD


Lungs: CTA b/l no wheezes or rales


CARD: RRR no murmurs


ABD: No facial grimmacing to palpation, soft, nondistended, no guarding


EXT: no edema





 CBC, BMP





 10/07/19 08:20 





Active Medications





Atorvastatin Calcium (Lipitor -)  40 mg PO HS [facility]


Chlorhexidine Gluconate (Hibiclens For Decolonization -)  1 applic TP HS [facility]


Clopidogrel Bisulfate (Plavix -)  75 mg PO DAILY [facility]


   Last Admin: 10/07/19 09:34 Dose:  75 mg


Ezetimibe (Zetia -)  10 mg PO HS [facility]


Heparin Sodium (Porcine) (Heparin -)  5,000 unit SQ TID [facility]


   Last Admin: 10/07/19 05:35 Dose:  5,000 unit


Dextrose/Lactated Ringer's (D5-Lr -)  1,000 mls @ 100 mls/hr IV ASDIR [facility]


   Last Admin: 10/07/19 10:52 Dose:  100 mls/hr


Insulin Aspart (Novolog Vial Sliding Scale -)  1 vial SQ ACHS [facility]; Protocol


   Last Admin: 10/07/19 11:56 Dose:  10 units


Mupirocin (Bactroban Ointment (For Decolonization) -)  1 applic NS BID [facility]


   Stop: 10/12/19 09:59


   Last Admin: 10/07/19 09:35 Dose:  1 applic


Nebivolol (Bystolic -)  2.5 mg PO HS MARY


Tamsulosin HCl (Flomax -)  0.4 mg PO DAILY@0830 [facility]


   Last Admin: 10/07/19 08:30 Dose:  0.4 mg





A/P


Hyperosmolar hyperglycemia state (resolving)


Acute kidney insufficiency


Acute metabolic encephalopathy; r/o NPH


Dementia


Leukocytosis





--Insulin gtt discontinued in night due to anion gap closure


--Switch to Lactated ringers to avoid NAGMA to continue at 100cc/hr


--Glycemic control with ISS


--Will need to assess ability to eat to initiate long-acting insulin regiment


--Neurology consulted for ?NPH; prior head CT reviewed


--Will need to speak with family/ICU team about pt's baseline 


--Continue home medications as below:


   Bystolic 2.5mg HS


   FLomax 0.4mg qdaily


   Ezetimibe 10mg HS


   Plavix 75mg qdaily


   Lipitor 40mg HS





FEN:


   Fluids: LR@100cc/hr


   Electrolyte abnormalities: None this AM


   Nutrition: Speech and swallow





PPX:


   DVT - Heparin SQ TID





Dispo: Can transfer to medical floors


Case discussed with Dr. Rufina Beal, DO - IM PGY-3

## 2019-10-07 NOTE — CON.NEURO
Consult


Consult Specialty:: Kike


Referred by:: ICU resident 


Reason for Consultation:: ams





- History of Present Illness


History of Present Illness: 





79-year-old right-handed man with multiple medical problem including


1.  Coronary artery disease.


Insulin-dependent diabetes


Osteoarthritis


Coronary artery disease


Dementia at baseline.


Hypertension


Patient was brought in to [facility] yesterday with a chief 

complaint of worsening mental status.  Patient is not able to provide any 

history.  Patient was evaluated in the emergency room CAT scan of the head 

revealed no evidence of acute pathology.  Patient was stepwise been admitted to 

the medical ICU.  Patient was found to be hyperglycemic I reviewed the images 

of the  CAT scan of the head which revealed evidence of ventriculomegaly very 

typical of hydrocephalus associated with normal pressure. Patient himself is a 

poor historian.  Patient was seen in the medical ICU


Patient was evaluated with the  wife at the bedside patient was alert awake 

oriented.  Patient was uncooperative with the exam.  I last saw the patient May 

2017 he was evaluated for dementia.  Patient had MRI of the brain in the past 

which showed supratentorial atrophy that was done in 2016.





- History Source


History Provided By: Family Member, Medical Record


Limitations to Obtaining History: Clinical Condition





- Past Medical History


Cardio/Vascular: Yes: CAD, HTN, Hyperlipdemia


Pulmonary: Yes: Asthma, COPD





- Past Surgical History


Past Surgical History: Yes: CABG, Joint Replacement (R hip ORIF)





- Alcohol/Substance Use


Hx Alcohol Use: No





- Smoking History


Smoking history: Former smoker


Have you smoked in the past 12 months: No


Aproximately how many cigarettes per day: 0


If you are a former smoker, when did you quit?: '96





- Social History


Usual Living Arrangement: With Spouse


ADL: Family Assistance


History of Recent Travel: No





Home Medications





- Allergies


Allergies/Adverse Reactions: 


 Allergies











Allergy/AdvReac Type Severity Reaction Status Date / Time


 


No Known Allergies Allergy   Verified 02/15/19 11:57














- Home Medications


Home Medications: 


Ambulatory Orders





Atorvastatin Ca [Lipitor] 40 mg PO DAILY 06/10/19 


Clopidogrel Bisulfate [Plavix] 75 mg PO DAILY 06/10/19 


Ezetimibe 10 mg PO HS 06/10/19 


Furosemide 20 mg PO DAILY 06/10/19 


Insulin Degludec [Tresiba] 6 unit SQ HS 06/10/19 


Insulin Pump [Insulin Pump - (Nf)] 1 each NR DAILY 06/10/19 


Nebivolol HCl [Bystolic] 2.5 mg PO HS 06/10/19 


Tamsulosin HCl 0.4 mg PO DAILY 06/10/19 











Review of Systems





- Review of Systems


Constitutional: reports: No Symptoms


Neurological: reports: Change in Speech, Confusion





Physical Exam-Neuro


Vital Signs: 


 Vital Signs











Temperature  98.4 F   10/07/19 06:00


 


Pulse Rate  75   10/07/19 08:00


 


Respiratory Rate  18   10/07/19 08:00


 


Blood Pressure  120/43 L  10/07/19 08:00


 


O2 Sat by Pulse Oximetry (%)  100   10/07/19 01:57











Labs: 


 CBC, BMP





 10/07/19 08:20 





 10/07/19 05:50 





 INR, PTT











INR  1.15  (0.83-1.09)  H  10/06/19  21:00    














- Neuro Exam


Level Of Consciousness: Yes: Oriented to Person, Oriented to Place


Eyes: Yes: PERRLA


Speech: WNL


Dominant Hand: Right


Mini Mental Exam: 18/30


Cranial Nerves II-XII Intact: Yes


Gag: Present


DTR's: 1+ Left Bicep, 1+ Right Bicep, 1+ Left Brachioradialis, 1+ Right 

Brachioradialis


Response to light touch: Normal


Response to pain prick: Normal


Response to temperature: Normal


Response to vibration: Normal


Motor Strength: 3/5: Left Arm, Right Arm, Left Leg, Right Leg


Gait: Deferred





Imaging





- Results


Cat Scan: Image Reviewed





Problem List





- Problems


(1) Altered mental status


Assessment/Plan: 


** Altered mental status


** Toxic metabolic encephalopathy associated with hyperglycemia


** Baseline dementia of Alzheimer type with behavioral changes


** normal pressure hydrocephalus?





1.   In reference to the hydrocephalus the patient has multiple medical 

problems and I personally do not think that the patient will be a good 

candidate for intervention with shunt.  We can get second opinion from 

neurosurgery and start the patient on physical therapy.


2.  Tight blood sugar control.


3.Speech and swallow evaluation


4. Endocrine consult


5.  Start Lexapro 10 mg once daily


6.   Physical therapy


7. Baby aspirin





Thank you very much for referring this patient for neurological consultation





Mustapha Stokes MD 


Code(s): R41.82 - ALTERED MENTAL STATUS, UNSPECIFIED   


Qualifiers: 


   Altered mental status type: somnolence   Qualified Code(s): R40.0 - 

Somnolence

## 2019-10-07 NOTE — PN
Physical Exam: 


SUBJECTIVE: Patient seen and examined at bedside in the ICU. Lethargic and 

somnolent but arousable. Oriented to person and that he is in a hospital. 

Denies any pain. Discussed with son, who reports that this has been his 

baseline for the past couple of months. 











OBJECTIVE:





 Vital Signs











 Period  Temp  Pulse  Resp  BP Sys/Ramírez  Pulse Ox


 


 Last 24 Hr  98 F-98.6 F  80-88  17-19  /  100-100











GENERAL: The patient is lethargic, arousable, confused, in no acute distress.


HEAD: Normal with no signs of trauma.


EYES: PERRL, sclera anicteric, conjunctiva clear. No ptosis. Right eye opacity. 


ENT: Ears normal, nares patent, oropharynx clear without exudates, moist mucous 


membranes.


NECK: Trachea midline, full range of motion, supple. 


LUNGS: Breath sounds equal, clear to auscultation bilaterally, no wheezes, no 

crackles, no 


accessory muscle use. 


HEART: Regular rate and rhythm, S1, S2 without murmur, rub or gallop.


ABDOMEN: Soft, nontender, nondistended, no guarding, no 


rebound, no hepatosplenomegaly, no masses.


EXTREMITIES: 2+ pulses, warm, well-perfused, no edema. 


NEUROLOGICAL: Unable to assess. 


PSYCH: Unable to assess. 


SKIN: Warm, dry, normal turgor, no rashes or lesions noted














 Laboratory Results - last 24 hr











  10/06/19 10/06/19 10/06/19





  20:43 21:00 21:00


 


WBC   18.0 H 


 


RBC   4.69 


 


Hgb   13.5 


 


Hct   42.1  D 


 


MCV   89.8 


 


MCH   28.9 


 


MCHC   32.1 


 


RDW   13.7 


 


Plt Count   191  D 


 


MPV   10.3 


 


Absolute Neuts (auto)   15.3 H 


 


Neutrophils %   84.6 H D 


 


Lymphocytes %   5.4 L D 


 


Monocytes %   9.3 


 


Eosinophils %   0.1  D 


 


Basophils %   0.6 


 


Nucleated RBC %   0 


 


PT with INR   


 


INR   


 


Anticoagulation Therapy   


 


Puncture Site   


 


ABG pH   


 


ABG pCO2 at Pt Temp   


 


ABG pO2 at Pt Temp   


 


ABG HCO3   


 


ABG O2 Sat (Measured)   


 


ABG O2 Content   


 


ABG Base Excess   


 


Gonzalo Test   


 


Carboxyhemoglobin   


 


Methemoglobin   


 


O2 Delivery Device   


 


Oxygen Flow Rate   


 


Vent Mode   


 


Vent Rate   


 


Mechanical Rate   


 


Pressure Support Vent   


 


Sodium    131 L


 


Potassium    5.6 H


 


Chloride    91 L


 


Carbon Dioxide    24


 


Anion Gap    16


 


BUN    56.2 H


 


Creatinine    2.5 H


 


Est GFR (CKD-EPI)AfAm    27.28


 


Est GFR (CKD-EPI)NonAf    23.54


 


POC Glucometer  > 600  


 


Random Glucose    916 H*


 


Lactic Acid   


 


Calcium    9.6


 


Phosphorus   


 


Magnesium   


 


Total Bilirubin    0.5


 


AST    13 L


 


ALT    17


 


Alkaline Phosphatase    248 H


 


Troponin I    0.05


 


Total Protein    7.6


 


Albumin    3.3 L


 


Lipase    114


 


Urine Color   


 


Urine Appearance   


 


Urine pH   


 


Ur Specific Gravity   


 


Urine Protein   


 


Urine Glucose (UA)   


 


Urine Ketones   


 


Urine Blood   


 


Urine Nitrite   


 


Urine Bilirubin   


 


Urine Urobilinogen   


 


Ur Leukocyte Esterase   


 


Urine WBC (Auto)   


 


Urine RBC (Auto)   


 


U Epithel Cells (Auto)   


 


Urine Bacteria (Auto)   


 


Ur Random Sodium   


 


Ur Random Potassium   


 


Ur Random Chloride   


 


Acetone, Qual    Positive small 1+














  10/06/19 10/06/19 10/06/19





  21:00 21:00 21:16


 


WBC   


 


RBC   


 


Hgb   


 


Hct   


 


MCV   


 


MCH   


 


MCHC   


 


RDW   


 


Plt Count   


 


MPV   


 


Absolute Neuts (auto)   


 


Neutrophils %   


 


Lymphocytes %   


 


Monocytes %   


 


Eosinophils %   


 


Basophils %   


 


Nucleated RBC %   


 


PT with INR   13.60 H 


 


INR   1.15 H 


 


Anticoagulation Therapy   


 


Puncture Site   


 


ABG pH   


 


ABG pCO2 at Pt Temp   


 


ABG pO2 at Pt Temp   


 


ABG HCO3   


 


ABG O2 Sat (Measured)   


 


ABG O2 Content   


 


ABG Base Excess   


 


Gonzalo Test   


 


Carboxyhemoglobin   


 


Methemoglobin   


 


O2 Delivery Device   


 


Oxygen Flow Rate   


 


Vent Mode   


 


Vent Rate   


 


Mechanical Rate   


 


Pressure Support Vent   


 


Sodium   


 


Potassium   


 


Chloride   


 


Carbon Dioxide   


 


Anion Gap   


 


BUN   


 


Creatinine   


 


Est GFR (CKD-EPI)AfAm   


 


Est GFR (CKD-EPI)NonAf   


 


POC Glucometer   


 


Random Glucose   


 


Lactic Acid  2.1 H  


 


Calcium   


 


Phosphorus   


 


Magnesium   


 


Total Bilirubin   


 


AST   


 


ALT   


 


Alkaline Phosphatase   


 


Troponin I   


 


Total Protein   


 


Albumin   


 


Lipase   


 


Urine Color    Yellow


 


Urine Appearance    Clear


 


Urine pH    5.5


 


Ur Specific Gravity    <= 1.005 L


 


Urine Protein    Negative


 


Urine Glucose (UA)    3+ H


 


Urine Ketones    2+ H


 


Urine Blood    Trace-intact


 


Urine Nitrite    Negative


 


Urine Bilirubin    Negative


 


Urine Urobilinogen    0.2


 


Ur Leukocyte Esterase    1+ H


 


Urine WBC (Auto)    30-50


 


Urine RBC (Auto)    0-4


 


U Epithel Cells (Auto)    0-5


 


Urine Bacteria (Auto)    Moderate


 


Ur Random Sodium   


 


Ur Random Potassium   


 


Ur Random Chloride   


 


Acetone, Qual   














  10/06/19 10/07/19 10/07/19





  23:00 00:20 00:20


 


WBC   


 


RBC   


 


Hgb   


 


Hct   


 


MCV   


 


MCH   


 


MCHC   


 


RDW   


 


Plt Count   


 


MPV   


 


Absolute Neuts (auto)   


 


Neutrophils %   


 


Lymphocytes %   


 


Monocytes %   


 


Eosinophils %   


 


Basophils %   


 


Nucleated RBC %   


 


PT with INR   


 


INR   


 


Anticoagulation Therapy   


 


Puncture Site   


 


ABG pH   


 


ABG pCO2 at Pt Temp   


 


ABG pO2 at Pt Temp   


 


ABG HCO3   


 


ABG O2 Sat (Measured)   


 


ABG O2 Content   


 


ABG Base Excess   


 


Gonzalo Test   


 


Carboxyhemoglobin   


 


Methemoglobin   


 


O2 Delivery Device   


 


Oxygen Flow Rate   


 


Vent Mode   


 


Vent Rate   


 


Mechanical Rate   


 


Pressure Support Vent   


 


Sodium  137  


 


Potassium  4.8  


 


Chloride  98  


 


Carbon Dioxide  24  


 


Anion Gap  14  


 


BUN  57.2 H  


 


Creatinine  2.3 H  


 


Est GFR (CKD-EPI)AfAm  30.17  


 


Est GFR (CKD-EPI)NonAf  26.04  


 


POC Glucometer   


 


Random Glucose  744 H*  


 


Lactic Acid   3.1 H* 


 


Calcium  9.5  


 


Phosphorus  3.2   Cancelled


 


Magnesium  3.1 H   Cancelled


 


Total Bilirubin   


 


AST   


 


ALT   


 


Alkaline Phosphatase   


 


Troponin I   


 


Total Protein   


 


Albumin   


 


Lipase   


 


Urine Color   


 


Urine Appearance   


 


Urine pH   


 


Ur Specific Gravity   


 


Urine Protein   


 


Urine Glucose (UA)   


 


Urine Ketones   


 


Urine Blood   


 


Urine Nitrite   


 


Urine Bilirubin   


 


Urine Urobilinogen   


 


Ur Leukocyte Esterase   


 


Urine WBC (Auto)   


 


Urine RBC (Auto)   


 


U Epithel Cells (Auto)   


 


Urine Bacteria (Auto)   


 


Ur Random Sodium   


 


Ur Random Potassium   


 


Ur Random Chloride   


 


Acetone, Qual   














  10/07/19 10/07/19 10/07/19





  00:45 01:30 02:33


 


WBC   


 


RBC   


 


Hgb   


 


Hct   


 


MCV   


 


MCH   


 


MCHC   


 


RDW   


 


Plt Count   


 


MPV   


 


Absolute Neuts (auto)   


 


Neutrophils %   


 


Lymphocytes %   


 


Monocytes %   


 


Eosinophils %   


 


Basophils %   


 


Nucleated RBC %   


 


PT with INR   


 


INR   


 


Anticoagulation Therapy  No Result Required.  


 


Puncture Site  Right radial  


 


ABG pH  7.42  


 


ABG pCO2 at Pt Temp  38.6  


 


ABG pO2 at Pt Temp  84.6  


 


ABG HCO3  24.4  


 


ABG O2 Sat (Measured)  96.8  


 


ABG O2 Content  16.3  


 


ABG Base Excess  0.5  


 


Gonzalo Test  Positive  


 


Carboxyhemoglobin  2.2 H  


 


Methemoglobin  < 1.0  


 


O2 Delivery Device  Room air  


 


Oxygen Flow Rate  21%  


 


Vent Mode  No Result Required.  


 


Vent Rate  No Result Required.  


 


Mechanical Rate  No Result Required.  


 


Pressure Support Vent  No Result Required.  


 


Sodium   


 


Potassium   


 


Chloride   


 


Carbon Dioxide   


 


Anion Gap   


 


BUN   


 


Creatinine   


 


Est GFR (CKD-EPI)AfAm   


 


Est GFR (CKD-EPI)NonAf   


 


POC Glucometer   485  363


 


Random Glucose   


 


Lactic Acid   


 


Calcium   


 


Phosphorus   


 


Magnesium   


 


Total Bilirubin   


 


AST   


 


ALT   


 


Alkaline Phosphatase   


 


Troponin I   


 


Total Protein   


 


Albumin   


 


Lipase   


 


Urine Color   


 


Urine Appearance   


 


Urine pH   


 


Ur Specific Gravity   


 


Urine Protein   


 


Urine Glucose (UA)   


 


Urine Ketones   


 


Urine Blood   


 


Urine Nitrite   


 


Urine Bilirubin   


 


Urine Urobilinogen   


 


Ur Leukocyte Esterase   


 


Urine WBC (Auto)   


 


Urine RBC (Auto)   


 


U Epithel Cells (Auto)   


 


Urine Bacteria (Auto)   


 


Ur Random Sodium   


 


Ur Random Potassium   


 


Ur Random Chloride   


 


Acetone, Qual   














  10/07/19 10/07/19 10/07/19





  03:00 03:00 03:00


 


WBC  15.8 H  


 


RBC  4.40  


 


Hgb  12.5  


 


Hct  37.8  


 


MCV  86.0  


 


MCH  28.5  


 


MCHC  33.1  


 


RDW  13.6  


 


Plt Count  180  


 


MPV  9.5  


 


Absolute Neuts (auto)   


 


Neutrophils %   


 


Lymphocytes %   


 


Monocytes %   


 


Eosinophils %   


 


Basophils %   


 


Nucleated RBC %   


 


PT with INR   


 


INR   


 


Anticoagulation Therapy   


 


Puncture Site   


 


ABG pH   


 


ABG pCO2 at Pt Temp   


 


ABG pO2 at Pt Temp   


 


ABG HCO3   


 


ABG O2 Sat (Measured)   


 


ABG O2 Content   


 


ABG Base Excess   


 


Gonzalo Test   


 


Carboxyhemoglobin   


 


Methemoglobin   


 


O2 Delivery Device   


 


Oxygen Flow Rate   


 


Vent Mode   


 


Vent Rate   


 


Mechanical Rate   


 


Pressure Support Vent   


 


Sodium   144 


 


Potassium   4.3 


 


Chloride   105 


 


Carbon Dioxide   31 


 


Anion Gap   9 


 


BUN   49.4 H 


 


Creatinine   2.2 H 


 


Est GFR (CKD-EPI)AfAm   31.84 


 


Est GFR (CKD-EPI)NonAf   27.47 


 


POC Glucometer   


 


Random Glucose   351 H 


 


Lactic Acid    3.4 H*


 


Calcium   9.8 


 


Phosphorus   


 


Magnesium   


 


Total Bilirubin   


 


AST   


 


ALT   


 


Alkaline Phosphatase   


 


Troponin I   0.03 


 


Total Protein   


 


Albumin   


 


Lipase   


 


Urine Color   


 


Urine Appearance   


 


Urine pH   


 


Ur Specific Gravity   


 


Urine Protein   


 


Urine Glucose (UA)   


 


Urine Ketones   


 


Urine Blood   


 


Urine Nitrite   


 


Urine Bilirubin   


 


Urine Urobilinogen   


 


Ur Leukocyte Esterase   


 


Urine WBC (Auto)   


 


Urine RBC (Auto)   


 


U Epithel Cells (Auto)   


 


Urine Bacteria (Auto)   


 


Ur Random Sodium   


 


Ur Random Potassium   


 


Ur Random Chloride   


 


Acetone, Qual   














  10/07/19 10/07/19 10/07/19





  03:00 03:48 04:37


 


WBC   


 


RBC   


 


Hgb   


 


Hct   


 


MCV   


 


MCH   


 


MCHC   


 


RDW   


 


Plt Count   


 


MPV   


 


Absolute Neuts (auto)   


 


Neutrophils %   


 


Lymphocytes %   


 


Monocytes %   


 


Eosinophils %   


 


Basophils %   


 


Nucleated RBC %   


 


PT with INR   


 


INR   


 


Anticoagulation Therapy   


 


Puncture Site   


 


ABG pH   


 


ABG pCO2 at Pt Temp   


 


ABG pO2 at Pt Temp   


 


ABG HCO3   


 


ABG O2 Sat (Measured)   


 


ABG O2 Content   


 


ABG Base Excess   


 


Gonzalo Test   


 


Carboxyhemoglobin   


 


Methemoglobin   


 


O2 Delivery Device   


 


Oxygen Flow Rate   


 


Vent Mode   


 


Vent Rate   


 


Mechanical Rate   


 


Pressure Support Vent   


 


Sodium   


 


Potassium   


 


Chloride   


 


Carbon Dioxide   


 


Anion Gap   


 


BUN   


 


Creatinine   


 


Est GFR (CKD-EPI)AfAm   


 


Est GFR (CKD-EPI)NonAf   


 


POC Glucometer   252  205


 


Random Glucose   


 


Lactic Acid   


 


Calcium   


 


Phosphorus   


 


Magnesium   


 


Total Bilirubin   


 


AST   


 


ALT   


 


Alkaline Phosphatase   


 


Troponin I   


 


Total Protein   


 


Albumin   


 


Lipase   


 


Urine Color   


 


Urine Appearance   


 


Urine pH   


 


Ur Specific Gravity   


 


Urine Protein   


 


Urine Glucose (UA)   


 


Urine Ketones   


 


Urine Blood   


 


Urine Nitrite   


 


Urine Bilirubin   


 


Urine Urobilinogen   


 


Ur Leukocyte Esterase   


 


Urine WBC (Auto)   


 


Urine RBC (Auto)   


 


U Epithel Cells (Auto)   


 


Urine Bacteria (Auto)   


 


Ur Random Sodium  10 L  


 


Ur Random Potassium  22.0 L  


 


Ur Random Chloride  < 11 L  


 


Acetone, Qual   














  10/07/19 10/07/19 10/07/19





  05:38 05:50 06:26


 


WBC   


 


RBC   


 


Hgb   


 


Hct   


 


MCV   


 


MCH   


 


MCHC   


 


RDW   


 


Plt Count   


 


MPV   


 


Absolute Neuts (auto)   


 


Neutrophils %   


 


Lymphocytes %   


 


Monocytes %   


 


Eosinophils %   


 


Basophils %   


 


Nucleated RBC %   


 


PT with INR   


 


INR   


 


Anticoagulation Therapy   


 


Puncture Site   


 


ABG pH   


 


ABG pCO2 at Pt Temp   


 


ABG pO2 at Pt Temp   


 


ABG HCO3   


 


ABG O2 Sat (Measured)   


 


ABG O2 Content   


 


ABG Base Excess   


 


Gonzalo Test   


 


Carboxyhemoglobin   


 


Methemoglobin   


 


O2 Delivery Device   


 


Oxygen Flow Rate   


 


Vent Mode   


 


Vent Rate   


 


Mechanical Rate   


 


Pressure Support Vent   


 


Sodium   146 H 


 


Potassium   4.0 


 


Chloride   108 H 


 


Carbon Dioxide   30 


 


Anion Gap   8 


 


BUN   45.1 H 


 


Creatinine   1.9 H 


 


Est GFR (CKD-EPI)AfAm   38.02 


 


Est GFR (CKD-EPI)NonAf   32.80 


 


POC Glucometer  158   166


 


Random Glucose   148 H 


 


Lactic Acid   


 


Calcium   9.7 


 


Phosphorus   2.0 L 


 


Magnesium   3.1 H 


 


Total Bilirubin   


 


AST   


 


ALT   


 


Alkaline Phosphatase   


 


Troponin I   


 


Total Protein   


 


Albumin   


 


Lipase   


 


Urine Color   


 


Urine Appearance   


 


Urine pH   


 


Ur Specific Gravity   


 


Urine Protein   


 


Urine Glucose (UA)   


 


Urine Ketones   


 


Urine Blood   


 


Urine Nitrite   


 


Urine Bilirubin   


 


Urine Urobilinogen   


 


Ur Leukocyte Esterase   


 


Urine WBC (Auto)   


 


Urine RBC (Auto)   


 


U Epithel Cells (Auto)   


 


Urine Bacteria (Auto)   


 


Ur Random Sodium   


 


Ur Random Potassium   


 


Ur Random Chloride   


 


Acetone, Qual   








Active Medications











Generic Name Dose Route Start Last Admin





  Trade Name Freq  PRN Reason Stop Dose Admin


 


Atorvastatin Calcium  40 mg  10/07/19 22:00  





  Lipitor -  PO   





  [facility]   





     





     





     





     


 


Chlorhexidine Gluconate  1 applic  10/07/19 22:00  





  Hibiclens For Decolonization -  TP   





  [facility]   





     





     





     





     


 


Clopidogrel Bisulfate  75 mg  10/07/19 10:00  





  Plavix -  PO   





  [facility]   





     





     





     





     


 


Ezetimibe  10 mg  10/07/19 22:00  





  Zetia -  PO   





  [facility]   





     





     





     





     


 


Heparin Sodium (Porcine)  5,000 unit  10/07/19 06:00  10/07/19 05:35





  Heparin -  SQ   5,000 unit





  TID [facility]   Administration





     





     





     





     


 


Ceftriaxone Sodium 1 gm/  50 mls @ 100 mls/hr  10/07/19 10:00  





  Dextrose  IVPB  10/07/19 10:29  





  ONCE ONE   





     





     





     





     


 


Potassium Chloride/Dextrose/Sod Cl  20 meq in 1,000 mls @ 200 mls/hr  10/07/19 

05:45  10/07/19 05:41





  D5-1/2ns+20 Meq Kcl -  IV   200 mls/hr





  ASDIR [facility]   Administration





     





     





     





     


 


Insulin Aspart  1 vial  10/07/19 07:00  10/07/19 06:26





  Novolog Vial Sliding Scale -  SQ   2 units





  [facility]   Administration





     





     





  Protocol   





     


 


Mupirocin  1 applic  10/07/19 10:00  





  Bactroban Ointment (For Decolonization) -  NS  10/12/19 09:59  





  BID M[facility]   





     





     





     





     


 


Nebivolol  2.5 mg  10/07/19 22:00  





  Bystolic -  PO   





  [facility]   





     





     





     





     


 


Tamsulosin HCl  0.4 mg  10/07/19 08:30  





  Flomax -  PO   





  DAILY@0830 [facility]   





     





     





     





     











ASSESSMENT/PLAN:








78 y/o/m with PMHx of IDDM, CAD s/p CABG, MI in 1998, CHF, CKD, COPD, dementia 

here for altered mental status BIB EMS from home. Patient has history of 

elevated sugars in the 600s.








#Endo - [facility]


-DMT2, hx of sugars in the 600s at home, initial sugar 916 here


-initial anion gap at 16, ABG w/o acidosis, likely [facility] vs less likely DKA, 

anion gap now closed 


-BGM monitoring 


-novolog SS





#resp


-O2 as needed


-aspiration precautions 





#GI


-CT shows stool retention in rectosigmoid colon with findings suggestive of 

early stercoral colitis


-fleet enema for possible relief of constipation


-consider fecal disimpaction if unsuccessful 





#renal - DEB 


-CT shows distended bladder, camacho placed with release of > 700 cc urine


-hold lasix as pt needs fluid rehydration 


-Cr elevated at 1.9 today 





#


-UA with 1+ leuk esterase and 2+ ketones


-ceftriaxone 1g 





#cardio


-elevated trop (initial 0.05)


-trending down with second trop


-EKG without acute ischemic changes 








#Lactic acidosis


-initial lactic 2.1 


-lactic acid today 3.4 


-trend and continue IV fluids





#Neuro


-neuro consult 





#F/E/N


-trend lytes, replete PRN 


-D5LR for fluids 


-d/c Camacho 





DVT ppx: c/w plavix 





Code Status: Full Code





Dispo: Floor





Brenden Pringle MD


PGY-1, Critical Care/ICU


[id number]








Visit type





- Emergency Visit


Emergency Visit: Yes


ED Registration Date: 10/06/19


Care time: The patient presented to the Emergency Department on the above date 

and was hospitalized for further evaluation of their emergent condition.





- New Patient


This patient is new to me today: Yes


Date on this admission: 10/07/19





- Critical Care


Critical Care patient: Yes


Total Critical Care Time (in minutes): 35


Critical Care Statement: The care of this patient involved high complexity 

decision making to prevent further life threatening deterioration of the patient

's condition and/or to evaluate & treat vital organ system(s) failure or risk 

of failure.





ATTENDING PHYSICIAN STATEMENT





I saw and evaluated the patient.


I reviewed the resident's note and discussed the case with the resident.


I agree with the resident's findings and plan as documented.








SUBJECTIVE:








OBJECTIVE:








ASSESSMENT AND PLAN:

## 2019-10-08 LAB
ANION GAP SERPL CALC-SCNC: 8 MMOL/L (ref 8–16)
BUN SERPL-MCNC: 28.4 MG/DL (ref 7–18)
CALCIUM SERPL-MCNC: 9 MG/DL (ref 8.5–10.1)
CHLORIDE SERPL-SCNC: 111 MMOL/L (ref 98–107)
CO2 SERPL-SCNC: 27 MMOL/L (ref 21–32)
CREAT SERPL-MCNC: 1.7 MG/DL (ref 0.55–1.3)
GLUCOSE SERPL-MCNC: 358 MG/DL (ref 74–106)
POTASSIUM SERPLBLD-SCNC: 4.2 MMOL/L (ref 3.5–5.1)
SODIUM SERPL-SCNC: 146 MMOL/L (ref 136–145)

## 2019-10-08 RX ADMIN — ATORVASTATIN CALCIUM SCH MG: 40 TABLET, FILM COATED ORAL at 22:49

## 2019-10-08 RX ADMIN — HEPARIN SODIUM SCH UNIT: 5000 INJECTION, SOLUTION INTRAVENOUS; SUBCUTANEOUS at 06:40

## 2019-10-08 RX ADMIN — SODIUM CHLORIDE, POTASSIUM CHLORIDE, SODIUM LACTATE AND CALCIUM CHLORIDE SCH MLS/HR: 600; 310; 30; 20 INJECTION, SOLUTION INTRAVENOUS at 06:42

## 2019-10-08 RX ADMIN — INSULIN ASPART SCH UNITS: 100 INJECTION, SOLUTION INTRAVENOUS; SUBCUTANEOUS at 16:42

## 2019-10-08 RX ADMIN — INSULIN ASPART SCH UNITS: 100 INJECTION, SOLUTION INTRAVENOUS; SUBCUTANEOUS at 11:19

## 2019-10-08 RX ADMIN — TAMSULOSIN HYDROCHLORIDE SCH MG: 0.4 CAPSULE ORAL at 08:41

## 2019-10-08 RX ADMIN — EZETIMIBE SCH MG: 10 TABLET ORAL at 22:49

## 2019-10-08 RX ADMIN — INSULIN ASPART SCH UNITS: 100 INJECTION, SOLUTION INTRAVENOUS; SUBCUTANEOUS at 06:41

## 2019-10-08 RX ADMIN — CLOPIDOGREL BISULFATE SCH MG: 75 TABLET, FILM COATED ORAL at 10:05

## 2019-10-08 RX ADMIN — HEPARIN SODIUM SCH UNIT: 5000 INJECTION, SOLUTION INTRAVENOUS; SUBCUTANEOUS at 22:49

## 2019-10-08 RX ADMIN — NEBIVOLOL HYDROCHLORIDE SCH MG: 2.5 TABLET ORAL at 22:49

## 2019-10-08 RX ADMIN — HEPARIN SODIUM SCH UNIT: 5000 INJECTION, SOLUTION INTRAVENOUS; SUBCUTANEOUS at 13:38

## 2019-10-08 RX ADMIN — INSULIN ASPART SCH UNITS: 100 INJECTION, SOLUTION INTRAVENOUS; SUBCUTANEOUS at 22:49

## 2019-10-08 NOTE — PN
Teaching Attending Note


Name of Resident: Collins Beal





ATTENDING PHYSICIAN STATEMENT





I saw and evaluated the patient.


I reviewed the resident's note and discussed the case with the resident.


I agree with the resident's findings and plan as documented.








SUBJECTIVE: Patient is more alert and remains confused. He appears comfortable.








OBJECTIVE:


 Vital Signs











 Period  Temp  Pulse  Resp  BP Sys/Ramírez  Pulse Ox


 


 Last 24 Hr  98.1 F-99.3 F  86-92  18-20  115-139/53-92  








GENERAL: Awake, alert, confused, in no distress


HEART: S1S2, RRR


LUNGS: Clear


ABDOMEN: Soft, non-distended, no apparent tenderness, normal BS


EXTREMITIES: No edema











 Laboratory Results - last 24 hr











  10/07/19 10/08/19 10/08/19





  20:56 06:39 11:12


 


Sodium   


 


Potassium   


 


Chloride   


 


Carbon Dioxide   


 


Anion Gap   


 


BUN   


 


Creatinine   


 


Est GFR (CKD-EPI)AfAm   


 


Est GFR (CKD-EPI)NonAf   


 


POC Glucometer  458  430  409


 


Random Glucose   


 


Calcium   














  10/08/19 10/08/19 10/08/19





  12:07 13:00 16:30


 


Sodium   146 H 


 


Potassium   4.2 


 


Chloride   111 H 


 


Carbon Dioxide   27 


 


Anion Gap   8 


 


BUN   28.4 H 


 


Creatinine   1.7 H 


 


Est GFR (CKD-EPI)AfAm   43.49 


 


Est GFR (CKD-EPI)NonAf   37.52 


 


POC Glucometer  358   383


 


Random Glucose   358 H 


 


Calcium   9.0 








 Current Medications











Generic Name Dose Route Start Last Admin





  Trade Name Freq  PRN Reason Stop Dose Admin


 


Atorvastatin Calcium  40 mg  10/07/19 22:00  10/07/19 21:29





  Lipitor -  PO   Not Given





  HS MARY   





     





     





     





     


 


Clopidogrel Bisulfate  75 mg  10/08/19 10:00  10/08/19 10:05





  Plavix -  PO   75 mg





  DAILY MARY   Administration





     





     





     





     


 


Ezetimibe  10 mg  10/07/19 22:00  10/07/19 21:30





  Zetia -  PO   Not Given





  HS MARY   





     





     





     





     


 


Escitalopram Oxalate  10 mg  10/08/19 10:00  10/08/19 10:05





  Lexapro -  PO   10 mg





  DAILY MARY   Administration





     





     





     





     


 


Heparin Sodium (Porcine)  5,000 unit  10/07/19 22:00  10/08/19 13:38





  Heparin -  SQ   5,000 unit





  TID MARY   Administration





     





     





     





     


 


Lactated Ringer's  1,000 ml in 1,000 mls @ 100 mls/hr  10/07/19 14:15  10/08/19 

06:42





  Lactated Ringers Solution  IV   100 mls/hr





  ASDIR MARY   Administration





     





     





     





     


 


Insulin Aspart  1 vial  10/07/19 22:00  10/08/19 16:42





  Novolog Vial Sliding Scale -  SQ   10 units





  ACHS MARY   Administration





     





     





  Protocol   





     


 


Insulin Detemir  10 units  10/08/19 22:00  





  Levemir Vial  SQ   





  HS MARY   





     





     





     





     


 


Nebivolol  2.5 mg  10/07/19 22:00  10/07/19 21:29





  Bystolic -  PO   Not Given





  HS MARY   





     





     





     





     


 


Tamsulosin HCl  0.4 mg  10/08/19 08:30  10/08/19 08:41





  Flomax -  PO   0.4 mg





  DAILY@0830 [facility]   Administration





     





     





     





     














ASSESSMENT AND PLAN:


This is a 79 year old man with a history of HTN, hyperlipidemia, CAD, MI, CABG, 

chronic diastolic heart failure, type 2 DM, stage 3 CKD, COPD, dementia who 

presented to the ED with altered mental status. 





1. Acute metabolic encephalopathy secondary to hyperosmolar hyperglycemic state 

from uncontrolled type 2 DM


   - HbA1c 12.6


   - Continue Levemir, Novolog sliding scale


2. Acute kidney injury


   - Improving


   - Continue IV fluid


   - Continue to hold Lasix


3. Abdominal pain secondary to constipation


   - Given Fleet enema


   - Pain appears to have resolved 


4. Possible UTI


   - Levaquin, Flagyl given in ED


   - Ceftriaxone given yesterday


   - Follow-up urine culture


5. Lactic acidosis


   - Improved with IV fluid


6. Hypernatremia


   - Continue IV fluid


7. Hypophosphatemia


   - Given K-Phos


   - Recheck phosphorus


8. HTN


   - Continue Bystolic


9. Hyperlipidemia


   - Continue Lipitor, Zetia


10. CAD, history of MI, CABG


   - Continue Bystolic, Plavix, Lipitor, Zetia


11. Chronic diastolic heart failure


   - Stable


12. Stage 3 CKD


13. COPD


   - Stable


14. Alzheimer dementia

## 2019-10-08 NOTE — PN
Progress Note, Physician


History of Present Illness: 





events noted


Chart reviewed 


Off the medical ICU to the floor


No significant change since yesterday


no new neurological event


Withdrawn confused garbled speech





- Current Medication List


Current Medications: 


Active Medications





Atorvastatin Calcium (Lipitor -)  40 mg PO [facility]


   Last Admin: 10/07/19 21:29 Dose:  Not Given


Clopidogrel Bisulfate (Plavix -)  75 mg PO DAILY [facility]


   Last Admin: 10/08/19 10:05 Dose:  75 mg


Ezetimibe (Zetia -)  10 mg PO [facility]


   Last Admin: 10/07/19 21:30 Dose:  Not Given


Escitalopram Oxalate (Lexapro -)  10 mg PO DAILY [facility]


   Last Admin: 10/08/19 10:05 Dose:  10 mg


Heparin Sodium (Porcine) (Heparin -)  5,000 unit SQ TID [facility]


   Last Admin: 10/08/19 13:38 Dose:  5,000 unit


Lactated Ringer's (Lactated Ringers Solution)  1,000 ml in 1,000 mls @ 100 mls/

hr IV ASDIR [facility]


   Last Admin: 10/08/19 06:42 Dose:  100 mls/hr


Insulin Aspart (Novolog Vial Sliding Scale -)  1 vial SQ [facility]; Protocol


   Last Admin: 10/08/19 16:42 Dose:  10 units


Insulin Detemir (Levemir Vial)  10 units SQ [facility]


Nebivolol (Bystolic -)  2.5 mg PO [facility]


   Last Admin: 10/07/19 21:29 Dose:  Not Given


Tamsulosin HCl (Flomax -)  0.4 mg PO DAILY@0830 [facility]


   Last Admin: 10/08/19 08:41 Dose:  0.4 mg











- Objective


Vital Signs: 


 Vital Signs











Temperature  98.1 F   10/08/19 14:48


 


Pulse Rate  91 H  10/08/19 14:48


 


Respiratory Rate  18   10/08/19 14:48


 


Blood Pressure  115/53 L  10/08/19 14:48


 


O2 Sat by Pulse Oximetry (%)  98   10/08/19 09:00











Constitutional: Yes: Well Nourished


Eyes: Yes: WNL


Neurological: Yes: Alert, Oriented, Babinski positive


...Motor Strength: WNL


Labs: 


 CBC, BMP





 10/07/19 08:20 





 10/08/19 13:00 





 INR, PTT











INR  1.15  (0.83-1.09)  H  10/06/19  21:00    














Problem List





- Problems


(1) Altered mental status


Assessment/Plan: 


**Baseline dementia of the Alzheimer type


**Organic brain syndrome


** advanced stage of normal pressure hydrocephalus


1.  Conservative treatment with medication


2. Patient is not a candidate for  shunt


3.  Fall precautions.


4.  Trial of Aricept 5 mg once daily.


5.  Trial of Lexapro 10 mg once daily.


Code(s): R41.82 - ALTERED MENTAL STATUS, UNSPECIFIED   


Qualifiers: 


   Altered mental status type: somnolence   Qualified Code(s): R40.0 - 

Somnolence

## 2019-10-08 NOTE — PN
Progress Note (short form)





- Note


Progress Note: 





HPI: Limited due to patients clinical condition. Pt remains with incoherent 

speech and relative altered mentation


 


 Vital Signs











Temperature  98.1 F   10/08/19 14:48


 


Pulse Rate  91 H  10/08/19 14:48


 


Respiratory Rate  18   10/08/19 14:48


 


Blood Pressure  115/53 L  10/08/19 14:48


 


O2 Sat by Pulse Oximetry (%)  98   10/08/19 09:00











PE:


Gen: NAD, nonverbal, contracted, oriented to self


HEENT: NC/AT, opacification of R eye, sclera anicteric, MMM


Neck: No JVD


Lungs: CTA b/l no wheezes or rales


CARD: RRR no murmurs


ABD: No facial grimmacing to palpation, soft, nondistended, no guarding


EXT: no edema


 CBC, BMP





 10/07/19 08:20 





 10/08/19 13:00 








Active Medications





Atorvastatin Calcium (Lipitor -)  40 mg PO HS [facility]


   Last Admin: 10/07/19 21:29 Dose:  Not Given


Clopidogrel Bisulfate (Plavix -)  75 mg PO DAILY [facility]


   Last Admin: 10/08/19 10:05 Dose:  75 mg


Donepezil HCl (Aricept -)  5 mg PO ONCE [facility]


   Last Admin: 10/08/19 18:54 Dose:  5 mg


Ezetimibe (Zetia -)  10 mg PO HS [facility]


   Last Admin: 10/07/19 21:30 Dose:  Not Given


Escitalopram Oxalate (Lexapro -)  10 mg PO DAILY [facility]


   Stop: 10/14/19 18:11


Heparin Sodium (Porcine) (Heparin -)  5,000 unit SQ TID [facility]


   Last Admin: 10/08/19 13:38 Dose:  5,000 unit


Lactated Ringer's (Lactated Ringers Solution)  1,000 ml in 1,000 mls @ 100 mls/

hr IV ASDIR [facility]


   Last Admin: 10/08/19 06:42 Dose:  100 mls/hr


Insulin [facility]  [facility]


   Last Admin: 10/08/19 16:42 Dose:  10 units


Insulin Detemir (Levemir Vial)  10 units SQ [facility]


Nebivolol (Bystolic -)  2.5 mg PO HS [facility]


   Last Admin: 10/07/19 21:29 Dose:  Not Given


Tamsulosin HCl (Flomax -)  0.4 mg PO DAILY@0830 MARY


   Last Admin: 10/08/19 08:41 Dose:  0.4 mg








A/P


Hyperosmolar hyperglycemia state (resolving)


Acute kidney insufficiency


Acute metabolic encephalopathy; r/o NPH


Dementia


Leukocytosis





--Hyperglycemia noted today


   --Added Long-acting insulin (Levemir 10U HS) weight-based calculated


   --ISS coverage for now


--LR@100cc/hr continued


--apprecited Neurology recommendations:


   Trial Lexapro 10mg qdaily and Aricept 5mg


--Will need to speak with family about pt's baseline 


--Continue home medications as below:


   Bystolic 2.5mg HS


   FLomax 0.4mg qdaily


   Ezetimibe 10mg HS


   Plavix 75mg qdaily


   Lipitor 40mg HS





FEN:


   Fluids: LR@100cc/hr


   Electrolyte abnormalities: HyperNa and HyperCl


   Nutrition: dysphagia chopped and nutritional supplementation


PPX:


   DVT - Heparin SQ TID





Dispo: Glycemic control; monitor on M/s


Case discussed with Dr. Rufina Beal, DO - IM PGY-3

## 2019-10-08 NOTE — PN
Progress Note, SLP





- Note


Progress Note: 





 Selected Entries











  10/07/19 10/07/19 10/07/19





  01:44 01:57 06:00


 


Breakfast   


 


Diet Tolerated   


 


Temperature 98 F 98 F 98.4 F














  10/08/19 10/08/19 10/08/19





  02:00 06:00 10:59


 


Breakfast   75%


 


Diet Tolerated   Well


 


Temperature 99.3 F 99.0 F 








 Laboratory Tests











  10/06/19 10/07/19





  21:00 03:00


 


WBC  18.0 H  15.8 H








Pt on Dys chopped/thin liquids, now on 6s.





Confused. Resistant to care. Tolerating diet well. Receiving Magic cup/Glucerna 

for supplemental nutrition.

## 2019-10-09 LAB
ANION GAP SERPL CALC-SCNC: 3 MMOL/L (ref 8–16)
ANION GAP SERPL CALC-SCNC: 6 MMOL/L (ref 8–16)
BUN SERPL-MCNC: 18.2 MG/DL (ref 7–18)
BUN SERPL-MCNC: 20.2 MG/DL (ref 7–18)
CALCIUM SERPL-MCNC: 8.4 MG/DL (ref 8.5–10.1)
CALCIUM SERPL-MCNC: 8.5 MG/DL (ref 8.5–10.1)
CHLORIDE SERPL-SCNC: 113 MMOL/L (ref 98–107)
CHLORIDE SERPL-SCNC: 115 MMOL/L (ref 98–107)
CO2 SERPL-SCNC: 29 MMOL/L (ref 21–32)
CO2 SERPL-SCNC: 32 MMOL/L (ref 21–32)
CREAT SERPL-MCNC: 1.1 MG/DL (ref 0.55–1.3)
CREAT SERPL-MCNC: 1.2 MG/DL (ref 0.55–1.3)
DEPRECATED RDW RBC AUTO: 14.4 % (ref 11.9–15.9)
GLUCOSE SERPL-MCNC: 165 MG/DL (ref 74–106)
GLUCOSE SERPL-MCNC: 299 MG/DL (ref 74–106)
HCT VFR BLD CALC: 35.1 % (ref 35.4–49)
HGB BLD-MCNC: 11.8 GM/DL (ref 11.7–16.9)
MCH RBC QN AUTO: 29.6 PG (ref 25.7–33.7)
MCHC RBC AUTO-ENTMCNC: 33.6 G/DL (ref 32–35.9)
MCV RBC: 88.1 FL (ref 80–96)
PLATELET # BLD AUTO: 131 K/MM3 (ref 134–434)
PMV BLD: 10.2 FL (ref 7.5–11.1)
POTASSIUM SERPLBLD-SCNC: 3.9 MMOL/L (ref 3.5–5.1)
POTASSIUM SERPLBLD-SCNC: 4.3 MMOL/L (ref 3.5–5.1)
RBC # BLD AUTO: 3.99 M/MM3 (ref 4–5.6)
SODIUM SERPL-SCNC: 147 MMOL/L (ref 136–145)
SODIUM SERPL-SCNC: 150 MMOL/L (ref 136–145)
WBC # BLD AUTO: 8.6 K/MM3 (ref 4–10)

## 2019-10-09 RX ADMIN — SODIUM CHLORIDE SCH MLS/HR: 4.5 INJECTION, SOLUTION INTRAVENOUS at 11:29

## 2019-10-09 RX ADMIN — SODIUM CHLORIDE, POTASSIUM CHLORIDE, SODIUM LACTATE AND CALCIUM CHLORIDE SCH MLS/HR: 600; 310; 30; 20 INJECTION, SOLUTION INTRAVENOUS at 03:37

## 2019-10-09 RX ADMIN — HEPARIN SODIUM SCH UNIT: 5000 INJECTION, SOLUTION INTRAVENOUS; SUBCUTANEOUS at 15:18

## 2019-10-09 RX ADMIN — INSULIN ASPART SCH UNIT: 100 INJECTION, SOLUTION INTRAVENOUS; SUBCUTANEOUS at 11:31

## 2019-10-09 RX ADMIN — HEPARIN SODIUM SCH UNIT: 5000 INJECTION, SOLUTION INTRAVENOUS; SUBCUTANEOUS at 22:06

## 2019-10-09 RX ADMIN — HEPARIN SODIUM SCH UNIT: 5000 INJECTION, SOLUTION INTRAVENOUS; SUBCUTANEOUS at 06:52

## 2019-10-09 RX ADMIN — ACETAMINOPHEN PRN MG: 325 TABLET ORAL at 12:31

## 2019-10-09 RX ADMIN — INSULIN ASPART SCH UNITS: 100 INJECTION, SOLUTION INTRAVENOUS; SUBCUTANEOUS at 06:48

## 2019-10-09 RX ADMIN — CLOPIDOGREL BISULFATE SCH MG: 75 TABLET, FILM COATED ORAL at 09:58

## 2019-10-09 RX ADMIN — TAMSULOSIN HYDROCHLORIDE SCH MG: 0.4 CAPSULE ORAL at 09:58

## 2019-10-09 RX ADMIN — NEBIVOLOL HYDROCHLORIDE SCH MG: 2.5 TABLET ORAL at 22:05

## 2019-10-09 RX ADMIN — INSULIN ASPART SCH UNIT: 100 INJECTION, SOLUTION INTRAVENOUS; SUBCUTANEOUS at 16:52

## 2019-10-09 RX ADMIN — DONEPEZIL HYDROCHLORIDE SCH MG: 5 TABLET, FILM COATED ORAL at 09:58

## 2019-10-09 RX ADMIN — ESCITALOPRAM OXALATE SCH MG: 10 TABLET, FILM COATED ORAL at 09:58

## 2019-10-09 RX ADMIN — INSULIN ASPART SCH: 100 INJECTION, SOLUTION INTRAVENOUS; SUBCUTANEOUS at 22:05

## 2019-10-09 RX ADMIN — ATORVASTATIN CALCIUM SCH MG: 40 TABLET, FILM COATED ORAL at 22:05

## 2019-10-09 RX ADMIN — EZETIMIBE SCH MG: 10 TABLET ORAL at 22:04

## 2019-10-09 NOTE — CONSULT
Consult


Consult Specialty:: Nephrology


Reason for Consultation:: DEB and hypernatremia





- History of Present Illness


Chief Complaint: initially presented with elevated blood sugar


History of Present Illness: 





Pt is a 79 year old male with pmhx of dm, cad, mi, cabg, chf, ckd, and dementia 

who presented to the ER with high blood sugar. The machine at home was reading 

above 600. His wife is at bedside and assisted with history. The chart was also 

reviewed. He was admitted and treated. His renal function improved with 

hydration however his serum sodium is elevated. I was called to evaluate him 

for deb and hypernatremia. He is confused and unable to give much history. 





- History Source


History Provided By: Family Member, Medical Record





- Past Medical History


CNS: Yes: Dementia


Cardio/Vascular: Yes: CAD, HTN, Hyperlipdemia


Pulmonary: Yes: Asthma, COPD


Renal/: Yes: Renal Inusuff





- Past Surgical History


Past Surgical History: Yes: CABG, Joint Replacement (R hip ORIF)





- Alcohol/Substance Use


Hx Alcohol Use: No





- Smoking History


Smoking history: Former smoker


Have you smoked in the past 12 months: No


Aproximately how many cigarettes per day: 0


If you are a former smoker, when did you quit?: '96





- Social History


Usual Living Arrangement: With Spouse


ADL: Family Assistance


History of Recent Travel: No





Home Medications





- Allergies


Allergies/Adverse Reactions: 


 Allergies











Allergy/AdvReac Type Severity Reaction Status Date / Time


 


No Known Allergies Allergy   Verified 02/15/19 11:57














- Home Medications


Home Medications: 


Ambulatory Orders





Atorvastatin Ca [Lipitor] 40 mg PO DAILY 06/10/19 


Clopidogrel Bisulfate [Plavix] 75 mg PO DAILY 06/10/19 


Ezetimibe 10 mg PO HS 06/10/19 


Furosemide 20 mg PO DAILY 06/10/19 


Insulin Degludec [Tresiba] 6 unit SQ HS 06/10/19 


Insulin Pump [Insulin Pump - (Nf)] 1 each NR DAILY 06/10/19 


Nebivolol HCl [Bystolic] 2.5 mg PO HS 06/10/19 


Tamsulosin HCl 0.4 mg PO DAILY 06/10/19 











Family Medical History


Family History: Unable to Obtain





Review of Systems


Unable to obtain ROS, reason: confused





Physical Exam


Vital Signs: 


 Vital Signs











Temperature  98.1 F   10/09/19 05:59


 


Pulse Rate  74   10/09/19 09:00


 


Respiratory Rate  18   10/09/19 09:00


 


Blood Pressure  147/73   10/09/19 09:00


 


O2 Sat by Pulse Oximetry (%)  98   10/08/19 09:00











Constitutional: Yes: Calm


Eyes: Yes: Other (legaly blind)


Neck: Yes: Supple


Cardiovascular: Yes: S1, S2


Respiratory: Yes: CTA Bilaterally


Gastrointestinal: Yes: Soft


Renal/: Yes: Camacho Present


Musculoskeletal: Yes: Muscle Weakness


Edema: No


Neurological: Yes: Confusion


Labs: 


 CBC, BMP





 10/09/19 08:10 





 10/09/19 08:10 





 Laboratory Tests











  10/06/19 10/06/19 10/06/19





  21:00 21:00 23:00


 


WBC  18.0 H  


 


Sodium   


 


Creatinine   2.5 H  2.3 H














  10/07/19 10/07/19 10/07/19





  03:00 05:50 08:20


 


WBC    14.9 H


 


Sodium  144  146 H 


 


Creatinine  2.2 H  1.9 H 














  10/08/19 10/09/19 10/09/19





  13:00 08:10 08:10


 


WBC   8.6 


 


Sodium  146 H   150 H


 


Creatinine  1.7 H   1.1














Imaging





- Results


Ultrasound: Report Reviewed





Problem List





- Problems


(1) DEB (acute kidney injury)


Code(s): N17.9 - ACUTE KIDNEY FAILURE, UNSPECIFIED   





(2) DKA (diabetic ketoacidoses)


Code(s): E11.10 - TYPE 2 DIABETES MELLITUS WITH KETOACIDOSIS WITHOUT COMA   


Qualifiers: 


   Diabetes mellitus type: type 2   Diabetes mellitus complication detail: 

without coma   Qualified Code(s): E11.10 - Type 2 diabetes mellitus with 

ketoacidosis without coma   





(3) Acute renal failure


Code(s): N17.9 - ACUTE KIDNEY FAILURE, UNSPECIFIED   





(4) Hypernatremia


Code(s): E87.0 - HYPEROSMOLALITY AND HYPERNATREMIA   





Assessment/Plan





 Current Medications











Generic Name Dose Route Start Last Admin





  Trade Name Freq  PRN Reason Stop Dose Admin


 


Acetaminophen  650 mg  10/09/19 10:34  10/09/19 12:31





  Tylenol -  PO   650 mg





  Q4H PRN   Administration





  FEVER   





     





     





     


 


Atorvastatin Calcium  40 mg  10/07/19 22:00  10/08/19 22:49





  Lipitor -  PO   40 mg





  HS MARY   Administration





     





     





     





     


 


Clopidogrel Bisulfate  75 mg  10/08/19 10:00  10/09/19 09:58





  Plavix -  PO   75 mg





  DAILY MARY   Administration





     





     





     





     


 


Donepezil HCl  5 mg  10/09/19 10:00  10/09/19 09:58





  Aricept -  PO   5 mg





  DAILY MARY   Administration





     





     





     





     


 


Ezetimibe  10 mg  10/07/19 22:00  10/08/19 22:49





  Zetia -  PO   10 mg





  HS MARY   Administration





     





     





     





     


 


Escitalopram Oxalate  10 mg  10/09/19 10:00  10/09/19 09:58





  Lexapro -  PO  10/14/19 18:11  10 mg





  DAILY MARY   Administration





     





     





     





     


 


Heparin Sodium (Porcine)  5,000 unit  10/07/19 22:00  10/09/19 06:52





  Heparin -  SQ   5,000 unit





  TID MARY   Administration





     





     





     





     


 


Sodium Chloride  1,000 mls @ 83 mls/hr  10/09/19 10:15  10/09/19 11:29





  1/2 Normal Saline  IV   83 mls/hr





  ASDIR MARY   Administration





     





     





     





     


 


Insulin Aspart  1 vial  10/09/19 08:10  10/09/19 11:31





  Novolog Vial Sliding Scale -  SQ   2 unit





  ACHS MARY   Administration





     





     





  Protocol   





     


 


Insulin Detemir  10 units  10/09/19 22:00  





  Levemir Vial  SQ   





  BID@0700,2200 MARY   





     





     





     





     


 


Nebivolol  2.5 mg  10/07/19 22:00  10/08/19 22:49





  Bystolic -  PO   2.5 mg





  HS MARY   Administration





     





     





     





     


 


Tamsulosin HCl  0.4 mg  10/08/19 08:30  10/09/19 09:58





  Flomax -  PO   0.4 mg





  DAILY@0830 MARY   Administration





     





     





     





     


 


Vancomycin HCl  1,000 mg  10/09/19 14:00  





  Vancomycin (Pre-Docked)  IVPB  10/09/19 14:01  





  ONCE ONE   





     





     





  Protocol   





     








Impression


1. DEB


2. DKA


3. hypernatremia


4. DM


5. dementia


6. cad





Plan


- cont hypotonic fluids


- monitor serum sodium


- renal function is improved


- can d/c camacho cath


- neuro input appreciated


- DEB likely secondary to dehydration from DKA

## 2019-10-09 NOTE — PN
Progress Note (short form)





- Note


Progress Note: 





HPI: Limited due to patients clinical condition. Pt remains with incoherent 

speech and relative altered mentation


 


 Vital Signs











Temperature  98.1 F   10/08/19 14:48


 


Pulse Rate  91 H  10/08/19 14:48


 


Respiratory Rate  18   10/08/19 14:48


 


Blood Pressure  115/53 L  10/08/19 14:48


 


O2 Sat by Pulse Oximetry (%)  98   10/08/19 09:00











PE:


Gen: NAD, nonverbal, contracted, oriented to self


HEENT: NC/AT, opacification of R eye, sclera anicteric, MMM


Neck: No JVD


Lungs: CTA b/l no wheezes or rales


CARD: RRR no murmurs


ABD: No facial grimmacing to palpation, soft, nondistended, no guarding


EXT: no edema


 CBC, BMP





 10/07/19 08:20 





 10/08/19 13:00 








Active Medications





Atorvastatin Calcium (Lipitor -)  40 mg PO HS [facility]


   Last Admin: 10/07/19 21:29 Dose:  Not Given


Clopidogrel Bisulfate (Plavix -)  75 mg PO DAILY [facility]


   Last Admin: 10/08/19 10:05 Dose:  75 mg


Donepezil HCl (Aricept -)  5 mg PO ONCE [facility]


   Last Admin: 10/08/19 18:54 Dose:  5 mg


Ezetimibe (Zetia -)  10 mg PO HS [facility]


   Last Admin: 10/07/19 21:30 Dose:  Not Given


Escitalopram Oxalate (Lexapro -)  10 mg PO DAILY [facility]


   Stop: 10/14/19 18:11


Heparin Sodium (Porcine) (Heparin -)  5,000 unit SQ TID [facility]


   Last Admin: 10/08/19 13:38 Dose:  5,000 unit


Lactated Ringer's (Lactated Ringers Solution)  1,000 ml in 1,000 mls @ 100 mls/

hr IV ASDIR [facility]


   Last Admin: 10/08/19 06:42 Dose:  100 mls/hr


Insulin Aspart (Novolog Vial Sliding Scale -)  [facility]; Protocol


   Last Admin: 10/08/19 16:42 Dose:  10 units


Insulin Detemir (Levemir Vial)  10 units SQ [facility]


Nebivolol (Bystolic -)  2.5 mg PO HS [facility]


   Last Admin: 10/07/19 21:29 Dose:  Not Given


Tamsulosin HCl (Flomax -)  0.4 mg PO DAILY@0830 [facility]


   Last Admin: 10/08/19 08:41 Dose:  0.4 mg








A/P


Hyperosmolar hyperglycemia state (resolving)


Acute kidney insufficiency


Uncontrolled diabetes


Acute metabolic encephalopathy; r/o NPH


Dementia


Leukocytosis





--Uncontrolled glucose levels remain


   --Added Levemir 10U BID (previously required 22U BID)


--Consult Endocrinology for recommendations re: insulin pump/basal rate


--Switch to 1/2NS@100cc/hr


--Apprecited Neurology recommendations:


   Continue Lexapro 10mg qdaily and Aricept 5mg


--Continue home medications as below:


   Bystolic 2.5mg HS


   Flomax 0.4mg qdaily


   Ezetimibe 10mg HS


   Plavix 75mg qdaily


   Lipitor 40mg HS





FEN:


   Fluids: LR@100cc/hr


   Electrolyte abnormalities: HyperNa and HyperCl


   Nutrition: dysphagia chopped and nutritional supplementation


PPX:


   DVT - Heparin SQ TID





Dispo: Glycemic control; monitor on M/s


Case discussed with Dr. Tracy Beal, DO - IM PGY-3











<Collins Beal - Last Filed: 10/09/19 19:19>





- Note


Progress Note: 





Attending Addendum


I have seen and examined the indicated patient independently/along with the 

resident team. I have personally verified all lebron exam findings and historical 

components. I have personally interpreted all diagnostics indicated per todays 

orders and reviewed interpretation of indicated subspecialty services. This 

patient meets a high level of medical complexity and warrants inpatient 

admission to avoid decompensation and worsening of the indicated illness. 60 

minutes have been spent in the completion of this admission.





S:


Agree with historical findings as outlined in resident documentation regarding 

history of present illness.  Patient is a poor historian and cannot contribute 

any history of I did talk at length with his family who confirmed the pertinent 

details.  He has poor mentation at baseline and cannot operate his insulin pump 

and cannot recall any of his values.  He is a poor candidate for an insulin 

pump.  We will consult Dr. Willard Raman and have him evaluated for subcutaneous 

long-acting and sliding scale insulin.  Family is in agreement.








O:


All vital signs reviewed per ER records and are as per EMR


NAD, AAO, Resting in bed; mentating at baseline per prior encounters


NC AT EOMI PERRLA


Neck supple, trachea midline, no roby LN


RRR s1/2


Lungs CTAB, w/ sym expansion


NT ND +BS


No skin breakdown or rashes noted


CN2-12 wnl, no new focal deficits noted


Muscle tone normal, no deficits in motor function or strength noted


Normal mood, appropriate behavior, average insight











A/P:


Patient seen, examined, and discussed in depth with resident team. Problem list 

reviewed per resident note and agree with their discussion aside from as 

supplemented by myself below.





Problems include:


Diabetic ketoacidosis was subsequently poorly controlled glucose secondary to 

missed long-acting insulin has improved.  Dr. Dillon was consulted the patient 

will be on 15 units twice daily of Levemir with sliding scale.  Swallow 

evaluation with Rosalia Mallory is pending.  Patient is human apically stable and 

afebrile.  Glycemic control is improved with glucose is trending between 100 

and 200 with no hypoglycemic episodes noted.  No symptomatic hyper or 

hypoglycemia.  He has no red flag signs or symptoms on examination.  He will be 

cleared for discharge in 24 hours.





Continue to monitor on the floor; agree with plan as documented per resident 

note. 








<Tiburcio Molina - Last Filed: 10/10/19 07:10>

## 2019-10-10 LAB
ANION GAP SERPL CALC-SCNC: 1 MMOL/L (ref 8–16)
BUN SERPL-MCNC: 16.1 MG/DL (ref 7–18)
CALCIUM SERPL-MCNC: 8.4 MG/DL (ref 8.5–10.1)
CHLORIDE SERPL-SCNC: 114 MMOL/L (ref 98–107)
CO2 SERPL-SCNC: 31 MMOL/L (ref 21–32)
CREAT SERPL-MCNC: 1 MG/DL (ref 0.55–1.3)
DEPRECATED RDW RBC AUTO: 13.8 % (ref 11.9–15.9)
GLUCOSE SERPL-MCNC: 118 MG/DL (ref 74–106)
HCT VFR BLD CALC: 34.2 % (ref 35.4–49)
HGB BLD-MCNC: 11.4 GM/DL (ref 11.7–16.9)
MAGNESIUM SERPL-MCNC: 2.4 MG/DL (ref 1.8–2.4)
MCH RBC QN AUTO: 29.3 PG (ref 25.7–33.7)
MCHC RBC AUTO-ENTMCNC: 33.2 G/DL (ref 32–35.9)
MCV RBC: 88.4 FL (ref 80–96)
PLATELET # BLD AUTO: 122 K/MM3 (ref 134–434)
PMV BLD: 9.4 FL (ref 7.5–11.1)
POTASSIUM SERPLBLD-SCNC: 4.3 MMOL/L (ref 3.5–5.1)
RBC # BLD AUTO: 3.87 M/MM3 (ref 4–5.6)
SODIUM SERPL-SCNC: 146 MMOL/L (ref 136–145)
WBC # BLD AUTO: 8 K/MM3 (ref 4–10)

## 2019-10-10 RX ADMIN — SODIUM CHLORIDE SCH: 4.5 INJECTION, SOLUTION INTRAVENOUS at 10:40

## 2019-10-10 RX ADMIN — NYSTATIN SCH APPLIC: 100000 POWDER TOPICAL at 15:06

## 2019-10-10 RX ADMIN — DOXYCYCLINE HYCLATE SCH MG: 100 CAPSULE ORAL at 17:26

## 2019-10-10 RX ADMIN — DONEPEZIL HYDROCHLORIDE SCH MG: 5 TABLET, FILM COATED ORAL at 10:40

## 2019-10-10 RX ADMIN — SODIUM CHLORIDE SCH MLS/HR: 4.5 INJECTION, SOLUTION INTRAVENOUS at 04:51

## 2019-10-10 RX ADMIN — HEPARIN SODIUM SCH UNIT: 5000 INJECTION, SOLUTION INTRAVENOUS; SUBCUTANEOUS at 15:06

## 2019-10-10 RX ADMIN — NEBIVOLOL HYDROCHLORIDE SCH MG: 2.5 TABLET ORAL at 21:42

## 2019-10-10 RX ADMIN — INSULIN ASPART SCH: 100 INJECTION, SOLUTION INTRAVENOUS; SUBCUTANEOUS at 06:27

## 2019-10-10 RX ADMIN — CLOPIDOGREL BISULFATE SCH MG: 75 TABLET, FILM COATED ORAL at 10:39

## 2019-10-10 RX ADMIN — HEPARIN SODIUM SCH UNIT: 5000 INJECTION, SOLUTION INTRAVENOUS; SUBCUTANEOUS at 06:54

## 2019-10-10 RX ADMIN — INSULIN DETEMIR SCH UNITS: 100 INJECTION, SOLUTION SUBCUTANEOUS at 06:55

## 2019-10-10 RX ADMIN — HEPARIN SODIUM SCH UNIT: 5000 INJECTION, SOLUTION INTRAVENOUS; SUBCUTANEOUS at 21:43

## 2019-10-10 RX ADMIN — NYSTATIN SCH APPLIC: 100000 POWDER TOPICAL at 21:53

## 2019-10-10 RX ADMIN — INSULIN ASPART SCH: 100 INJECTION, SOLUTION INTRAVENOUS; SUBCUTANEOUS at 21:50

## 2019-10-10 RX ADMIN — ESCITALOPRAM OXALATE SCH MG: 10 TABLET, FILM COATED ORAL at 10:39

## 2019-10-10 RX ADMIN — SODIUM CHLORIDE SCH MLS/HR: 4.5 INJECTION, SOLUTION INTRAVENOUS at 15:31

## 2019-10-10 RX ADMIN — ATORVASTATIN CALCIUM SCH MG: 40 TABLET, FILM COATED ORAL at 21:43

## 2019-10-10 RX ADMIN — INSULIN DETEMIR SCH UNITS: 100 INJECTION, SOLUTION SUBCUTANEOUS at 21:52

## 2019-10-10 RX ADMIN — INSULIN ASPART SCH UNITS: 100 INJECTION, SOLUTION INTRAVENOUS; SUBCUTANEOUS at 17:26

## 2019-10-10 RX ADMIN — EZETIMIBE SCH MG: 10 TABLET ORAL at 21:42

## 2019-10-10 RX ADMIN — TAMSULOSIN HYDROCHLORIDE SCH MG: 0.4 CAPSULE ORAL at 10:39

## 2019-10-10 RX ADMIN — INSULIN ASPART SCH UNITS: 100 INJECTION, SOLUTION INTRAVENOUS; SUBCUTANEOUS at 12:25

## 2019-10-10 NOTE — PN
Progress Note (short form)





- Note


Progress Note: 





HPI: Limited due to patients clinical condition. Pt remains with incoherent 

speech and relative altered mentation. Discussed with son today after updating 

about clinical status. He is in agreement to SNF once cleared medically.


 


 Vital Signs











Temperature  97.9 F   10/10/19 10:00


 


Pulse Rate  74   10/10/19 10:00


 


Respiratory Rate  18   10/10/19 10:00


 


Blood Pressure  161/80   10/10/19 10:00


 


O2 Sat by Pulse Oximetry (%)  98   10/08/19 09:00














PE:


Gen: NAD, nonverbal, contracted, oriented to self


HEENT: NC/AT, opacification of R eye, sclera anicteric, MMM


Neck: No JVD


Lungs: CTA b/l no wheezes or rales


CARD: RRR no murmurs


ABD: No facial grimmacing to palpation, soft, nondistended, no guarding


EXT: no edema


 CBC, BMP





 10/10/19 06:15 





 10/10/19 06:15 








Active Medications





Acetaminophen (Tylenol -)  650 mg PO Q4H PRN


   PRN Reason: FEVER


   Last Admin: 10/09/19 12:31 Dose:  650 mg


Atorvastatin Calcium (Lipitor -)  40 mg PO HS [facility]


   Last Admin: 10/09/19 22:05 Dose:  40 mg


Clopidogrel Bisulfate (Plavix -)  75 mg PO DAILY [facility]


   Last Admin: 10/10/19 10:39 Dose:  75 mg


Donepezil HCl (Aricept -)  5 mg PO DAILY [facility]


   Last Admin: 10/10/19 10:40 Dose:  5 mg


Doxycycline Hyclate (Vibramycin -)  100 mg PO BID@1000,1800 [facility]


Ezetimibe (Zetia -)  10 mg PO HS [facility]


   Last Admin: 10/09/19 22:04 Dose:  10 mg


Escitalopram Oxalate (Lexapro -)  10 mg PO DAILY [facility]


   Stop: 10/14/19 18:11


   Last Admin: 10/10/19 10:39 Dose:  10 mg


Heparin Sodium (Porcine) (Heparin -)  5,000 unit SQ TID [facility]


   Last Admin: 10/10/19 06:54 Dose:  5,000 unit


Sodium Chloride (1/2 Normal Saline)  1,000 mls @ 83 mls/hr IV ASDIR [facility]


   Last Admin: 10/10/19 10:40 Dose:  Not Given


Insulin Aspart (Novolog Vial Sliding Scale -)  [facility] Protocol


   Last Admin: 10/10/19 12:25 Dose:  8 units


Insulin Detemir (Levemir Vial)  15 units SQ BID@0700,2200 [facility]


   Last Admin: 10/10/19 06:55 Dose:  15 units


Nebivolol (Bystolic -)  2.5 mg PO HS [facility]


   Last Admin: 10/09/19 22:05 Dose:  2.5 mg


Nystatin (Nystop Powder -)  1 applic TP BID [facility]


Tamsulosin HCl (Flomax -)  0.4 mg PO DAILY@0830 [facility]


   Last Admin: 10/10/19 10:39 Dose:  0.4 mg








Microbiology





10/06/19 21:16   Urine - Urine Camacho   Urine Culture - Final


                            Strep Agalactiae Group B


                            Mr S Aureus


                            Staphylococcus Coagulase Neg


10/06/19 23:00   Blood - Peripheral Venous   Blood Culture - Preliminary


                            NO GROWTH OBTAINED AFTER 72 HOURS, INCUBATION TO 

CONTINUE


                            FOR 2 DAYS.


10/06/19 23:00   Blood - Peripheral Venous   Blood Culture - Preliminary


                            NO GROWTH OBTAINED AFTER 72 HOURS, INCUBATION TO 

CONTINUE


                            FOR 2 DAYS.














A/P


MRSA UTI


Hyperosmolar hyperglycemia state (resolving)


Acute kidney insufficiency (resolved)


Uncontrolled diabetes


Acute metabolic encephalopathy; r/o NPH


Urinary retention possibly 2/2 to BPH


Dementia





--Appreciated all consulting recommendations


--Continue Levemir 15U BID with BGM ACHS and ISS


--Discussed with son about discontinuing insulin pump and using above protocol 

due to controlled glucose levels


--Doxycycline on board for UTI


--Continue 1/2NS as pt's FWD remains yet improving


--Continue Lexapro 10mg qdaily and Aricept 5mg


--Continue home medications as below:


   Bystolic 2.5mg HS


   Flomax 0.4mg qdaily


   Ezetimibe 10mg HS


   Plavix 75mg qdaily


   Lipitor 40mg HS


--Added nystatin powder to be applied to pt's groin


--Pt did not void after camacho discontinued previous; camacho insertion today 

yielding 350cc of urine


   --Recommend follow-up with urology








FEN:


   Fluids: 1/2NS@83cc/hr; can d/c tomorrow


   Electrolyte abnormalities: HyperNa and HyperCl (resovling)


   Nutrition: dysphagia chopped and nutritional supplementation


PPX:


   DVT - Heparin SQ TID





Dispo: Awaiting SNF Auth


Case discussed with Dr. Tracy Beal, DO - IM PGY-3











<Collins Beal - Last Filed: 10/10/19 14:08>





- Note


Progress Note: 





Seen and examined; I agree with above aside from as documented by myself.  

Overall remains stable and without profound difficulties noted and clinical 

improvement.  ID consult noted and discussed with consulting services.





10 sys unable to be completed secondary to underlying clinical condition





VS, labs, imaging reviewed


NAD, AAO, resting in bed


RRR s1/2


NC AT [facility] PERRLA


NT ND +BS


CN2-12 wnl, no fnd





Community Acquired MRSA UTI


Hyperosmolar hyperglycemia state (resolving)


Acute kidney insufficiency (resolved)


Uncontrolled diabetes


Acute metabolic encephalopathy; r/o NPH


Urinary retention possibly 2/2 to BPH


Dementia





Completed course PO doxy, monitor renal function, planning for DC








<Tiburcio Molina - Last Filed: 10/12/19 10:20>

## 2019-10-10 NOTE — CON.ID
Consult


Consult Specialty:: infectious diseases


Referred by:: 


Reason for Consultation:: ams,lethargy uti





- History of Present Illness


Chief Complaint: fevers,ams


History of Present Illness: 





78 y/o/m with PMHx of IDDM, CAD s/p CABG, MI in 1998, CHF, CKD, COPD, dementia 

admitted for altered mental status BIB EMS from home. patients wife in the room 

who is providing history 


. Patient  complained of abdominal pain as per family. blood sugars were not 

under control


patient was brought here and worked up and found to have uti with multiple 

organism


patient was given iv vanco.


currently patient is doing well and much more awake and alert according to the 

wife





patient is legally blind





- History Source


History Provided By: Family Member


Limitations to Obtaining History: Clinical Condition





- Past Medical History


CNS: Yes: Dementia


Cardio/Vascular: Yes: CAD, HTN, Hyperlipdemia


Pulmonary: Yes: Asthma, COPD


Renal/: Yes: Renal Inusuff





- Past Surgical History


Past Surgical History: Yes: CABG, Joint Replacement (R hip ORIF)





- Alcohol/Substance Use


Hx Alcohol Use: No





- Smoking History


Smoking history: Former smoker


Have you smoked in the past 12 months: No


Aproximately how many cigarettes per day: 0


If you are a former smoker, when did you quit?: '96





- Social History


Usual Living Arrangement: With Spouse


ADL: Family Assistance


History of Recent Travel: No





Home Medications





- Allergies


Allergies/Adverse Reactions: 


 Allergies











Allergy/AdvReac Type Severity Reaction Status Date / Time


 


No Known Allergies Allergy   Verified 02/15/19 11:57














- Home Medications


Home Medications: 


Ambulatory Orders





Atorvastatin Ca [Lipitor] 40 mg PO DAILY 06/10/19 


Clopidogrel Bisulfate [Plavix] 75 mg PO DAILY 06/10/19 


Ezetimibe 10 mg PO HS 06/10/19 


Furosemide 20 mg PO DAILY 06/10/19 


Insulin Degludec [Tresiba] 6 unit SQ HS 06/10/19 


Insulin Pump [Insulin Pump - (Nf)] 1 each NR DAILY 06/10/19 


Nebivolol HCl [Bystolic] 2.5 mg PO HS 06/10/19 


Tamsulosin HCl 0.4 mg PO DAILY 06/10/19 











Review of Systems





- Review of Systems


Constitutional: reports: No Symptoms


Eyes: reports: No Symptoms


HENT: reports: No Symptoms


Neck: reports: No Symptoms


Cardiovascular: reports: No Symptoms


Respiratory: reports: No Symptoms


Gastrointestinal: reports: No Symptoms


Genitourinary: reports: No Symptoms


Musculoskeletal: reports: No Symptoms


Integumentary: reports: No Symptoms


Neurological: reports: Other (ams)


Endocrine: reports: No Symptoms


Hematology/Lymphatic: reports: No Symptoms


Psychiatric: reports: No Symptoms





Physical Exam


Vital Signs: 


 Vital Signs











Temperature  97.9 F   10/10/19 10:00


 


Pulse Rate  74   10/10/19 10:00


 


Respiratory Rate  18   10/10/19 10:00


 


Blood Pressure  161/80   10/10/19 10:00


 


O2 Sat by Pulse Oximetry (%)  98   10/08/19 09:00











Constitutional: Yes: No Distress, Calm


Eyes: Yes: Other (legally blind)


Cardiovascular: Yes: Regular Rate and Rhythm


Respiratory: Yes: Regular, CTA Bilaterally


Gastrointestinal: Yes: Normal Bowel Sounds, Soft


Musculoskeletal: Yes: WNL


Extremities: Yes: WNL


Neurological: Yes: Alert


Psychiatric: Yes: Alert


Labs: 


 CBC, BMP





 10/10/19 06:15 





 10/10/19 06:15 











Imaging





- Results


Chest X-ray: Report Reviewed, Image Reviewed


Cat Scan: Report Reviewed, Image Reviewed


Ultrasound: Report Reviewed, Image Reviewed





Assessment/Plan





79 year old man with a history of HTN, hyperlipidemia, CAD, MI, CABG, chronic 

diastolic heart failure, type 2 DM, stage 3 CKD, COPD, dementia who presented 

to the ED with altered mental status. 





1. Acute metabolic encephalopathy 


2. Acute kidney injury


3. Abdominal pain 


4.  UTI





5. Lactic acidosis


6. Hypernatremia


7. Hypophosphatemia


8. HTN


9. Hyperlipidemia


10. CAD, history of MI, CABG


11. Chronic diastolic heart failure


12. Stage 3 CKD


13. COPD


14. Alzheimer dementia





plan


will start patient on doxy


monitor


rest as per the team

## 2019-10-10 NOTE — CONSULT
Consult


Consult Specialty:: Endocrine


Referred by:: Medical Resident


Reason for Consultation:: uncontrolled T2DM





- History of Present Illness


Chief Complaint: high blood sugars


History of Present Illness: 





80 y/o/m with PMHx of T2DM, CAD s/p CABG, MI in 1998, CHF, CKD, COPD, dementia,

admitted for altered mental status from home. Patient unable to provide history 

secondary to mental status. He has been lethargic weak,poor po intake,high 

blood sugars despite insulin pump and injections, and decreased appetite,

worsening neurological status,and difficulty controlling his blood sugars 

warranted admission.





- Past Medical History


CNS: Yes: Dementia


Cardio/Vascular: Yes: CAD, HTN, Hyperlipdemia


Pulmonary: Yes: Asthma, COPD


Renal/: Yes: Renal Inusuff





- Past Surgical History


Past Surgical History: Yes: CABG, Joint Replacement (R hip ORIF)





- Alcohol/Substance Use


Hx Alcohol Use: No





- Smoking History


Smoking history: Former smoker


Have you smoked in the past 12 months: No


Aproximately how many cigarettes per day: 0


If you are a former smoker, when did you quit?: '96





- Social History


Usual Living Arrangement: With Spouse


ADL: Family Assistance


History of Recent Travel: No





Home Medications





- Allergies


Allergies/Adverse Reactions: 


 Allergies











Allergy/AdvReac Type Severity Reaction Status Date / Time


 


No Known Allergies Allergy   Verified 02/15/19 11:57














- Home Medications


Home Medications: 


Ambulatory Orders





Atorvastatin Ca [Lipitor] 40 mg PO DAILY 06/10/19 


Clopidogrel Bisulfate [Plavix] 75 mg PO DAILY 06/10/19 


Ezetimibe 10 mg PO HS 06/10/19 


Furosemide 20 mg PO DAILY 06/10/19 


Insulin Degludec [Tresiba] 6 unit SQ HS 06/10/19 


Insulin Pump [Insulin Pump - (Nf)] 1 each NR DAILY 06/10/19 


Nebivolol HCl [Bystolic] 2.5 mg PO HS 06/10/19 


Tamsulosin HCl 0.4 mg PO DAILY 06/10/19 











Review of Systems


Unable to obtain ROS, reason: confused lethargic





Physical Exam


Vital Signs: 


 Vital Signs











Temperature  98.3 F   10/09/19 20:23


 


Pulse Rate  61   10/09/19 20:23


 


Respiratory Rate  16   10/09/19 21:00


 


Blood Pressure  107/77   10/09/19 20:23


 


O2 Sat by Pulse Oximetry (%)  98   10/08/19 09:00











Constitutional: Yes: No Distress


Eyes: Yes: EOM Intact


HENT: Yes: Normocephalic


Neck: Yes: Trachea Midline


Cardiovascular: Yes: Regular Rate and Rhythm


Respiratory: Yes: CTA Bilaterally


Gastrointestinal: Yes: Normal Bowel Sounds


...Rectal Exam: Yes: Deferred


Renal/: Yes: WNL


Breast(s): Yes: WNL


Musculoskeletal: Yes: Muscle Weakness


Edema: No


Neurological: Yes: Alert, Confusion, Weakness


Labs: 


 CBC, BMP





 10/09/19 08:10 





 10/09/19 14:30 











Problem List





- Problems


(1) DEB (acute kidney injury)


Code(s): N17.9 - ACUTE KIDNEY FAILURE, UNSPECIFIED   





(2) Acute renal failure


Code(s): N17.9 - ACUTE KIDNEY FAILURE, UNSPECIFIED   





(3) Altered mental status


Code(s): R41.82 - ALTERED MENTAL STATUS, UNSPECIFIED   


Qualifiers: 


   Altered mental status type: somnolence   Qualified Code(s): R40.0 - 

Somnolence   





(4) Community acquired pneumonia


Code(s): J18.9 - PNEUMONIA, UNSPECIFIED ORGANISM   





(5) Dehydration


Code(s): E86.0 - DEHYDRATION   





(6) Hyperglycemia


Code(s): R73.9 - HYPERGLYCEMIA, UNSPECIFIED   





Assessment/Plan





Current Active Problems


dm uncontrolled 


DEB (acute kidney injury) (Acute)


nkhos


Obesity (BMI 30-39.9) (Chronic)





 Laboratory Tests











  10/09/19





  08:10


 


Hemoglobin A1c %  12.6 H








 Abnormal Lab Results











  10/09/19 10/09/19 10/09/19





  08:10 08:10 08:10


 


RBC  3.99 L  


 


Hct  35.1 L  


 


Plt Count  131 L  


 


Sodium   150 H 


 


Chloride   115 H 


 


Anion Gap   3 L 


 


BUN   18.2 H 


 


Random Glucose   165 H 


 


Hemoglobin A1c %    12.6 H


 


Calcium   














  10/09/19





  14:30


 


RBC 


 


Hct 


 


Plt Count 


 


Sodium  147 H


 


Chloride  113 H


 


Anion Gap  6 L


 


BUN  20.2 H


 


Random Glucose  299 H


 


Hemoglobin A1c % 


 


Calcium  8.4 L





plan:


bgm acmeals


levemir dose titrate 15 units bid


swallowing evaluation recommendation

## 2019-10-10 NOTE — PN
Progress Note, Physician


History of Present Illness: 





events noted 


Chart reviewed 


Alert 


Awake 


Feels better 


Speech still same


Right eye droopy the same





- Current Medication List


Current Medications: 


Active Medications





Acetaminophen (Tylenol -)  650 mg PO Q4H PRN


   PRN Reason: FEVER


   Last Admin: 10/09/19 12:31 Dose:  650 mg


Atorvastatin Calcium (Lipitor -)  40 mg PO HS [facility]


   Last Admin: 10/09/19 22:05 Dose:  40 mg


Clopidogrel Bisulfate (Plavix -)  75 mg PO DAILY [facility]


   Last Admin: 10/10/19 10:39 Dose:  75 mg


Donepezil HCl (Aricept -)  5 mg PO DAILY [facility]


   Last Admin: 10/10/19 10:40 Dose:  5 mg


Doxycycline Hyclate (Vibramycin -)  100 mg PO BID@1000,1800 [facility]


   Last Admin: 10/10/19 17:26 Dose:  100 mg


Ezetimibe (Zetia -)  10 mg PO HS [facility]


   Last Admin: 10/09/19 22:04 Dose:  10 mg


Escitalopram Oxalate (Lexapro -)  10 mg PO DAILY [facility]


   Stop: 10/14/19 18:11


   Last Admin: 10/10/19 10:39 Dose:  10 mg


Heparin Sodium (Porcine) (Heparin -)  5,000 unit SQ TID [facility]


   Last Admin: 10/10/19 15:06 Dose:  5,000 unit


Sodium Chloride (1/2 Normal Saline)  1,000 mls @ 83 mls/hr IV ASDIR [facility]


   Last Admin: 10/10/19 15:31 Dose:  83 mls/hr


Insulin Aspart (Novolog Vial Sliding Scale -)  [facility] Protocol


   Last Admin: 10/10/19 17:26 Dose:  4 units


Insulin Detemir (Levemir Vial)  15 units SQ BID@0700,2200 [facility]


   Last Admin: 10/10/19 06:55 Dose:  15 units


Nebivolol (Bystolic -)  2.5 mg PO HS [facility]


   Last Admin: 10/09/19 22:05 Dose:  2.5 mg


Nystatin (Nystop Powder -)  1 applic TP BID [facility]


   Last Admin: 10/10/19 15:06 Dose:  1 applic


Tamsulosin HCl (Flomax -)  0.4 mg PO DAILY@0830 [facility]


   Last Admin: 10/10/19 10:39 Dose:  0.4 mg











- Objective


Vital Signs: 


 Vital Signs











Temperature  98.9 F   10/10/19 14:42


 


Pulse Rate  74   10/10/19 14:42


 


Respiratory Rate  20   10/10/19 14:42


 


Blood Pressure  135/63   10/10/19 14:42


 


O2 Sat by Pulse Oximetry (%)  98   10/08/19 09:00











Constitutional: Yes: Well Nourished


Eyes: Yes: WNL


Neurological: Yes: Alert, Oriented, Babinski negative


...Motor Strength: WNL


Labs: 


 CBC, BMP





 10/10/19 06:15 





 10/10/19 06:15 





 INR, PTT











INR  1.15  (0.83-1.09)  H  10/06/19  21:00    














Problem List





- Problems


(1) Altered mental status


Assessment/Plan: 


1. Continue shell Lexapro 


2. Fall precautions


3. SNF 


4. Tight BSL control 


Code(s): R41.82 - ALTERED MENTAL STATUS, UNSPECIFIED   


Qualifiers: 


   Altered mental status type: somnolence   Qualified Code(s): R40.0 - 

Somnolence

## 2019-10-11 LAB
ANION GAP SERPL CALC-SCNC: 5 MMOL/L (ref 8–16)
BUN SERPL-MCNC: 11.4 MG/DL (ref 7–18)
CALCIUM SERPL-MCNC: 8 MG/DL (ref 8.5–10.1)
CHLORIDE SERPL-SCNC: 108 MMOL/L (ref 98–107)
CO2 SERPL-SCNC: 27 MMOL/L (ref 21–32)
CREAT SERPL-MCNC: 0.9 MG/DL (ref 0.55–1.3)
GLUCOSE SERPL-MCNC: 226 MG/DL (ref 74–106)
POTASSIUM SERPLBLD-SCNC: 4.1 MMOL/L (ref 3.5–5.1)
SODIUM SERPL-SCNC: 140 MMOL/L (ref 136–145)

## 2019-10-11 RX ADMIN — ESCITALOPRAM OXALATE SCH MG: 10 TABLET, FILM COATED ORAL at 09:10

## 2019-10-11 RX ADMIN — HEPARIN SODIUM SCH UNIT: 5000 INJECTION, SOLUTION INTRAVENOUS; SUBCUTANEOUS at 22:07

## 2019-10-11 RX ADMIN — INSULIN DETEMIR SCH UNITS: 100 INJECTION, SOLUTION SUBCUTANEOUS at 06:21

## 2019-10-11 RX ADMIN — CLOPIDOGREL BISULFATE SCH MG: 75 TABLET, FILM COATED ORAL at 09:10

## 2019-10-11 RX ADMIN — NYSTATIN SCH APPLIC: 100000 POWDER TOPICAL at 22:07

## 2019-10-11 RX ADMIN — INSULIN ASPART SCH UNITS: 100 INJECTION, SOLUTION INTRAVENOUS; SUBCUTANEOUS at 11:48

## 2019-10-11 RX ADMIN — INSULIN ASPART SCH UNITS: 100 INJECTION, SOLUTION INTRAVENOUS; SUBCUTANEOUS at 06:21

## 2019-10-11 RX ADMIN — HEPARIN SODIUM SCH UNIT: 5000 INJECTION, SOLUTION INTRAVENOUS; SUBCUTANEOUS at 13:47

## 2019-10-11 RX ADMIN — DOXYCYCLINE HYCLATE SCH MG: 100 CAPSULE ORAL at 09:10

## 2019-10-11 RX ADMIN — NYSTATIN SCH APPLIC: 100000 POWDER TOPICAL at 09:11

## 2019-10-11 RX ADMIN — TAMSULOSIN HYDROCHLORIDE SCH MG: 0.4 CAPSULE ORAL at 09:10

## 2019-10-11 RX ADMIN — EZETIMIBE SCH MG: 10 TABLET ORAL at 22:07

## 2019-10-11 RX ADMIN — DONEPEZIL HYDROCHLORIDE SCH MG: 5 TABLET, FILM COATED ORAL at 09:11

## 2019-10-11 RX ADMIN — INSULIN DETEMIR SCH UNITS: 100 INJECTION, SOLUTION SUBCUTANEOUS at 22:06

## 2019-10-11 RX ADMIN — DOXYCYCLINE HYCLATE SCH MG: 100 CAPSULE ORAL at 18:15

## 2019-10-11 RX ADMIN — ATORVASTATIN CALCIUM SCH MG: 40 TABLET, FILM COATED ORAL at 22:09

## 2019-10-11 RX ADMIN — INSULIN ASPART SCH UNITS: 100 INJECTION, SOLUTION INTRAVENOUS; SUBCUTANEOUS at 18:15

## 2019-10-11 RX ADMIN — HEPARIN SODIUM SCH UNIT: 5000 INJECTION, SOLUTION INTRAVENOUS; SUBCUTANEOUS at 05:41

## 2019-10-11 RX ADMIN — INSULIN ASPART SCH UNITS: 100 INJECTION, SOLUTION INTRAVENOUS; SUBCUTANEOUS at 22:09

## 2019-10-11 RX ADMIN — NEBIVOLOL HYDROCHLORIDE SCH MG: 2.5 TABLET ORAL at 22:07

## 2019-10-11 NOTE — DS
Physical Exam: 


SUBJECTIVE: Limited due to patients clinical condition. Pt remains with 

incoherent speech and relative altered mentation. Discussed with son today 

about discharge arrangement. He is in agreement to SNF and prefers [facility] if 

able to.








OBJECTIVE:





 Vital Signs











 Period  Temp  Pulse  Resp  BP Sys/Ramírez  Pulse Ox


 


 Last 24 Hr  97.9 F-98.9 F  71-88  18-20  114-135/51-75  








PHYSICAL EXAM





Gen: NAD, nonverbal, contracted, oriented to self


HEENT: NC/AT, opacification of R eye, sclera anicteric, MMM


Neck: No JVD


Lungs: CTA b/l no wheezes or rales


CARD: RRR no murmurs


ABD: No facial grimmacing to palpation, soft, nondistended, no guarding


EXT: no edema





LABS


 Laboratory Results - last 24 hr











  10/10/19 10/10/19 10/11/19





  16:30 21:48 05:46


 


Sodium   


 


Potassium   


 


Chloride   


 


Carbon Dioxide   


 


Anion Gap   


 


BUN   


 


Creatinine   


 


Est GFR (CKD-EPI)AfAm   


 


Est GFR (CKD-EPI)NonAf   


 


POC Glucometer  207  150  236


 


Random Glucose   


 


Calcium   














  10/11/19 10/11/19





  07:25 11:47


 


Sodium  140 


 


Potassium  4.1 


 


Chloride  108 H 


 


Carbon Dioxide  27 


 


Anion Gap  5 L 


 


BUN  11.4 


 


Creatinine  0.9 


 


Est GFR (CKD-EPI)AfAm  93.82 


 


Est GFR (CKD-EPI)NonAf  80.95 


 


POC Glucometer   237


 


Random Glucose  226 H 


 


Calcium  8.0 L 








 Microbiology





10/06/19 23:00   Blood - Peripheral Venous   Blood Culture - Preliminary


                            NO GROWTH OBTAINED AFTER 96 HOURS, INCUBATION TO 

CONTINUE


                            FOR 1 DAYS.


10/06/19 23:00   Blood - Peripheral Venous   Blood Culture - Preliminary


                            NO GROWTH OBTAINED AFTER 96 HOURS, INCUBATION TO 

CONTINUE


                            FOR 1 DAYS.


10/06/19 21:16   Urine - Urine Camacho   Urine Culture - Final


                            Strep Agalactiae Group B


                            Mr S Aureus


                            Staphylococcus Coagulase Neg





Active Medications





Acetaminophen (Tylenol -)  650 mg PO Q4H PRN


   PRN Reason: FEVER


   Last Admin: 10/09/19 12:31 Dose:  650 mg


Atorvastatin Calcium (Lipitor -)  40 mg PO HS MARY


   Last Admin: 10/10/19 21:43 Dose:  40 mg


Clopidogrel Bisulfate (Plavix -)  75 mg PO DAILY MARY


   Last Admin: 10/11/19 09:10 Dose:  75 mg


Donepezil HCl (Aricept -)  5 mg PO DAILY [facility]


   Last Admin: 10/11/19 09:11 Dose:  5 mg


Doxycycline Hyclate (Vibramycin -)  100 mg PO BID@1000,1800 [facility]


   Last Admin: 10/11/19 18:15 Dose:  100 mg


Ezetimibe (Zetia -)  10 mg PO HS [facility]


   Last Admin: 10/10/19 21:42 Dose:  10 mg


Escitalopram Oxalate (Lexapro -)  10 mg PO DAILY [facility]


   Stop: 10/14/19 18:11


   Last Admin: 10/11/19 09:10 Dose:  10 mg


Heparin Sodium (Porcine) (Heparin -)  5,000 unit SQ TID [facility]


   Last Admin: 10/11/19 13:47 Dose:  5,000 unit


Insulin Aspart (Novolog Vial Sliding Scale -)  1 vial SQ ACHS [facility]; Protocol


   Last Admin: 10/11/19 18:15 Dose:  10 units


Insulin Detemir (Levemir Vial)  15 units SQ BID@0700,2200 [facility]


   Last Admin: 10/11/19 06:21 Dose:  15 units


Nebivolol (Bystolic -)  2.5 mg PO HS [facility]


   Last Admin: 10/10/19 21:42 Dose:  2.5 mg


Nystatin (Nystop Powder -)  1 applic TP BID [facility]


   Last Admin: 10/11/19 09:11 Dose:  1 applic


Tamsulosin HCl (Flomax -)  0.4 mg PO DAILY@0830 [facility]


   Last Admin: 10/11/19 09:10 Dose:  0.4 mg





IMAGING:


ABD U/S:


IMPRESSION:


Limited examination likely due to the patient's body habitus.


The liver appears to be slightly hyperechoic suggestive of mild fatty 

infiltration versus


hepatocellular disease.


No gross gallstones identified. Borderline thickening of the gallbladder wall 

without evidence


of acute cholecystitis.


Nonvisualization of the pancreas.


Nonvisualization of the abdominal aorta and inferior vena cava.


Right renal simple cyst measuring 2.1 cm.





Abd/Pelvis CT:


IMPRESSION:


Constipation.


Mild fullness of the collecting system with a dilated bladder which could 

represent reflux.


Paraumbilical hernia with no stranding.


Hiatal hernia seen previously.


Renal cysts.





Head CT:


IMPRESSION:


No significant interval change


Moderate to marked ventricular dilatation with a lateral and third ventricles 

are relatively


more dilated than for. Degree of the ventricular dilatation is more than 

expected for the


degree of cortical atrophy.


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 

Sylvius


stenosis/narrowing.


Otherwise, no interval acute intracranial pathology is identified.


A preliminary report was forwarded by the [facility] service,





HOSPITAL COURSE:





Date of Admission:10/06/19





Date of Discharge: 10/11/19





Pt admitted to hospital on 10/6/19 due to HHS. Pt was placed on insulin gtt and 

HHS protocol until which he was able to transition to the floor alongside of 

subcutaneously insulin. Pt was seen by endocrinology with final insulin 

regiment being Levemir 15U BID, Novolog 2U before Lunch and dinner, alongside 

of ISS for further coverage if needed. During patient's workup he was found to 

have MRSA UTI which is being treated with Doxycycline 100mg BID for a total of 

5 days. Unfortunately patient was given trial of void period where he did not 

void for 24hrs. Camacho was placed, tamsulosin was increased to 0.4mg BID and he 

is to follow-up with urology on the outpatient setting for ToV and BPH 

recommendations. In addition, pt was found to have physical therapy care needs 

and it was recommended for patient to be discharged to SNF for further rehab.





Pt's family has been instructed to discontinue use of insulin pump as patient 

may be noncompliant with accurate carbohydrate counting and proper insulin 

injection.


Minutes to complete discharge: 35





<Collins Beal - Last Filed: 10/11/19 21:19>


Physical Exam: 


SUBJECTIVE: Patient seen and examined








OBJECTIVE:





 Vital Signs











 Period  Temp  Pulse  Resp  BP Sys/Ramírez  Pulse Ox


 


 Last 24 Hr  98.0 F-98.8 F  69-76  16-20  108-149/63-82  








PHYSICAL EXAM





GENERAL: The patient is awake, alert, and fully oriented, in no acute distress.


HEAD: Normal with no signs of trauma.


EYES: PERRL, extraocular movements intact, sclera anicteric, conjunctiva clear. 


ENT: Ears normal, nares patent, oropharynx clear without exudates, moist mucous 

membranes.


NECK: Trachea midline, full range of motion, supple. 


LUNGS: Breath sounds equal, clear to auscultation bilaterally, no wheezes, no 

crackles, no accessory muscle use. 


HEART: Regular rate and rhythm, S1, S2 without murmur, rub or gallop.


ABDOMEN: Soft, nontender, nondistended, normoactive bowel sounds, no guarding, 

no rebound, no hepatosplenomegaly, no masses.


EXTREMITIES: 2+ pulses, warm, well-perfused, no edema. 


NEUROLOGICAL: Cranial nerves II through XII grossly intact. Normal speech, gait 

not observed.


PSYCH: Normal mood, normal affect.


SKIN: Warm, dry, normal turgor, no rashes or lesions noted.





LABS


 Laboratory Results - last 24 hr











  10/06/19 10/06/19 10/06/19





  20:43 21:00 21:00


 


WBC   18.0 H 


 


RBC   4.69 


 


Hgb   13.5 


 


Hct   42.1  D 


 


MCV   89.8 


 


MCH   28.9 


 


MCHC   32.1 


 


RDW   13.7 


 


Plt Count   191  D 


 


MPV   10.3 


 


Absolute Neuts (auto)   15.3 H 


 


Neutrophils %   84.6 H D 


 


Lymphocytes %   5.4 L D 


 


Monocytes %   9.3 


 


Eosinophils %   0.1  D 


 


Basophils %   0.6 


 


Nucleated RBC %   0 


 


PT with INR   


 


INR   


 


Puncture Site   


 


ABG pH   


 


ABG pCO2 at Pt Temp   


 


ABG pO2 at Pt Temp   


 


ABG HCO3   


 


ABG O2 Sat (Measured)   


 


ABG O2 Content   


 


ABG Base Excess   


 


Gonzalo Test   


 


Carboxyhemoglobin   


 


Methemoglobin   


 


O2 Delivery Device   


 


Oxygen Flow Rate   


 


Sodium    131 L


 


Potassium    5.6 H


 


Chloride    91 L


 


Carbon Dioxide    24


 


Anion Gap    16


 


BUN    56.2 H


 


Creatinine    2.5 H


 


Est GFR (CKD-EPI)AfAm    27.28


 


Est GFR (CKD-EPI)NonAf    23.54


 


POC Glucometer  > 600  


 


Random Glucose    916 H*


 


Lactic Acid   


 


Calcium    9.6


 


Phosphorus   


 


Magnesium   


 


Total Bilirubin    0.5


 


AST    13 L


 


ALT    17


 


Alkaline Phosphatase    248 H


 


Troponin I    0.05


 


Total Protein    7.6


 


Albumin    3.3 L


 


Lipase    114


 


Urine Color   


 


Urine Appearance   


 


Urine pH   


 


Ur Specific Gravity   


 


Urine Protein   


 


Urine Glucose (UA)   


 


Urine Ketones   


 


Urine Blood   


 


Urine Nitrite   


 


Urine Bilirubin   


 


Urine Urobilinogen   


 


Ur Leukocyte Esterase   


 


Urine WBC (Auto)   


 


Urine RBC (Auto)   


 


U Epithel Cells (Auto)   


 


Urine Bacteria (Auto)   


 


Acetone, Qual    Positive small 1+














  10/06/19 10/06/19 10/06/19





  21:00 21:00 21:16


 


WBC   


 


RBC   


 


Hgb   


 


Hct   


 


MCV   


 


MCH   


 


MCHC   


 


RDW   


 


Plt Count   


 


MPV   


 


Absolute Neuts (auto)   


 


Neutrophils %   


 


Lymphocytes %   


 


Monocytes %   


 


Eosinophils %   


 


Basophils %   


 


Nucleated RBC %   


 


PT with INR   13.60 H 


 


INR   1.15 H 


 


Puncture Site   


 


ABG pH   


 


ABG pCO2 at Pt Temp   


 


ABG pO2 at Pt Temp   


 


ABG HCO3   


 


ABG O2 Sat (Measured)   


 


ABG O2 Content   


 


ABG Base Excess   


 


Gonzalo Test   


 


Carboxyhemoglobin   


 


Methemoglobin   


 


O2 Delivery Device   


 


Oxygen Flow Rate   


 


Sodium   


 


Potassium   


 


Chloride   


 


Carbon Dioxide   


 


Anion Gap   


 


BUN   


 


Creatinine   


 


Est GFR (CKD-EPI)AfAm   


 


Est GFR (CKD-EPI)NonAf   


 


POC Glucometer   


 


Random Glucose   


 


Lactic Acid  2.1 H  


 


Calcium   


 


Phosphorus   


 


Magnesium   


 


Total Bilirubin   


 


AST   


 


ALT   


 


Alkaline Phosphatase   


 


Troponin I   


 


Total Protein   


 


Albumin   


 


Lipase   


 


Urine Color    Yellow


 


Urine Appearance    Clear


 


Urine pH    5.5


 


Ur Specific Gravity    <= 1.005 L


 


Urine Protein    Negative


 


Urine Glucose (UA)    3+ H


 


Urine Ketones    2+ H


 


Urine Blood    Trace-intact


 


Urine Nitrite    Negative


 


Urine Bilirubin    Negative


 


Urine Urobilinogen    0.2


 


Ur Leukocyte Esterase    1+ H


 


Urine WBC (Auto)    30-50


 


Urine RBC (Auto)    0-4


 


U Epithel Cells (Auto)    0-5


 


Urine Bacteria (Auto)    Moderate


 


Acetone, Qual   














  10/06/19 10/07/19 10/07/19





  23:00 00:45 01:30


 


WBC   


 


RBC   


 


Hgb   


 


Hct   


 


MCV   


 


MCH   


 


MCHC   


 


RDW   


 


Plt Count   


 


MPV   


 


Absolute Neuts (auto)   


 


Neutrophils %   


 


Lymphocytes %   


 


Monocytes %   


 


Eosinophils %   


 


Basophils %   


 


Nucleated RBC %   


 


PT with INR   


 


INR   


 


Puncture Site   Right radial 


 


ABG pH   7.42 


 


ABG pCO2 at Pt Temp   38.6 


 


ABG pO2 at Pt Temp   84.6 


 


ABG HCO3   24.4 


 


ABG O2 Sat (Measured)   96.8 


 


ABG O2 Content   16.3 


 


ABG Base Excess   0.5 


 


Gonzalo Test   Positive 


 


Carboxyhemoglobin   2.2 H 


 


Methemoglobin   < 1.0 


 


O2 Delivery Device   Room air 


 


Oxygen Flow Rate   21% 


 


Sodium  137  


 


Potassium  4.8  


 


Chloride  98  


 


Carbon Dioxide  24  


 


Anion Gap  14  


 


BUN  57.2 H  


 


Creatinine  2.3 H  


 


Est GFR (CKD-EPI)AfAm  30.17  


 


Est GFR (CKD-EPI)NonAf  26.04  


 


POC Glucometer    485


 


Random Glucose  744 H*  


 


Lactic Acid   


 


Calcium  9.5  


 


Phosphorus  3.2  


 


Magnesium  3.1 H  


 


Total Bilirubin   


 


AST   


 


ALT   


 


Alkaline Phosphatase   


 


Troponin I   


 


Total Protein   


 


Albumin   


 


Lipase   


 


Urine Color   


 


Urine Appearance   


 


Urine pH   


 


Ur Specific Gravity   


 


Urine Protein   


 


Urine Glucose (UA)   


 


Urine Ketones   


 


Urine Blood   


 


Urine Nitrite   


 


Urine Bilirubin   


 


Urine Urobilinogen   


 


Ur Leukocyte Esterase   


 


Urine WBC (Auto)   


 


Urine RBC (Auto)   


 


U Epithel Cells (Auto)   


 


Urine Bacteria (Auto)   


 


Acetone, Qual   














  10/07/19 10/07/19 10/07/19





  02:33 03:48 04:37


 


WBC   


 


RBC   


 


Hgb   


 


Hct   


 


MCV   


 


MCH   


 


MCHC   


 


RDW   


 


Plt Count   


 


MPV   


 


Absolute Neuts (auto)   


 


Neutrophils %   


 


Lymphocytes %   


 


Monocytes %   


 


Eosinophils %   


 


Basophils %   


 


Nucleated RBC %   


 


PT with INR   


 


INR   


 


Puncture Site   


 


ABG pH   


 


ABG pCO2 at Pt Temp   


 


ABG pO2 at Pt Temp   


 


ABG HCO3   


 


ABG O2 Sat (Measured)   


 


ABG O2 Content   


 


ABG Base Excess   


 


Gonzalo Test   


 


Carboxyhemoglobin   


 


Methemoglobin   


 


O2 Delivery Device   


 


Oxygen Flow Rate   


 


Sodium   


 


Potassium   


 


Chloride   


 


Carbon Dioxide   


 


Anion Gap   


 


BUN   


 


Creatinine   


 


Est GFR (CKD-EPI)AfAm   


 


Est GFR (CKD-EPI)NonAf   


 


POC Glucometer  363  252  205


 


Random Glucose   


 


Lactic Acid   


 


Calcium   


 


Phosphorus   


 


Magnesium   


 


Total Bilirubin   


 


AST   


 


ALT   


 


Alkaline Phosphatase   


 


Troponin I   


 


Total Protein   


 


Albumin   


 


Lipase   


 


Urine Color   


 


Urine Appearance   


 


Urine pH   


 


Ur Specific Gravity   


 


Urine Protein   


 


Urine Glucose (UA)   


 


Urine Ketones   


 


Urine Blood   


 


Urine Nitrite   


 


Urine Bilirubin   


 


Urine Urobilinogen   


 


Ur Leukocyte Esterase   


 


Urine WBC (Auto)   


 


Urine RBC (Auto)   


 


U Epithel Cells (Auto)   


 


Urine Bacteria (Auto)   


 


Acetone, Qual   














  10/07/19 10/07/19 10/07/19





  05:38 06:26 10:57


 


WBC   


 


RBC   


 


Hgb   


 


Hct   


 


MCV   


 


MCH   


 


MCHC   


 


RDW   


 


Plt Count   


 


MPV   


 


Absolute Neuts (auto)   


 


Neutrophils %   


 


Lymphocytes %   


 


Monocytes %   


 


Eosinophils %   


 


Basophils %   


 


Nucleated RBC %   


 


PT with INR   


 


INR   


 


Puncture Site   


 


ABG pH   


 


ABG pCO2 at Pt Temp   


 


ABG pO2 at Pt Temp   


 


ABG HCO3   


 


ABG O2 Sat (Measured)   


 


ABG O2 Content   


 


ABG Base Excess   


 


Gonzalo Test   


 


Carboxyhemoglobin   


 


Methemoglobin   


 


O2 Delivery Device   


 


Oxygen Flow Rate   


 


Sodium   


 


Potassium   


 


Chloride   


 


Carbon Dioxide   


 


Anion Gap   


 


BUN   


 


Creatinine   


 


Est GFR (CKD-EPI)AfAm   


 


Est GFR (CKD-EPI)NonAf   


 


POC Glucometer  158  166  367


 


Random Glucose   


 


Lactic Acid   


 


Calcium   


 


Phosphorus   


 


Magnesium   


 


Total Bilirubin   


 


AST   


 


ALT   


 


Alkaline Phosphatase   


 


Troponin I   


 


Total Protein   


 


Albumin   


 


Lipase   


 


Urine Color   


 


Urine Appearance   


 


Urine pH   


 


Ur Specific Gravity   


 


Urine Protein   


 


Urine Glucose (UA)   


 


Urine Ketones   


 


Urine Blood   


 


Urine Nitrite   


 


Urine Bilirubin   


 


Urine Urobilinogen   


 


Ur Leukocyte Esterase   


 


Urine WBC (Auto)   


 


Urine RBC (Auto)   


 


U Epithel Cells (Auto)   


 


Urine Bacteria (Auto)   


 


Acetone, Qual   














  10/07/19 10/07/19 10/11/19





  16:53 20:56 17:19


 


WBC   


 


RBC   


 


Hgb   


 


Hct   


 


MCV   


 


MCH   


 


MCHC   


 


RDW   


 


Plt Count   


 


MPV   


 


Absolute Neuts (auto)   


 


Neutrophils %   


 


Lymphocytes %   


 


Monocytes %   


 


Eosinophils %   


 


Basophils %   


 


Nucleated RBC %   


 


PT with INR   


 


INR   


 


Puncture Site   


 


ABG pH   


 


ABG pCO2 at Pt Temp   


 


ABG pO2 at Pt Temp   


 


ABG HCO3   


 


ABG O2 Sat (Measured)   


 


ABG O2 Content   


 


ABG Base Excess   


 


Gonzalo Test   


 


Carboxyhemoglobin   


 


Methemoglobin   


 


O2 Delivery Device   


 


Oxygen Flow Rate   


 


Sodium   


 


Potassium   


 


Chloride   


 


Carbon Dioxide   


 


Anion Gap   


 


BUN   


 


Creatinine   


 


Est GFR (CKD-EPI)AfAm   


 


Est GFR (CKD-EPI)NonAf   


 


POC Glucometer  419  458  367


 


Random Glucose   


 


Lactic Acid   


 


Calcium   


 


Phosphorus   


 


Magnesium   


 


Total Bilirubin   


 


AST   


 


ALT   


 


Alkaline Phosphatase   


 


Troponin I   


 


Total Protein   


 


Albumin   


 


Lipase   


 


Urine Color   


 


Urine Appearance   


 


Urine pH   


 


Ur Specific Gravity   


 


Urine Protein   


 


Urine Glucose (UA)   


 


Urine Ketones   


 


Urine Blood   


 


Urine Nitrite   


 


Urine Bilirubin   


 


Urine Urobilinogen   


 


Ur Leukocyte Esterase   


 


Urine WBC (Auto)   


 


Urine RBC (Auto)   


 


U Epithel Cells (Auto)   


 


Urine Bacteria (Auto)   


 


Acetone, Qual   














  10/11/19 10/12/19 10/12/19





  22:05 06:29 11:16


 


WBC   


 


RBC   


 


Hgb   


 


Hct   


 


MCV   


 


MCH   


 


MCHC   


 


RDW   


 


Plt Count   


 


MPV   


 


Absolute Neuts (auto)   


 


Neutrophils %   


 


Lymphocytes %   


 


Monocytes %   


 


Eosinophils %   


 


Basophils %   


 


Nucleated RBC %   


 


PT with INR   


 


INR   


 


Puncture Site   


 


ABG pH   


 


ABG pCO2 at Pt Temp   


 


ABG pO2 at Pt Temp   


 


ABG HCO3   


 


ABG O2 Sat (Measured)   


 


ABG O2 Content   


 


ABG Base Excess   


 


Gonzalo Test   


 


Carboxyhemoglobin   


 


Methemoglobin   


 


O2 Delivery Device   


 


Oxygen Flow Rate   


 


Sodium   


 


Potassium   


 


Chloride   


 


Carbon Dioxide   


 


Anion Gap   


 


BUN   


 


Creatinine   


 


Est GFR (CKD-EPI)AfAm   


 


Est GFR (CKD-EPI)NonAf   


 


POC Glucometer  297  76  210


 


Random Glucose   


 


Lactic Acid   


 


Calcium   


 


Phosphorus   


 


Magnesium   


 


Total Bilirubin   


 


AST   


 


ALT   


 


Alkaline Phosphatase   


 


Troponin I   


 


Total Protein   


 


Albumin   


 


Lipase   


 


Urine Color   


 


Urine Appearance   


 


Urine pH   


 


Ur Specific Gravity   


 


Urine Protein   


 


Urine Glucose (UA)   


 


Urine Ketones   


 


Urine Blood   


 


Urine Nitrite   


 


Urine Bilirubin   


 


Urine Urobilinogen   


 


Ur Leukocyte Esterase   


 


Urine WBC (Auto)   


 


Urine RBC (Auto)   


 


U Epithel Cells (Auto)   


 


Urine Bacteria (Auto)   


 


Acetone, Qual   











HOSPITAL COURSE:





Date of Admission:10/06/19


Date of Discharge: 10/12/19





Seen and examined; I agree with above aside from as documented by myself.  

Overall remains stable and without profound difficulties noted and clinical 

improvement.  ID consult noted and discussed with consulting services.





10 sys unable to be completed secondary to underlying clinical condition





VS, labs, imaging reviewed


NAD, AAO, resting in bed


RRR s1/2


NC AT EOM[facility] PERRLA


NT ND +BS


CN2-12 wnl, no fnd





Community Acquired MRSA UTI


Hyperosmolar hyperglycemia state (resolving)


Acute kidney insufficiency (resolved)


Uncontrolled diabetes


Acute metabolic encephalopathy; r/o NPH


Urinary retention possibly 2/2 to BPH


Dementia





Completed course PO doxy, monitor renal function, planning for DC








<Tiburcio Molina - Last Filed: 10/14/19 11:31>





Discharge Summary


Problems reviewed: Yes


Reason For Visit: ACUTE KIDNEY INJURY, DIABETIC KETOACIDOSIS, COLITI


Current Active Problems





DEB (acute kidney injury) (Acute)


DKA (diabetic ketoacidoses) (Acute)


Obesity (BMI 30-39.9) (Chronic)











- Home Medications


Comprehensive Discharge Medication List: 


Ambulatory Orders





Atorvastatin Ca [Lipitor] 40 mg PO DAILY 06/10/19 


Clopidogrel Bisulfate [Plavix] 75 mg PO DAILY 06/10/19 


Ezetimibe 10 mg PO HS 06/10/19 


Nebivolol HCl [Bystolic] 2.5 mg PO HS 06/10/19 


Tamsulosin HCl 0.4 mg PO DAILY 06/10/19 


Compression Socks, Medium [Futuro Restoring] 1 each MC DAILY #1 each 10/11/19 


Donepezil HCl [Aricept -] 5 mg PO DAILY  tablet 10/11/19 


Doxycycline Hyclate [Vibramycin -] 100 mg PO BID@1000,1800 3 Days #6 capsule 10/

11/19 


Escitalopram Oxalate [Lexapro -] 10 mg PO DAILY  tablet 10/11/19 


Insulin (Levemir) [Levemir Vial] 15 units SQ BID@0700,2200  units 10/11/19 


Insulin Aspart [Novolog] 2 unit SQ ACLD #1 cartridge 10/11/19 


Insulin Sliding Scale [Novolog Vial Sliding Scale -] 1 vial SQ ACHS  units 10/11

/19 


Tamsulosin HCl 0.4 mg PO BID #60 capsule 10/11/19 











<Collins Beal - Last Filed: 10/11/19 21:19>


Current Active Problems





DEB (acute kidney injury) (Acute)


DKA (diabetic ketoacidoses) (Acute)


Obesity (BMI 30-39.9) (Chronic)











- Home Medications


Comprehensive Discharge Medication List: 


Ambulatory Orders





Atorvastatin Ca [Lipitor] 40 mg PO DAILY 06/10/19 


Clopidogrel Bisulfate [Plavix] 75 mg PO DAILY 06/10/19 


Ezetimibe 10 mg PO HS 06/10/19 


Nebivolol HCl [Bystolic] 2.5 mg PO HS 06/10/19 


Tamsulosin HCl 0.4 mg PO DAILY 06/10/19 


Compression Socks, Medium [Futuro Restoring] 1 each MC DAILY #1 each 10/11/19 


Donepezil HCl [Aricept -] 5 mg PO DAILY  tablet 10/11/19 


Doxycycline Hyclate [Vibramycin -] 100 mg PO BID@1000,1800 3 Days #6 capsule 10/

11/19 


Escitalopram Oxalate [Lexapro -] 10 mg PO DAILY  tablet 10/11/19 


Insulin (Levemir) [Levemir Vial] 15 units SQ BID@0700,2200  units 10/11/19 


Insulin Aspart [Novolog] 2 unit SQ ACLD #1 cartridge 10/11/19 


Insulin Sliding Scale [Novolog Vial Sliding Scale -] 1 vial SQ ACHS  units 10/11

/19 


Tamsulosin HCl 0.4 mg PO BID #60 capsule 10/11/19 











<Tiburcio Molina - Last Filed: 10/14/19 11:31>


Condition: Stable





- Instructions


Diet, Activity, Other Instructions: 


Please STOP using the insulin pump


Instead you will be using Levemir 15U TWICE daily


Novolog 2U with Lunch and Dinner


Sliding scale as below for AS NEEDED coverage:


   100-150 0units


   151-200 1units


   201-250 2units


   251-300 3units


   301-350 4units


   351-400 6 units


   >400 6 units and go to the ER or call a doctor





Also take Doxycycline 100mg TWICE daily for 3 more days


Also continue taking Aricept 5mg daily and Lexapro 10mg daily as suggested by 

the neurologist


We increased your Tamsulosin to 0.4mg TWICE daily and please use compression 

stockings to help with any orthostatic hypotension seen





Diet: Diabetic Dysphagia ground with thin liquids





Follow-up with Dr. Villa regarding the camacho catheter within 3-5 days. 

Please maintain the camacho catheter with proper care the skilled nursing facility


Follow-up with Dr. Burnett for the diabetes.


Follow-up with Dr. Elkins in 3-5 days to update them on your care


Referrals: 


Collins Villa MD [Staff Physician] - 1 Week


Shanel Elkins MD [Staff Physician] - 


Ramírez Burnett MD [Staff Physician] - 2 Weeks


Disposition: SKILLED NURSING FACILITY


This patient is new to me today: No


Emergency Visit: Yes


ED Registration Date: 10/06/19


Care time: The patient presented to the Emergency Department on the above date 

and was hospitalized for further evaluation of their emergent condition.


Critical Care patient: No





- Discharge Referral


Referred to [facility] No





<Collins Beal - Last Filed: 10/11/19 21:19>





ATTENDING PHYSICIAN STATEMENT





I saw and evaluated the patient.


I reviewed the resident's note and discussed the case with the resident.


I agree with the resident's findings and plan as documented.








SUBJECTIVE:








OBJECTIVE:








ASSESSMENT AND PLAN:








<Collins Beal - Last Filed: 10/11/19 21:19>





ATTENDING PHYSICIAN STATEMENT





I saw and evaluated the patient.


I reviewed the resident's note and discussed the case with the resident.


I agree with the resident's findings and plan as documented.








SUBJECTIVE:








OBJECTIVE:








ASSESSMENT AND PLAN:








<Tiburcio Molina - Last Filed: 10/14/19 11:31>

## 2019-10-11 NOTE — PN
Progress Note, Physician


History of Present Illness: 





Pt seen and examined at bedside. He is awake and appears comfortable. 





- Current Medication List


Current Medications: 


Active Medications





Acetaminophen (Tylenol -)  650 mg PO Q4H PRN


   PRN Reason: FEVER


   Last Admin: 10/09/19 12:31 Dose:  650 mg


Atorvastatin Calcium (Lipitor -)  40 mg PO [facility]


   Last Admin: 10/10/19 21:43 Dose:  40 mg


Clopidogrel Bisulfate (Plavix -)  75 mg PO DAILY [facility]


   Last Admin: 10/11/19 09:10 Dose:  75 mg


Donepezil HCl (Aricept -)  5 mg PO DAILY [facility]


   Last Admin: 10/11/19 09:11 Dose:  5 mg


Doxycycline Hyclate (Vibramycin -)  100 mg PO BID@1000,1800 [facility]


   Last Admin: 10/11/19 09:10 Dose:  100 mg


Ezetimibe (Zetia -)  10 mg PO [facility]


   Last Admin: 10/10/19 21:42 Dose:  10 mg


Escitalopram Oxalate (Lexapro -)  10 mg PO DAILY [facility]


   Stop: 10/14/19 18:11


   Last Admin: 10/11/19 09:10 Dose:  10 mg


Heparin Sodium (Porcine) (Heparin -)  5,000 unit SQ TID [facility]


   Last Admin: 10/11/19 13:47 Dose:  5,000 unit


Insulin Aspart (Novolog Vial Sliding Scale -)  1 vial SQ [facility]; Protocol


   Last Admin: 10/11/19 11:48 Dose:  4 units


Insulin Detemir (Levemir Vial)  15 units SQ BID@0700,2200 [facility]


   Last Admin: 10/11/19 06:21 Dose:  15 units


Nebivolol (Bystolic -)  2.5 mg PO [facility]


   Last Admin: 10/10/19 21:42 Dose:  2.5 mg


Nystatin (Nystop Powder -)  1 applic TP BID [facility]


   Last Admin: 10/11/19 09:11 Dose:  1 applic


Tamsulosin HCl (Flomax -)  0.4 mg PO DAILY@0830 [facility]


   Last Admin: 10/11/19 09:10 Dose:  0.4 mg











- Objective


Vital Signs: 


 Vital Signs











Temperature  98.3 F   10/11/19 09:12


 


Pulse Rate  78   10/11/19 09:12


 


Respiratory Rate  20   10/11/19 09:12


 


Blood Pressure  120/54 L  10/11/19 09:12


 


O2 Sat by Pulse Oximetry (%)  98   10/08/19 09:00











Constitutional: Yes: Calm


Eyes: Yes: Other (legaly blind)


Cardiovascular: Yes: S1, S2


Respiratory: Yes: CTA Bilaterally


Gastrointestinal: Yes: Soft


Genitourinary: Yes: Mckeon Present


Musculoskeletal: Yes: WNL


Edema: No


Neurological: Yes: Oriented


Labs: 


 CBC, BMP





 10/10/19 06:15 





 10/11/19 07:25 





 INR, PTT











INR  1.15  (0.83-1.09)  H  10/06/19  21:00    














Problem List





- Problems


(1) DEB (acute kidney injury)


Code(s): N17.9 - ACUTE KIDNEY FAILURE, UNSPECIFIED   





(2) DKA (diabetic ketoacidoses)


Code(s): E11.10 - TYPE 2 DIABETES MELLITUS WITH KETOACIDOSIS WITHOUT COMA   


Qualifiers: 


   Diabetes mellitus type: type 2   Diabetes mellitus complication detail: 

without coma   Qualified Code(s): E11.10 - Type 2 diabetes mellitus with 

ketoacidosis without coma   





(3) Acute renal failure


Code(s): N17.9 - ACUTE KIDNEY FAILURE, UNSPECIFIED   





(4) Hypernatremia


Code(s): E87.0 - HYPEROSMOLALITY AND HYPERNATREMIA   





Assessment/Plan


 Current Medications











Generic Name Dose Route Start Last Admin





  Trade Name Cliffq  PRN Reason Stop Dose Admin


 


Acetaminophen  650 mg  10/09/19 10:34  10/09/19 12:31





  Tylenol -  PO   650 mg





  Q4H PRN   Administration





  FEVER   





     





     





     


 


Atorvastatin Calcium  40 mg  10/07/19 22:00  10/10/19 21:43





  Lipitor -  PO   40 mg





  [facility] MARY   Administration





     





     





     





     


 


Clopidogrel Bisulfate  75 mg  10/08/19 10:00  10/11/19 09:10





  Plavix -  PO   75 mg





  DAILY MARY   Administration





     





     





     





     


 


Donepezil HCl  5 mg  10/09/19 10:00  10/11/19 09:11





  Aricept -  PO   5 mg





  DAILY MARY   Administration





     





     





     





     


 


Doxycycline Hyclate  100 mg  10/10/19 18:00  10/11/19 09:10





  Vibramycin -  PO   100 mg





  BID@1000,1800 MARY   Administration





     





     





     





     


 


Ezetimibe  10 mg  10/07/19 22:00  10/10/19 21:42





  Zetia -  PO   10 mg





  HS MARY   Administration





     





     





     





     


 


Escitalopram Oxalate  10 mg  10/09/19 10:00  10/11/19 09:10





  Lexapro -  PO  10/14/19 18:11  10 mg





  DAILY MARY   Administration





     





     





     





     


 


Heparin Sodium (Porcine)  5,000 unit  10/07/19 22:00  10/11/19 13:47





  Heparin -  SQ   5,000 unit





  TID MARY   Administration





     





     





     





     


 


Insulin Aspart  1 vial  10/10/19 00:52  10/11/19 11:48





  Novolog Vial Sliding Scale -  SQ   4 units





  [facility]   Administration





     





     





  Protocol   





     


 


Insulin Detemir  15 units  10/10/19 00:50  10/11/19 06:21





  Levemir Vial  SQ   15 units





  BID@0700,2200 MARY   Administration





     





     





     





     


 


Nebivolol  2.5 mg  10/07/19 22:00  10/10/19 21:42





  Bystolic -  PO   2.5 mg





  HS MARY   Administration





     





     





     





     


 


Nystatin  1 applic  10/10/19 11:30  10/11/19 09:11





  Nystop Powder -  TP   1 applic





  BID MARY   Administration





     





     





     





     


 


Tamsulosin HCl  0.4 mg  10/08/19 08:30  10/11/19 09:10





  Flomax -  PO   0.4 mg





  DAILY@0830 MARY   Administration





     





     





     





     











Impression


1. DEB


2. DKA


3. hypernatremia


4. DM


5. dementia


6. cad





Plan


- sodium is improved


- renal function is improved


- diabetic diet, discussed with wife


- DEB likely secondary to dehydration from DKA


- will follow PRN

## 2019-10-11 NOTE — PN
Progress Note, Physician


History of Present Illness: 





stable


no new issues





- Current Medication List


Current Medications: 


Active Medications





Acetaminophen (Tylenol -)  650 mg PO Q4H PRN


   PRN Reason: FEVER


   Last Admin: 10/09/19 12:31 Dose:  650 mg


Atorvastatin Calcium (Lipitor -)  40 mg PO [facility]


   Last Admin: 10/10/19 21:43 Dose:  40 mg


Clopidogrel Bisulfate (Plavix -)  75 mg PO DAILY [facility]


   Last Admin: 10/11/19 09:10 Dose:  75 mg


Donepezil HCl (Aricept -)  5 mg PO DAILY [facility]


   Last Admin: 10/11/19 09:11 Dose:  5 mg


Doxycycline Hyclate (Vibramycin -)  100 mg PO BID@1000,1800 [facility]


   Last Admin: 10/11/19 09:10 Dose:  100 mg


Ezetimibe (Zetia -)  10 mg PO [facility]


   Last Admin: 10/10/19 21:42 Dose:  10 mg


Escitalopram Oxalate (Lexapro -)  10 mg PO DAILY [facility]


   Stop: 10/14/19 18:11


   Last Admin: 10/11/19 09:10 Dose:  10 mg


Heparin Sodium (Porcine) (Heparin -)  5,000 unit SQ TID [facility]


   Last Admin: 10/11/19 05:41 Dose:  5,000 unit


Insulin Aspart (Novolog Vial Sliding Scale -)  1 vial SQ [facility]; Protocol


   Last Admin: 10/11/19 11:48 Dose:  4 units


Insulin Detemir (Levemir Vial)  15 units SQ BID@0700,2200 [facility]


   Last Admin: 10/11/19 06:21 Dose:  15 units


Nebivolol (Bystolic -)  2.5 mg PO [facility]


   Last Admin: 10/10/19 21:42 Dose:  2.5 mg


Nystatin (Nystop Powder -)  1 applic TP BID [facility]


   Last Admin: 10/11/19 09:11 Dose:  1 applic


Tamsulosin HCl (Flomax -)  0.4 mg PO DAILY@0830 [facility]


   Last Admin: 10/11/19 09:10 Dose:  0.4 mg











- Objective


Vital Signs: 


 Vital Signs











Temperature  98.3 F   10/11/19 09:12


 


Pulse Rate  78   10/11/19 09:12


 


Respiratory Rate  20   10/11/19 09:12


 


Blood Pressure  120/54 L  10/11/19 09:12


 


O2 Sat by Pulse Oximetry (%)  98   10/08/19 09:00











Constitutional: Yes: No Distress, Calm


Eyes: Yes: Other (legally blind)


Cardiovascular: Yes: S1, S2


Respiratory: Yes: Regular, CTA Bilaterally


Gastrointestinal: Yes: Normal Bowel Sounds, Soft


Musculoskeletal: Yes: WNL


Extremities: Yes: WNL


Labs: 


 CBC, BMP





 10/10/19 06:15 





 10/11/19 07:25 





 INR, PTT











INR  1.15  (0.83-1.09)  H  10/06/19  21:00    














Assessment/Plan





79 year old man with a history of HTN, hyperlipidemia, CAD, MI, CABG, chronic 

diastolic heart failure, type 2 DM, stage 3 CKD, COPD, dementia who presented 

to the ED with altered mental status. 





1. Acute metabolic encephalopathy 


2. Acute kidney injury


3. Abdominal pain 


4.  UTI





5. Lactic acidosis


6. Hypernatremia


7. Hypophosphatemia


8. HTN


9. Hyperlipidemia


10. CAD, history of MI, CABG


11. Chronic diastolic heart failure


12. Stage 3 CKD


13. COPD


14. Alzheimer dementia





plan


continue current mgmt


monitor


rest as per the team

## 2019-10-12 RX ADMIN — INSULIN ASPART SCH UNITS: 100 INJECTION, SOLUTION INTRAVENOUS; SUBCUTANEOUS at 12:16

## 2019-10-12 RX ADMIN — ATORVASTATIN CALCIUM SCH MG: 40 TABLET, FILM COATED ORAL at 21:14

## 2019-10-12 RX ADMIN — CLOPIDOGREL BISULFATE SCH MG: 75 TABLET, FILM COATED ORAL at 09:00

## 2019-10-12 RX ADMIN — TAMSULOSIN HYDROCHLORIDE SCH MG: 0.4 CAPSULE ORAL at 08:55

## 2019-10-12 RX ADMIN — DONEPEZIL HYDROCHLORIDE SCH MG: 5 TABLET, FILM COATED ORAL at 09:00

## 2019-10-12 RX ADMIN — NYSTATIN SCH APPLIC: 100000 POWDER TOPICAL at 21:14

## 2019-10-12 RX ADMIN — DOXYCYCLINE HYCLATE SCH MG: 100 CAPSULE ORAL at 09:00

## 2019-10-12 RX ADMIN — DOXYCYCLINE HYCLATE SCH MG: 100 CAPSULE ORAL at 17:35

## 2019-10-12 RX ADMIN — NYSTATIN SCH APPLIC: 100000 POWDER TOPICAL at 09:01

## 2019-10-12 RX ADMIN — INSULIN ASPART SCH: 100 INJECTION, SOLUTION INTRAVENOUS; SUBCUTANEOUS at 06:31

## 2019-10-12 RX ADMIN — EZETIMIBE SCH MG: 10 TABLET ORAL at 21:14

## 2019-10-12 RX ADMIN — HEPARIN SODIUM SCH UNIT: 5000 INJECTION, SOLUTION INTRAVENOUS; SUBCUTANEOUS at 06:31

## 2019-10-12 RX ADMIN — INSULIN ASPART SCH UNITS: 100 INJECTION, SOLUTION INTRAVENOUS; SUBCUTANEOUS at 21:16

## 2019-10-12 RX ADMIN — INSULIN DETEMIR SCH: 100 INJECTION, SOLUTION SUBCUTANEOUS at 06:31

## 2019-10-12 RX ADMIN — NEBIVOLOL HYDROCHLORIDE SCH MG: 2.5 TABLET ORAL at 21:14

## 2019-10-12 RX ADMIN — HEPARIN SODIUM SCH UNIT: 5000 INJECTION, SOLUTION INTRAVENOUS; SUBCUTANEOUS at 14:48

## 2019-10-12 RX ADMIN — ESCITALOPRAM OXALATE SCH MG: 10 TABLET, FILM COATED ORAL at 09:00

## 2019-10-12 RX ADMIN — INSULIN ASPART SCH UNITS: 100 INJECTION, SOLUTION INTRAVENOUS; SUBCUTANEOUS at 17:34

## 2019-10-12 RX ADMIN — HEPARIN SODIUM SCH UNIT: 5000 INJECTION, SOLUTION INTRAVENOUS; SUBCUTANEOUS at 21:14

## 2019-10-12 NOTE — PN
Progress Note, Physician


Chief Complaint: 





awake yet confused 





- Current Medication List


Current Medications: 


Active Medications





Acetaminophen (Tylenol -)  650 mg PO Q4H PRN


   PRN Reason: FEVER


   Last Admin: 10/09/19 12:31 Dose:  650 mg


Atorvastatin Calcium (Lipitor -)  40 mg PO [facility]


   Last Admin: 10/11/19 22:09 Dose:  40 mg


Clopidogrel Bisulfate (Plavix -)  75 mg PO DAILY [facility]


   Last Admin: 10/12/19 09:00 Dose:  75 mg


Donepezil HCl (Aricept -)  5 mg PO DAILY [facility]


   Last Admin: 10/12/19 09:00 Dose:  5 mg


Doxycycline Hyclate (Vibramycin -)  100 mg PO BID@1000,1800 [facility]


   Last Admin: 10/12/19 17:35 Dose:  100 mg


Ezetimibe (Zetia -)  10 mg PO [facility]


   Last Admin: 10/11/19 22:07 Dose:  10 mg


Escitalopram Oxalate (Lexapro -)  10 mg PO DAILY [facility]


   Stop: 10/14/19 18:11


   Last Admin: 10/12/19 09:00 Dose:  10 mg


Heparin Sodium (Porcine) (Heparin -)  5,000 unit SQ TID [facility]


   Last Admin: 10/12/19 14:48 Dose:  5,000 unit


Insulin Aspart (Novolog Vial Sliding Scale -)  1 vial SQ [facility]; Protocol


   Last Admin: 10/12/19 17:34 Dose:  8 units


Insulin Detemir (Levemir Vial)  15 units SQ BID@0700,2200 [facility]


   Last Admin: 10/12/19 06:31 Dose:  Not Given


Nebivolol (Bystolic -)  2.5 mg PO [facility]


   Last Admin: 10/11/19 22:07 Dose:  2.5 mg


Nystatin (Nystop Powder -)  1 applic TP BID [facility]


   Last Admin: 10/12/19 09:01 Dose:  1 applic


Tamsulosin HCl (Flomax -)  0.4 mg PO DAILY@0830 [facility]


   Last Admin: 10/12/19 08:55 Dose:  0.4 mg











- Objective


Vital Signs: 


 Vital Signs











Temperature  97.3 F L  10/12/19 18:00


 


Pulse Rate  75   10/12/19 18:00


 


Respiratory Rate  20   10/12/19 18:00


 


Blood Pressure  151/72   10/12/19 18:00


 


O2 Sat by Pulse Oximetry (%)  98   10/08/19 09:00











Constitutional: Yes: Calm


Eyes: Yes: EOM Intact


HENT: Yes: Normocephalic


Neck: Yes: Trachea Midline


Cardiovascular: Yes: Regular Rate and Rhythm


Respiratory: Yes: CTA Bilaterally


Gastrointestinal: Yes: Normal Bowel Sounds


...Rectal Exam: Yes: Deferred


Genitourinary: Yes: WNL


Musculoskeletal: Yes: Back Pain, Joint Swelling, Muscle Pain, Muscle Weakness


Extremities: Yes: WNL


Edema: No


Labs: 


 CBC, BMP





 10/10/19 06:15 





 10/11/19 07:25 





 INR, PTT











INR  1.15  (0.83-1.09)  H  10/06/19  21:00    














Problem List





- Problems


(1) DEB (acute kidney injury)


Code(s): N17.9 - ACUTE KIDNEY FAILURE, UNSPECIFIED   





(2) Acute renal failure


Code(s): N17.9 - ACUTE KIDNEY FAILURE, UNSPECIFIED   





(3) Altered mental status


Code(s): R41.82 - ALTERED MENTAL STATUS, UNSPECIFIED   


Qualifiers: 


   Altered mental status type: somnolence   Qualified Code(s): R40.0 - 

Somnolence   





(4) Community acquired pneumonia


Code(s): J18.9 - PNEUMONIA, UNSPECIFIED ORGANISM   





(5) Dehydration


Code(s): E86.0 - DEHYDRATION   





(6) Hyperglycemia


Code(s): R73.9 - HYPERGLYCEMIA, UNSPECIFIED   





Assessment/Plan





Current Active Problems


dm t2


DEB (acute kidney injury) (Acute)


Obesity (BMI 30-39.9) (Chronic)


dementia


htn 


ashd


 Laboratory Results - last 24 hr











  10/11/19 10/12/19 10/12/19





  22:05 06:29 11:16


 


POC Glucometer  297  76  210














  10/12/19





  17:19


 


POC Glucometer  343








plan:


bgm qid novolog scale


levmir q am 18 iu 


dc to snf

## 2019-10-12 NOTE — PN
Progress Note, Physician


History of Present Illness: 





Pt is afebrile, without distress. States he feels "ok".





- Current Medication List


Current Medications: 


Active Medications





Acetaminophen (Tylenol -)  650 mg PO Q4H PRN


   PRN Reason: FEVER


   Last Admin: 10/09/19 12:31 Dose:  650 mg


Atorvastatin Calcium (Lipitor -)  40 mg PO HS [facility]


   Last Admin: 10/11/19 22:09 Dose:  40 mg


Clopidogrel Bisulfate (Plavix -)  75 mg PO DAILY [facility]


   Last Admin: 10/12/19 09:00 Dose:  75 mg


Donepezil HCl (Aricept -)  5 mg PO DAILY [facility]


   Last Admin: 10/12/19 09:00 Dose:  5 mg


Doxycycline Hyclate (Vibramycin -)  100 mg PO BID@1000,1800 [facility]


   Last Admin: 10/12/19 17:35 Dose:  100 mg


Ezetimibe (Zetia -)  10 mg PO HS [facility]


   Last Admin: 10/11/19 22:07 Dose:  10 mg


Escitalopram Oxalate (Lexapro -)  10 mg PO DAILY [facility]


   Stop: 10/14/19 18:11


   Last Admin: 10/12/19 09:00 Dose:  10 mg


Heparin Sodium (Porcine) (Heparin -)  5,000 unit SQ TID [facility]


   Last Admin: 10/12/19 14:48 Dose:  5,000 unit


Insulin Aspart (Novolog Vial Sliding Scale -)  [facility]; Protocol


   Last Admin: 10/12/19 17:34 Dose:  8 units


Insulin Detemir (Levemir Vial)  18 units SQ AM [facility]


Nebivolol (Bystolic -)  2.5 mg PO HS [facility]


   Last Admin: 10/11/19 22:07 Dose:  2.5 mg


Nystatin (Nystop Powder -)  1 applic TP BID [facility]


   Last Admin: 10/12/19 09:01 Dose:  1 applic


Tamsulosin HCl (Flomax -)  0.4 mg PO DAILY@0830 [facility]


   Last Admin: 10/12/19 08:55 Dose:  0.4 mg











- Objective


Vital Signs: 


 Vital Signs











Temperature  99.2 F   10/12/19 21:06


 


Pulse Rate  70   10/12/19 21:06


 


Respiratory Rate  18   10/12/19 21:06


 


Blood Pressure  141/64   10/12/19 21:06


 


O2 Sat by Pulse Oximetry (%)  98   10/08/19 09:00











Constitutional: Yes: No Distress, Calm


Cardiovascular: Yes: Regular Rate and Rhythm


Respiratory: Yes: Regular


Gastrointestinal: Yes: Normal Bowel Sounds, Soft


Genitourinary: Yes: Mckeon Present


Extremities: Yes: WNL


Integumentary: Yes: WNL


Labs: 


 CBC, BMP





 10/10/19 06:15 





 10/11/19 07:25 





 INR, PTT











INR  1.15  (0.83-1.09)  H  10/06/19  21:00    








 Microbiology





10/06/19 23:00   Blood - Peripheral Venous   Blood Culture - Final


                            NO GROWTH AFTER 5 DAYS INCUBATION


10/06/19 23:00   Blood - Peripheral Venous   Blood Culture - Final


                            NO GROWTH AFTER 5 DAYS INCUBATION


10/06/19 21:16   Urine - Urine Mckeon   Urine Culture - Final


                            Strep Agalactiae Group B


                            Mr S Aureus


                            Staphylococcus Coagulase Neg











Problem List





- Problems


(1) DEB (acute kidney injury)


Code(s): N17.9 - ACUTE KIDNEY FAILURE, UNSPECIFIED   





(2) Altered mental status


Code(s): R41.82 - ALTERED MENTAL STATUS, UNSPECIFIED   


Qualifiers: 


   Altered mental status type: somnolence   Qualified Code(s): R40.0 - 

Somnolence   





(3) Urinary retention


Code(s): R33.9 - RETENTION OF URINE, UNSPECIFIED   





(4) CAD (coronary artery disease)


Code(s): I25.10 - ATHSCL HEART DISEASE OF NATIVE CORONARY ARTERY W/O ANG PCTRS 

  





(5) CKD stage 3 due to type 2 diabetes mellitus


Code(s): E11.22 - TYPE 2 DIABETES MELLITUS W DIABETIC CHRONIC KIDNEY DISEASE; 

N18.3 - CHRONIC KIDNEY DISEASE, STAGE 3 (MODERATE)   





(6) Hyperlipidemia


Code(s): E78.5 - HYPERLIPIDEMIA, UNSPECIFIED   





(7) Hypertension


Code(s): I10 - ESSENTIAL (PRIMARY) HYPERTENSION   





(8) Type 2 diabetes mellitus


Code(s): E11.9 - TYPE 2 DIABETES MELLITUS WITHOUT COMPLICATIONS   





Assessment/Plan





-- continue doxycycline


-- afebrile, vitals stable, without distress


discharge planning

## 2019-10-12 NOTE — PN
Progress Note (short form)





- Note


Progress Note: 








1. DEB


2. DKA


3. hypernatremia


4. DM


5. dementia


6. cad











 Current Medications





Acetaminophen (Tylenol -)  650 mg PO Q4H PRN


   PRN Reason: FEVER


   Last Admin: 10/09/19 12:31 Dose:  650 mg


Atorvastatin Calcium (Lipitor -)  40 mg PO HS [facility]


   Last Admin: 10/11/19 22:09 Dose:  40 mg


Clopidogrel Bisulfate (Plavix -)  75 mg PO DAILY [facility]


   Last Admin: 10/12/19 09:00 Dose:  75 mg


Donepezil HCl (Aricept -)  5 mg PO DAILY [facility]


   Last Admin: 10/12/19 09:00 Dose:  5 mg


Doxycycline Hyclate (Vibramycin -)  100 mg PO BID@1000,1800 [facility]


   Last Admin: 10/12/19 17:35 Dose:  100 mg


Ezetimibe (Zetia -)  10 mg PO HS [facility]


   Last Admin: 10/11/19 22:07 Dose:  10 mg


Escitalopram Oxalate (Lexapro -)  10 mg PO DAILY [facility]


   Stop: 10/14/19 18:11


   Last Admin: 10/12/19 09:00 Dose:  10 mg


Heparin Sodium (Porcine) (Heparin -)  5,000 unit SQ TID [facility]


   Last Admin: 10/12/19 14:48 Dose:  5,000 unit


Insulin Aspart (Novolog Vial Sliding Scale -)  [facility]; Protocol


   Last Admin: 10/12/19 17:34 Dose:  8 units


Insulin Detemir (Levemir Vial)  18 units SQ AM [facility]


Nebivolol (Bystolic -)  2.5 mg PO HS [facility]


   Last Admin: 10/11/19 22:07 Dose:  2.5 mg


Nystatin (Nystop Powder -)  1 applic TP BID [facility]


   Last Admin: 10/12/19 09:01 Dose:  1 applic


Tamsulosin HCl (Flomax -)  0.4 mg PO DAILY@0830 [facility]


   Last Admin: 10/12/19 08:55 Dose:  0.4 mg





 Last Vital Signs











Temp Pulse Resp BP Pulse Ox


 


 97.3 F L  75   20   151/72   98 


 


 10/12/19 18:00  10/12/19 18:00  10/12/19 18:00  10/12/19 18:00  10/08/19 09:00























 CBC, BMP





 10/10/19 06:15 





 10/11/19 07:25

## 2019-10-12 NOTE — PN
Progress Note (short form)





- Note


Progress Note: 





Discharge order written yesterday. No issues overnight. Pt without any changes 

in HPI.


 Vital Signs











Temperature  98.4 F   10/12/19 08:54


 


Pulse Rate  70   10/12/19 08:54


 


Respiratory Rate  20   10/12/19 08:54


 


Blood Pressure  108/82   10/12/19 08:54


 


O2 Sat by Pulse Oximetry (%)  98   10/08/19 09:00








PE:


Gen: NAD, easily arousable


Lungs: CTA b/l, no wheezes. On RA


CARD: RRR no M/m/r


ABD: Soft, Nt/ND, normoactive BS


Ext: No edema, pulses intact througout








Plan:


--Plan remains for discharge to SNF, however pending authorization from 

insurance per CM.





Case discussed with CM and Dr. Tracy Beal, DO - IM PGY-3





<Collins Beal - Last Filed: 10/12/19 10:16>





- Note


Progress Note: 





Was discharged but insurance did not accept.  He remains hospitalized with no 

acute issues. 





VS, labs, imaging reviewed


NAD, AAO, resting in bed


NC AT EOMI PERRLA


NT ND +BS


CN2-12 wnl, no fnd


Normal mood, appropriate behavior, demented but no FND.  Can follow directions, 

responds well to verbal cues.





A/P:


Patient presented for DKA; had complicated UTI with CA-MRSA.  ID saw and will 

complete doxy.  Off insulin pump and he should stay off it given repeated 

issues with managing it.  He was discharged on friday but insurance denied his 

stay in the acute rehab and cannot approve over the weekend so he will likely 

be here through monday. 





<Tiburcio Molina - Last Filed: 10/14/19 11:34>

## 2019-10-13 RX ADMIN — NEBIVOLOL HYDROCHLORIDE SCH MG: 2.5 TABLET ORAL at 21:35

## 2019-10-13 RX ADMIN — EZETIMIBE SCH MG: 10 TABLET ORAL at 21:35

## 2019-10-13 RX ADMIN — INSULIN ASPART SCH UNITS: 100 INJECTION, SOLUTION INTRAVENOUS; SUBCUTANEOUS at 23:40

## 2019-10-13 RX ADMIN — DOXYCYCLINE HYCLATE SCH MG: 100 CAPSULE ORAL at 09:09

## 2019-10-13 RX ADMIN — HEPARIN SODIUM SCH UNIT: 5000 INJECTION, SOLUTION INTRAVENOUS; SUBCUTANEOUS at 06:19

## 2019-10-13 RX ADMIN — INSULIN ASPART SCH UNITS: 100 INJECTION, SOLUTION INTRAVENOUS; SUBCUTANEOUS at 17:19

## 2019-10-13 RX ADMIN — HEPARIN SODIUM SCH UNIT: 5000 INJECTION, SOLUTION INTRAVENOUS; SUBCUTANEOUS at 13:39

## 2019-10-13 RX ADMIN — INSULIN ASPART SCH UNITS: 100 INJECTION, SOLUTION INTRAVENOUS; SUBCUTANEOUS at 21:34

## 2019-10-13 RX ADMIN — INSULIN DETEMIR SCH UNITS: 100 INJECTION, SOLUTION SUBCUTANEOUS at 06:19

## 2019-10-13 RX ADMIN — DONEPEZIL HYDROCHLORIDE SCH MG: 5 TABLET, FILM COATED ORAL at 09:09

## 2019-10-13 RX ADMIN — NYSTATIN SCH APPLIC: 100000 POWDER TOPICAL at 21:35

## 2019-10-13 RX ADMIN — DOXYCYCLINE HYCLATE SCH MG: 100 CAPSULE ORAL at 17:19

## 2019-10-13 RX ADMIN — ACETAMINOPHEN PRN MG: 325 TABLET ORAL at 18:00

## 2019-10-13 RX ADMIN — ATORVASTATIN CALCIUM SCH MG: 40 TABLET, FILM COATED ORAL at 21:35

## 2019-10-13 RX ADMIN — INSULIN ASPART SCH UNITS: 100 INJECTION, SOLUTION INTRAVENOUS; SUBCUTANEOUS at 12:02

## 2019-10-13 RX ADMIN — ESCITALOPRAM OXALATE SCH MG: 10 TABLET, FILM COATED ORAL at 09:09

## 2019-10-13 RX ADMIN — HEPARIN SODIUM SCH UNIT: 5000 INJECTION, SOLUTION INTRAVENOUS; SUBCUTANEOUS at 21:35

## 2019-10-13 RX ADMIN — CLOPIDOGREL BISULFATE SCH MG: 75 TABLET, FILM COATED ORAL at 09:09

## 2019-10-13 RX ADMIN — INSULIN ASPART SCH UNITS: 100 INJECTION, SOLUTION INTRAVENOUS; SUBCUTANEOUS at 06:18

## 2019-10-13 RX ADMIN — NYSTATIN SCH APPLIC: 100000 POWDER TOPICAL at 09:09

## 2019-10-13 RX ADMIN — TAMSULOSIN HYDROCHLORIDE SCH MG: 0.4 CAPSULE ORAL at 09:09

## 2019-10-13 NOTE — PN
Progress Note, Physician


History of Present Illness: 





Pt afebrile. No new complaints.





- Current Medication List


Current Medications: 


Active Medications





Acetaminophen (Tylenol -)  650 mg PO Q4H PRN


   PRN Reason: FEVER


   Last Admin: 10/13/19 18:00 Dose:  650 mg


Atorvastatin Calcium (Lipitor -)  40 mg PO HS [facility]


   Last Admin: 10/12/19 21:14 Dose:  40 mg


Clopidogrel Bisulfate (Plavix -)  75 mg PO DAILY [facility]


   Last Admin: 10/13/19 09:09 Dose:  75 mg


Donepezil HCl (Aricept -)  5 mg PO DAILY [facility]


   Last Admin: 10/13/19 09:09 Dose:  5 mg


Doxycycline Hyclate (Vibramycin -)  100 mg PO BID@1000,1800 [facility]


   Last Admin: 10/13/19 17:19 Dose:  100 mg


Ezetimibe (Zetia -)  10 mg PO HS [facility]


   Last Admin: 10/12/19 21:14 Dose:  10 mg


Escitalopram Oxalate (Lexapro -)  10 mg PO DAILY [facility]


   Stop: 10/14/19 18:11


   Last Admin: 10/13/19 09:09 Dose:  10 mg


Heparin Sodium (Porcine) (Heparin -)  5,000 unit SQ TID [facility]


   Last Admin: 10/13/19 13:39 Dose:  5,000 unit


Insulin Aspart (Novolog Vial Sliding Scale -)  [facility]; Protocol


   Last Admin: 10/13/19 17:19 Dose:  6 units


Insulin Detemir (Levemir Vial)  18 units SQ AM [facility]


   Last Admin: 10/13/19 06:19 Dose:  18 units


Nebivolol (Bystolic -)  2.5 mg PO HS [facility]


   Last Admin: 10/12/19 21:14 Dose:  2.5 mg


Nystatin (Nystop Powder -)  1 applic TP BID [facility]


   Last Admin: 10/13/19 09:09 Dose:  1 applic


Tamsulosin HCl (Flomax -)  0.4 mg PO DAILY@0830 [facility]


   Last Admin: 10/13/19 09:09 Dose:  0.4 mg











- Objective


Vital Signs: 


 Vital Signs











Temperature  97.7 F   10/13/19 14:22


 


Pulse Rate  67   10/13/19 14:22


 


Respiratory Rate  18   10/13/19 14:22


 


Blood Pressure  134/67   10/13/19 14:22


 


O2 Sat by Pulse Oximetry (%)  98   10/08/19 09:00











Constitutional: Yes: No Distress, Calm


Cardiovascular: Yes: Regular Rate and Rhythm


Respiratory: Yes: Regular


Gastrointestinal: Yes: Normal Bowel Sounds, Soft


Genitourinary: Yes: WNL, Mckeon Present


Edema: No


Integumentary: Yes: WNL


Neurological: Yes: Alert


Labs: 


 CBC, BMP





 10/10/19 06:15 





 10/11/19 07:25 





 INR, PTT











INR  1.15  (0.83-1.09)  H  10/06/19  21:00    








 Microbiology





10/06/19 23:00   Blood - Peripheral Venous   Blood Culture - Final


                            NO GROWTH AFTER 5 DAYS INCUBATION


10/06/19 23:00   Blood - Peripheral Venous   Blood Culture - Final


                            NO GROWTH AFTER 5 DAYS INCUBATION


10/06/19 21:16   Urine - Urine Mckeon   Urine Culture - Final


                            Strep Agalactiae Group B


                            Mr S Aureus


                            Staphylococcus Coagulase Neg











Problem List





- Problems


(1) DEB (acute kidney injury)


Code(s): N17.9 - ACUTE KIDNEY FAILURE, UNSPECIFIED   





(2) Altered mental status


Code(s): R41.82 - ALTERED MENTAL STATUS, UNSPECIFIED   


Qualifiers: 


   Altered mental status type: somnolence   Qualified Code(s): R40.0 - 

Somnolence   





(3) Urinary retention


Code(s): R33.9 - RETENTION OF URINE, UNSPECIFIED   





(4) CAD (coronary artery disease)


Code(s): I25.10 - ATHSCL HEART DISEASE OF NATIVE CORONARY ARTERY W/O ANG PCTRS 

  





(5) CKD stage 3 due to type 2 diabetes mellitus


Code(s): E11.22 - TYPE 2 DIABETES MELLITUS W DIABETIC CHRONIC KIDNEY DISEASE; 

N18.3 - CHRONIC KIDNEY DISEASE, STAGE 3 (MODERATE)   





(6) Hyperlipidemia


Code(s): E78.5 - HYPERLIPIDEMIA, UNSPECIFIED   





(7) Hypertension


Code(s): I10 - ESSENTIAL (PRIMARY) HYPERTENSION   





(8) Type 2 diabetes mellitus


Code(s): E11.9 - TYPE 2 DIABETES MELLITUS WITHOUT COMPLICATIONS   





Assessment/Plan





UTI


DEB


DM


Dementia








-- continue doxycycline


-- afebrile, vitals stable, without distress

## 2019-10-14 LAB
ANION GAP SERPL CALC-SCNC: 6 MMOL/L (ref 8–16)
BASOPHILS # BLD: 0.9 % (ref 0–2)
BUN SERPL-MCNC: 27.5 MG/DL (ref 7–18)
CALCIUM SERPL-MCNC: 8.4 MG/DL (ref 8.5–10.1)
CHLORIDE SERPL-SCNC: 103 MMOL/L (ref 98–107)
CO2 SERPL-SCNC: 27 MMOL/L (ref 21–32)
CREAT SERPL-MCNC: 1.3 MG/DL (ref 0.55–1.3)
DEPRECATED RDW RBC AUTO: 14.1 % (ref 11.9–15.9)
EOSINOPHIL # BLD: 3.3 % (ref 0–4.5)
GLUCOSE SERPL-MCNC: 398 MG/DL (ref 74–106)
HCT VFR BLD CALC: 30 % (ref 35.4–49)
HGB BLD-MCNC: 9.6 GM/DL (ref 11.7–16.9)
LYMPHOCYTES # BLD: 23 % (ref 8–40)
MCH RBC QN AUTO: 28.6 PG (ref 25.7–33.7)
MCHC RBC AUTO-ENTMCNC: 32 G/DL (ref 32–35.9)
MCV RBC: 89.2 FL (ref 80–96)
MONOCYTES # BLD AUTO: 15.6 % (ref 3.8–10.2)
NEUTROPHILS # BLD: 57.2 % (ref 42.8–82.8)
PLATELET # BLD AUTO: 200 K/MM3 (ref 134–434)
PMV BLD: 8.6 FL (ref 7.5–11.1)
POTASSIUM SERPLBLD-SCNC: 5.1 MMOL/L (ref 3.5–5.1)
RBC # BLD AUTO: 3.36 M/MM3 (ref 4–5.6)
SODIUM SERPL-SCNC: 136 MMOL/L (ref 136–145)
WBC # BLD AUTO: 9.1 K/MM3 (ref 4–10)

## 2019-10-14 RX ADMIN — EZETIMIBE SCH MG: 10 TABLET ORAL at 22:36

## 2019-10-14 RX ADMIN — DOXYCYCLINE HYCLATE SCH MG: 100 CAPSULE ORAL at 18:17

## 2019-10-14 RX ADMIN — INSULIN ASPART SCH UNITS: 100 INJECTION, SOLUTION INTRAVENOUS; SUBCUTANEOUS at 22:37

## 2019-10-14 RX ADMIN — HEPARIN SODIUM SCH: 5000 INJECTION, SOLUTION INTRAVENOUS; SUBCUTANEOUS at 15:05

## 2019-10-14 RX ADMIN — DOXYCYCLINE HYCLATE SCH: 100 CAPSULE ORAL at 18:32

## 2019-10-14 RX ADMIN — HEPARIN SODIUM SCH UNIT: 5000 INJECTION, SOLUTION INTRAVENOUS; SUBCUTANEOUS at 05:56

## 2019-10-14 RX ADMIN — NEBIVOLOL HYDROCHLORIDE SCH MG: 2.5 TABLET ORAL at 22:36

## 2019-10-14 RX ADMIN — HEPARIN SODIUM SCH UNIT: 5000 INJECTION, SOLUTION INTRAVENOUS; SUBCUTANEOUS at 22:35

## 2019-10-14 RX ADMIN — CLOPIDOGREL BISULFATE SCH MG: 75 TABLET, FILM COATED ORAL at 11:09

## 2019-10-14 RX ADMIN — INSULIN ASPART SCH UNITS: 100 INJECTION, SOLUTION INTRAVENOUS; SUBCUTANEOUS at 06:01

## 2019-10-14 RX ADMIN — INSULIN DETEMIR SCH UNITS: 100 INJECTION, SOLUTION SUBCUTANEOUS at 06:01

## 2019-10-14 RX ADMIN — ESCITALOPRAM OXALATE SCH MG: 10 TABLET, FILM COATED ORAL at 11:09

## 2019-10-14 RX ADMIN — NYSTATIN SCH APPLIC: 100000 POWDER TOPICAL at 22:42

## 2019-10-14 RX ADMIN — DONEPEZIL HYDROCHLORIDE SCH MG: 5 TABLET, FILM COATED ORAL at 11:09

## 2019-10-14 RX ADMIN — TAMSULOSIN HYDROCHLORIDE SCH MG: 0.4 CAPSULE ORAL at 11:09

## 2019-10-14 RX ADMIN — NYSTATIN SCH APPLIC: 100000 POWDER TOPICAL at 11:10

## 2019-10-14 RX ADMIN — DOXYCYCLINE HYCLATE SCH MG: 100 CAPSULE ORAL at 11:09

## 2019-10-14 RX ADMIN — INSULIN ASPART SCH UNITS: 100 INJECTION, SOLUTION INTRAVENOUS; SUBCUTANEOUS at 14:03

## 2019-10-14 RX ADMIN — ATORVASTATIN CALCIUM SCH MG: 40 TABLET, FILM COATED ORAL at 22:36

## 2019-10-14 RX ADMIN — INSULIN ASPART SCH UNITS: 100 INJECTION, SOLUTION INTRAVENOUS; SUBCUTANEOUS at 18:18

## 2019-10-14 NOTE — PN
Progress Note, Physician


History of Present Illness: 





vents noted chart reviewed


Seen on the floor


Alert awake oriented


Still with the right facial droop chronic


No report of any new neurological event





- Current Medication List


Current Medications: 


Active Medications





Acetaminophen (Tylenol -)  650 mg PO Q4H PRN


   PRN Reason: FEVER


   Last Admin: 10/13/19 18:00 Dose:  650 mg


Atorvastatin Calcium (Lipitor -)  [facility]


   Last Admin: 10/13/19 21:35 Dose:  40 mg


Clopidogrel Bisulfate (Plavix -)  75 mg PO DAILY [facility]


   Last Admin: 10/13/19 09:09 Dose:  75 mg


Donepezil HCl (Aricept -)  5 mg PO DAILY [facility]


   Last Admin: 10/13/19 09:09 Dose:  5 mg


Doxycycline Hyclate (Vibramycin -)  100 mg PO BID@1000,1800 [facility]


   Last Admin: 10/13/19 17:19 Dose:  100 mg


Ezetimibe (Zetia -)  10 mg PO HS [facility]


   Last Admin: 10/13/19 21:35 Dose:  10 mg


Escitalopram Oxalate (Lexapro -)  10 mg PO DAILY [facility]


   Stop: 10/14/19 18:11


   Last Admin: 10/13/19 09:09 Dose:  10 mg


Heparin Sodium (Porcine) (Heparin -)  5,000 unit SQ TID [facility]


   Last Admin: 10/14/19 05:56 Dose:  5,000 unit


Insulin Aspart (Novolog Vial Sliding Scale -)  [facility]


   Last Admin: 10/14/19 06:01 Dose:  4 units


Insulin Detemir (Levemir Vial)  18 units SQ AM [facility]


   Last Admin: 10/14/19 06:01 Dose:  18 units


Nebivolol (Bystolic -)  2.5 mg PO HS [facility]


   Last Admin: 10/13/19 21:35 Dose:  2.5 mg


Nystatin (Nystop Powder -)  1 applic TP BID [facility]


   Last Admin: 10/13/19 21:35 Dose:  1 applic


Tamsulosin HCl (Flomax -)  0.4 mg PO DAILY@0830 [facility]


   Last Admin: 10/13/19 09:09 Dose:  0.4 mg











- Objective


Vital Signs: 


 Vital Signs











Temperature  98.8 F   10/14/19 06:00


 


Pulse Rate  57 L  10/14/19 06:00


 


Respiratory Rate  16   10/14/19 09:00


 


Blood Pressure  116/53 L  10/14/19 06:00


 


O2 Sat by Pulse Oximetry (%)  98   10/08/19 09:00











Constitutional: Yes: Well Nourished


Eyes: Yes: WNL


HENT: Yes: WNL


Neurological: Yes: Alert, Oriented, Babinski positive


...Motor Strength: WNL


Labs: 


 CBC, BMP





 10/10/19 06:15 





 10/11/19 07:25 





 INR, PTT











INR  1.15  (0.83-1.09)  H  10/06/19  21:00    














Problem List





- Problems


(1) Altered mental status


Assessment/Plan: 


improving baselinemental status changes


Still with a poorly controlled diabetes


Dementia


normal pressure hydrocephalus not a candidate for shunt





1.  Fall precautions.


2.  Tight blood sugar control.


3.  Increase Lexapro to 10 mg once daily.


4.  Increase Aricept to 10 mg once daily.


5.  Physical therapy.


6.  Suggest subacute nursing facility


Code(s): R41.82 - ALTERED MENTAL STATUS, UNSPECIFIED   


Qualifiers: 


   Altered mental status type: somnolence   Qualified Code(s): R40.0 - 

Somnolence

## 2019-10-14 NOTE — PN
Physical Exam: 


SUBJECTIVE: Patient seen and examined; no updates from insurance.   

Resubmitting.  Assuming likely placement tomorrow.  No neuro deficits.  

Responds to verbal cues but dementia and language barrier continue to limit 

communication.





10 sys ROS less reliable given the underlying prescribed issues








OBJECTIVE:





 Vital Signs











 Period  Temp  Pulse  Resp  BP Sys/Ramírez  Pulse Ox


 


 Last 24 Hr  97.6 F-98.8 F  57-88  16-18  116-145/50-78  























 Laboratory Results - last 24 hr











  10/13/19 10/13/19 10/13/19





  11:06 16:40 20:55


 


POC Glucometer  298  276  375














  10/13/19 10/14/19 10/14/19





  22:56 00:42 05:47


 


POC Glucometer  324  240  232








Active Medications











Generic Name Dose Route Start Last Admin





  Trade Name Freq  PRN Reason Stop Dose Admin


 


Acetaminophen  650 mg  10/09/19 10:34  10/13/19 18:00





  Tylenol -  PO   650 mg





  Q4H PRN   Administration





  FEVER   





     





     





     


 


Atorvastatin Calcium  40 mg  10/07/19 22:00  10/13/19 21:35





  Lipitor -  PO   40 mg





  HS MARY   Administration





     





     





     





     


 


Clopidogrel Bisulfate  75 mg  10/08/19 10:00  10/13/19 09:09





  Plavix -  PO   75 mg





  DAILY MARY   Administration





     





     





     





     


 


Donepezil HCl  5 mg  10/09/19 10:00  10/13/19 09:09





  Aricept -  PO   5 mg





  DAILY MARY   Administration





     





     





     





     


 


Doxycycline Hyclate  100 mg  10/10/19 18:00  10/13/19 17:19





  Vibramycin -  PO   100 mg





  BID@1000,1800 MARY   Administration





     





     





     





     


 


Ezetimibe  10 mg  10/07/19 22:00  10/13/19 21:35





  Zetia -  PO   10 mg





  HS M[facility]   Administration





     





     





     





     


 


Escitalopram Oxalate  10 mg  10/09/19 10:00  10/13/19 09:09





  Lexapro -  PO  10/14/19 18:11  10 mg





  DAILY MARY   Administration





     





     





     





     


 


Heparin Sodium (Porcine)  5,000 unit  10/07/19 22:00  10/14/19 05:56





  Heparin -  SQ   5,000 unit





  TID MARY   Administration





     





     





     





     


 


Insulin Aspart  1 vial  10/10/19 00:52  10/14/19 06:01





  Novolog Vial Sliding Scale -  SQ   4 units





  [facility]   Administration





     





     





  Protocol   





     


 


Insulin Detemir  18 units  10/13/19 07:00  10/14/19 06:01





  Levemir Vial  SQ   18 units





  AM MARY   Administration





     





     





     





     


 


Nebivolol  2.5 mg  10/07/19 22:00  10/13/19 21:35





  Bystolic -  PO   2.5 mg





  [facility]   Administration





     





     





     





     


 


Nystatin  1 applic  10/10/19 11:30  10/13/19 21:35





  Nystop Powder -  TP   1 applic





  BID MARY   Administration





     





     





     





     


 


Tamsulosin HCl  0.4 mg  10/08/19 08:30  10/13/19 09:09





  Flomax -  PO   0.4 mg





  DAILY@0830 MARY   Administration





     





     





     





     











ASSESSMENT/PLAN:





Was discharged but insurance did not accept.  He remains hospitalized with no 

acute issues. 








A/P:


Patient presented for DKA; had complicated UTI with CA-MRSA.  ID saw and will 

complete doxy.  Off insulin pump and he should stay off it given repeated 

issues with managing it.  He was discharged on friday but insurance denied his 

stay in the acute rehab and cannot approve over the weekend so he will likely 

be here through monday. 





No new issues today so will continue to monitor.  Updated family.








Visit type





- Emergency Visit


Emergency Visit: Yes


ED Registration Date: 10/06/19


Care time: The patient presented to the Emergency Department on the above date 

and was hospitalized for further evaluation of their emergent condition.





- New Patient


This patient is new to me today: No





- Critical Care


Critical Care patient: No

## 2019-10-14 NOTE — PN
Physical Exam: 


SUBJECTIVE: Patient seen and examined





Completed peer to peer.  They cite PT note from 10/10 as reason for denial.  

Myself and resident team have met with family every day and have spoken with 

 Savanna alexandre extensively regarding this patinet and his DC planning.  

Peer reviewer indicates that lethargy annotated in PT exam as well as his 

inability to participate in PT that day as reason to deny rehab.  I informed 

him that patient has not been seen by PT in this capacity over the weekend and 

that is when he was recovering from an acute clinical illness.  He has since 

improved and is even known to  to be doing well in transfers from 

bed to [facility], and would be willing to participate in further PT.  He has no 

functional neuro deficits restricting him from getting out of bed even though 

he is a fall risk.  He furthermore has no indication that he has had any 

indication of progressive or persistent lethargy.  He is absolutely demented 

which limits communication, but he also has a language barrier; his primary 

 is his son but when his son isnt here or  phone isnt used 

his wife is used to communicate.  It is notable that she herself is advanced in 

age (though not  frankly demented) and has limited english language abilities.  

I agree that this patient is significantly deconditioned but strongly disagree 

in the sense that there is no documented way is functionally bedbound aside 

from the old PT notes.  Despite my indication of improved status and exam the 

denial was put through.  I was only told he was too lethargic and deconditioned 

to participate in PT due to the initial PT note again as a response which is 

odd as none of the information I offered reviewer was taken into consideration.

  I tried calling back multiple times using multiple tax IDs but throughout the 

hour I could not reach a human connection capable of doing the emergency appeal 

before noon.  I disucssed this at length with CM who was in contact with patient

's son and has documented conversation since. 





Will discuss with case management and insurance tomorrow.  If not will likely 

need to defer to home services. 





OBJECTIVE:





 Vital Signs











 Period  Temp  Pulse  Resp  BP Sys/Ramírez  Pulse Ox


 


 Last 24 Hr  97.6 F-98.8 F  57-88  16-20  116-145/50-78  








VS, labs, imaging reviewed


NAD, AAOx1, resting in bed, responsive


RRR s1/2


NC AT EOMI PERRLA


NT ND +BS


CN2-12 wnl, no fnd


Not agitated, limited insight, dementia apparent


No rashes or breakdown


Moves all 4 ext with normal muscle tone








 Laboratory Results - last 24 hr











  10/13/19 10/13/19 10/14/19





  20:55 22:56 00:42


 


POC Glucometer  375  324  240














  10/14/19 10/14/19





  05:47 13:56


 


POC Glucometer  232  382








Active Medications











Generic Name Dose Route Start Last Admin





  Trade Name Freq  PRN Reason Stop Dose Admin


 


Acetaminophen  650 mg  10/09/19 10:34  10/13/19 18:00





  Tylenol -  PO   650 mg





  Q4H PRN   Administration





  FEVER   





     





     





     


 


Atorvastatin Calcium  40 mg  10/07/19 22:00  10/13/19 21:35





  Lipitor -  PO   40 mg





  [facility]   Administration





     





     





     





     


 


Clopidogrel Bisulfate  75 mg  10/08/19 10:00  10/14/19 11:09





  Plavix -  PO   75 mg





  DAILY MARY   Administration





     





     





     





     


 


Donepezil HCl  5 mg  10/09/19 10:00  10/14/19 11:09





  Aricept -  PO   5 mg





  DAILY MARY   Administration





     





     





     





     


 


Doxycycline Hyclate  100 mg  10/10/19 18:00  10/14/19 11:09





  Vibramycin -  PO   100 mg





  BID@1000,1800 MARY   Administration





     





     





     





     


 


Ezetimibe  10 mg  10/07/19 22:00  10/13/19 21:35





  Zetia -  PO   10 mg





  [facility] M[facility]   Administration





     





     





     





     


 


Escitalopram Oxalate  10 mg  10/09/19 10:00  10/14/19 11:09





  Lexapro -  PO  10/14/19 18:11  10 mg





  DAILY MARY   Administration





     





     





     





     


 


Heparin Sodium (Porcine)  5,000 unit  10/07/19 22:00  10/14/19 15:05





  Heparin -  SQ   Not Given





  TID MARY   





     





     





     





     


 


Insulin Aspart  1 vial  10/10/19 00:52  10/14/19 14:03





  Novolog Vial Sliding Scale -  SQ   10 units





  [facility]   Administration





     





     





  Protocol   





     


 


Insulin Detemir  18 units  10/13/19 07:00  10/14/19 06:01





  Levemir Vial  SQ   18 units





  AM MARY   Administration





     





     





     





     


 


Nebivolol  2.5 mg  10/07/19 22:00  10/13/19 21:35





  Bystolic -  PO   2.5 mg





  HS MARY   Administration





     





     





     





     


 


Nystatin  1 applic  10/10/19 11:30  10/14/19 11:10





  Nystop Powder -  TP   1 applic





  BID MARY   Administration





     





     





     





     


 


Tamsulosin HCl  0.4 mg  10/08/19 08:30  10/14/19 11:09





  Flomax -  PO   0.4 mg





  DAILY@0830 MARY   Administration





     





     





     





     











ASSESSMENT/PLAN:


Rechecking labs; spoke to resident and we will continue to uptitrate insulin. 

Noted changes made by endocrine but still poorly controlled; I suspect he will 

need premeal coverage.








Visit type





- Emergency Visit


Emergency Visit: Yes


ED Registration Date: 10/06/19


Care time: The patient presented to the Emergency Department on the above date 

and was hospitalized for further evaluation of their emergent condition.





- New Patient


This patient is new to me today: No





- Critical Care


Critical Care patient: No

## 2019-10-14 NOTE — PN
Progress Note, Physician


History of Present Illness: 





stable


no new issues





- Current Medication List


Current Medications: 


Active Medications





Acetaminophen (Tylenol -)  650 mg PO Q4H PRN


   PRN Reason: FEVER


   Last Admin: 10/13/19 18:00 Dose:  650 mg


Atorvastatin Calcium (Lipitor -)  40 mg PO HS [facility]


   Last Admin: 10/13/19 21:35 Dose:  40 mg


Clopidogrel Bisulfate (Plavix -)  75 mg PO DAILY [facility]


   Last Admin: 10/13/19 09:09 Dose:  75 mg


Donepezil HCl (Aricept -)  5 mg PO DAILY [facility]


   Last Admin: 10/13/19 09:09 Dose:  5 mg


Doxycycline Hyclate (Vibramycin -)  100 mg PO BID@1000,1800 [facility]


   Last Admin: 10/13/19 17:19 Dose:  100 mg


Ezetimibe (Zetia -)  10 mg PO HS [facility]


   Last Admin: 10/13/19 21:35 Dose:  10 mg


Escitalopram Oxalate (Lexapro -)  10 mg PO DAILY [facility]


   Stop: 10/14/19 18:11


   Last Admin: 10/13/19 09:09 Dose:  10 mg


Heparin Sodium (Porcine) (Heparin -)  5,000 unit SQ TID [facility]


   Last Admin: 10/14/19 05:56 Dose:  5,000 unit


Insulin Aspart (Novolog Vial Sliding Scale -)  [facility]Q [facility]; Protocol


   Last Admin: 10/14/19 06:01 Dose:  4 units


Insulin Detemir (Levemir Vial)  18 units SQ AM [facility]


   Last Admin: 10/14/19 06:01 Dose:  18 units


Nebivolol (Bystolic -)  2.5 mg PO HS [facility]


   Last Admin: 10/13/19 21:35 Dose:  2.5 mg


Nystatin (Nystop Powder -)  1 applic TP BID [facility]


   Last Admin: 10/13/19 21:35 Dose:  1 applic


Tamsulosin HCl (Flomax -)  0.4 mg PO DAILY@0830 [facility]


   Last Admin: 10/13/19 09:09 Dose:  0.4 mg











- Objective


Vital Signs: 


 Vital Signs











Temperature  98.8 F   10/14/19 06:00


 


Pulse Rate  57 L  10/14/19 06:00


 


Respiratory Rate  16   10/14/19 09:00


 


Blood Pressure  116/53 L  10/14/19 06:00


 


O2 Sat by Pulse Oximetry (%)  98   10/08/19 09:00











Constitutional: Yes: No Distress, Calm


Cardiovascular: Yes: Regular Rate and Rhythm


Respiratory: Yes: Regular, CTA Bilaterally


Gastrointestinal: Yes: Normal Bowel Sounds, Soft


Musculoskeletal: Yes: WNL


Extremities: Yes: WNL


Neurological: Yes: Alert, Oriented


Psychiatric: Yes: Alert, Oriented


Labs: 


 CBC, BMP





 10/10/19 06:15 





 10/11/19 07:25 





 INR, PTT











INR  1.15  (0.83-1.09)  H  10/06/19  21:00    














Assessment/Plan





79 year old man with a history of HTN, hyperlipidemia, CAD, MI, CABG, chronic 

diastolic heart failure, type 2 DM, stage 3 CKD, COPD, dementia who presented 

to the ED with altered mental status. 





1. Acute metabolic encephalopathy 


2. Acute kidney injury


3. Abdominal pain 


4.  UTI





5. Lactic acidosis


6. Hypernatremia


7. Hypophosphatemia


8. HTN


9. Hyperlipidemia


10. CAD, history of MI, CABG


11. Chronic diastolic heart failure


12. Stage 3 CKD


13. COPD


14. Alzheimer dementia





plan


continue current mgmt


monitor


rest as per the team

## 2019-10-14 NOTE — PN
Progress Note, Physician


History of Present Illness: 





Pt seen and examined. He appears comfortable.





- Current Medication List


Current Medications: 


Active Medications





Acetaminophen (Tylenol -)  650 mg PO Q4H PRN


   PRN Reason: FEVER


   Last Admin: 10/13/19 18:00 Dose:  650 mg


Atorvastatin Calcium (Lipitor -)  40 mg PO HS [facility]


   Last Admin: 10/13/19 21:35 Dose:  40 mg


Clopidogrel Bisulfate (Plavix -)  75 mg PO DAILY [facility]


   Last Admin: 10/14/19 11:09 Dose:  75 mg


Donepezil HCl (Aricept -)  5 mg PO DAILY [facility]


   Last Admin: 10/14/19 11:09 Dose:  5 mg


Doxycycline Hyclate (Vibramycin -)  100 mg PO BID@1000,1800 [facility]


   Last Admin: 10/14/19 11:09 Dose:  100 mg


Ezetimibe (Zetia -)  10 mg PO HS [facility]


   Last Admin: 10/13/19 21:35 Dose:  10 mg


Escitalopram Oxalate (Lexapro -)  10 mg PO DAILY [facility]


   Stop: 10/14/19 18:11


   Last Admin: 10/14/19 11:09 Dose:  10 mg


Heparin Sodium (Porcine) (Heparin -)  5,000 unit SQ TID [facility]


   Last Admin: 10/14/19 15:05 Dose:  Not Given


Insulin Aspart (Novolog Vial Sliding Scale -)  [facility]ial SQ [facility]; Protocol


   Last Admin: 10/14/19 14:03 Dose:  10 units


Insulin Detemir (Levemir Vial)  18 units SQ AM [facility]


   Last Admin: 10/14/19 06:01 Dose:  18 units


Nebivolol (Bystolic -)  2.5 mg PO HS [facility]


   Last Admin: 10/13/19 21:35 Dose:  2.5 mg


Nystatin (Nystop Powder -)  1 applic TP BID [facility]


   Last Admin: 10/14/19 11:10 Dose:  1 applic


Tamsulosin HCl (Flomax -)  0.4 mg PO DAILY@0830 [facility]


   Last Admin: 10/14/19 11:09 Dose:  0.4 mg











- Objective


Vital Signs: 


 Vital Signs











Temperature  98.2 F   10/14/19 14:08


 


Pulse Rate  63   10/14/19 14:08


 


Respiratory Rate  20   10/14/19 14:08


 


Blood Pressure  120/62   10/14/19 14:08


 


O2 Sat by Pulse Oximetry (%)  98   10/08/19 09:00











Constitutional: Yes: Calm


HENT: Yes: Other (legally blind)


Cardiovascular: Yes: S1, S2


Respiratory: Yes: CTA Bilaterally


Gastrointestinal: Yes: Soft


Musculoskeletal: Yes: WNL


Edema: No


Neurological: Yes: Oriented


Labs: 


 CBC, BMP





 10/10/19 06:15 





 10/11/19 07:25 





 INR, PTT











INR  1.15  (0.83-1.09)  H  10/06/19  21:00    














Problem List





- Problems


(1) DEB (acute kidney injury)


Code(s): N17.9 - ACUTE KIDNEY FAILURE, UNSPECIFIED   





(2) DKA (diabetic ketoacidoses)


Code(s): E11.10 - TYPE 2 DIABETES MELLITUS WITH KETOACIDOSIS WITHOUT COMA   


Qualifiers: 


   Diabetes mellitus type: type 2   Diabetes mellitus complication detail: 

without coma   Qualified Code(s): E11.10 - Type 2 diabetes mellitus with 

ketoacidosis without coma   





(3) Acute renal failure


Code(s): N17.9 - ACUTE KIDNEY FAILURE, UNSPECIFIED   





(4) Hypernatremia


Code(s): E87.0 - HYPEROSMOLALITY AND HYPERNATREMIA   





Assessment/Plan


 Current Medications











Generic Name Dose Route Start Last Admin





  Trade Name Freq  PRN Reason Stop Dose Admin


 


Acetaminophen  650 mg  10/09/19 10:34  10/13/19 18:00





  Tylenol -  PO   650 mg





  Q4H PRN   Administration





  FEVER   





     





     





     


 


Atorvastatin Calcium  40 mg  10/07/19 22:00  10/13/19 21:35





  Lipitor -  PO   40 mg





  HS MARY   Administration





     





     





     





     


 


Clopidogrel Bisulfate  75 mg  10/08/19 10:00  10/14/19 11:09





  Plavix -  PO   75 mg





  DAILY MARY   Administration





     





     





     





     


 


Donepezil HCl  5 mg  10/09/19 10:00  10/14/19 11:09





  Aricept -  PO   5 mg





  DAILY MARY   Administration





     





     





     





     


 


Doxycycline Hyclate  100 mg  10/10/19 18:00  10/14/19 11:09





  Vibramycin -  PO   100 mg





  BID@1000,1800 MARY   Administration





     





     





     





     


 


Ezetimibe  10 mg  10/07/19 22:00  10/13/19 21:35





  Zetia -  PO   10 mg





  HS MARY   Administration





     





     





     





     


 


Escitalopram Oxalate  10 mg  10/09/19 10:00  10/14/19 11:09





  Lexapro -  PO  10/14/19 18:11  10 mg





  DAILY MARY   Administration





     





     





     





     


 


Heparin Sodium (Porcine)  5,000 unit  10/07/19 22:00  10/14/19 15:05





  Heparin -  SQ   Not Given





  TID MARY   





     





     





     





     


 


Insulin Aspart  1 vial  10/10/19 00:52  10/14/19 14:03





  Novolog Vial Sliding Scale -  SQ   10 units





  [facility]   Administration





     





     





  Protocol   





     


 


Insulin Detemir  18 units  10/13/19 07:00  10/14/19 06:01





  Levemir Vial  SQ   18 units





  AM MARY   Administration





     





     





     





     


 


Nebivolol  2.5 mg  10/07/19 22:00  10/13/19 21:35





  Bystolic -  PO   2.5 mg





  HS MARY   Administration





     





     





     





     


 


Nystatin  1 applic  10/10/19 11:30  10/14/19 11:10





  Nystop Powder -  TP   1 applic





  BID MARY   Administration





     





     





     





     


 


Tamsulosin HCl  0.4 mg  10/08/19 08:30  10/14/19 11:09





  Flomax -  PO   0.4 mg





  DAILY@0830 MARY   Administration





     





     





     





     














Impression


1. DEB


2. DKA


3. hypernatremia


4. DM


5. dementia


6. cad





Plan


- no new labs


- check bmp in am if not discharged


- DEB likely secondary to dehydration from DKA


- will follow PRN

## 2019-10-15 LAB
ANION GAP SERPL CALC-SCNC: 6 MMOL/L (ref 8–16)
ANION GAP SERPL CALC-SCNC: 9 MMOL/L (ref 8–16)
APPEARANCE UR: (no result)
BACTERIA # UR AUTO: 0.5 /HPF
BILIRUB UR STRIP.AUTO-MCNC: (no result) MG/DL
BNP SERPL-MCNC: 1937.4 PG/ML (ref 5–450)
BUN SERPL-MCNC: 23.9 MG/DL (ref 7–18)
BUN SERPL-MCNC: 26.2 MG/DL (ref 7–18)
CALCIUM SERPL-MCNC: 8.9 MG/DL (ref 8.5–10.1)
CALCIUM SERPL-MCNC: 9 MG/DL (ref 8.5–10.1)
CASTS URNS QL MICRO: 30 /LPF (ref 0–8)
CHLORIDE SERPL-SCNC: 102 MMOL/L (ref 98–107)
CHLORIDE SERPL-SCNC: 105 MMOL/L (ref 98–107)
CO2 SERPL-SCNC: 26 MMOL/L (ref 21–32)
CO2 SERPL-SCNC: 28 MMOL/L (ref 21–32)
COLOR UR: (no result)
CREAT SERPL-MCNC: 1.3 MG/DL (ref 0.55–1.3)
CREAT SERPL-MCNC: 1.3 MG/DL (ref 0.55–1.3)
DEPRECATED RDW RBC AUTO: 14.2 % (ref 11.9–15.9)
EPITH CASTS URNS QL MICRO: 15 /HPF
GLUCOSE SERPL-MCNC: 283 MG/DL (ref 74–106)
GLUCOSE SERPL-MCNC: 442 MG/DL (ref 74–106)
HCT VFR BLD CALC: 32.5 % (ref 35.4–49)
HGB BLD-MCNC: 10.5 GM/DL (ref 11.7–16.9)
KETONES UR QL STRIP: NEGATIVE
LEUKOCYTE ESTERASE UR QL STRIP.AUTO: (no result)
MCH RBC QN AUTO: 28.9 PG (ref 25.7–33.7)
MCHC RBC AUTO-ENTMCNC: 32.4 G/DL (ref 32–35.9)
MCV RBC: 89.3 FL (ref 80–96)
NITRITE UR QL STRIP: POSITIVE
PLATELET # BLD AUTO: 224 K/MM3 (ref 134–434)
PMV BLD: 8.5 FL (ref 7.5–11.1)
POTASSIUM SERPLBLD-SCNC: 5.1 MMOL/L (ref 3.5–5.1)
POTASSIUM SERPLBLD-SCNC: 5.3 MMOL/L (ref 3.5–5.1)
PROT UR QL STRIP: (no result)
PROT UR QL STRIP: (no result)
RBC # BLD AUTO: 3.64 M/MM3 (ref 4–5.6)
RBC # BLD AUTO: 6866 /HPF (ref 0–4)
SODIUM SERPL-SCNC: 137 MMOL/L (ref 136–145)
SODIUM SERPL-SCNC: 139 MMOL/L (ref 136–145)
SP GR UR: 1.02 (ref 1.01–1.03)
UROBILINOGEN UR STRIP-MCNC: 0.2 MG/DL (ref 0.2–1)
WBC # BLD AUTO: 9.4 K/MM3 (ref 4–10)
WBC # UR AUTO: 78 /HPF (ref 0–5)

## 2019-10-15 RX ADMIN — NYSTATIN SCH: 100000 POWDER TOPICAL at 11:28

## 2019-10-15 RX ADMIN — CLOPIDOGREL BISULFATE SCH: 75 TABLET, FILM COATED ORAL at 11:28

## 2019-10-15 RX ADMIN — NEBIVOLOL HYDROCHLORIDE SCH MG: 2.5 TABLET ORAL at 22:24

## 2019-10-15 RX ADMIN — INSULIN DETEMIR SCH UNITS: 100 INJECTION, SOLUTION SUBCUTANEOUS at 06:29

## 2019-10-15 RX ADMIN — INSULIN ASPART SCH UNITS: 100 INJECTION, SOLUTION INTRAVENOUS; SUBCUTANEOUS at 16:11

## 2019-10-15 RX ADMIN — INSULIN ASPART SCH UNITS: 100 INJECTION, SOLUTION INTRAVENOUS; SUBCUTANEOUS at 06:29

## 2019-10-15 RX ADMIN — HEPARIN SODIUM SCH UNIT: 5000 INJECTION, SOLUTION INTRAVENOUS; SUBCUTANEOUS at 06:29

## 2019-10-15 RX ADMIN — SODIUM CHLORIDE SCH: 4.5 INJECTION, SOLUTION INTRAVENOUS at 22:23

## 2019-10-15 RX ADMIN — DOXYCYCLINE HYCLATE SCH: 100 CAPSULE ORAL at 11:54

## 2019-10-15 RX ADMIN — NYSTATIN SCH: 100000 POWDER TOPICAL at 22:33

## 2019-10-15 RX ADMIN — INSULIN ASPART SCH UNITS: 100 INJECTION, SOLUTION INTRAVENOUS; SUBCUTANEOUS at 16:12

## 2019-10-15 RX ADMIN — SODIUM CHLORIDE SCH: 4.5 INJECTION, SOLUTION INTRAVENOUS at 16:07

## 2019-10-15 RX ADMIN — HEPARIN SODIUM SCH: 5000 INJECTION, SOLUTION INTRAVENOUS; SUBCUTANEOUS at 15:17

## 2019-10-15 RX ADMIN — ATORVASTATIN CALCIUM SCH MG: 40 TABLET, FILM COATED ORAL at 22:24

## 2019-10-15 RX ADMIN — HEPARIN SODIUM SCH UNIT: 5000 INJECTION, SOLUTION INTRAVENOUS; SUBCUTANEOUS at 22:23

## 2019-10-15 RX ADMIN — INSULIN ASPART SCH UNITS: 100 INJECTION, SOLUTION INTRAVENOUS; SUBCUTANEOUS at 12:24

## 2019-10-15 RX ADMIN — EZETIMIBE SCH MG: 10 TABLET ORAL at 22:24

## 2019-10-15 RX ADMIN — INSULIN ASPART SCH UNITS: 100 INJECTION, SOLUTION INTRAVENOUS; SUBCUTANEOUS at 12:23

## 2019-10-15 RX ADMIN — DONEPEZIL HYDROCHLORIDE SCH: 5 TABLET, FILM COATED ORAL at 11:28

## 2019-10-15 RX ADMIN — TAMSULOSIN HYDROCHLORIDE SCH: 0.4 CAPSULE ORAL at 11:28

## 2019-10-15 RX ADMIN — INSULIN ASPART SCH UNITS: 100 INJECTION, SOLUTION INTRAVENOUS; SUBCUTANEOUS at 22:23

## 2019-10-15 NOTE — PN
Progress Note, Physician


History of Present Illness: 





Pt seen and examined at bedside. Events noted. He appears comfortable. 





- Current Medication List


Current Medications: 


Active Medications





Acetaminophen (Tylenol -)  650 mg PO Q4H PRN


   PRN Reason: FEVER


   Last Admin: 10/13/19 18:00 Dose:  650 mg


Atorvastatin Calcium (Lipitor -)  40 mg PO HS [facility]


   Last Admin: 10/14/19 22:36 Dose:  40 mg


Clopidogrel Bisulfate (Plavix -)  75 mg PO DAILY [facility]


   Last Admin: 10/15/19 11:28 Dose:  Not Given


Donepezil HCl (Aricept -)  5 mg PO DAILY [facility]


   Last Admin: 10/15/19 11:28 Dose:  Not Given


Ezetimibe (Zetia -)  10 mg PO HS [facility]


   Last Admin: 10/14/19 22:36 Dose:  10 mg


Heparin Sodium (Porcine) (Heparin -)  5,000 unit SQ TID [facility]


   Last Admin: 10/15/19 15:17 Dose:  Not Given


Sodium Chloride (1/2 Normal Saline)  1,000 mls @ 42 mls/hr IV ASDIR [facility]


Insulin Aspart (Novolog Vial Sliding Scale -)  1 vial SQ ACHS [facility]; Protocol


   Last Admin: 10/15/19 12:23 Dose:  12 units


Insulin Aspart (Novolog Vial)  2 units SQ ACLD [facility]


   Last Admin: 10/15/19 12:24 Dose:  2 units


Insulin Detemir (Levemir Vial)  22 units SQ AM [facility]


Nebivolol (Bystolic -)  2.5 mg PO HS [facility]


   Last Admin: 10/14/19 22:36 Dose:  2.5 mg


Nystatin (Nystop Powder -)  1 applic TP BID [facility]


   Last Admin: 10/15/19 11:28 Dose:  Not Given


Tamsulosin HCl (Flomax -)  0.4 mg PO DAILY@0830 [facility]


   Last Admin: 10/15/19 11:28 Dose:  Not Given











- Objective


Vital Signs: 


 Vital Signs











Temperature  98 F   10/15/19 10:00


 


Pulse Rate  86   10/15/19 10:00


 


Respiratory Rate  18   10/15/19 10:00


 


Blood Pressure  135/101 H  10/15/19 10:00


 


O2 Sat by Pulse Oximetry (%)  98   10/08/19 09:00











Eyes: Yes: Other (legaly blind)


Cardiovascular: Yes: S1, S2


Respiratory: Yes: CTA Bilaterally


Gastrointestinal: Yes: Soft


Genitourinary: Yes: Mckeon Present


Musculoskeletal: Yes: WNL


Edema: No


Integumentary: Yes: WNL


Neurological: Yes: Confusion


Labs: 


 CBC, BMP





 10/15/19 09:45 





 10/15/19 09:45 





 INR, PTT











INR  1.15  (0.83-1.09)  H  10/06/19  21:00    














Problem List





- Problems


(1) DEB (acute kidney injury)


Code(s): N17.9 - ACUTE KIDNEY FAILURE, UNSPECIFIED   





(2) DKA (diabetic ketoacidoses)


Code(s): E11.10 - TYPE 2 DIABETES MELLITUS WITH KETOACIDOSIS WITHOUT COMA   


Qualifiers: 


   Diabetes mellitus type: type 2   Diabetes mellitus complication detail: 

without coma   Qualified Code(s): E11.10 - Type 2 diabetes mellitus with 

ketoacidosis without coma   





(3) Acute renal failure


Code(s): N17.9 - ACUTE KIDNEY FAILURE, UNSPECIFIED   





(4) Hypernatremia


Code(s): E87.0 - HYPEROSMOLALITY AND HYPERNATREMIA   





Assessment/Plan











Impression


1. DEB


2. DKA


3. hypernatremia


4. DM


5. dementia


6. cad


7. hyperkalemia





Plan


- low potassium diet


- glucose not controlled


- will need more compliance with diet as well


- monitor lytes


- will give a dose of lokelma








 Current Medications











Generic Name Dose Route Start Last Admin





  Trade Name Freq  PRN Reason Stop Dose Admin


 


Acetaminophen  650 mg  10/09/19 10:34  10/13/19 18:00





  Tylenol -  PO   650 mg





  Q4H PRN   Administration





  FEVER   





     





     





     


 


Atorvastatin Calcium  40 mg  10/07/19 22:00  10/14/19 22:36





  Lipitor -  PO   40 mg





  HS MARY   Administration





     





     





     





     


 


Clopidogrel Bisulfate  75 mg  10/08/19 10:00  10/15/19 11:28





  Plavix -  PO   Not Given





  DAILY MARY   





     





     





     





     


 


Donepezil HCl  5 mg  10/09/19 10:00  10/15/19 11:28





  Aricept -  PO   Not Given





  DAILY MARY   





     





     





     





     


 


Ezetimibe  10 mg  10/07/19 22:00  10/14/19 22:36





  Zetia -  PO   10 mg





  HS MARY   Administration





     





     





     





     


 


Heparin Sodium (Porcine)  5,000 unit  10/07/19 22:00  10/15/19 15:17





  Heparin -  SQ   Not Given





  TID MARY   





     





     





     





     


 


Sodium Chloride  1,000 mls @ 42 mls/hr  10/14/19 20:45  





  1/2 Normal Saline  IV   





  ASDIR MARY   





     





     





     





     


 


Insulin Aspart  1 vial  10/10/19 00:52  10/15/19 12:23





  Novolog Vial Sliding Scale -  SQ   12 units





  [facility] MARY   Administration





     





     





  Protocol   





     


 


Insulin Aspart  2 units  10/15/19 11:30  10/15/19 12:24





  Novolog Vial  SQ   2 units





  ACLD MARY   Administration





     





     





     





     


 


Insulin Detemir  22 units  10/16/19 07:00  





  Levemir Vial  SQ   





  AM [facility]   





     





     





     





     


 


Nebivolol  2.5 mg  10/07/19 22:00  10/14/19 22:36





  Bystolic -  PO   2.5 mg





  HS [facility]   Administration





     





     





     





     


 


Nystatin  1 applic  10/10/19 11:30  10/15/19 11:28





  Nystop Powder -  TP   Not Given





  BID [facility]   





     





     





     





     


 


Tamsulosin HCl  0.4 mg  10/08/19 08:30  10/15/19 11:28





  Flomax -  PO   Not Given





  DAILY@0830 [facility]

## 2019-10-15 NOTE — ECHO
______________________________________________________________________________



Name: DAVID DIEZ                               Exam:Adult Echocardiogram

MRN: [id number]         Study Date: 10/15/2019 02:36 PM

Age: 79 yrs

______________________________________________________________________________



Height: 60 in        Weight: 138 lb        BSA: 1.6 m2



______________________________________________________________________________



MMode/2D Measurements & Calculations

IVSd: 1.2 cm                                       Ao root diam: 3.1 cm

LVIDd: 4.1 cm                                      LA dimension: 4.1 cm

LVIDs: 3.0 cm

LVPWd: 1.1 cm



____________________________________________________

LVPWs: 1.9 cm                                      EDV(Teich): 75.4 ml

                                                   ESV(Teich): 36.0 ml



____________________________________________________

LVOT diam: 1.9 cm                                  RV S Bill: 7.1 cm/sec



Doppler Measurements & Calculations

MV E max bill: 88.8 cm/sec                             Ao V2 max: 164.2 cm/sec

MV A max bill: 116.5 cm/sec                            Ao max PG: 10.9 mmHg

MV E/A: 0.76                                          Ao V2 mean: 131.9 cm/sec

MV dec time: 0.27 sec                                 Ao mean P.6 mmHg

                                                      Ao V2 VTI: 35.8 cm



                                                      NIKKO(I,D): 1.4 cm2

                                                      NIKKO(V,D): 1.3 cm2

_______________________________________________________



LV V1 max P.5 mmHg                                SV(LVOT): 48.7 ml

LV V1 mean P.6 mmHg

LV V1 max: 79.5 cm/sec

LV V1 mean: 58.9 cm/sec

LV V1 VTI: 17.8 cm

_______________________________________________________



PA V2 max: 123.5 cm/sec                               Med Peak E' Bill: 4.4 cm/sec

PA max P.1 mmHg                                   Med E/e': 20.3

                                                      Lat Peak E' Bill: 7.5 cm/sec

                                                      Lat E/e': 11.8



______________________________________________________________________________



Procedure

The study was technically adequate with some images being suboptimal in quality.

Left Ventricle

There is mild concentric left ventricular hypertrophy. The left ventricular ejection fraction is norm
al.

Ejection Fraction = 55%. Grade I diastolic dysfunction, (abnormal relaxation pattern).

Right Ventricle

The right ventricle is normal in size and function.

Atria

The left atrium is mildly dilated. Right atrial size is normal.

Mitral Valve

There is moderate mitral valve thickening. There is no mitral valve stenosis. There is trace mitral

regurgitation.

Tricuspid Valve

The tricuspid valve is normal in structure and function. There was insufficient TR detected to calcul
ate RV

systolic pressure.

Aortic Valve

There is mild aortic sclerosis.;. The aortic valve is trileaflet. No hemodynamically significant valv
ular

aortic stenosis.

Pulmonic Valve

The pulmonic valve is not well seen, but is grossly normal.

Great Vessels

The aortic root is normal size.

Pericardium/Pleura

There is no pericardial effusion.

______________________________________________________________________________





Interpretation Summary

There is mild concentric left ventricular hypertrophy.

The left ventricular ejection fraction is normal.

Grade I diastolic dysfunction, (abnormal relaxation pattern).

The right ventricle is normal in size and function.

There is mild aortic sclerosis.;

No hemodynamically significant valvular aortic stenosis.





Gary Rudolph 10/15/2019 03:26 PM

## 2019-10-15 NOTE — CONS
DATE OF CONSULTATION:  

 

DATE OF DICTATION:  10/15/2019

 

HISTORY OF PRESENT ILLNESS:  The patient is a 79-year-old male admitted via the

emergency room on October 6, 2019, with diabetic ketoacidosis.  Patient does have

history of diabetes, coronary artery disease.  He is status post coronary artery

bypass graft, had an MI in 1998, had history of congestive heart failure, chronic

kidney disease, COPD, and dementia.  He came to the emergency room because of a

change in mental status.  Patient according to his son has always had urinary

incontinence and wears an adult diaper.  In the emergency room, his blood sugars were

greater than 600, and patient was treated with IV fluids as well as IV insulin.  A

Mckeon catheter placed at the time revealed 700 mL of clear urine.  The patient also

had a urine culture which grew out strep as well as staphylococcus aureus.  Patient's

CAT scan on day of admission revealed a distended bladder and bilateral fullness of

the collecting system, there was a paraumbilical hernia with no stranding.  There was

also a hiatal hernia, bilateral renal cyst.  This is all indicative of bladder outlet

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  Patient has had the Mckeon catheter

since admission.  Mckeon catheter was removed this morning and presently the patient

is dripping urine into an adult diaper but his bladder appears to be distended.  Will

recommend a bladder scan if there is more than 500 mL of residual urine.  Will

recommend a Mckeon catheter.  The patient is on no medication for his prostate.  Will

start him on Proscar.  He is presently on Flomax 0.4 mg daily.  Will follow with you.

 

 

 

BALJIT [id number]

DD: 10/15/2019 15:36

DT: 10/15/2019 17:32

Job #:  [id number]

## 2019-10-15 NOTE — PN
Progress Note (short form)





- Note


Progress Note: 





UROLOGY NOTE. PT. WITH URINARY RETENTION AND OVERFLOW INCONTENENCE. RAMIREZ D/C 

TODAY WILL REPEAT BLADDER SONO IN PM.

## 2019-10-15 NOTE — PN
Progress Note, Physician


History of Present Illness: 





confused


refusing abx





- Current Medication List


Current Medications: 


Active Medications





Acetaminophen (Tylenol -)  650 mg PO Q4H PRN


   PRN Reason: FEVER


   Last Admin: 10/13/19 18:00 Dose:  650 mg


Atorvastatin Calcium (Lipitor -)  40 mg PO HS [facility]


   Last Admin: 10/14/19 22:36 Dose:  40 mg


Clopidogrel Bisulfate (Plavix -)  75 mg PO DAILY [facility]


   Last Admin: 10/15/19 11:28 Dose:  Not Given


Donepezil HCl (Aricept -)  5 mg PO DAILY [facility]


   Last Admin: 10/15/19 11:28 Dose:  Not Given


Doxycycline Hyclate (Vibramycin -)  100 mg PO BID@1000,1800 [facility]


   Last Admin: 10/14/19 18:32 Dose:  Not Given


Ezetimibe (Zetia -)  10 mg PO HS [facility]


   Last Admin: 10/14/19 22:36 Dose:  10 mg


Heparin Sodium (Porcine) (Heparin -)  5,000 unit SQ TID [facility]


   Last Admin: 10/15/19 06:29 Dose:  5,000 unit


Sodium Chloride (1/2 Normal Saline)  1,000 mls @ 42 mls/hr IV ASDIR [facility]


Insulin Aspart (Novolog Vial Sliding Scale -)  1 vial SQ ACHS [facility]; Protocol


   Last Admin: 10/15/19 06:29 Dose:  10 units


Insulin Aspart (Novolog Vial)  2 units SQ ACLD [facility]


Insulin Detemir (Levemir Vial)  22 units SQ AM [facility]


Nebivolol (Bystolic -)  2.5 mg PO [facility]


   Last Admin: 10/14/19 22:36 Dose:  2.5 mg


Nystatin (Nystop Powder -)  1 applic TP BID [facility]


   Last Admin: 10/15/19 11:28 Dose:  Not Given


Tamsulosin HCl (Flomax -)  0.4 mg PO DAILY@0830 [facility]


   Last Admin: 10/15/19 11:28 Dose:  Not Given











- Objective


Vital Signs: 


 Vital Signs











Temperature  98.8 F   10/15/19 06:00


 


Pulse Rate  69   10/15/19 06:00


 


Respiratory Rate  20   10/15/19 07:35


 


Blood Pressure  124/62   10/15/19 06:00


 


O2 Sat by Pulse Oximetry (%)  98   10/08/19 09:00











Constitutional: Yes: Calm, Anxious


Cardiovascular: Yes: S1, S2


Respiratory: Yes: Regular, CTA Bilaterally


Gastrointestinal: Yes: Normal Bowel Sounds, Soft


Genitourinary: Yes: Mckeon Present, Other (hematuria)


Musculoskeletal: Yes: WNL


Extremities: Yes: Other


Neurological: Yes: Alert, Other


Psychiatric: Yes: Other


Labs: 


 CBC, BMP





 10/15/19 09:45 





 10/15/19 09:45 





 INR, PTT











INR  1.15  (0.83-1.09)  H  10/06/19  21:00    














Assessment/Plan





79 year old man with a history of HTN, hyperlipidemia, CAD, MI, CABG, chronic 

diastolic heart failure, type 2 DM, stage 3 CKD, COPD, dementia who presented 

to the ED with altered mental status. 





1. Acute metabolic encephalopathy 


2. Acute kidney injury


3. Abdominal pain 


4.  UTI





5. Lactic acidosis


6. Hypernatremia


7. Hypophosphatemia


8. HTN


9. Hyperlipidemia


10. CAD, history of MI, CABG


11. Chronic diastolic heart failure


12. Stage 3 CKD


13. COPD


14. Alzheimer dementia





plan


continue current mgmt


monitor


rest as per the team

## 2019-10-15 NOTE — DS
Physical Exam: 


SUBJECTIVE: Patient seen and examined; no events overnight.  Continues to have 

suboptimal glycemic control; uptitrated levemir and addec 2 units aspart 

premeal.  Overall no significant changes.  All subspecialty input noted.  

Pending approval with appeal. If appeal not approved will likely need to 

coordinate home services. Pending PT reevaluation.  Discussed with SW and admin.





10 sys ROS done and negative aside from HPI








OBJECTIVE:





 Vital Signs











 Period  Temp  Pulse  Resp  BP Sys/Ramírez  Pulse Ox


 


 Last 24 Hr  97.5 F-98.8 F  63-69  16-20  120-125/54-62  








PHYSICAL EXAM





VS, labs, imaging reviewed


NAD, AAOx1, resting in bed, responsive


RRR s1/2


NC AT EOMI PERRLA


NT ND +BS


CN2-12 wnl, no fnd


Not agitated, limited insight, dementia apparent


No rashes or breakdown


Moves all 4 ext with normal muscle tone





LABS


 Laboratory Results - last 24 hr











  10/14/19 10/14/19 10/14/19





  13:56 18:15 18:40


 


WBC    9.1


 


RBC    3.36 L


 


Hgb    9.6 L


 


Hct    30.0 L


 


MCV    89.2


 


MCH    28.6


 


MCHC    32.0


 


RDW    14.1


 


Plt Count    200  D


 


MPV    8.6


 


Absolute Neuts (auto)    5.2


 


Neutrophils %    57.2  D


 


Lymphocytes %    23.0  D


 


Monocytes %    15.6 H


 


Eosinophils %    3.3  D


 


Basophils %    0.9


 


Nucleated RBC %    0


 


Sodium   


 


Potassium   


 


Chloride   


 


Carbon Dioxide   


 


Anion Gap   


 


BUN   


 


Creatinine   


 


Est GFR (CKD-EPI)AfAm   


 


Est GFR (CKD-EPI)NonAf   


 


POC Glucometer  382  369 


 


Random Glucose   


 


Calcium   














  10/14/19 10/14/19 10/15/19





  18:40 22:25 06:27


 


WBC   


 


RBC   


 


Hgb   


 


Hct   


 


MCV   


 


MCH   


 


MCHC   


 


RDW   


 


Plt Count   


 


MPV   


 


Absolute Neuts (auto)   


 


Neutrophils %   


 


Lymphocytes %   


 


Monocytes %   


 


Eosinophils %   


 


Basophils %   


 


Nucleated RBC %   


 


Sodium  136  


 


Potassium  5.1  


 


Chloride  103  


 


Carbon Dioxide  27  


 


Anion Gap  6 L  


 


BUN  27.5 H  


 


Creatinine  1.3  


 


Est GFR (CKD-EPI)AfAm  60.15  


 


Est GFR (CKD-EPI)NonAf  51.90  


 


POC Glucometer   193  345


 


Random Glucose  398 H  


 


Calcium  8.4 L  











HOSPITAL COURSE:


Date of Admission:10/06/19


Date of Discharge: 10/15/19





Prolonged hospitalization due to insurance denial-was denied friday and had to 

stay through the weekend, then denied again monday and appeal was pending until 

tuesday due to initial PT eval stating lethargy and inability to participate in 

PT.





He was admitted for DKA and was found to have ongoing issues with his insulin 

pump likely secondary to his underlying dementia.  Despite familial support he 

had issues with glycemic control.  He was aggressively hydrated adn trated in 

the ICU with the DKA protocol and then discharged to the floor.  DEB resolved 

and was likely prerenal secondary to dehydration due to the underlying DKA.  We 

consulted endocrine who agreed with discontinuing the pump and placing him on 

SQ regimine.  He had difficult to control glucose but these were improved 

compared to his baseline.  He is currently on 22 qAM Levemir with 2 Aspart pre-

lunch/dinner and SSI.  He should continue to have AC+HS fsg with qAM checks as 

well.  Needs to keep all DM followups (Endo, PCP, nephro, podiatry).  





Minutes to complete discharge: 33





Discharge Summary


Problems reviewed: Yes


Reason For Visit: ACUTE KIDNEY INJURY, DIABETIC KETOACIDOSIS, COLITI


Current Active Problems





DEB (acute kidney injury) (Acute)


DKA (diabetic ketoacidoses) (Acute)


Obesity (BMI 30-39.9) (Chronic)








Condition: Stable





- Instructions


Diet, Activity, Other Instructions: 


Please STOP using the insulin pump


Instead you will be using Levemir 22 qAM


Novolog 2U with Lunch and Dinner


Sliding scale as below for AS NEEDED coverage:


   100-150 0units


   151-200 1units


   201-250 2units


   251-300 3units


   301-350 4units


   351-400 6 units


   >400 6 units and go to the ER or call a doctor





Also take Doxycycline 100mg TWICE daily for 3 more days


Also continue taking Aricept 5mg daily and Lexapro 10mg daily as suggested by 

the neurologist


We increased your Tamsulosin to 0.4mg TWICE daily and please use compression 

stockings to help with any orthostatic hypotension seen





Diet: Diabetic Dysphagia ground with thin liquids





Follow-up with Dr. Villa regarding the camacho catheter within 3-5 days. 

Please maintain the camacho catheter with proper care the skilled nursing facility


Follow-up with Dr. Burnett for the diabetes.


Follow-up with Dr. Elkins in 3-5 days to update them on your care


Referrals: 


Collins Villa MD [Staff Physician] - 1 Week


Shanel Elkins MD [Staff Physician] - 


Ramírez Burnett MD [Staff Physician] - 2 Weeks


Nereyda Venegas DPM [Staff Physician] - 1 Month (For diabetic foot checks)


Disposition: SKILLED NURSING FACILITY





- Home Medications


Comprehensive Discharge Medication List: 


Ambulatory Orders





Atorvastatin Ca [Lipitor] 40 mg PO DAILY 06/10/19 


Clopidogrel Bisulfate [Plavix] 75 mg PO DAILY 06/10/19 


Ezetimibe 10 mg PO HS 06/10/19 


Nebivolol HCl [Bystolic] 2.5 mg PO HS 06/10/19 


Tamsulosin HCl 0.4 mg PO DAILY 06/10/19 


Compression Socks, Medium [Futuro Restoring] 1 each MC DAILY #1 each 10/11/19 


Donepezil HCl [Aricept -] 5 mg PO DAILY  tablet 10/11/19 


Doxycycline Hyclate [Vibramycin -] 100 mg PO BID@1000,1800 3 Days #6 capsule 10/

11/19 


Escitalopram Oxalate [Lexapro -] 10 mg PO DAILY  tablet 10/11/19 


Insulin (Levemir) [Levemir Vial] 15 units SQ BID@0700,2200  units 10/11/19 


Insulin Aspart [Novolog] 2 unit SQ ACLD #1 cartridge 10/11/19 


Insulin Sliding Scale [Novolog Vial Sliding Scale -] 1 vial SQ ACHS  units 10/11

/19 


Tamsulosin HCl 0.4 mg PO BID #60 capsule 10/11/19 








This patient is new to me today: No


Emergency Visit: Yes


ED Registration Date: 10/06/19


Care time: The patient presented to the Emergency Department on the above date 

and was hospitalized for further evaluation of their emergent condition.


Critical Care patient: No





- Discharge Referral


Referred to [facility] No

## 2019-10-16 LAB
ALBUMIN SERPL-MCNC: 2.5 G/DL (ref 3.4–5)
ALP SERPL-CCNC: 112 U/L (ref 45–117)
ALT SERPL-CCNC: 30 U/L (ref 13–61)
ANION GAP SERPL CALC-SCNC: 6 MMOL/L (ref 8–16)
ANION GAP SERPL CALC-SCNC: 8 MMOL/L (ref 8–16)
AST SERPL-CCNC: 20 U/L (ref 15–37)
BILIRUB SERPL-MCNC: 0.7 MG/DL (ref 0.2–1)
BUN SERPL-MCNC: 22.7 MG/DL (ref 7–18)
BUN SERPL-MCNC: 28 MG/DL (ref 7–18)
CALCIUM SERPL-MCNC: 8.7 MG/DL (ref 8.5–10.1)
CALCIUM SERPL-MCNC: 8.9 MG/DL (ref 8.5–10.1)
CHLORIDE SERPL-SCNC: 101 MMOL/L (ref 98–107)
CHLORIDE SERPL-SCNC: 107 MMOL/L (ref 98–107)
CO2 SERPL-SCNC: 27 MMOL/L (ref 21–32)
CO2 SERPL-SCNC: 30 MMOL/L (ref 21–32)
CREAT SERPL-MCNC: 1.3 MG/DL (ref 0.55–1.3)
CREAT SERPL-MCNC: 1.5 MG/DL (ref 0.55–1.3)
DEPRECATED RDW RBC AUTO: 14.3 % (ref 11.9–15.9)
GLUCOSE SERPL-MCNC: 152 MG/DL (ref 74–106)
GLUCOSE SERPL-MCNC: 502 MG/DL (ref 74–106)
HCT VFR BLD CALC: 26.3 % (ref 35.4–49)
HGB BLD-MCNC: 8.7 GM/DL (ref 11.7–16.9)
MAGNESIUM SERPL-MCNC: 2.4 MG/DL (ref 1.8–2.4)
MCH RBC QN AUTO: 29.6 PG (ref 25.7–33.7)
MCHC RBC AUTO-ENTMCNC: 33.1 G/DL (ref 32–35.9)
MCV RBC: 89.2 FL (ref 80–96)
PLATELET # BLD AUTO: 252 K/MM3 (ref 134–434)
PMV BLD: 8.2 FL (ref 7.5–11.1)
POTASSIUM SERPLBLD-SCNC: 4.5 MMOL/L (ref 3.5–5.1)
POTASSIUM SERPLBLD-SCNC: 4.8 MMOL/L (ref 3.5–5.1)
PROT SERPL-MCNC: 6.2 G/DL (ref 6.4–8.2)
RBC # BLD AUTO: 2.95 M/MM3 (ref 4–5.6)
SODIUM SERPL-SCNC: 137 MMOL/L (ref 136–145)
SODIUM SERPL-SCNC: 143 MMOL/L (ref 136–145)
WBC # BLD AUTO: 10.2 K/MM3 (ref 4–10)

## 2019-10-16 RX ADMIN — TAMSULOSIN HYDROCHLORIDE SCH: 0.4 CAPSULE ORAL at 10:34

## 2019-10-16 RX ADMIN — HEPARIN SODIUM SCH UNIT: 5000 INJECTION, SOLUTION INTRAVENOUS; SUBCUTANEOUS at 14:16

## 2019-10-16 RX ADMIN — EZETIMIBE SCH MG: 10 TABLET ORAL at 21:42

## 2019-10-16 RX ADMIN — DONEPEZIL HYDROCHLORIDE SCH MG: 10 TABLET, FILM COATED ORAL at 10:25

## 2019-10-16 RX ADMIN — INSULIN ASPART SCH UNITS: 100 INJECTION, SOLUTION INTRAVENOUS; SUBCUTANEOUS at 12:21

## 2019-10-16 RX ADMIN — FINASTERIDE SCH MG: 5 TABLET, FILM COATED ORAL at 10:28

## 2019-10-16 RX ADMIN — INSULIN ASPART SCH UNITS: 100 INJECTION, SOLUTION INTRAVENOUS; SUBCUTANEOUS at 06:20

## 2019-10-16 RX ADMIN — TAMSULOSIN HYDROCHLORIDE SCH MG: 0.4 CAPSULE ORAL at 21:42

## 2019-10-16 RX ADMIN — HEPARIN SODIUM SCH UNIT: 5000 INJECTION, SOLUTION INTRAVENOUS; SUBCUTANEOUS at 06:20

## 2019-10-16 RX ADMIN — INSULIN DETEMIR SCH UNITS: 100 INJECTION, SOLUTION SUBCUTANEOUS at 06:19

## 2019-10-16 RX ADMIN — INSULIN ASPART SCH UNITS: 100 INJECTION, SOLUTION INTRAVENOUS; SUBCUTANEOUS at 21:43

## 2019-10-16 RX ADMIN — INSULIN ASPART SCH UNITS: 100 INJECTION, SOLUTION INTRAVENOUS; SUBCUTANEOUS at 17:13

## 2019-10-16 RX ADMIN — TAMSULOSIN HYDROCHLORIDE SCH MG: 0.4 CAPSULE ORAL at 10:25

## 2019-10-16 RX ADMIN — INSULIN ASPART SCH UNITS: 100 INJECTION, SOLUTION INTRAVENOUS; SUBCUTANEOUS at 12:22

## 2019-10-16 RX ADMIN — NEBIVOLOL HYDROCHLORIDE SCH MG: 2.5 TABLET ORAL at 21:42

## 2019-10-16 RX ADMIN — CLOPIDOGREL BISULFATE SCH: 75 TABLET, FILM COATED ORAL at 10:25

## 2019-10-16 RX ADMIN — NYSTATIN SCH APPLIC: 100000 POWDER TOPICAL at 10:25

## 2019-10-16 RX ADMIN — NYSTATIN SCH APPLIC: 100000 POWDER TOPICAL at 21:44

## 2019-10-16 RX ADMIN — ESCITALOPRAM OXALATE SCH MG: 10 TABLET, FILM COATED ORAL at 10:25

## 2019-10-16 RX ADMIN — ATORVASTATIN CALCIUM SCH MG: 40 TABLET, FILM COATED ORAL at 21:42

## 2019-10-16 NOTE — PN
Progress Note, Physician


History of Present Illness: 





Pt seen and examined at bedside. He is awake and appears comfortable. 





- Current Medication List


Current Medications: 


Active Medications





Acetaminophen (Tylenol -)  650 mg PO Q4H PRN


   PRN Reason: FEVER


   Last Admin: 10/13/19 18:00 Dose:  650 mg


Atorvastatin Calcium (Lipitor -)  40 mg PO HS [facility]


   Last Admin: 10/15/19 22:24 Dose:  40 mg


Clopidogrel Bisulfate (Plavix -)  75 mg PO DAILY [facility]


   Last Admin: 10/16/19 10:25 Dose:  Not Given


Donepezil HCl (Aricept -)  10 mg PO DAILY [facility]


   Last Admin: 10/16/19 10:25 Dose:  10 mg


Ezetimibe (Zetia -)  10 mg PO HS [facility]


   Last Admin: 10/15/19 22:24 Dose:  10 mg


Escitalopram Oxalate (Lexapro -)  10 mg PO DAILY [facility]


   Last Admin: 10/16/19 10:25 Dose:  10 mg


Finasteride (Proscar -)  5 mg PO DAILY [facility]


   Last Admin: 10/16/19 10:28 Dose:  5 mg


Heparin Sodium (Porcine) (Heparin -)  5,000 unit SQ TID [facility]


   Last Admin: 10/16/19 06:20 Dose:  5,000 unit


Insulin Aspart (Novolog Vial Sliding Scale -)  1 vial SQ ACHS [facility]; Protocol


   Last Admin: 10/16/19 12:21 Dose:  8 units


Insulin Aspart (Novolog Vial)  2 units SQ ACLD [facility]


   Last Admin: 10/16/19 12:22 Dose:  2 units


Insulin Detemir (Levemir Vial)  22 units SQ AM [facility]


   Last Admin: 10/16/19 06:19 Dose:  22 units


Nebivolol (Bystolic -)  2.5 mg PO HS [facility]


   Last Admin: 10/15/19 22:24 Dose:  2.5 mg


Nystatin (Nystop Powder -)  1 applic TP BID [facility]


   Last Admin: 10/16/19 10:25 Dose:  1 applic


Tamsulosin HCl (Flomax -)  0.4 mg PO BID@0830,2200 [facility]


   Last Admin: 10/16/19 10:25 Dose:  0.4 mg











- Objective


Vital Signs: 


 Vital Signs











Temperature  97.8 F   10/16/19 06:00


 


Pulse Rate  81   10/16/19 10:00


 


Respiratory Rate  20   10/16/19 10:00


 


Blood Pressure  151/72   10/16/19 10:00


 


O2 Sat by Pulse Oximetry (%)  98   10/08/19 09:00











Constitutional: Yes: Calm


Eyes: Yes: Other (legaly blind)


HENT: Yes: Atraumatic


Neck: Yes: Supple


Cardiovascular: Yes: S1, S2


Genitourinary: Yes: Hematuria, Incontinence.  No: Camacho Present


Musculoskeletal: Yes: WNL


Edema: LLE: Trace, RLE: Trace


Neurological: Yes: Confusion


Labs: 


 CBC, BMP





 10/15/19 09:45 





 10/16/19 06:55 





 INR, PTT











INR  1.15  (0.83-1.09)  H  10/06/19  21:00    














Problem List





- Problems


(1) DEB (acute kidney injury)


Code(s): N17.9 - ACUTE KIDNEY FAILURE, UNSPECIFIED   





(2) DKA (diabetic ketoacidoses)


Code(s): E11.10 - TYPE 2 DIABETES MELLITUS WITH KETOACIDOSIS WITHOUT COMA   


Qualifiers: 


   Diabetes mellitus type: type 2   Diabetes mellitus complication detail: 

without coma   Qualified Code(s): E11.10 - Type 2 diabetes mellitus with 

ketoacidosis without coma   





(3) Acute renal failure


Code(s): N17.9 - ACUTE KIDNEY FAILURE, UNSPECIFIED   





(4) Hypernatremia


Code(s): E87.0 - HYPEROSMOLALITY AND HYPERNATREMIA   





Assessment/Plan


 Current Medications











Generic Name Dose Route Start Last Admin





  Trade Name Freq  PRN Reason Stop Dose Admin


 


Acetaminophen  650 mg  10/09/19 10:34  10/13/19 18:00





  Tylenol -  PO   650 mg





  Q4H PRN   Administration





  FEVER   





     





     





     


 


Atorvastatin Calcium  40 mg  10/07/19 22:00  10/15/19 22:24





  Lipitor -  PO   40 mg





  HS [facility]   Administration





     





     





     





     


 


Clopidogrel Bisulfate  75 mg  10/08/19 10:00  10/16/19 10:25





  Plavix -  PO   Not Given





  DAILY [facility]   





     





     





     





     


 


Donepezil HCl  10 mg  10/16/19 10:00  10/16/19 10:25





  Aricept -  PO   10 mg





  DAILY [facility]   Administration





     





     





     





     


 


Ezetimibe  10 mg  10/07/19 22:00  10/15/19 22:24





  Zetia -  PO   10 mg





  [facility]   Administration





     





     





     





     


 


Escitalopram Oxalate  10 mg  10/16/19 10:00  10/16/19 10:25





  Lexapro -  PO   10 mg





  DAILY [facility]   Administration





     





     





     





     


 


Finasteride  5 mg  10/16/19 10:00  10/16/19 10:28





  Proscar -  PO   5 mg





  DAILY [facility]   Administration





     





     





     





     


 


Heparin Sodium (Porcine)  5,000 unit  10/07/19 22:00  10/16/19 06:20





  Heparin -  SQ   5,000 unit





  TID [facility]   Administration





     





     





     





     


 


Insulin Aspart  1 vial  10/10/19 00:52  10/16/19 12:21





  Novolog Vial Sliding Scale -  SQ   8 units





  [facility]   Administration





     





     





  Protocol   





     


 


Insulin Aspart  2 units  10/15/19 11:30  10/16/19 12:22





  Novolog Vial  SQ   2 units





  ACLD [facility]   Administration





     





     





     





     


 


Insulin Detemir  22 units  10/16/19 07:00  10/16/19 06:19





  Levemir Vial  SQ   22 units





  AM [facility]   Administration





     





     





     





     


 


Nebivolol  2.5 mg  10/07/19 22:00  10/15/19 22:24





  Bystolic -  PO   2.5 mg





  HS [facility]   Administration





     





     





     





     


 


Nystatin  1 applic  10/10/19 11:30  10/16/19 10:25





  Nystop Powder -  TP   1 applic





  BID [facility]   Administration





     





     





     





     


 


Tamsulosin HCl  0.4 mg  10/16/19 08:45  10/16/19 10:25





  Flomax -  PO   0.4 mg





  BID@0830,2200 [facility]   Administration





     





     





     





     

















Impression


1. DEB


2. DKA


3. hypernatremia


4. DM


5. dementia


6. cad


7. hyperkalemia





Plan


- potassium is improved


- renal function stable


- urology follow up for hematuria


- camacho removed


- monitor blood sugar

## 2019-10-16 NOTE — PN
Progress Note (short form)





- Note


Progress Note: 





On call resident asked to assess patient regarding urinalysis findings, and 

pulmonary congestion. 





Vitals:


/79 mmHg


HR 66 BPM


RR 12 BPM


O2 saturation 98% room air





Exam 


General: Patient alert oriented to self, resting comfortably in no acute 

distress.


Pulmonary: Faint crackles auscultated bilateral lower lung lobes right > left. 

Negative respiratory distress.


Cardiac: S1, S2, no murmurs, rubs, gallops


Abdominal: soft, nontender, nondistended. Bowel sounds auscultated X4 quadrants.


Extremities: 2+ pulses bilaterally. Negative edema bilateral lower extremities. 





Plan:


Urinalysis findings noted; antibiotic coverage broadened from Doxycycline to 

Vancomycin, Zosyn earlier today. 


Per Urology note, will discontinue camacho catheter, and allow for trial of 

voiding (patient's nurse informs me that current camacho catheter is same 

catheter since admission). If evidence of significant urinary retention (

greater than 500cc) will need to place new camacho catheter. 


Will cover with additional dose of Zosyn, and follow ID recommendations.


Case discussed with Urology (Dr. Durán) who agrees in continuing current 

regimen, and plan. 





Will administer Lasix 20mg IV given hemodynamics, for pulmonary congestion. 


 


Fingerstick blood glucose noted 388 mg/dL, and was given 10 units insulin 

Novolog per sliding scale


Repeat fingerstick blood glucose at 242 mg/dL. Will administer 5 units insulin 

Novolog one time dose to ensure appropriate coverage.

## 2019-10-16 NOTE — PN
<Tiburcio Molina - Last Filed: 10/16/19 12:05>


Physical Exam: 





Patient seen and examined.  Agree with resident note as aside from as 

supplemented in my own documentation.  Independently verified all historical 

and physical exam findings.  Patient remains unable to provide a complete 

review of systems secondary to his underlying dementia.  Physical therapy did 

see the patient yesterday but unfortunately, the patient was not able to 

participate in the visit secondary to his underlying dementia, pushing await 

the physical therapist further documentation.  Is unknown if they used a 

.  Overall, the patient will require continued monitoring on discharge

, but given the prior denial and repeat denial with the indicated reasoning of 

his dementia being too progressed to tolerate physical therapy at rehab, I 

question if this will be able to be approved.  Thus, we will work to get him 

home services and discuss overall goals of care with the family.





VS, labs, imaging reviewed


NAD, AAO, resting in bed


RRR s1/2 no mgr


Scattered crackles improved from yesterday, w. sym exp


NT ND +BS


CN2-12 wnl, no fnd


At baseline without any acute agitation but will become upset when redirected 

and refuses some therapy.





All labs and imaging noted


Echo with Grade 1 DD


CXR with some congestive changes





A/P:


Problems include:


1) DKA 2/2 issues with managing insulin pump, resolved


2) Uncontrolled DM


   *We continue to titrate up the patient's Levemir, increasing it to 22 units 

yesterday, he was improved to 150 in the morning.  I suspect that his glucose 

was out of control intermittently due to the patient's family giving him sweets

, juices, sodas, and other issues.  We have instructed the family member who is 

doing so to stop doing this, but unfortunately she is demented and difficult to 

redirect.  I spoke with nursing regarding this.  We will continue the sliding 

scale, with 2 units pre-meal as well as fingerstick glucose checks.  BMP 

without any gap.


3) NPH


   *Patient is not a candidate for a shunt.  This likely is worsening his 

underlying acute dementia symptoms with his underlying chronic dementia.  

Neurology saw him.  We uptitrated his Aricept to 10 and Lexapro to 10.  I gave 

him a dose of 2 of Haldol this morning as he became very agitated when taking 

his pulse ox.  He is redirectable 1 1 of the members of our staff who speaks 

his dialect is present and able to speak with him, but I will note that when 

his wife is present her communication is marred by underlying dementia and 

sometimes he will become even more upset if something is explained in a 

suboptimal manner.


4) Acute Cystitis with Hematuria


   *Patient was noted to have worsening hematuria yesterday.  This could be due 

to him pulling on his Camacho versus him having uncontrolled glycemic values with 

worsening of his infection due to this and due to him refusing p.o. 

doxycycline.  Medications were changed back to intravenous and I spoke with 

infectious disease.  Due to the amount of hematuria with the potential for 

traumatic causation, I consulted urology who recommended flushing the Camacho and 

it was removed this morning.  There is some clots, but he did not have any 

obstruction apparent and he was urinating normally today.  We will repeat the 

bladder scan later today.


5) Urinary Obstruction


   *Likely secondary to BPH which was the initial cause for the Camacho.  We 

increased the patient's tamsulosin to 0.4 twice daily and will start him on 

Proscar as well.  Ultimate management deferred to urology consult.


6) DEB on CKD-III, improved


   *Improved to resolve, underlying CKD likely secondary to diabetes mellitus 

being so uncontrolled.  We will continue to monitor.  He should follow-up with 

his nephrologist upon discharge.


7) Hx CAD s/p CABG


   *There is no documented history of any recent stents, etc. etc. that would 

necessitate the absolute use of Plavix.  However the patient's hemoglobin went 

from 9-10, which does not endorse any worsening bleeding.  Due to this, I will 

elect to continue it from now, and I will speak with resident team about 

obtaining more records so we can elucidate the patient's full cardiac history 

and discontinue this medication if indeed is not indicated.  He is not having 

any chest pain or atypical cardiac symptoms are apparent to myself or nursing.


8) Underlying Dementia (documented as Alzheimer's Type)


   *Worsened by NPH and likely toxic metabolic encephalopathy due to the acute 

issues.  Son tells me that he transfers and ambulates on his own. 


9) Hx D-CHF


10) Lactic Acidosis, resolved


11) Hypernatremia, resolved


12) Hx HTN


13) Hx HLD


14) Hx COPD, no acute exacerbation








Today's plan overall will be quite simple, we will follow-up the patient's new 

urine culture, we will follow-up ID recommendations and continue broad-spectrum 

coverage until we see if there is a new causative organism.  Furthermore, we 

will monitor the bladder scan to ensure there is no obstruction.  We will 

observe his urine output to see if indeed there is improvement from the Proscar 

in the extra tamsulosin.  Records will be reviewed relating to the patient's 

coronary artery disease.





I have asked case management to speak with his son regarding his overall mental 

status and placement of this patient.  He is likely not a safe discharge home 

and will require placement or significant home services





ATTENDING PHYSICIAN STATEMENT





I saw and evaluated the patient.


I reviewed the resident's note and discussed the case with the resident.


I agree with the resident's findings and plan as documented.








SUBJECTIVE:








OBJECTIVE:








ASSESSMENT AND PLAN:








<Collins Beal - Last Filed: 10/25/19 07:27>


Physical Exam: 


SUBJECTIVE: Overnight events noted regarding camacho and insulin coverage. HPI 

limited due to patient's severe dementia/NPH








OBJECTIVE:





 Vital Signs











 Period  Temp  Pulse  Resp  BP Sys/Ramírez  Pulse Ox


 


 Last 24 Hr  97.8 F-98.7 F  66-86  18-20  124-139/  











PE:


Gen: NAD, easily arousable


Lungs: CTA b/l, no wheezes. On RA


CARD: RRR no M/m/r


ABD: Soft, Nt/ND, normoactive BS


: Camacho catheter draining yellow urine


Ext: No edema, pulses intact througout

















 Laboratory Results - last 24 hr











  10/15/19 10/15/19 10/15/19





  09:45 09:45 12:22


 


WBC  9.4  


 


RBC  3.64 L  


 


Hgb  10.5 L  


 


Hct  32.5 L  


 


MCV  89.3  


 


MCH  28.9  


 


MCHC  32.4  


 


RDW  14.2  


 


Plt Count  224  


 


MPV  8.5  


 


Sodium   137 


 


Potassium   5.3 H 


 


Chloride   102 


 


Carbon Dioxide   26 


 


Anion Gap   9 


 


BUN   23.9 H 


 


Creatinine   1.3 


 


Est GFR (CKD-EPI)AfAm   60.15 


 


Est GFR (CKD-EPI)NonAf   51.90 


 


POC Glucometer    498


 


Random Glucose   442 H* 


 


Calcium   8.9 


 


Magnesium   


 


Total Bilirubin   


 


AST   


 


ALT   


 


Alkaline Phosphatase   


 


B-Natriuretic Peptide   1937.4 H 


 


Total Protein   


 


Albumin   


 


Urine Color   


 


Urine Appearance   


 


Urine pH   


 


Ur Specific Gravity   


 


Urine Protein   


 


Urine Glucose (UA)   


 


Urine Ketones   


 


Urine Blood   


 


Urine Nitrite   


 


Urine Bilirubin   


 


Urine Urobilinogen   


 


Ur Leukocyte Esterase   


 


Urine WBC (Auto)   


 


Urine RBC (Auto)   


 


Urine Casts (Auto)   


 


U Pathogenic Cast Auto   


 


U Epithel Cells (Auto)   


 


U Sm Round Cell (Auto)   


 


Urine Bacteria (Auto)   














  10/15/19 10/15/19 10/15/19





  13:18 14:12 16:00


 


WBC   


 


RBC   


 


Hgb   


 


Hct   


 


MCV   


 


MCH   


 


MCHC   


 


RDW   


 


Plt Count   


 


MPV   


 


Sodium    139


 


Potassium    5.1


 


Chloride    105


 


Carbon Dioxide    28


 


Anion Gap    6 L


 


BUN    26.2 H


 


Creatinine    1.3


 


Est GFR (CKD-EPI)AfAm    60.15


 


Est GFR (CKD-EPI)NonAf    51.90


 


POC Glucometer  488  419 


 


Random Glucose    283 H


 


Calcium    9.0


 


Magnesium   


 


Total Bilirubin   


 


AST   


 


ALT   


 


Alkaline Phosphatase   


 


B-Natriuretic Peptide   


 


Total Protein   


 


Albumin   


 


Urine Color   


 


Urine Appearance   


 


Urine pH   


 


Ur Specific Gravity   


 


Urine Protein   


 


Urine Glucose (UA)   


 


Urine Ketones   


 


Urine Blood   


 


Urine Nitrite   


 


Urine Bilirubin   


 


Urine Urobilinogen   


 


Ur Leukocyte Esterase   


 


Urine WBC (Auto)   


 


Urine RBC (Auto)   


 


Urine Casts (Auto)   


 


U Pathogenic Cast Auto   


 


U Epithel Cells (Auto)   


 


U Sm Round Cell (Auto)   


 


Urine Bacteria (Auto)   














  10/15/19 10/15/19 10/16/19





  16:05 17:46 06:55


 


WBC   


 


RBC   


 


Hgb   


 


Hct   


 


MCV   


 


MCH   


 


MCHC   


 


RDW   


 


Plt Count   


 


MPV   


 


Sodium    143


 


Potassium    4.5


 


Chloride    107


 


Carbon Dioxide    30


 


Anion Gap    6 L


 


BUN    22.7 H


 


Creatinine    1.3


 


Est GFR (CKD-EPI)AfAm    60.15


 


Est GFR (CKD-EPI)NonAf    51.90


 


POC Glucometer  300  


 


Random Glucose    152 H


 


Calcium    8.9


 


Magnesium    2.4


 


Total Bilirubin    0.7


 


AST    20


 


ALT    30


 


Alkaline Phosphatase    112


 


B-Natriuretic Peptide   


 


Total Protein    6.2 L


 


Albumin    2.5 L


 


Urine Color   Red 


 


Urine Appearance   Turbid 


 


Urine pH   No Result Required. 


 


Ur Specific Gravity   1.024 


 


Urine Protein   2+ H 


 


Urine Glucose (UA)   3+ H 


 


Urine Ketones   Negative 


 


Urine Blood   2+ H 


 


Urine Nitrite   Positive H 


 


Urine Bilirubin   2+ H 


 


Urine Urobilinogen   0.2 


 


Ur Leukocyte Esterase   2+ H 


 


Urine WBC (Auto)   78 


 


Urine RBC (Auto)   6866 


 


Urine Casts (Auto)   30 


 


U Pathogenic Cast Auto   None seen 


 


U Epithel Cells (Auto)   15.0 


 


U Sm Round Cell (Auto)   None seen 


 


Urine Bacteria (Auto)   0.5 








Active Medications











Generic Name Dose Route Start Last Admin





  Trade Name Freq  PRN Reason Stop Dose Admin


 


Acetaminophen  650 mg  10/09/19 10:34  10/13/19 18:00





  Tylenol -  PO   650 mg





  Q4H PRN   Administration





  FEVER   





     





     





     


 


Atorvastatin Calcium  40 mg  10/07/19 22:00  10/15/19 22:24





  Lipitor -  PO   40 mg





  HS [facility]   Administration





     





     





     





     


 


Clopidogrel Bisulfate  75 mg  10/08/19 10:00  10/15/19 11:28





  Plavix -  PO   Not Given





  DAILY [facility]   





     





     





     





     


 


Donepezil HCl  10 mg  10/16/19 08:34  





  Aricept -  PO   





  DAILY [facility]   





     





     





     





     


 


Ezetimibe  10 mg  10/07/19 22:00  10/15/19 22:24





  Zetia -  PO   10 mg





  HS [facility]   Administration





     





     





     





     


 


Escitalopram Oxalate  10 mg  10/16/19 10:00  





  Lexapro Oral Solution -  PO   





  DAILY [facility]   





     





     





     





     


 


Furosemide  20 mg  10/16/19 08:45  





  Lasix Injection -  IVPUSH  10/16/19 08:46  





  ONCE ONE   





     





     





     





     


 


Heparin Sodium (Porcine)  5,000 unit  10/07/19 22:00  10/16/19 06:20





  Heparin -  SQ   5,000 unit





  TID [facility]   Administration





     





     





     





     


 


Insulin Aspart  1 vial  10/10/19 00:52  10/16/19 06:20





  Novolog Vial Sliding Scale -  SQ   2 units





  [facility]   Administration





     





     





  Protocol   





     


 


Insulin Aspart  2 units  10/15/19 11:30  10/15/19 16:12





  Novolog Vial  SQ   2 units





  ACLD [facility]   Administration





     





     





     





     


 


Insulin Detemir  22 units  10/16/19 07:00  10/16/19 06:19





  Levemir Vial  SQ   22 units





  AM [facility]   Administration





     





     





     





     


 


Nebivolol  2.5 mg  10/07/19 22:00  10/15/19 22:24





  Bystolic -  PO   2.5 mg





  HS [facility]   Administration





     





     





     





     


 


Nystatin  1 applic  10/10/19 11:30  10/15/19 22:33





  Nystop Powder -  TP   Not Given





  BID [facility]   





     





     





     





     


 


Tamsulosin HCl  0.4 mg  10/16/19 08:45  





  Flomax -  PO   





  BID@0830,2200 [facility]   





     





     





     





     











ASSESSMENT/PLAN:


BPH


Acute on chronic kidney injury


Acute cystitis


DKA (resolved)


Uncontrolled DM


Normal pressure hydrocephalus


CAD s/p CABG


HFpEF


Hx HTN


Hx HLD


COPD, not in exacerbation





--Appreciate all consultant recommendations


--F/u urine culture and await sensitivities


--Pt broadened to Zosyn; to continue until bacterium identified


--ID on board


--Bladder scan today to r/o obstruction


--Monitor UO


--Continue Proscar 5mg qdaily


--Continue Flomax 0.4mg BID


--Avoid nephrotoxic agents





--Glucose levels approaching target


--Continue Levemir 22 units AM


--Continue premeal Novolog 4U ACLD


   --Strict diabetic diet


--Continue ISS for breakthrough coverage


--Monitor glucose levels





--Pt not candidate for shunting revolving around NPH.


--Continue Aricept 10mg and Lexapro 10mg qdaily 





FEN:


   Fluids: PO


   Electrolyte abnormalities: None today


   Nutrition: Diabetic based diet





PPX:


   DVT - scds





Dispo: Cultures and broad-spectrum abx


Case discussed with Dr. Tracy Beal, DO - IM PGY-3





Visit type





- Emergency Visit


Emergency Visit: Yes


ED Registration Date: 10/06/19


Care time: The patient presented to the Emergency Department on the above date 

and was hospitalized for further evaluation of their emergent condition.





- New Patient


This patient is new to me today: No





- Critical Care


Critical Care patient: No





ATTENDING PHYSICIAN STATEMENT





I saw and evaluated the patient.


I reviewed the resident's note and discussed the case with the resident.


I agree with the resident's findings and plan as documented.








SUBJECTIVE:








OBJECTIVE:








ASSESSMENT AND PLAN:

## 2019-10-16 NOTE — PN
Progress Note, Physician


History of Present Illness: 





stable


no new issues





- Current Medication List


Current Medications: 


Active Medications





Acetaminophen (Tylenol -)  650 mg PO Q4H PRN


   PRN Reason: FEVER


   Last Admin: 10/13/19 18:00 Dose:  650 mg


Atorvastatin Calcium (Lipitor -)  40 mg PO HS [facility]


   Last Admin: 10/15/19 22:24 Dose:  40 mg


Clopidogrel Bisulfate (Plavix -)  75 mg PO DAILY [facility]


   Last Admin: 10/16/19 10:25 Dose:  Not Given


Donepezil HCl (Aricept -)  10 mg PO DAILY [facility]


   Last Admin: 10/16/19 10:25 Dose:  10 mg


Ezetimibe (Zetia -)  10 mg PO HS [facility]


   Last Admin: 10/15/19 22:24 Dose:  10 mg


Escitalopram Oxalate (Lexapro -)  10 mg PO DAILY [facility]


   Last Admin: 10/16/19 10:25 Dose:  10 mg


Finasteride (Proscar -)  5 mg PO DAILY [facility]


   Last Admin: 10/16/19 10:28 Dose:  5 mg


Heparin Sodium (Porcine) (Heparin -)  5,000 unit SQ TID [facility]


   Last Admin: 10/16/19 06:20 Dose:  5,000 unit


Insulin Aspart (Novolog Vial Sliding Scale -)  1 vial SQ ACHS [facility]; Protocol


   Last Admin: 10/16/19 06:20 Dose:  2 units


Insulin Aspart (Novolog Vial)  2 units SQ ACLD [facility]


   Last Admin: 10/15/19 16:12 Dose:  2 units


Insulin Detemir (Levemir Vial)  22 units SQ AM [facility]


   Last Admin: 10/16/19 06:19 Dose:  22 units


Nebivolol (Bystolic -)  2.5 mg PO HS [facility]


   Last Admin: 10/15/19 22:24 Dose:  2.5 mg


Nystatin (Nystop Powder -)  1 applic TP BID [facility]


   Last Admin: 10/16/19 10:25 Dose:  1 applic


Tamsulosin HCl (Flomax -)  0.4 mg PO BID@0830,2200 [facility]


   Last Admin: 10/16/19 10:25 Dose:  0.4 mg











- Objective


Vital Signs: 


 Vital Signs











Temperature  97.8 F   10/16/19 06:00


 


Pulse Rate  81   10/16/19 10:00


 


Respiratory Rate  20   10/16/19 10:00


 


Blood Pressure  151/72   10/16/19 10:00


 


O2 Sat by Pulse Oximetry (%)  98   10/08/19 09:00











Constitutional: Yes: No Distress, Calm


Cardiovascular: Yes: S1, S2


Respiratory: Yes: Regular, CTA Bilaterally


Gastrointestinal: Yes: Normal Bowel Sounds, Soft


Musculoskeletal: Yes: WNL


Extremities: Yes: WNL


Neurological: Yes: Alert


Psychiatric: Yes: Alert


Labs: 


 CBC, BMP





 10/15/19 09:45 





 10/16/19 06:55 





 INR, PTT











INR  1.15  (0.83-1.09)  H  10/06/19  21:00    














Assessment/Plan





79 year old man with a history of HTN, hyperlipidemia, CAD, MI, CABG, chronic 

diastolic heart failure, type 2 DM, stage 3 CKD, COPD, dementia who presented 

to the ED with altered mental status. 





1. Acute metabolic encephalopathy 


2. Acute kidney injury


3. Abdominal pain 


4.  UTI





5. Lactic acidosis


6. Hypernatremia


7. Hypophosphatemia


8. HTN


9. Hyperlipidemia


10. CAD, history of MI, CABG


11. Chronic diastolic heart failure


12. Stage 3 CKD


13. COPD


14. Alzheimer dementia





plan


continue current mgmt


monitor


rest as per the team

## 2019-10-17 LAB
ANION GAP SERPL CALC-SCNC: 6 MMOL/L (ref 8–16)
BASOPHILS # BLD: 0.8 % (ref 0–2)
BASOPHILS # BLD: 0.9 % (ref 0–2)
BUN SERPL-MCNC: 23.3 MG/DL (ref 7–18)
CALCIUM SERPL-MCNC: 8.6 MG/DL (ref 8.5–10.1)
CHLORIDE SERPL-SCNC: 107 MMOL/L (ref 98–107)
CO2 SERPL-SCNC: 30 MMOL/L (ref 21–32)
CREAT SERPL-MCNC: 1.3 MG/DL (ref 0.55–1.3)
DEPRECATED RDW RBC AUTO: 14.4 % (ref 11.9–15.9)
DEPRECATED RDW RBC AUTO: 14.6 % (ref 11.9–15.9)
EOSINOPHIL # BLD: 1.7 % (ref 0–4.5)
EOSINOPHIL # BLD: 1.8 % (ref 0–4.5)
GLUCOSE SERPL-MCNC: 246 MG/DL (ref 74–106)
HCT VFR BLD CALC: 22.6 % (ref 35.4–49)
HCT VFR BLD CALC: 23.7 % (ref 35.4–49)
HGB BLD-MCNC: 7.7 GM/DL (ref 11.7–16.9)
HGB BLD-MCNC: 7.7 GM/DL (ref 11.7–16.9)
LYMPHOCYTES # BLD: 17.8 % (ref 8–40)
LYMPHOCYTES # BLD: 17.9 % (ref 8–40)
MCH RBC QN AUTO: 28.9 PG (ref 25.7–33.7)
MCH RBC QN AUTO: 29.8 PG (ref 25.7–33.7)
MCHC RBC AUTO-ENTMCNC: 32.5 G/DL (ref 32–35.9)
MCHC RBC AUTO-ENTMCNC: 34 G/DL (ref 32–35.9)
MCV RBC: 87.7 FL (ref 80–96)
MCV RBC: 88.9 FL (ref 80–96)
MONOCYTES # BLD AUTO: 11.9 % (ref 3.8–10.2)
MONOCYTES # BLD AUTO: 12.3 % (ref 3.8–10.2)
NEUTROPHILS # BLD: 67.4 % (ref 42.8–82.8)
NEUTROPHILS # BLD: 67.5 % (ref 42.8–82.8)
PLATELET # BLD AUTO: 227 K/MM3 (ref 134–434)
PLATELET # BLD AUTO: 242 K/MM3 (ref 134–434)
PMV BLD: 7.9 FL (ref 7.5–11.1)
PMV BLD: 8.3 FL (ref 7.5–11.1)
POTASSIUM SERPLBLD-SCNC: 4.4 MMOL/L (ref 3.5–5.1)
RBC # BLD AUTO: 2.58 M/MM3 (ref 4–5.6)
RBC # BLD AUTO: 2.67 M/MM3 (ref 4–5.6)
SODIUM SERPL-SCNC: 143 MMOL/L (ref 136–145)
WBC # BLD AUTO: 10.3 K/MM3 (ref 4–10)
WBC # BLD AUTO: 9.7 K/MM3 (ref 4–10)

## 2019-10-17 RX ADMIN — NYSTATIN SCH APPLIC: 100000 POWDER TOPICAL at 10:38

## 2019-10-17 RX ADMIN — NYSTATIN SCH APPLIC: 100000 POWDER TOPICAL at 21:51

## 2019-10-17 RX ADMIN — INSULIN ASPART SCH UNITS: 100 INJECTION, SOLUTION INTRAVENOUS; SUBCUTANEOUS at 11:41

## 2019-10-17 RX ADMIN — DONEPEZIL HYDROCHLORIDE SCH MG: 10 TABLET, FILM COATED ORAL at 10:38

## 2019-10-17 RX ADMIN — INSULIN ASPART SCH UNITS: 100 INJECTION, SOLUTION INTRAVENOUS; SUBCUTANEOUS at 17:57

## 2019-10-17 RX ADMIN — INSULIN DETEMIR SCH UNITS: 100 INJECTION, SOLUTION SUBCUTANEOUS at 06:21

## 2019-10-17 RX ADMIN — TAMSULOSIN HYDROCHLORIDE SCH MG: 0.4 CAPSULE ORAL at 21:51

## 2019-10-17 RX ADMIN — INSULIN ASPART SCH: 100 INJECTION, SOLUTION INTRAVENOUS; SUBCUTANEOUS at 21:54

## 2019-10-17 RX ADMIN — FINASTERIDE SCH MG: 5 TABLET, FILM COATED ORAL at 10:38

## 2019-10-17 RX ADMIN — TAMSULOSIN HYDROCHLORIDE SCH MG: 0.4 CAPSULE ORAL at 10:38

## 2019-10-17 RX ADMIN — ATORVASTATIN CALCIUM SCH MG: 40 TABLET, FILM COATED ORAL at 21:51

## 2019-10-17 RX ADMIN — NEBIVOLOL HYDROCHLORIDE SCH MG: 2.5 TABLET ORAL at 21:51

## 2019-10-17 RX ADMIN — ESCITALOPRAM OXALATE SCH MG: 10 TABLET, FILM COATED ORAL at 10:38

## 2019-10-17 RX ADMIN — EZETIMIBE SCH MG: 10 TABLET ORAL at 21:51

## 2019-10-17 RX ADMIN — INSULIN ASPART SCH UNITS: 100 INJECTION, SOLUTION INTRAVENOUS; SUBCUTANEOUS at 06:21

## 2019-10-17 NOTE — PN
Progress Note, Physician


History of Present Illness: 





having urinary retention


roby hematuria





- Current Medication List


Current Medications: 


Active Medications





Acetaminophen (Tylenol -)  650 mg PO Q4H PRN


   PRN Reason: FEVER


   Last Admin: 10/13/19 18:00 Dose:  650 mg


Atorvastatin Calcium (Lipitor -)  40 mg PO HS [facility]


   Last Admin: 10/16/19 21:42 Dose:  40 mg


Donepezil HCl (Aricept -)  10 mg PO DAILY [facility]


   Last Admin: 10/17/19 10:38 Dose:  10 mg


Ezetimibe (Zetia -)  10 mg PO HS [facility]


   Last Admin: 10/16/19 21:42 Dose:  10 mg


Escitalopram Oxalate (Lexapro -)  10 mg PO DAILY [facility]


   Last Admin: 10/17/19 10:38 Dose:  10 mg


Finasteride (Proscar -)  5 mg PO DAILY [facility]


   Last Admin: 10/17/19 10:38 Dose:  5 mg


Insulin Aspart (Novolog Vial Sliding Scale -)  1 vial SQ ACHS [facility]; Protocol


   Last Admin: 10/17/19 11:41 Dose:  14 units


Insulin Aspart (Novolog Vial)  4 units SQ ACLD [facility]


   Last Admin: 10/17/19 11:41 Dose:  4 units


Insulin Detemir (Levemir Vial)  26 units SQ AM [facility]ARY


Nebivolol (Bystolic -)  2.5 mg PO [facility]


   Last Admin: 10/16/19 21:42 Dose:  2.5 mg


Nystatin (Nystop Powder -)  1 applic TP BID [facility]


   Last Admin: 10/17/19 10:38 Dose:  1 applic


Tamsulosin HCl (Flomax -)  0.4 mg PO BID@0830,2200 [facility]


   Last Admin: 10/17/19 10:38 Dose:  0.4 mg











- Objective


Vital Signs: 


 Vital Signs











Temperature  98.2 F   10/17/19 10:00


 


Pulse Rate  72   10/17/19 10:00


 


Respiratory Rate  20   10/17/19 10:00


 


Blood Pressure  106/68   10/17/19 10:00


 


O2 Sat by Pulse Oximetry (%)  98   10/08/19 09:00











Constitutional: Yes: Calm, Mild Distress


Cardiovascular: Yes: Regular Rate and Rhythm


Respiratory: Yes: Regular, CTA Bilaterally


Gastrointestinal: Yes: Normal Bowel Sounds, Soft


Musculoskeletal: Yes: WNL


Extremities: Yes: WNL


Neurological: Yes: Alert, Other (legally blind)


Labs: 


 CBC, BMP





 10/17/19 09:03 





 10/17/19 09:03 





 INR, PTT











INR  1.15  (0.83-1.09)  H  10/06/19  21:00    














Assessment/Plan





79 year old man with a history of HTN, hyperlipidemia, CAD, MI, CABG, chronic 

diastolic heart failure, type 2 DM, stage 3 CKD, COPD, dementia who presented 

to the ED with altered mental status. 





1. Acute metabolic encephalopathy 


2. Acute kidney injury


3. Abdominal pain 


4.  UTI





5. Lactic acidosis


6. Hypernatremia


7. Hypophosphatemia


8. HTN


9. Hyperlipidemia


10. CAD, history of MI, CABG


11. Chronic diastolic heart failure


12. Stage 3 CKD


13. COPD


14. Alzheimer dementia





plan


continue current mgmt


monitor


monitor for output


urology to see he patient


nephro on case


rest as per the team

## 2019-10-17 NOTE — CONSULT
Consult


Consult Specialty:: UROLOGY 


Reason for Consultation:: Gross hematuria





- History of Present Illness


Chief Complaint: 80 Y/O Male patient with history of DEB,, HT, DM, CAD, BPH and 

Dementia developed AUR and gross hematuria..  BUN 23  S.Creat 1.3  WBC 9.4 HB 

10.5.  18 F camacho catheter inserted and draining bloody urine, bladder 

irrigation done and urine gets clear





- Past Medical History


CNS: Yes: Dementia


Cardio/Vascular: Yes: CAD, HTN, Hyperlipdemia


Pulmonary: Yes: Asthma, COPD


Renal/: Yes: Renal Inusuff





- Past Surgical History


Past Surgical History: Yes: CABG, Joint Replacement (R hip ORIF)





- Alcohol/Substance Use


Hx Alcohol Use: No





- Smoking History


Smoking history: Former smoker


Have you smoked in the past 12 months: No


Aproximately how many cigarettes per day: 0


If you are a former smoker, when did you quit?: '96





- Social History


Usual Living Arrangement: With Spouse


ADL: Family Assistance


History of Recent Travel: No





Home Medications





- Allergies


Allergies/Adverse Reactions: 


 Allergies











Allergy/AdvReac Type Severity Reaction Status Date / Time


 


No Known Allergies Allergy   Verified 02/15/19 11:57














- Home Medications


Home Medications: 


Ambulatory Orders





Atorvastatin Ca [Lipitor] 40 mg PO DAILY 06/10/19 


Clopidogrel Bisulfate [Plavix] 75 mg PO DAILY 06/10/19 


Ezetimibe 10 mg PO HS 06/10/19 


Nebivolol HCl [Bystolic] 2.5 mg PO HS 06/10/19 


Tamsulosin HCl 0.4 mg PO DAILY 06/10/19 


Compression Socks, Medium [Futuro Restoring] 1 each MC DAILY #1 each 10/11/19 


Donepezil HCl [Aricept -] 5 mg PO DAILY  tablet 10/11/19 


Doxycycline Hyclate [Vibramycin -] 100 mg PO BID@1000,1800 3 Days #6 capsule 10/

11/19 


Escitalopram Oxalate [Lexapro -] 10 mg PO DAILY  tablet 10/11/19 


Insulin (Levemir) [Levemir Vial] 15 units SQ BID@0700,2200  units 10/11/19 


Insulin Aspart [Novolog] 2 unit SQ ACLD #1 cartridge 10/11/19 


Insulin Sliding Scale [Novolog Vial Sliding Scale -] 1 vial SQ ACHS  units 10/11

/19 


Tamsulosin HCl 0.4 mg PO BID #60 capsule 10/11/19 











Physical Exam


Vital Signs: 


 Vital Signs











Temperature  98 F   10/17/19 14:08


 


Pulse Rate  74   10/17/19 14:08


 


Respiratory Rate  20   10/17/19 14:08


 


Blood Pressure  114/56 L  10/17/19 14:08


 


O2 Sat by Pulse Oximetry (%)  98   10/08/19 09:00











Labs: 


 CBC, BMP





 10/17/19 09:03 





 10/17/19 09:03 











Assessment/Plan





AUR


BPH


Gross hematuria


Plan:


NPO for Cystoscopy tomorrow.

## 2019-10-17 NOTE — PN
Progress Note, Physician


History of Present Illness: 





Pt seen and examined at bedside. He appears comfortable. He still has 

hematuria. 





- Current Medication List


Current Medications: 


Active Medications





Acetaminophen (Tylenol -)  650 mg PO Q4H PRN


   PRN Reason: FEVER


   Last Admin: 10/13/19 18:00 Dose:  650 mg


Atorvastatin Calcium (Lipitor -)  40 mg PO HS [facility]


   Last Admin: 10/16/19 21:42 Dose:  40 mg


Donepezil HCl (Aricept -)  10 mg PO DAILY [facility]


   Last Admin: 10/17/19 10:38 Dose:  10 mg


Ezetimibe (Zetia -)  10 mg PO HS [facility]


   Last Admin: 10/16/19 21:42 Dose:  10 mg


Escitalopram Oxalate (Lexapro -)  10 mg PO DAILY [facility]


   Last Admin: 10/17/19 10:38 Dose:  10 mg


Finasteride (Proscar -)  5 mg PO DAILY [facility]


   Last Admin: 10/17/19 10:38 Dose:  5 mg


Insulin Aspart (Novolog Vial Sliding Scale -)  1 vial SQ ACHS [facility]; Protocol


   Last Admin: 10/17/19 11:41 Dose:  14 units


Insulin Aspart (Novolog Vial)  4 units SQ ACLD [facility]


   Last Admin: 10/17/19 11:41 Dose:  4 units


Insulin Detemir (Levemir Vial)  26 units SQ AM [facility]


Nebivolol (Bystolic -)  2.5 mg PO HS [facility]


   Last Admin: 10/16/19 21:42 Dose:  2.5 mg


Nystatin (Nystop Powder -)  1 applic TP BID [facility]


   Last Admin: 10/17/19 10:38 Dose:  1 applic


Tamsulosin HCl (Flomax -)  0.4 mg PO BID@0830,2200 [facility]


   Last Admin: 10/17/19 10:38 Dose:  0.4 mg











- Objective


Vital Signs: 


 Vital Signs











Temperature  98 F   10/17/19 14:08


 


Pulse Rate  74   10/17/19 14:08


 


Respiratory Rate  20   10/17/19 14:08


 


Blood Pressure  114/56 L  10/17/19 14:08


 


O2 Sat by Pulse Oximetry (%)  98   10/08/19 09:00











Constitutional: Yes: Calm


HENT: Yes: Atraumatic


Neck: Yes: Supple


Cardiovascular: Yes: S1, S2


Respiratory: Yes: CTA Bilaterally


Gastrointestinal: Yes: Normal Bowel Sounds, Soft


Genitourinary: Yes: WNL


Musculoskeletal: Yes: WNL


Edema: Yes


Edema: LLE: Trace, RLE: Trace


Neurological: Yes: Confusion


Labs: 


 CBC, BMP





 10/17/19 09:03 





 10/17/19 09:03 





 INR, PTT











INR  1.15  (0.83-1.09)  H  10/06/19  21:00    














Problem List





- Problems


(1) DEB (acute kidney injury)


Code(s): N17.9 - ACUTE KIDNEY FAILURE, UNSPECIFIED   





(2) DKA (diabetic ketoacidoses)


Code(s): E11.10 - TYPE 2 DIABETES MELLITUS WITH KETOACIDOSIS WITHOUT COMA   


Qualifiers: 


   Diabetes mellitus type: type 2   Diabetes mellitus complication detail: 

without coma   Qualified Code(s): E11.10 - Type 2 diabetes mellitus with 

ketoacidosis without coma   





(3) Acute renal failure


Code(s): N17.9 - ACUTE KIDNEY FAILURE, UNSPECIFIED   





(4) Hypernatremia


Code(s): E87.0 - HYPEROSMOLALITY AND HYPERNATREMIA   





Assessment/Plan


 Current Medications











Generic Name Dose Route Start Last Admin





  Trade Name Freq  PRN Reason Stop Dose Admin


 


Acetaminophen  650 mg  10/09/19 10:34  10/13/19 18:00





  Tylenol -  PO   650 mg





  Q4H PRN   Administration





  FEVER   





     





     





     


 


Atorvastatin Calcium  40 mg  10/07/19 22:00  10/16/19 21:42





  Lipitor -  PO   40 mg





  HS MARY   Administration





     





     





     





     


 


Donepezil HCl  10 mg  10/16/19 10:00  10/17/19 10:38





  Aricept -  PO   10 mg





  DAILY MARY   Administration





     





     





     





     


 


Ezetimibe  10 mg  10/07/19 22:00  10/16/19 21:42





  Zetia -  PO   10 mg





  HS [facility]   Administration





     





     





     





     


 


Escitalopram Oxalate  10 mg  10/16/19 10:00  10/17/19 10:38





  Lexapro -  PO   10 mg





  DAILY MARY   Administration





     





     





     





     


 


Finasteride  5 mg  10/16/19 10:00  10/17/19 10:38





  Proscar -  PO   5 mg





  DAILY MARY   Administration





     





     





     





     


 


Insulin Aspart  1 vial  10/10/19 00:52  10/17/19 11:41





  Novolog Vial Sliding Scale -  SQ   14 units





  [facility]   Administration





     





     





  Protocol   





     


 


Insulin Aspart  4 units  10/17/19 07:28  10/17/19 11:41





  Novolog Vial  SQ   4 units





  ACLD MARY   Administration





     





     





     





     


 


Insulin Detemir  26 units  10/17/19 07:27  





  Levemir Vial  SQ   





  AM MARY   





     





     





     





     


 


Nebivolol  2.5 mg  10/07/19 22:00  10/16/19 21:42





  Bystolic -  PO   2.5 mg





  HS [facility]   Administration





     





     





     





     


 


Nystatin  1 applic  10/10/19 11:30  10/17/19 10:38





  Nystop Powder -  TP   1 applic





  BID MARY   Administration





     





     





     





     


 


Tamsulosin HCl  0.4 mg  10/16/19 08:45  10/17/19 10:38





  Flomax -  PO   0.4 mg





  BID@0830,2200 [facility]   Administration





     





     





     





     














Impression


1. DEB


2. DKA


3. hypernatremia


4. DM


5. dementia


6. cad


7. hyperkalemia





Plan


- renal function is improving


- will need better glucose control


- urology follow up for hematuria


- monitor lytes


- monitor blood sugar

## 2019-10-17 NOTE — PN
Physical Exam: 


SUBJECTIVE: Patient seen and examined; had urinary retention this AM.  Saw 

patient in PM with intermittent bloody urine streams after +bladder scan 

earlier.  Likely clot blocking.  Off all AC, SCDs for DVT px.  Urology 

contacted and I spoke to Dr. Durán.  May need cbi





Abx per ID, continuing to taper insulin-nursing has been successful in stopping 

family from giving additional sweets. 


No records from other hospitals locally per xf centers of note


Prior records reviewed extensively, has been on plavix since 2016.  Given 

chronic use with otherwise no recent stenting, it is reasonable to hold plavix 

at this time given the dropping in Hb with hematuria


CBC in PM and rechecking type and cross.





10 sys ROS not feasable








OBJECTIVE:





 Vital Signs











 Period  Temp  Pulse  Resp  BP Sys/Ramírez  Pulse Ox


 


 Last 24 Hr  97.8 F-98.4 F  67-95  20-20  108-151/53-78  











GENERAL: The patient is awake, alert, and fully oriented, in no acute distress.


HEAD: Normal with no signs of trauma.


EYES: PERRL, extraocular movements intact, sclera anicteric, conjunctiva clear. 

No ptosis. 


ENT: Ears normal, nares patent, oropharynx clear without exudates, moist mucous 

membranes.


NECK: Trachea midline, full range of motion, supple. 


LUNGS: Breath sounds equal, clear to auscultation bilaterally, no wheezes, no 

crackles, no accessory muscle use. 


HEART: Regular rate and rhythm, S1, S2 without murmur, rub or gallop.


: Camacho inserted with hematuria noted.


ABDOMEN: Soft, nontender, nondistended, normoactive bowel sounds, no guarding, 

no 


rebound, no hepatosplenomegaly, no masses.


EXTREMITIES: 2+ pulses, warm, well-perfused, no edema. 


NEUROLOGICAL: Cranial nerves II through XII grossly unchanged; no new fnd.


PSYCH: Confused, somewhat combative during camacho insertion, no active 

hallucinations, etc.


SKIN: Warm, dry, normal turgor, no rashes or lesions noted














 Laboratory Results - last 24 hr











  10/15/19 10/15/19 10/16/19





  22:17 23:57 06:14


 


WBC   


 


RBC   


 


Hgb   


 


Hct   


 


MCV   


 


MCH   


 


MCHC   


 


RDW   


 


Plt Count   


 


MPV   


 


Sodium   


 


Potassium   


 


Chloride   


 


Carbon Dioxide   


 


Anion Gap   


 


BUN   


 


Creatinine   


 


Est GFR (CKD-EPI)AfAm   


 


Est GFR (CKD-EPI)NonAf   


 


POC Glucometer  388  242  157


 


Random Glucose   


 


Calcium   


 


Magnesium   


 


Total Bilirubin   


 


AST   


 


ALT   


 


Alkaline Phosphatase   


 


Total Protein   


 


Albumin   














  10/16/19 10/16/19 10/16/19





  06:55 11:18 16:38


 


WBC   


 


RBC   


 


Hgb   


 


Hct   


 


MCV   


 


MCH   


 


MCHC   


 


RDW   


 


Plt Count   


 


MPV   


 


Sodium  143  


 


Potassium  4.5  


 


Chloride  107  


 


Carbon Dioxide  30  


 


Anion Gap  6 L  


 


BUN  22.7 H  


 


Creatinine  1.3  


 


Est GFR (CKD-EPI)AfAm  60.15  


 


Est GFR (CKD-EPI)NonAf  51.90  


 


POC Glucometer   313  455


 


Random Glucose  152 H  


 


Calcium  8.9  


 


Magnesium  2.4  


 


Total Bilirubin  0.7  


 


AST  20  


 


ALT  30  


 


Alkaline Phosphatase  112  


 


Total Protein  6.2 L  


 


Albumin  2.5 L  














  10/16/19 10/16/19 10/16/19





  18:47 19:30 19:30


 


WBC   10.2 H 


 


RBC   2.95 L 


 


Hgb   8.7 L 


 


Hct   26.3 L D 


 


MCV   89.2 


 


MCH   29.6 


 


MCHC   33.1 


 


RDW   14.3 


 


Plt Count   252 


 


MPV   8.2 


 


Sodium    137


 


Potassium    4.8


 


Chloride    101


 


Carbon Dioxide    27


 


Anion Gap    8


 


BUN    28.0 H


 


Creatinine    1.5 H


 


Est GFR (CKD-EPI)AfAm    50.59


 


Est GFR (CKD-EPI)NonAf    43.65


 


POC Glucometer  505  


 


Random Glucose    502 H*


 


Calcium    8.7


 


Magnesium   


 


Total Bilirubin   


 


AST   


 


ALT   


 


Alkaline Phosphatase   


 


Total Protein   


 


Albumin   














  10/16/19 10/16/19 10/17/19





  21:30 22:43 06:19


 


WBC   


 


RBC   


 


Hgb   


 


Hct   


 


MCV   


 


MCH   


 


MCHC   


 


RDW   


 


Plt Count   


 


MPV   


 


Sodium   


 


Potassium   


 


Chloride   


 


Carbon Dioxide   


 


Anion Gap   


 


BUN   


 


Creatinine   


 


Est GFR (CKD-EPI)AfAm   


 


Est GFR (CKD-EPI)NonAf   


 


POC Glucometer  402  322  242


 


Random Glucose   


 


Calcium   


 


Magnesium   


 


Total Bilirubin   


 


AST   


 


ALT   


 


Alkaline Phosphatase   


 


Total Protein   


 


Albumin   








Active Medications











Generic Name Dose Route Start Last Admin





  Trade Name Freq  PRN Reason Stop Dose Admin


 


Acetaminophen  650 mg  10/09/19 10:34  10/13/19 18:00





  Tylenol -  PO   650 mg





  Q4H PRN   Administration





  FEVER   





     





     





     


 


Atorvastatin Calcium  40 mg  10/07/19 22:00  10/16/19 21:42





  Lipitor -  PO   40 mg





  HS MARY   Administration





     





     





     





     


 


Donepezil HCl  10 mg  10/16/19 10:00  10/16/19 10:25





  Aricept -  PO   10 mg





  DAILY MARY   Administration





     





     





     





     


 


Ezetimibe  10 mg  10/07/19 22:00  10/16/19 21:42





  Zetia -  PO   10 mg





  HS [facility]   Administration





     





     





     





     


 


Escitalopram Oxalate  10 mg  10/16/19 10:00  10/16/19 10:25





  Lexapro -  PO   10 mg





  DAILY MARY   Administration





     





     





     





     


 


Finasteride  5 mg  10/16/19 10:00  10/16/19 10:28





  Proscar -  PO   5 mg





  DAILY MARY   Administration





     





     





     





     


 


Insulin Aspart  1 vial  10/10/19 00:52  10/17/19 06:21





  Novolog Vial Sliding Scale -  SQ   4 units





  [facility]   Administration





     





     





  Protocol   





     


 


Insulin Aspart  2 units  10/15/19 11:30  10/16/19 17:13





  Novolog Vial  SQ   2 units





  ACLD MARY   Administration





     





     





     





     


 


Insulin Detemir  22 units  10/16/19 07:00  10/17/19 06:21





  Levemir Vial  SQ   22 units





  AM MARY   Administration





     





     





     





     


 


Nebivolol  2.5 mg  10/07/19 22:00  10/16/19 21:42





  Bystolic -  PO   2.5 mg





  HS MARY   Administration





     





     





     





     


 


Nystatin  1 applic  10/10/19 11:30  10/16/19 21:44





  Nystop Powder -  TP   1 applic





  BID MARY   Administration





     





     





     





     


 


Tamsulosin HCl  0.4 mg  10/16/19 08:45  10/16/19 21:42





  Flomax -  PO   0.4 mg





  BID@0830,2200 MARY   Administration





     





     





     





     











VS, labs, imaging reviewed


NAD, AAO, resting in bed


RRR s1/2 no mgr


Scattered crackles improved from yesterday, w. sym exp


NT ND +BS


CN2-12 wnl, no fnd


At baseline without any acute agitation but will become upset when redirected 

and refuses some therapy.





All labs and imaging noted


Echo with Grade 1 DD


CXR with some congestive changes





ASSESSMENT/PLAN:


As stated throughout last several notes, patient's DC was initially held due to 

insurance issue then he was found to develop roby hematuria with drop in Hb 

and hyperglycemia.  Not clear if due to trauma or infection; discussed with 

subspecialty services and manage per urology.  This has been a persisting issue

, unfortunately, for this patient.  Continue to monitor.





DC held up multiple times by:


-hematuria persisting


-Lack of placement in facility with family appeal. 





Problems include:


1) DKA 2/2 issues with managing insulin pump, resolved-DC on SQ insulin


2) Uncontrolled DM


   *We continue to titrate up the patient's Levemir, increasing it to 22 units 

yesterday, he was improved to 150 in the morning.  I suspect that his glucose 

was out of control intermittently due to the patient's family giving him sweets

, juices, sodas, and other issues.  We have instructed the family member who is 

doing so to stop doing this, but unfortunately she is demented and difficult to 

redirect.  I spoke with nursing regarding this.  We will continue the sliding 

scale, with 2 units pre-meal as well as fingerstick glucose checks.  BMP 

without any gap.


3) NPH


   *Patient is not a candidate for a shunt.  This likely is worsening his 

underlying acute dementia symptoms with his underlying chronic dementia.  

Neurology saw him.  We uptitrated his Aricept to 10 and Lexapro to 10.  I gave 

him a dose of 2 of Haldol this morning as he became very agitated when taking 

his pulse ox.  He is redirectable 1 1 of the members of our staff who speaks 

his dialect is present and able to speak with him, but I will note that when 

his wife is present her communication is marred by underlying dementia and 

sometimes he will become even more upset if something is explained in a 

suboptimal manner.


4) Acute Cystitis with Hematuria


   *Patient was noted to have worsening hematuria yesterday.  This could be due 

to him pulling on his Camacho versus him having uncontrolled glycemic values with 

worsening of his infection due to this and due to him refusing p.o. 

doxycycline.  Medications were changed back to intravenous and I spoke with 

infectious disease.  Due to the amount of hematuria with the potential for 

traumatic causation, I consulted urology who recommended flushing the Camacho and 

it was removed this morning.  There is some clots, but he did not have any 

obstruction apparent and he was urinating normally today.  We will repeat the 

bladder scan later today.


5) Urinary Obstruction


   *Likely secondary to BPH which was the initial cause for the Camacho.  We 

increased the patient's tamsulosin to 0.4 twice daily and will start him on 

Proscar as well.  Ultimate management deferred to urology consult.


6) DEB on CKD-III, improved


   *Improved to resolve, underlying CKD likely secondary to diabetes mellitus 

being so uncontrolled.  We will continue to monitor.  He should follow-up with 

his nephrologist upon discharge.


7) Hx CAD s/p CABG


   *There is no documented history of any recent stents, etc. etc. that would 

necessitate the absolute use of Plavix.  However the patient's hemoglobin went 

from 9-10, which does not endorse any worsening bleeding.  Due to this, I will 

elect to continue it from now, and I will speak with resident team about 

obtaining more records so we can elucidate the patient's full cardiac history 

and discontinue this medication if indeed is not indicated.  He is not having 

any chest pain or atypical cardiac symptoms are apparent to myself or nursing.


8) Underlying Dementia (documented as Alzheimer's Type)


   *Worsened by NPH and likely toxic metabolic encephalopathy due to the acute 

issues.  Son tells me that he transfers and ambulates on his own. 


9) Hx D-CHF


10) Lactic Acidosis, resolved


11) Hypernatremia, resolved


12) Hx HTN


13) Hx HLD


14) Hx COPD, no acute exacerbation








Visit type





- Emergency Visit


Emergency Visit: Yes


ED Registration Date: 10/06/19


Care time: The patient presented to the Emergency Department on the above date 

and was hospitalized for further evaluation of their emergent condition.





- New Patient


This patient is new to me today: No





- Critical Care


Critical Care patient: No

## 2019-10-18 LAB
ANION GAP SERPL CALC-SCNC: 10 MMOL/L (ref 8–16)
ANION GAP SERPL CALC-SCNC: 6 MMOL/L (ref 8–16)
BASOPHILS # BLD: 1 % (ref 0–2)
BUN SERPL-MCNC: 19.3 MG/DL (ref 7–18)
BUN SERPL-MCNC: 19.4 MG/DL (ref 7–18)
CALCIUM SERPL-MCNC: 8.5 MG/DL (ref 8.5–10.1)
CALCIUM SERPL-MCNC: 8.7 MG/DL (ref 8.5–10.1)
CHLORIDE SERPL-SCNC: 107 MMOL/L (ref 98–107)
CHLORIDE SERPL-SCNC: 108 MMOL/L (ref 98–107)
CO2 SERPL-SCNC: 25 MMOL/L (ref 21–32)
CO2 SERPL-SCNC: 27 MMOL/L (ref 21–32)
CREAT SERPL-MCNC: 1.2 MG/DL (ref 0.55–1.3)
CREAT SERPL-MCNC: 1.4 MG/DL (ref 0.55–1.3)
DEPRECATED RDW RBC AUTO: 15.1 % (ref 11.9–15.9)
EOSINOPHIL # BLD: 2.1 % (ref 0–4.5)
GLUCOSE SERPL-MCNC: 303 MG/DL (ref 74–106)
GLUCOSE SERPL-MCNC: 322 MG/DL (ref 74–106)
HCT VFR BLD CALC: 23 % (ref 35.4–49)
HGB BLD-MCNC: 7.7 GM/DL (ref 11.7–16.9)
LYMPHOCYTES # BLD: 15 % (ref 8–40)
MCH RBC QN AUTO: 29.8 PG (ref 25.7–33.7)
MCHC RBC AUTO-ENTMCNC: 33.3 G/DL (ref 32–35.9)
MCV RBC: 89.3 FL (ref 80–96)
MONOCYTES # BLD AUTO: 7.9 % (ref 3.8–10.2)
NEUTROPHILS # BLD: 74 % (ref 42.8–82.8)
PLATELET # BLD AUTO: 240 K/MM3 (ref 134–434)
PMV BLD: 7.7 FL (ref 7.5–11.1)
POTASSIUM SERPLBLD-SCNC: 5 MMOL/L (ref 3.5–5.1)
POTASSIUM SERPLBLD-SCNC: 5.2 MMOL/L (ref 3.5–5.1)
RBC # BLD AUTO: 2.57 M/MM3 (ref 4–5.6)
SODIUM SERPL-SCNC: 141 MMOL/L (ref 136–145)
SODIUM SERPL-SCNC: 142 MMOL/L (ref 136–145)
WBC # BLD AUTO: 10 K/MM3 (ref 4–10)

## 2019-10-18 PROCEDURE — 0TCB8ZZ EXTIRPATION OF MATTER FROM BLADDER, VIA NATURAL OR ARTIFICIAL OPENING ENDOSCOPIC: ICD-10-PCS | Performed by: UROLOGY

## 2019-10-18 PROCEDURE — 0VT08ZZ RESECTION OF PROSTATE, VIA NATURAL OR ARTIFICIAL OPENING ENDOSCOPIC: ICD-10-PCS | Performed by: UROLOGY

## 2019-10-18 RX ADMIN — INSULIN ASPART SCH: 100 INJECTION, SOLUTION INTRAVENOUS; SUBCUTANEOUS at 06:23

## 2019-10-18 RX ADMIN — INSULIN ASPART SCH: 100 INJECTION, SOLUTION INTRAVENOUS; SUBCUTANEOUS at 19:11

## 2019-10-18 RX ADMIN — INSULIN ASPART SCH: 100 INJECTION, SOLUTION INTRAVENOUS; SUBCUTANEOUS at 11:35

## 2019-10-18 RX ADMIN — INSULIN ASPART SCH UNITS: 100 INJECTION, SOLUTION INTRAVENOUS; SUBCUTANEOUS at 17:55

## 2019-10-18 RX ADMIN — SODIUM CHLORIDE, POTASSIUM CHLORIDE, SODIUM LACTATE AND CALCIUM CHLORIDE SCH: 600; 310; 30; 20 INJECTION, SOLUTION INTRAVENOUS at 19:11

## 2019-10-18 RX ADMIN — DONEPEZIL HYDROCHLORIDE SCH: 10 TABLET, FILM COATED ORAL at 11:37

## 2019-10-18 RX ADMIN — FINASTERIDE SCH: 5 TABLET, FILM COATED ORAL at 11:38

## 2019-10-18 RX ADMIN — TAMSULOSIN HYDROCHLORIDE SCH MG: 0.4 CAPSULE ORAL at 21:51

## 2019-10-18 RX ADMIN — ESCITALOPRAM OXALATE SCH: 10 TABLET, FILM COATED ORAL at 11:37

## 2019-10-18 RX ADMIN — INSULIN DETEMIR SCH: 100 INJECTION, SOLUTION SUBCUTANEOUS at 06:22

## 2019-10-18 RX ADMIN — TAMSULOSIN HYDROCHLORIDE SCH: 0.4 CAPSULE ORAL at 11:37

## 2019-10-18 RX ADMIN — EZETIMIBE SCH MG: 10 TABLET ORAL at 21:51

## 2019-10-18 RX ADMIN — NYSTATIN SCH APPLIC: 100000 POWDER TOPICAL at 21:52

## 2019-10-18 RX ADMIN — NEBIVOLOL HYDROCHLORIDE SCH: 2.5 TABLET ORAL at 21:52

## 2019-10-18 RX ADMIN — ATORVASTATIN CALCIUM SCH MG: 40 TABLET, FILM COATED ORAL at 21:52

## 2019-10-18 RX ADMIN — NYSTATIN SCH APPLIC: 100000 POWDER TOPICAL at 11:38

## 2019-10-18 NOTE — PN
Progress Note, Physician


History of Present Illness: 





events noted


was not able to pas urine


foleys placed


hematuria with clots


plan to go to or for cystoscopy





- Current Medication List


Current Medications: 


Active Medications





Acetaminophen (Tylenol -)  650 mg PO Q4H PRN


   PRN Reason: FEVER


   Last Admin: 10/13/19 18:00 Dose:  650 mg


Atorvastatin Calcium (Lipitor -)  40 mg PO HS [facility]


   Last Admin: 10/17/19 21:51 Dose:  40 mg


Donepezil HCl (Aricept -)  10 mg PO DAILY [facility]


   Last Admin: 10/17/19 10:38 Dose:  10 mg


Ezetimibe (Zetia -)  10 mg PO HS [facility]


   Last Admin: 10/17/19 21:51 Dose:  10 mg


Escitalopram Oxalate (Lexapro -)  10 mg PO DAILY [facility]


   Last Admin: 10/17/19 10:38 Dose:  10 mg


Finasteride (Proscar -)  5 mg PO DAILY [facility]


   Last Admin: 10/17/19 10:38 Dose:  5 mg


Insulin Aspart (Novolog Vial Sliding Scale -)  1 vial SQ ACHS [facility]; Protocol


   Last Admin: 10/18/19 06:23 Dose:  Not Given


Insulin Aspart (Novolog Vial)  4 units SQ ACLD [facility]


   Last Admin: 10/17/19 17:57 Dose:  4 units


Insulin Detemir (Levemir Vial)  26 units SQ AM [facility]


   Last Admin: 10/18/19 06:22 Dose:  Not Given


Nebivolol (Bystolic -)  2.5 mg PO HS [facility]


   Last Admin: 10/17/19 21:51 Dose:  2.5 mg


Nystatin (Nystop Powder -)  1 applic TP BID [facility]


   Last Admin: 10/17/19 21:51 Dose:  1 applic


Tamsulosin HCl (Flomax -)  0.4 mg PO BID@0830,2200 [facility]


   Last Admin: 10/17/19 21:51 Dose:  0.4 mg











- Objective


Vital Signs: 


 Vital Signs











Temperature  97.5 F L  10/18/19 06:00


 


Pulse Rate  85   10/18/19 06:00


 


Respiratory Rate  18   10/18/19 06:00


 


Blood Pressure  131/63   10/18/19 06:00


 


O2 Sat by Pulse Oximetry (%)  98   10/17/19 21:00











Constitutional: Yes: Calm, Mild Distress


Neck: Yes: Supple


Cardiovascular: Yes: Regular Rate and Rhythm, S1, S2


Respiratory: Yes: Regular, CTA Bilaterally


Gastrointestinal: Yes: Normal Bowel Sounds, Soft


Genitourinary: Yes: Mckeon Present, Hematuria


Musculoskeletal: Yes: WNL


Extremities: Yes: WNL


Neurological: Yes: Alert


Labs: 


 CBC, BMP





 10/17/19 18:00 





 10/17/19 09:03 





 INR, PTT











INR  1.15  (0.83-1.09)  H  10/06/19  21:00    














Assessment/Plan





79 year old man with a history of HTN, hyperlipidemia, CAD, MI, CABG, chronic 

diastolic heart failure, type 2 DM, stage 3 CKD, COPD, dementia who presented 

to the ED with altered mental status. 





1. Acute metabolic encephalopathy 


2. Acute kidney injury


3. Abdominal pain 


4.  UTI





5. Lactic acidosis


6. Hypernatremia


7. Hypophosphatemia


8. HTN


9. Hyperlipidemia


10. CAD, history of MI, CABG


11. Chronic diastolic heart failure


12. Stage 3 CKD


13. COPD


14. Alzheimer dementia





plan


continue current mgmt


monitor


rest as per the team


for cystoscopy


rest as per the team

## 2019-10-18 NOTE — OP
Operative Note





- Note:


Operative Date: 10/18/19


Pre-Operative Diagnosis: bleeding prostate gland, clots, trabeculated bladder,

bph with obstruction


Operation: evacuation of clots, turp/tuvp


Findings: 





large bleeding prostate gland with large amount of clots


Post-Operative Diagnosis: Same as Pre-op


Surgeon: Melvin Durán


Anesthesia: General


Specimens Removed: prostate chips, blood clots


Estimated Blood Loss (mls): 50


Drains & Tubes with Location: 24f 3-way 30 cc camacho


Drains, Volume Out (mls): 0


Blood Volume Replaced (mls): 0


Fluid Volume Replaced (mls): 0


Operative Report Dictated: Yes

## 2019-10-18 NOTE — PN
Progress Note (short form)





- Note


Progress Note: 





HPI: Pt awaiting cystography today for investigation of gross hematuria. No 

other events overnight. HPI limited due to clinical condition.


 


PE:


Gen: NAD, easily arousable


Lungs: CTA b/l, no wheezes. On RA


CARD: RRR no M/m/r


ABD: Soft, Nt/ND, normoactive BS


: blood noted around diaper without any blood from meatus


Ext: No edema, pulses intact througout





 CBC, BMP





 10/18/19 13:20 





 10/18/19 17:30 





Microbiology





10/06/19 23:00   Blood - Peripheral Venous   Blood Culture - Final


                            NO GROWTH AFTER 5 DAYS INCUBATION


10/06/19 23:00   Blood - Peripheral Venous   Blood Culture - Final


                            NO GROWTH AFTER 5 DAYS INCUBATION


10/06/19 21:16   Urine - Urine Camacho   Urine Culture - Final


                            Strep Agalactiae Group B


                            Mr S Aureus


                            Staphylococcus Coagulase Neg





Active Medications





Acetaminophen (Tylenol -)  650 mg PO Q4H PRN


   PRN Reason: FEVER


   Last Admin: 10/13/19 18:00 Dose:  650 mg


Atorvastatin Calcium (Lipitor -)  40 mg PO HS [facility]


   Last Admin: 10/18/19 21:52 Dose:  40 mg


Donepezil HCl (Aricept -)  10 mg PO DAILY [facility]


   Last Admin: 10/18/19 11:37 Dose:  Not Given


Ezetimibe (Zetia -)  10 mg PO HS [facility]


   Last Admin: 10/18/19 21:51 Dose:  10 mg


Escitalopram Oxalate (Lexapro -)  10 mg PO DAILY [facility]


   Last Admin: 10/18/19 11:37 Dose:  Not Given


Finasteride (Proscar -)  5 mg PO DAILY [facility]


   Last Admin: 10/18/19 11:38 Dose:  Not Given


Lactated Ringer's (Lactated Ringers Solution)  1,000 mls @ 75 mls/hr IV ASDIR 

[facility]


   Last Admin: 10/18/19 19:11 Dose:  Not Given


Insulin Aspart (Novolog Vial Sliding Scale -)  1 vial SQ ACHS [facility]; Protocol


   Last Admin: 10/18/19 17:55 Dose:  8 units


Insulin Aspart (Novolog Vial)  4 units SQ ACLD [facility]


   Last Admin: 10/18/19 19:11 Dose:  Not Given


Insulin Detemir (Levemir Vial)  26 units SQ AM [facility]


   Last Admin: 10/18/19 06:22 Dose:  Not Given


Levofloxacin (Levaquin -)  250 mg PO DAILY@0600 [facility]


   Stop: 10/21/19 07:00


Nebivolol (Bystolic -)  2.5 mg PO HS [facility]


   Last Admin: 10/18/19 21:52 Dose:  Not Given


Nystatin (Nystop Powder -)  1 applic TP BID [facility]


   Last Admin: 10/18/19 21:52 Dose:  1 applic


Ondansetron HCl (Zofran Injection)  4 mg IVPUSH Q6H PRN


   PRN Reason: NAUSEA AND/OR VOMITING


Tamsulosin HCl (Flomax -)  0.4 mg PO BID@0830,2200 [facility]


   Last Admin: 10/18/19 21:51 Dose:  0.4 mg














A/P


Gross hematuria


BPH


Acute on chronic kidney injury


Acute cystitis


DKA (resolved)


Uncontrolled DM


Normal pressure hydrocephalus


CAD s/p CABG


HFpEF


Hx HTN


Hx HLD


COPD, not in exacerbation





--Appreciate all consultant recommendations


--Awaiting cystoscopy today for hematuria and investigation of obstructive BPH


--Monitor CBC


--Continue Proscar 5mg qdaily


--Continue Flomax 0.4mg BID


--Continue Levaquin 250mg qdaily for cystitis





--Cr decreasing, however monitor for acute changes


--Multiple etiologies for CKD, however acute injury likely from potential 

obstruction and pre-renal azotemia


--Avoid nephrotoxic agents





--Glucose levels approaching target


--Continue Levemir 26 units AM


--Continue premeal Novolog 4U ACLD


--Continue ISS for breakthrough coverage


--Monitor glucose levels





--Pt not candidate for shunting revolving around NPH.


--Continue Aricept 10mg and Lexapro 10mg qdaily 





FEN:


   Fluids: PO


   Electrolyte abnormalities: Hyperkalemia (mild; Lokelma 5)


   Nutrition: NPO for procedure





PPX:


   DVT - scds





Dispo: Awaiting cysto;


Case discussed with Dr. Tracy Beal, DO - IM PGY-3





<Collins Beal - Last Filed: 10/18/19 22:43>





- Note


Progress Note: 





Seen and examined; agree with resident note aside from as supplemented below by 

myself.  Independently reviewed all labs, vitals, and diagnostics.  In addition 

I verified all key historic and PE findings. 





No new complaints; no agitation, resting in bed, camacho draining pinkish urine.





10 sys ROS done and negative aside from HPI





NAD, AAO, resting in bed


NC AT EOMI PERRLA


Neurologically unchanged; wont participate in full exam but CN2-12 wnl, moves 

all 4 ext with normal sensory


NT ND +BS


HR wnl, no fnd


Lungs CTAB, w/ sym exp


Skin without rashes or breakdown


Not agitated, cannot do full psych exam, restricted insight and judgment 

secondary to clinical condition





1) DKA 2/2 issues with managing insulin pump, resolved-DC on SQ insulin


2) Uncontrolled DM -Continually adjusting insulin.  Complete control has proven 

difficult with this patient but is preferable to the ongoing issue with pump.  

No further input from endo noted.  


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  

Can discuss therapeutic tap as needed but per neuro likely should be done OP.  

Will continue to monitor.  


4) Acute Cystitis with Hematuria vs. Alexis Hematuria- Abx per ID; continue to 

monitor.  Ongoing trauma 2/2 underlying dementia vs. infectious origin.


5) Urinary Obstruction - Continue tamsulosin and finasteride.


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 

secondary to diabetes mellitus being so uncontrolled.  We will continue to 

monitor.  He should follow-up with his nephrologist upon discharge.  


7) Hx CAD s/p CABG


   *There is no documented history of any recent stents, etc. etc. that would 

necessitate the absolute use of Plavix.  However the patient's hemoglobin went 

from 9-10, which does not endorse any worsening bleeding.  Due to this, I will 

elect to continue it from now, and I will speak with resident team about 

obtaining more records so we can elucidate the patient's full cardiac history 

and discontinue this medication if indeed is not indicated.  He is not having 

any chest pain or atypical cardiac symptoms are apparent to myself or nursing.


8) Underlying Dementia (documented as Alzheimer's Type)


   *Worsened by NPH and likely toxic metabolic encephalopathy due to the acute 

issues.  Son tells me that he transfers and ambulates on his own. 


9) Hx D-CHF


10) Lactic Acidosis, resolved


11) Hypernatremia, resolved


12) Hx HTN


13) Hx HLD


14) Hx COPD, no acute exacerbation








<Tiburcio Molina - Last Filed: 10/25/19 07:31>

## 2019-10-18 NOTE — PN
Progress Note, Physician


History of Present Illness: 





Pt seen and examined at bedside. He is in the recovery room. He just had a 

turp. 





- Current Medication List


Current Medications: 


Active Medications





Acetaminophen (Tylenol -)  650 mg PO Q4H PRN


   PRN Reason: FEVER


   Last Admin: 10/13/19 18:00 Dose:  650 mg


Atorvastatin Calcium (Lipitor -)  40 mg PO HS [facility]


   Last Admin: 10/17/19 21:51 Dose:  40 mg


Donepezil HCl (Aricept -)  10 mg PO DAILY [facility]


   Last Admin: 10/18/19 11:37 Dose:  Not Given


Ezetimibe (Zetia -)  10 mg PO HS [facility]


   Last Admin: 10/17/19 21:51 Dose:  10 mg


Escitalopram Oxalate (Lexapro -)  10 mg PO DAILY [facility]


   Last Admin: 10/18/19 11:37 Dose:  Not Given


Fentanyl (Sublimaze Injection -)  25 mcg IVPUSH [id number] PRN


   PRN Reason: PAIN-PACU ORDER X 4 DOSES ONLY


   Stop: 10/19/19 02:00


Finasteride (Proscar -)  5 mg PO DAILY [facility]


   Last Admin: 10/18/19 11:38 Dose:  Not Given


Lactated Ringer's (Lactated Ringers Solution)  1,000 mls @ 75 mls/hr IV ASDIR 

[facility]


Insulin Aspart (Novolog Vial Sliding Scale -)  1 vial SQ ACHS [facility]; Protocol


   Last Admin: 10/18/19 11:35 Dose:  Not Given


Insulin Aspart (Novolog Vial)  4 units SQ ACLD [facility]


   Last Admin: 10/18/19 11:35 Dose:  Not Given


Insulin Detemir (Levemir Vial)  26 units SQ AM [facility]


   Last Admin: 10/18/19 06:22 Dose:  Not Given


Levofloxacin (Levaquin -)  250 mg PO DAILY@0600 [facility]


   Stop: 10/21/19 07:00


Nebivolol (Bystolic -)  2.5 mg PO HS [facility]


   Last Admin: 10/17/19 21:51 Dose:  2.5 mg


Nystatin (Nystop Powder -)  1 applic TP BID [facility]


   Last Admin: 10/18/19 11:38 Dose:  1 applic


Ondansetron HCl (Zofran Injection)  4 mg IVPUSH Q6H PRN


   PRN Reason: NAUSEA AND/OR VOMITING


Tamsulosin HCl (Flomax -)  0.4 mg PO BID@0830,2200 [facility]


   Last Admin: 10/18/19 11:37 Dose:  Not Given











- Objective


Vital Signs: 


 Vital Signs











Temperature  98.7 F   10/18/19 13:04


 


Pulse Rate  72   10/18/19 13:04


 


Respiratory Rate  18   10/18/19 13:04


 


Blood Pressure  112/74   10/18/19 13:04


 


O2 Sat by Pulse Oximetry (%)  98   10/18/19 09:00











Constitutional: Yes: Calm


Eyes: Yes: Conjunctiva Clear


HENT: Yes: Atraumatic


Neck: Yes: Supple


Cardiovascular: Yes: S1, S2


Respiratory: Yes: CTA Bilaterally


Gastrointestinal: Yes: Soft


Genitourinary: Yes: Mckeon Present, Hematuria, Other (cbi)


Edema: No


Integumentary: Yes: WNL


Neurological: Yes: Confusion


Labs: 


 CBC, BMP





 10/18/19 13:20 





 10/18/19 10:21 





 INR, PTT











INR  1.15  (0.83-1.09)  H  10/06/19  21:00    














Problem List





- Problems


(1) DEB (acute kidney injury)


Code(s): N17.9 - ACUTE KIDNEY FAILURE, UNSPECIFIED   





(2) DKA (diabetic ketoacidoses)


Code(s): E11.10 - TYPE 2 DIABETES MELLITUS WITH KETOACIDOSIS WITHOUT COMA   


Qualifiers: 


   Diabetes mellitus type: type 2   Diabetes mellitus complication detail: 

without coma   Qualified Code(s): E11.10 - Type 2 diabetes mellitus with 

ketoacidosis without coma   





(3) Acute renal failure


Code(s): N17.9 - ACUTE KIDNEY FAILURE, UNSPECIFIED   





(4) Hypernatremia


Code(s): E87.0 - HYPEROSMOLALITY AND HYPERNATREMIA   





Assessment/Plan


 Current Medications











Generic Name Dose Route Start Last Admin





  Trade Name Freq  PRN Reason Stop Dose Admin


 


Acetaminophen  650 mg  10/09/19 10:34  10/13/19 18:00





  Tylenol -  PO   650 mg





  Q4H PRN   Administration





  FEVER   





     





     





     


 


Atorvastatin Calcium  40 mg  10/07/19 22:00  10/17/19 21:51





  Lipitor -  PO   40 mg





  HS [facility]   Administration





     





     





     





     


 


Donepezil HCl  10 mg  10/16/19 10:00  10/18/19 11:37





  Aricept -  PO   Not Given





  DAILY [facility]   





     





     





     





     


 


Ezetimibe  10 mg  10/07/19 22:00  10/17/19 21:51





  Zetia -  PO   10 mg





  HS [facility]   Administration





     





     





     





     


 


Escitalopram Oxalate  10 mg  10/16/19 10:00  10/18/19 11:37





  Lexapro -  PO   Not Given





  DAILY [facility]   





     





     





     





     


 


Fentanyl  25 mcg  10/18/19 17:01  





  Sublimaze Injection -  IVPUSH  10/19/19 02:00  





  [id number] PRN   





  PAIN-PACU ORDER X 4 DOSES ONLY   





     





     





     


 


Finasteride  5 mg  10/16/19 10:00  10/18/19 11:38





  Proscar -  PO   Not Given





  DAILY [facility]   





     





     





     





     


 


Lactated Ringer's  1,000 mls @ 75 mls/hr  10/18/19 17:15  





  Lactated Ringers Solution  IV   





  ASDIR [facility]   





     





     





     





     


 


Insulin Aspart  1 vial  10/10/19 00:52  10/18/19 11:35





  Novolog Vial Sliding Scale -  SQ   Not Given





  ACHS [facility]   





     





     





  Protocol   





     


 


Insulin Aspart  4 units  10/17/19 07:28  10/18/19 11:35





  Novolog Vial  SQ   Not Given





  ACLD [facility]   





     





     





     





     


 


Insulin Detemir  26 units  10/17/19 07:27  10/18/19 06:22





  Levemir Vial  SQ   Not Given





  AM [facility]   





     





     





     





     


 


Levofloxacin  250 mg  10/18/19 17:30  





  Levaquin -  PO  10/21/19 07:00  





  DAILY@0600 [facility]   





     





     





     





     


 


Nebivolol  2.5 mg  10/07/19 22:00  10/17/19 21:51





  Bystolic -  PO   2.5 mg





  HS [facility]   Administration





     





     





     





     


 


Nystatin  1 applic  10/10/19 11:30  10/18/19 11:38





  Nystop Powder -  TP   1 applic





  BID [facility]   Administration





     





     





     





     


 


Ondansetron HCl  4 mg  10/18/19 17:01  





  Zofran Injection  IVPUSH   





  Q6H PRN   





  NAUSEA AND/OR VOMITING   





     





     





     


 


Tamsulosin HCl  0.4 mg  10/16/19 08:45  10/18/19 11:37





  Flomax -  PO   Not Given





  BID@0830,2200 [facility]   





     





     





     





     














Impression


1. DEB


2. DKA


3. hypernatremia


4. DM


5. dementia


6. cad


7. hyperkalemia





Plan


- monitor renal function


- repeat labs in am


- fluids should help with potassium


- will need better glucose control


- monitor lytes


- monitor blood sugar


- will follow PRN

## 2019-10-19 LAB
ANION GAP SERPL CALC-SCNC: 11 MMOL/L (ref 8–16)
BUN SERPL-MCNC: 20.4 MG/DL (ref 7–18)
CALCIUM SERPL-MCNC: 8.5 MG/DL (ref 8.5–10.1)
CHLORIDE SERPL-SCNC: 105 MMOL/L (ref 98–107)
CO2 SERPL-SCNC: 23 MMOL/L (ref 21–32)
CREAT SERPL-MCNC: 1.3 MG/DL (ref 0.55–1.3)
DEPRECATED RDW RBC AUTO: 14.3 % (ref 11.9–15.9)
GLUCOSE SERPL-MCNC: 368 MG/DL (ref 74–106)
HCT VFR BLD CALC: 26.5 % (ref 35.4–49)
HGB BLD-MCNC: 8.7 GM/DL (ref 11.7–16.9)
INR BLD: 1.42 (ref 0.83–1.09)
MAGNESIUM SERPL-MCNC: 2.5 MG/DL (ref 1.8–2.4)
MCH RBC QN AUTO: 29.4 PG (ref 25.7–33.7)
MCHC RBC AUTO-ENTMCNC: 32.6 G/DL (ref 32–35.9)
MCV RBC: 90.2 FL (ref 80–96)
PHOSPHATE SERPL-MCNC: 3.6 MG/DL (ref 2.5–4.9)
PLATELET # BLD AUTO: 241 K/MM3 (ref 134–434)
PMV BLD: 7.9 FL (ref 7.5–11.1)
POTASSIUM SERPLBLD-SCNC: 5 MMOL/L (ref 3.5–5.1)
PT PNL PPP: 16.8 SEC (ref 9.7–13)
RBC # BLD AUTO: 2.94 M/MM3 (ref 4–5.6)
SODIUM SERPL-SCNC: 140 MMOL/L (ref 136–145)
WBC # BLD AUTO: 15.6 K/MM3 (ref 4–10)

## 2019-10-19 RX ADMIN — INSULIN ASPART SCH: 100 INJECTION, SOLUTION INTRAVENOUS; SUBCUTANEOUS at 07:43

## 2019-10-19 RX ADMIN — TAMSULOSIN HYDROCHLORIDE SCH MG: 0.4 CAPSULE ORAL at 22:35

## 2019-10-19 RX ADMIN — SODIUM CHLORIDE, POTASSIUM CHLORIDE, SODIUM LACTATE AND CALCIUM CHLORIDE SCH: 600; 310; 30; 20 INJECTION, SOLUTION INTRAVENOUS at 22:34

## 2019-10-19 RX ADMIN — INSULIN ASPART SCH UNITS: 100 INJECTION, SOLUTION INTRAVENOUS; SUBCUTANEOUS at 22:56

## 2019-10-19 RX ADMIN — NYSTATIN SCH APPLIC: 100000 POWDER TOPICAL at 12:47

## 2019-10-19 RX ADMIN — NEBIVOLOL HYDROCHLORIDE SCH MG: 2.5 TABLET ORAL at 22:35

## 2019-10-19 RX ADMIN — ATORVASTATIN CALCIUM SCH MG: 40 TABLET, FILM COATED ORAL at 22:35

## 2019-10-19 RX ADMIN — DONEPEZIL HYDROCHLORIDE SCH MG: 10 TABLET, FILM COATED ORAL at 09:32

## 2019-10-19 RX ADMIN — NYSTATIN SCH APPLIC: 100000 POWDER TOPICAL at 22:57

## 2019-10-19 RX ADMIN — TAMSULOSIN HYDROCHLORIDE SCH MG: 0.4 CAPSULE ORAL at 09:32

## 2019-10-19 RX ADMIN — INSULIN ASPART SCH UNITS: 100 INJECTION, SOLUTION INTRAVENOUS; SUBCUTANEOUS at 11:26

## 2019-10-19 RX ADMIN — INSULIN ASPART SCH: 100 INJECTION, SOLUTION INTRAVENOUS; SUBCUTANEOUS at 16:59

## 2019-10-19 RX ADMIN — INSULIN ASPART SCH UNITS: 100 INJECTION, SOLUTION INTRAVENOUS; SUBCUTANEOUS at 17:00

## 2019-10-19 RX ADMIN — FINASTERIDE SCH MG: 5 TABLET, FILM COATED ORAL at 09:32

## 2019-10-19 RX ADMIN — INSULIN ASPART SCH: 100 INJECTION, SOLUTION INTRAVENOUS; SUBCUTANEOUS at 07:42

## 2019-10-19 RX ADMIN — INSULIN ASPART SCH UNITS: 100 INJECTION, SOLUTION INTRAVENOUS; SUBCUTANEOUS at 06:27

## 2019-10-19 RX ADMIN — ESCITALOPRAM OXALATE SCH MG: 10 TABLET, FILM COATED ORAL at 09:32

## 2019-10-19 RX ADMIN — SODIUM CHLORIDE, POTASSIUM CHLORIDE, SODIUM LACTATE AND CALCIUM CHLORIDE SCH MLS/HR: 600; 310; 30; 20 INJECTION, SOLUTION INTRAVENOUS at 06:24

## 2019-10-19 RX ADMIN — INSULIN DETEMIR SCH UNITS: 100 INJECTION, SOLUTION SUBCUTANEOUS at 06:21

## 2019-10-19 RX ADMIN — EZETIMIBE SCH MG: 10 TABLET ORAL at 22:57

## 2019-10-19 NOTE — PN
Progress Note, Physician


History of Present Illness: 





patient post cystoscopy


stable





- Current Medication List


Current Medications: 


Active Medications





Acetaminophen (Tylenol -)  650 mg PO Q4H PRN


   PRN Reason: FEVER


   Last Admin: 10/13/19 18:00 Dose:  650 mg


Atorvastatin Calcium (Lipitor -)  40 mg PO HS [facility]


   Last Admin: 10/18/19 21:52 Dose:  40 mg


Donepezil HCl (Aricept -)  10 mg PO DAILY [facility]


   Last Admin: 10/19/19 09:32 Dose:  10 mg


Ezetimibe (Zetia -)  10 mg PO HS [facility]


   Last Admin: 10/18/19 21:51 Dose:  10 mg


Escitalopram Oxalate (Lexapro -)  10 mg PO DAILY [facility]


   Last Admin: 10/19/19 09:32 Dose:  10 mg


Finasteride (Proscar -)  5 mg PO DAILY [facility]


   Last Admin: 10/19/19 09:32 Dose:  5 mg


Lactated Ringer's (Lactated Ringers Solution)  1,000 mls @ 75 mls/hr IV ASDIR 

[facility]


   Last Admin: 10/19/19 06:24 Dose:  75 mls/hr


Insulin Aspart (Novolog Vial Sliding Scale -)  1 vial SQ ACHS [facility]; Protocol


   Last Admin: 10/19/19 07:43 Dose:  Not Given


Insulin Aspart (Novolog Vial)  4 units SQ ACLD [facility]


   Last Admin: 10/18/19 19:11 Dose:  Not Given


Insulin Detemir (Levemir Vial)  26 units SQ AM [facility]


   Last Admin: 10/19/19 06:21 Dose:  26 units


Levofloxacin (Levaquin -)  250 mg PO DAILY@0600 [facility]


   Stop: 10/21/19 07:00


   Last Admin: 10/19/19 07:43 Dose:  Not Given


Nebivolol (Bystolic -)  2.5 mg PO HS [facility]


   Last Admin: 10/18/19 21:52 Dose:  Not Given


Nystatin (Nystop Powder -)  1 applic TP BID [facility]


   Last Admin: 10/18/19 21:52 Dose:  1 applic


Ondansetron HCl (Zofran Injection)  4 mg IVPUSH Q6H PRN


   PRN Reason: NAUSEA AND/OR VOMITING


Tamsulosin HCl (Flomax -)  0.4 mg PO BID@0830,2200 [facility]


   Last Admin: 10/19/19 09:32 Dose:  0.4 mg











- Objective


Vital Signs: 


 Vital Signs











Temperature  98.5 F   10/19/19 09:41


 


Pulse Rate  80   10/19/19 09:41


 


Respiratory Rate  20   10/19/19 09:41


 


Blood Pressure  112/52 L  10/19/19 09:41


 


O2 Sat by Pulse Oximetry (%)  97   10/18/19 21:00











Constitutional: Yes: No Distress, Calm


Cardiovascular: Yes: S1, S2


Respiratory: Yes: Regular, CTA Bilaterally


Gastrointestinal: Yes: Normal Bowel Sounds, Soft


Genitourinary: Yes: Mckeon Present


Musculoskeletal: Yes: WNL


Neurological: Yes: Alert


Labs: 


 CBC, BMP





 10/19/19 06:38 





 10/19/19 06:30 





 INR, PTT











INR  1.42  (0.83-1.09)  H  10/19/19  06:38    














Assessment/Plan





79 year old man with a history of HTN, hyperlipidemia, CAD, MI, CABG, chronic 

diastolic heart failure, type 2 DM, stage 3 CKD, COPD, dementia who presented 

to the ED with altered mental status. 





1. Acute metabolic encephalopathy 


2. Acute kidney injury


3. Abdominal pain 


4.  UTI





5. Lactic acidosis


6. Hypernatremia


7. Hypophosphatemia


8. HTN


9. Hyperlipidemia


10. CAD, history of MI, CABG


11. Chronic diastolic heart failure


12. Stage 3 CKD


13. COPD


14. Alzheimer dementia





plan


continue current mgmt


monitor for hematuria


patient stable


await for all results

## 2019-10-19 NOTE — PN
Progress Note, Physician


Chief Complaint: 





pt appers comfortable ,


  no cute distress, 








- Current Medication List


Current Medications: 


Active Medications





Acetaminophen (Tylenol -)  650 mg PO Q4H PRN


   PRN Reason: FEVER


   Last Admin: 10/13/19 18:00 Dose:  650 mg


Atorvastatin Calcium (Lipitor -)  40 mg PO HS [facility]


   Last Admin: 10/18/19 21:52 Dose:  40 mg


Donepezil HCl (Aricept -)  10 mg PO DAILY [facility]


   Last Admin: 10/19/19 09:32 Dose:  10 mg


Ezetimibe (Zetia -)  10 mg PO HS [facility]


   Last Admin: 10/18/19 21:51 Dose:  10 mg


Escitalopram Oxalate (Lexapro -)  10 mg PO DAILY [facility]


   Last Admin: 10/19/19 09:32 Dose:  10 mg


Finasteride (Proscar -)  5 mg PO DAILY [facility]


   Last Admin: 10/19/19 09:32 Dose:  5 mg


Lactated Ringer's (Lactated Ringers Solution)  1,000 mls @ 75 mls/hr IV ASDIR 

[facility]


   Last Admin: 10/19/19 06:24 Dose:  75 mls/hr


Insulin Aspart (Novolog Vial Sliding Scale -)  1 vial SQ ACHS [facility]; Protocol


   Last Admin: 10/19/19 11:26 Dose:  2 units


Insulin Aspart (Novolog Vial)  4 units SQ ACLD [facility]


   Last Admin: 10/19/19 11:26 Dose:  4 units


Insulin Detemir (Levemir Vial)  32 units SQ AM [facility]


Levofloxacin (Levaquin -)  250 mg PO DAILY@0600 [facility]


   Stop: 10/21/19 07:00


   Last Admin: 10/19/19 07:43 Dose:  Not Given


Nebivolol (Bystolic -)  2.5 mg PO HS [facility]


   Last Admin: 10/18/19 21:52 Dose:  Not Given


Nystatin (Nystop Powder -)  1 applic TP BID [facility]


   Last Admin: 10/18/19 21:52 Dose:  1 applic


Ondansetron HCl (Zofran Injection)  4 mg IVPUSH Q6H PRN


   PRN Reason: NAUSEA AND/OR VOMITING


Tamsulosin HCl (Flomax -)  0.4 mg PO BID@0830,2200 [facility]


   Last Admin: 10/19/19 09:32 Dose:  0.4 mg











- Objective


Vital Signs: 


 Vital Signs











Temperature  98.5 F   10/19/19 09:41


 


Pulse Rate  80   10/19/19 09:41


 


Respiratory Rate  20   10/19/19 09:41


 


Blood Pressure  112/52 L  10/19/19 09:41


 


O2 Sat by Pulse Oximetry (%)  97   10/18/19 21:00











Constitutional: Yes: Well Nourished


Eyes: Yes: WNL


HENT: Yes: WNL


Neck: Yes: WNL, Supple


Gastrointestinal: Yes: WNL, Normal Bowel Sounds


Genitourinary: Yes: WNL


Neurological: Yes: WNL, Alert, Oriented


Labs: 


 CBC, BMP





 10/19/19 06:38 





 10/19/19 06:30 





 INR, PTT











INR  1.42  (0.83-1.09)  H  10/19/19  06:38    














Impression/Plan


Impression/Plan: 














Gross hematuria,BPH,Acute on chronic kidney injury


Acute cystitis





--Appreciate all consultant recommendations


-had cystoscopy yesterday, path pending, 


--Monitor CBC


--Continue Proscar 5mg qdaily


--Continue Flomax 0.4mg BID


--Continue Levaquin 250mg qdaily for cystitis





-DEB< -Cr decreasing, however monitor for acute changes


--Multiple etiologies for CKD, however acute injury likely from potential 

obstruction and pre-renal azotemia


--Avoid nephrotoxic agents, 


DKA


 resolved , 


dm still uncintrolled 


-inc  Levemir 32 units AM


--increase Continue premeal Novolog 8 U ACLD


--Continue ISS for breakthrough coverage


--Monitor glucose levels


dementia, 


--Continue Aricept 10mg and Lexapro 10mg qdaily 


 CAD s/p CABG, stable, 


Hx HTN, controlled, 


Hx HLD


COPD, not in exacerbation, stbabe








FEN:


   Fluids: PO


   Electrolyte abnormalities: monitor,


   Nutrition: 1800 ada , diet, 





PPX:


   DVT - scds








Visit type





- Emergency Visit


Emergency Visit: No





- New Patient


This patient is new to me today: Yes


Date on this admission: 10/19/19





- Critical Care


Critical Care patient: No





- Discharge Referral


Referred to [facility] No

## 2019-10-20 LAB
ALBUMIN SERPL-MCNC: 2.1 G/DL (ref 3.4–5)
ALP SERPL-CCNC: 86 U/L (ref 45–117)
ALT SERPL-CCNC: 18 U/L (ref 13–61)
ANION GAP SERPL CALC-SCNC: 8 MMOL/L (ref 8–16)
AST SERPL-CCNC: 16 U/L (ref 15–37)
BASOPHILS # BLD: 0.9 % (ref 0–2)
BILIRUB SERPL-MCNC: 0.6 MG/DL (ref 0.2–1)
BUN SERPL-MCNC: 17.3 MG/DL (ref 7–18)
CALCIUM SERPL-MCNC: 7.9 MG/DL (ref 8.5–10.1)
CHLORIDE SERPL-SCNC: 104 MMOL/L (ref 98–107)
CO2 SERPL-SCNC: 25 MMOL/L (ref 21–32)
CREAT SERPL-MCNC: 1.1 MG/DL (ref 0.55–1.3)
DEPRECATED RDW RBC AUTO: 14.7 % (ref 11.9–15.9)
EOSINOPHIL # BLD: 2 % (ref 0–4.5)
GLUCOSE SERPL-MCNC: 257 MG/DL (ref 74–106)
HCT VFR BLD CALC: 24.6 % (ref 35.4–49)
HGB BLD-MCNC: 8.1 GM/DL (ref 11.7–16.9)
IRON SERPL-MCNC: 25 UG/DL (ref 50–175)
LYMPHOCYTES # BLD: 11.7 % (ref 8–40)
MCH RBC QN AUTO: 29.7 PG (ref 25.7–33.7)
MCHC RBC AUTO-ENTMCNC: 33.1 G/DL (ref 32–35.9)
MCV RBC: 89.7 FL (ref 80–96)
MONOCYTES # BLD AUTO: 6.7 % (ref 3.8–10.2)
NEUTROPHILS # BLD: 78.7 % (ref 42.8–82.8)
PLATELET # BLD AUTO: 206 K/MM3 (ref 134–434)
PMV BLD: 8.1 FL (ref 7.5–11.1)
POTASSIUM SERPLBLD-SCNC: 4.9 MMOL/L (ref 3.5–5.1)
PROT SERPL-MCNC: 5.5 G/DL (ref 6.4–8.2)
RBC # BLD AUTO: 2.74 M/MM3 (ref 4–5.6)
SODIUM SERPL-SCNC: 138 MMOL/L (ref 136–145)
TIBC SERPL-MCNC: 144 UG/DL (ref 250–450)
WBC # BLD AUTO: 12.7 K/MM3 (ref 4–10)

## 2019-10-20 RX ADMIN — INSULIN ASPART SCH UNITS: 100 INJECTION, SOLUTION INTRAVENOUS; SUBCUTANEOUS at 11:28

## 2019-10-20 RX ADMIN — ATORVASTATIN CALCIUM SCH MG: 40 TABLET, FILM COATED ORAL at 21:48

## 2019-10-20 RX ADMIN — NEBIVOLOL HYDROCHLORIDE SCH MG: 2.5 TABLET ORAL at 21:48

## 2019-10-20 RX ADMIN — DONEPEZIL HYDROCHLORIDE SCH MG: 10 TABLET, FILM COATED ORAL at 10:43

## 2019-10-20 RX ADMIN — NYSTATIN SCH APPLIC: 100000 POWDER TOPICAL at 21:49

## 2019-10-20 RX ADMIN — FERROUS SULFATE TAB EC 324 MG (65 MG FE EQUIVALENT) SCH MG: 324 (65 FE) TABLET DELAYED RESPONSE at 21:48

## 2019-10-20 RX ADMIN — INSULIN ASPART SCH: 100 INJECTION, SOLUTION INTRAVENOUS; SUBCUTANEOUS at 22:13

## 2019-10-20 RX ADMIN — EZETIMIBE SCH MG: 10 TABLET ORAL at 21:48

## 2019-10-20 RX ADMIN — INSULIN ASPART SCH UNITS: 100 INJECTION, SOLUTION INTRAVENOUS; SUBCUTANEOUS at 11:27

## 2019-10-20 RX ADMIN — TAMSULOSIN HYDROCHLORIDE SCH MG: 0.4 CAPSULE ORAL at 21:48

## 2019-10-20 RX ADMIN — NYSTATIN SCH APPLIC: 100000 POWDER TOPICAL at 10:43

## 2019-10-20 RX ADMIN — INSULIN ASPART SCH UNITS: 100 INJECTION, SOLUTION INTRAVENOUS; SUBCUTANEOUS at 06:09

## 2019-10-20 RX ADMIN — TAMSULOSIN HYDROCHLORIDE SCH MG: 0.4 CAPSULE ORAL at 10:43

## 2019-10-20 RX ADMIN — ESCITALOPRAM OXALATE SCH MG: 10 TABLET, FILM COATED ORAL at 10:43

## 2019-10-20 RX ADMIN — INSULIN ASPART SCH UNITS: 100 INJECTION, SOLUTION INTRAVENOUS; SUBCUTANEOUS at 16:25

## 2019-10-20 RX ADMIN — SODIUM CHLORIDE, POTASSIUM CHLORIDE, SODIUM LACTATE AND CALCIUM CHLORIDE SCH MLS/HR: 600; 310; 30; 20 INJECTION, SOLUTION INTRAVENOUS at 22:32

## 2019-10-20 RX ADMIN — FINASTERIDE SCH MG: 5 TABLET, FILM COATED ORAL at 10:44

## 2019-10-20 RX ADMIN — INSULIN ASPART SCH UNITS: 100 INJECTION, SOLUTION INTRAVENOUS; SUBCUTANEOUS at 16:24

## 2019-10-20 NOTE — PN
Progress Note, Physician


History of Present Illness: 





stable


improving


heamturia has cleared


wife in the room





- Current Medication List


Current Medications: 


Active Medications





Acetaminophen (Tylenol -)  650 mg PO Q4H PRN


   PRN Reason: FEVER


   Last Admin: 10/13/19 18:00 Dose:  650 mg


Atorvastatin Calcium (Lipitor -)  40 mg PO HS [facility]


   Last Admin: 10/19/19 22:35 Dose:  40 mg


Donepezil HCl (Aricept -)  10 mg PO DAILY [facility]


   Last Admin: 10/19/19 09:32 Dose:  10 mg


Ezetimibe (Zetia -)  10 mg PO HS [facility]


   Last Admin: 10/19/19 22:57 Dose:  10 mg


Escitalopram Oxalate (Lexapro -)  10 mg PO DAILY [facility]


   Last Admin: 10/19/19 09:32 Dose:  10 mg


Finasteride (Proscar -)  5 mg PO DAILY [facility]


   Last Admin: 10/19/19 09:32 Dose:  5 mg


Lactated Ringer's (Lactated Ringers Solution)  1,000 mls @ 75 mls/hr IV ASDIR 

[facility]


   Last Admin: 10/19/19 22:34 Dose:  Not Given


Insulin Aspart (Novolog Vial Sliding Scale -)  1 vial SQ ACHS [facility]; Protocol


   Last Admin: 10/20/19 06:09 Dose:  6 units


Insulin Aspart (Novolog Vial)  8 units SQ ACLD [facility]


   Last Admin: 10/19/19 17:00 Dose:  8 units


Insulin Detemir (Levemir Vial)  32 units SQ AM [facility]


   Last Admin: 10/20/19 06:09 Dose:  32 units


Levofloxacin (Levaquin -)  250 mg PO DAILY@0600 [facility]


   Stop: 10/21/19 07:00


   Last Admin: 10/20/19 06:10 Dose:  250 mg


Nebivolol (Bystolic -)  2.5 mg PO HS [facility]


   Last Admin: 10/19/19 22:35 Dose:  2.5 mg


Nystatin (Nystop Powder -)  1 applic TP BID [facility]


   Last Admin: 10/19/19 22:57 Dose:  1 applic


Ondansetron HCl (Zofran Injection)  4 mg IVPUSH Q6H PRN


   PRN Reason: NAUSEA AND/OR VOMITING


Tamsulosin HCl (Flomax -)  0.4 mg PO BID@0830,2200 [facility]


   Last Admin: 10/19/19 22:35 Dose:  0.4 mg











- Objective


Vital Signs: 


 Vital Signs











Temperature  97.9 F   10/20/19 06:00


 


Pulse Rate  69   10/20/19 06:00


 


Respiratory Rate  20   10/20/19 06:00


 


Blood Pressure  126/72   10/20/19 06:00


 


O2 Sat by Pulse Oximetry (%)  96   10/19/19 21:00











Constitutional: Yes: No Distress, Calm


Cardiovascular: Yes: S1, S2


Respiratory: Yes: Regular, CTA Bilaterally


Gastrointestinal: Yes: Normal Bowel Sounds, Soft


Genitourinary: Yes: Mckeon Present


Musculoskeletal: Yes: WNL


Extremities: Yes: WNL


Neurological: Yes: Alert, Other


Psychiatric: Yes: Other


Labs: 


 CBC, BMP





 10/20/19 05:20 





 10/20/19 05:20 





 INR, PTT











INR  1.42  (0.83-1.09)  H  10/19/19  06:38    














Assessment/Plan





79 year old man with a history of HTN, hyperlipidemia, CAD, MI, CABG, chronic 

diastolic heart failure, type 2 DM, stage 3 CKD, COPD, dementia who presented 

to the ED with altered mental status. 





1. Acute metabolic encephalopathy 


2. Acute kidney injury


3. Abdominal pain 


4.  UTI





5. Lactic acidosis


6. Hypernatremia


7. Hypophosphatemia


8. HTN


9. Hyperlipidemia


10. CAD, history of MI, CABG


11. Chronic diastolic heart failure


12. Stage 3 CKD


13. COPD


14. Alzheimer dementia





plan


continue current mgmt


monitor


monitor for output


nephro on case


rest as per the team

## 2019-10-20 NOTE — PN
Progress Note, Physician


Chief Complaint: 





pt appers comfortable ,


  no cute distress, no cp, no sob, 








- Current Medication List


Current Medications: 


Active Medications





Acetaminophen (Tylenol -)  650 mg PO Q4H PRN


   PRN Reason: FEVER


   Last Admin: 10/13/19 18:00 Dose:  650 mg


Atorvastatin Calcium (Lipitor -)  40 mg PO HS [facility]


   Last Admin: 10/19/19 22:35 Dose:  40 mg


Donepezil HCl (Aricept -)  10 mg PO DAILY [facility]


   Last Admin: 10/19/19 09:32 Dose:  10 mg


Ezetimibe (Zetia -)  10 mg PO HS [facility]


   Last Admin: 10/19/19 22:57 Dose:  10 mg


Escitalopram Oxalate (Lexapro -)  10 mg PO DAILY [facility]


   Last Admin: 10/19/19 09:32 Dose:  10 mg


Finasteride (Proscar -)  5 mg PO DAILY [facility]


   Last Admin: 10/19/19 09:32 Dose:  5 mg


Lactated Ringer's (Lactated Ringers Solution)  1,000 mls @ 75 mls/hr IV ASDIR 

[facility]


   Last Admin: 10/19/19 22:34 Dose:  Not Given


Insulin Aspart (Novolog Vial Sliding Scale -)  1 vial SQ ACHS [facility]; Protocol


   Last Admin: 10/20/19 06:09 Dose:  6 units


Insulin Aspart (Novolog Vial)  8 units SQ ACLD [facility]


   Last Admin: 10/19/19 17:00 Dose:  8 units


Insulin Detemir (Levemir Vial)  32 units SQ AM [facility]


   Last Admin: 10/20/19 06:09 Dose:  32 units


Levofloxacin (Levaquin -)  250 mg PO DAILY@0600 [facility]


   Stop: 10/21/19 07:00


   Last Admin: 10/20/19 06:10 Dose:  250 mg


Nebivolol (Bystolic -)  2.5 mg PO HS [facility]


   Last Admin: 10/19/19 22:35 Dose:  2.5 mg


Nystatin (Nystop Powder -)  1 applic TP BID [facility]


   Last Admin: 10/19/19 22:57 Dose:  1 applic


Ondansetron HCl (Zofran Injection)  4 mg IVPUSH Q6H PRN


   PRN Reason: NAUSEA AND/OR VOMITING


Tamsulosin HCl (Flomax -)  0.4 mg PO BID@0830,2200 [facility]


   Last Admin: 10/19/19 22:35 Dose:  0.4 mg











- Objective


Vital Signs: 


 Vital Signs











Temperature  97.9 F   10/20/19 06:00


 


Pulse Rate  69   10/20/19 06:00


 


Respiratory Rate  20   10/20/19 06:00


 


Blood Pressure  126/72   10/20/19 06:00


 


O2 Sat by Pulse Oximetry (%)  96   10/19/19 21:00











Constitutional: Yes: Well Nourished


Eyes: Yes: WNL, Conjunctiva Clear


HENT: Yes: WNL, Atraumatic, Normocephalic


Neck: Yes: WNL, Supple, Trachea Midline


Cardiovascular: Yes: WNL, Regular Rate and Rhythm


Respiratory: Yes: WNL, Regular, CTA Bilaterally


Gastrointestinal: Yes: WNL, Normal Bowel Sounds


Musculoskeletal: Yes: WNL


Extremities: Yes: WNL


Edema: No


Integumentary: Yes: WNL


Neurological: Yes: WNL, Alert, Oriented


...Motor Strength: WNL


Psychiatric: Yes: WNL


Labs: 


 CBC, BMP





 10/20/19 05:20 





 10/20/19 05:20 





 INR, PTT











INR  1.42  (0.83-1.09)  H  10/19/19  06:38    














Impression/Plan


Impression/Plan: 














Gross hematuria,BPH,Acute on chronic kidney injury


Acute cystitis, s/p turp, 


hematuria resolved, 


--Monitor CBC


 ANEMIC , WILL START IRON,


--Continue Proscar 5mg qdaily


--Continue Flomax 0.4mg BID


--Continue Levaquin 250mg qdaily for cystitis





-DEB< -Cr decreasing, however monitor for acute changes


--better, 


--Avoid nephrotoxic agents, 


DKA


 resolved , 


dm still uncintrolled 


-inc  Levemir 35 units AM


--increase Continue premeal Novolog 10 U ACLD


--Continue ISS for breakthrough coverage


--Monitor glucose levels


dementia, 


--Continue Aricept 10mg and Lexapro 10mg qdaily 


 CAD s/p CABG, stable, 


Hx HTN, controlled, 


Hx HLD


COPD, not in exacerbation, stbabe








FEN:


   Fluids: PO


   Electrolyte abnormalities: monitor,


   Nutrition: 1800 ada , diet, 





PPX:


   DVT - scds








Visit type





- Emergency Visit


Emergency Visit: No





- New Patient


This patient is new to me today: No





- Critical Care


Critical Care patient: No





- Discharge Referral


Referred to [facility] No

## 2019-10-20 NOTE — PN
Progress Note (short form)





- Note


Progress Note: 





UROLOGY NOTE. POD#2 S/P CLOT EVACUATION AND TURP/TUVP, RAMIREZ WITH CBI IS CLEAR, 

NO CLOTS, ABD.-SOFT, N/T BS++ .PLAN- D/C CBI IN AM.

## 2019-10-21 LAB
ALBUMIN SERPL-MCNC: 2 G/DL (ref 3.4–5)
ALP SERPL-CCNC: 84 U/L (ref 45–117)
ALT SERPL-CCNC: 18 U/L (ref 13–61)
ANION GAP SERPL CALC-SCNC: 9 MMOL/L (ref 8–16)
AST SERPL-CCNC: 17 U/L (ref 15–37)
BASOPHILS # BLD: 0.9 % (ref 0–2)
BILIRUB SERPL-MCNC: 0.7 MG/DL (ref 0.2–1)
BUN SERPL-MCNC: 21 MG/DL (ref 7–18)
CALCIUM SERPL-MCNC: 7.8 MG/DL (ref 8.5–10.1)
CHLORIDE SERPL-SCNC: 106 MMOL/L (ref 98–107)
CO2 SERPL-SCNC: 22 MMOL/L (ref 21–32)
CREAT SERPL-MCNC: 1.1 MG/DL (ref 0.55–1.3)
DEPRECATED RDW RBC AUTO: 15.5 % (ref 11.9–15.9)
EOSINOPHIL # BLD: 4.1 % (ref 0–4.5)
GLUCOSE SERPL-MCNC: 226 MG/DL (ref 74–106)
HCT VFR BLD CALC: 27.6 % (ref 35.4–49)
HGB BLD-MCNC: 9.4 GM/DL (ref 11.7–16.9)
LYMPHOCYTES # BLD: 14.7 % (ref 8–40)
MCH RBC QN AUTO: 30 PG (ref 25.7–33.7)
MCHC RBC AUTO-ENTMCNC: 34 G/DL (ref 32–35.9)
MCV RBC: 88.1 FL (ref 80–96)
MONOCYTES # BLD AUTO: 8.8 % (ref 3.8–10.2)
NEUTROPHILS # BLD: 71.5 % (ref 42.8–82.8)
PLATELET # BLD AUTO: 197 K/MM3 (ref 134–434)
PMV BLD: 8.2 FL (ref 7.5–11.1)
POTASSIUM SERPLBLD-SCNC: 4.8 MMOL/L (ref 3.5–5.1)
PROT SERPL-MCNC: 5.3 G/DL (ref 6.4–8.2)
RBC # BLD AUTO: 3.13 M/MM3 (ref 4–5.6)
SODIUM SERPL-SCNC: 137 MMOL/L (ref 136–145)
WBC # BLD AUTO: 9.1 K/MM3 (ref 4–10)

## 2019-10-21 RX ADMIN — INSULIN ASPART SCH UNITS: 100 INJECTION, SOLUTION INTRAVENOUS; SUBCUTANEOUS at 06:14

## 2019-10-21 RX ADMIN — FERROUS SULFATE TAB EC 324 MG (65 MG FE EQUIVALENT) SCH MG: 324 (65 FE) TABLET DELAYED RESPONSE at 18:51

## 2019-10-21 RX ADMIN — TAMSULOSIN HYDROCHLORIDE SCH MG: 0.4 CAPSULE ORAL at 22:21

## 2019-10-21 RX ADMIN — ESCITALOPRAM OXALATE SCH MG: 10 TABLET, FILM COATED ORAL at 12:28

## 2019-10-21 RX ADMIN — INSULIN ASPART SCH UNITS: 100 INJECTION, SOLUTION INTRAVENOUS; SUBCUTANEOUS at 12:29

## 2019-10-21 RX ADMIN — FINASTERIDE SCH MG: 5 TABLET, FILM COATED ORAL at 12:28

## 2019-10-21 RX ADMIN — NEBIVOLOL HYDROCHLORIDE SCH MG: 2.5 TABLET ORAL at 22:20

## 2019-10-21 RX ADMIN — INSULIN ASPART SCH UNITS: 100 INJECTION, SOLUTION INTRAVENOUS; SUBCUTANEOUS at 12:36

## 2019-10-21 RX ADMIN — FERROUS SULFATE TAB EC 324 MG (65 MG FE EQUIVALENT) SCH MG: 324 (65 FE) TABLET DELAYED RESPONSE at 12:28

## 2019-10-21 RX ADMIN — ATORVASTATIN CALCIUM SCH MG: 40 TABLET, FILM COATED ORAL at 22:20

## 2019-10-21 RX ADMIN — NYSTATIN SCH APPLIC: 100000 POWDER TOPICAL at 12:29

## 2019-10-21 RX ADMIN — INSULIN ASPART SCH UNITS: 100 INJECTION, SOLUTION INTRAVENOUS; SUBCUTANEOUS at 17:30

## 2019-10-21 RX ADMIN — TAMSULOSIN HYDROCHLORIDE SCH MG: 0.4 CAPSULE ORAL at 12:28

## 2019-10-21 RX ADMIN — EZETIMIBE SCH MG: 10 TABLET ORAL at 22:28

## 2019-10-21 RX ADMIN — NYSTATIN SCH APPLIC: 100000 POWDER TOPICAL at 22:28

## 2019-10-21 RX ADMIN — DONEPEZIL HYDROCHLORIDE SCH MG: 10 TABLET, FILM COATED ORAL at 12:29

## 2019-10-21 RX ADMIN — INSULIN ASPART SCH UNITS: 100 INJECTION, SOLUTION INTRAVENOUS; SUBCUTANEOUS at 22:27

## 2019-10-21 NOTE — PN
Progress Note (short form)





- Note


Progress Note: 





UROLOGY NOTE. POD#3     S/P TURP/TUVP RAMIREZ-PATENT,URINE-CLEAR, ABD.-SOFT,N/T 

PLAN-F/U IN OFFICE

## 2019-10-21 NOTE — PN
Progress Note, Physician


History of Present Illness: 





stable


no new issues








- Current Medication List


Current Medications: 


Active Medications





Acetaminophen (Tylenol -)  650 mg PO Q4H PRN


   PRN Reason: FEVER


   Last Admin: 10/13/19 18:00 Dose:  650 mg


Atorvastatin Calcium (Lipitor -)  40 mg PO HS [facility]


   Last Admin: 10/20/19 21:48 Dose:  40 mg


Donepezil HCl (Aricept -)  10 mg PO DAILY [facility]


   Last Admin: 10/20/19 10:43 Dose:  10 mg


Ezetimibe (Zetia -)  10 mg PO HS [facility]


   Last Admin: 10/20/19 21:48 Dose:  10 mg


Escitalopram Oxalate (Lexapro -)  10 mg PO DAILY [facility]


   Last Admin: 10/20/19 10:43 Dose:  10 mg


Ferrous Sulfate (Feosol -)  325 mg PO BIDWM [facility]


   Last Admin: 10/20/19 21:48 Dose:  325 mg


Finasteride (Proscar -)  5 mg PO DAILY [facility]


   Last Admin: 10/20/19 10:44 Dose:  5 mg


Lactated Ringer's (Lactated Ringers Solution)  1,000 mls @ 75 mls/hr IV ASDIR 

[facility]


   Last Admin: 10/20/19 22:32 Dose:  75 mls/hr


Insulin Aspart (Novolog Vial Sliding Scale -)  1 vial SQ ACHS [facility]; Protocol


   Last Admin: 10/21/19 06:14 Dose:  4 units


Insulin Aspart (Novolog Vial)  10 units SQ ACLD [facility]


   Last Admin: 10/20/19 16:24 Dose:  10 units


Insulin Detemir (Levemir Vial)  35 units SQ AM [facility]


   Last Admin: 10/21/19 06:10 Dose:  35 units


Nebivolol (Bystolic -)  2.5 mg PO HS [facility]


   Last Admin: 10/20/19 21:48 Dose:  2.5 mg


Nystatin (Nystop Powder -)  1 applic TP BID [facility]


   Last Admin: 10/20/19 21:49 Dose:  1 applic


Ondansetron HCl (Zofran Injection)  4 mg IVPUSH Q6H PRN


   PRN Reason: NAUSEA AND/OR VOMITING


Tamsulosin HCl (Flomax -)  0.4 mg PO BID@0830,2200 [facility]


   Last Admin: 10/20/19 21:48 Dose:  0.4 mg











- Objective


Vital Signs: 


 Vital Signs











Temperature  97.9 F   10/20/19 20:11


 


Pulse Rate  72   10/20/19 20:11


 


Respiratory Rate  20   10/20/19 20:11


 


Blood Pressure  120/63   10/20/19 20:11


 


O2 Sat by Pulse Oximetry (%)  97   10/20/19 21:00











Constitutional: Yes: No Distress, Calm


Cardiovascular: Yes: S1, S2


Respiratory: Yes: Regular, CTA Bilaterally


Gastrointestinal: Yes: Normal Bowel Sounds, Soft


Musculoskeletal: Yes: WNL


Extremities: Yes: WNL


Neurological: Yes: Alert


Psychiatric: Yes: Other


Labs: 


 CBC, BMP





 10/21/19 06:10 





 10/21/19 06:10 





 INR, PTT











INR  1.42  (0.83-1.09)  H  10/19/19  06:38    














Assessment/Plan





79 year old man with a history of HTN, hyperlipidemia, CAD, MI, CABG, chronic 

diastolic heart failure, type 2 DM, stage 3 CKD, COPD, dementia who presented 

to the ED with altered mental status. 





1. Acute metabolic encephalopathy 


2. Acute kidney injury


3. Abdominal pain 


4.  UTI





5. Lactic acidosis


6. Hypernatremia


7. Hypophosphatemia


8. HTN


9. Hyperlipidemia


10. CAD, history of MI, CABG


11. Chronic diastolic heart failure


12. Stage 3 CKD


13. COPD


14. Alzheimer dementia





plan


continue current mgmt


monitor


monitor for output


nephro on case


rest as per the team

## 2019-10-21 NOTE — PN
Progress Note, Physician


History of Present Illness: 





Pt seen and examined at bedside. He appears comfortable. He denies shortness of 

breath. Wife is at bedside and  care was discussed with her. 





- Current Medication List


Current Medications: 


Active Medications





Acetaminophen (Tylenol -)  650 mg PO Q4H PRN


   PRN Reason: FEVER


   Last Admin: 10/13/19 18:00 Dose:  650 mg


Atorvastatin Calcium (Lipitor -)  40 mg PO HS [facility]


   Last Admin: 10/20/19 21:48 Dose:  40 mg


Donepezil HCl (Aricept -)  10 mg PO DAILY [facility]


   Last Admin: 10/20/19 10:43 Dose:  10 mg


Ezetimibe (Zetia -)  10 mg PO HS [facility]


   Last Admin: 10/20/19 21:48 Dose:  10 mg


Escitalopram Oxalate (Lexapro -)  10 mg PO DAILY [facility]


   Last Admin: 10/20/19 10:43 Dose:  10 mg


Ferrous Sulfate (Feosol -)  325 mg PO BIDWM [facility]


   Last Admin: 10/20/19 21:48 Dose:  325 mg


Finasteride (Proscar -)  5 mg PO DAILY [facility]


   Last Admin: 10/20/19 10:44 Dose:  5 mg


Lactated Ringer's (Lactated Ringers Solution)  1,000 mls @ 75 mls/hr IV ASDIR 

[facility]


   Last Admin: 10/20/19 22:32 Dose:  75 mls/hr


Insulin Aspart (Novolog Vial Sliding Scale -)  1 vial SQ ACHS [facility]; Protocol


   Last Admin: 10/21/19 06:14 Dose:  4 units


Insulin Aspart (Novolog Vial)  10 units SQ ACLD [facility]


   Last Admin: 10/20/19 16:24 Dose:  10 units


Insulin Detemir (Levemir Vial)  35 units SQ AM [facility]


   Last Admin: 10/21/19 06:10 Dose:  35 units


Nebivolol (Bystolic -)  2.5 mg PO HS [facility]


   Last Admin: 10/20/19 21:48 Dose:  2.5 mg


Nystatin (Nystop Powder -)  1 applic TP BID [facility]


   Last Admin: 10/20/19 21:49 Dose:  1 applic


Ondansetron HCl (Zofran Injection)  4 mg IVPUSH Q6H PRN


   PRN Reason: NAUSEA AND/OR VOMITING


Tamsulosin HCl (Flomax -)  0.4 mg PO BID@0830,2200 [facility]


   Last Admin: 10/20/19 21:48 Dose:  0.4 mg











- Objective


Vital Signs: 


 Vital Signs











Temperature  97.9 F   10/20/19 20:11


 


Pulse Rate  72   10/20/19 20:11


 


Respiratory Rate  20   10/20/19 20:11


 


Blood Pressure  120/63   10/20/19 20:11


 


O2 Sat by Pulse Oximetry (%)  97   10/20/19 21:00











Constitutional: Yes: Calm


Eyes: Yes: Other (legally blind)


HENT: Yes: Atraumatic


Cardiovascular: Yes: S1, S2


Respiratory: Yes: CTA Bilaterally


Gastrointestinal: Yes: Normal Bowel Sounds, Soft


Genitourinary: Yes: Mckeon Present


Musculoskeletal: Yes: Muscle Weakness


Edema: LLE: Trace, RLE: Trace


Neurological: Yes: Confusion


Labs: 


 CBC, BMP





 10/21/19 06:10 





 10/21/19 06:10 





 INR, PTT











INR  1.42  (0.83-1.09)  H  10/19/19  06:38    














Problem List





- Problems


(1) DEB (acute kidney injury)


Code(s): N17.9 - ACUTE KIDNEY FAILURE, UNSPECIFIED   





(2) DKA (diabetic ketoacidoses)


Code(s): E11.10 - TYPE 2 DIABETES MELLITUS WITH KETOACIDOSIS WITHOUT COMA   


Qualifiers: 


   Diabetes mellitus type: type 2   Diabetes mellitus complication detail: 

without coma   Qualified Code(s): E11.10 - Type 2 diabetes mellitus with 

ketoacidosis without coma   





(3) Acute renal failure


Code(s): N17.9 - ACUTE KIDNEY FAILURE, UNSPECIFIED   





(4) Hypernatremia


Code(s): E87.0 - HYPEROSMOLALITY AND HYPERNATREMIA   





Assessment/Plan


 Current Medications











Generic Name Dose Route Start Last Admin





  Trade Name Freq  PRN Reason Stop Dose Admin


 


Acetaminophen  650 mg  10/09/19 10:34  10/13/19 18:00





  Tylenol -  PO   650 mg





  Q4H PRN   Administration





  FEVER   





     





     





     


 


Atorvastatin Calcium  40 mg  10/07/19 22:00  10/20/19 21:48





  Lipitor -  PO   40 mg





  HS [facility]   Administration





     





     





     





     


 


Donepezil HCl  10 mg  10/16/19 10:00  10/20/19 10:43





  Aricept -  PO   10 mg





  DAILY [facility]   Administration





     





     





     





     


 


Ezetimibe  10 mg  10/07/19 22:00  10/20/19 21:48





  Zetia -  PO   10 mg





  HS [facility]   Administration





     





     





     





     


 


Escitalopram Oxalate  10 mg  10/16/19 10:00  10/20/19 10:43





  Lexapro -  PO   10 mg





  DAILY [facility]   Administration





     





     





     





     


 


Ferrous Sulfate  325 mg  10/20/19 17:30  10/20/19 21:48





  Feosol -  PO   325 mg





  BIDWM [facility]   Administration





     





     





     





     


 


Finasteride  5 mg  10/16/19 10:00  10/20/19 10:44





  Proscar -  PO   5 mg





  DAILY [facility]   Administration





     





     





     





     


 


Lactated Ringer's  1,000 mls @ 75 mls/hr  10/18/19 17:15  10/20/19 22:32





  Lactated Ringers Solution  IV   75 mls/hr





  ASDIR [facility]   Administration





     





     





     





     


 


Insulin Aspart  1 vial  10/10/19 00:52  10/21/19 06:14





  Novolog Vial Sliding Scale -  SQ   4 units





  [facility]   Administration





     





     





  Protocol   





     


 


Insulin Aspart  10 units  10/20/19 16:30  10/20/19 16:24





  Novolog Vial  SQ   10 units





  ACLD [facility]   Administration





     





     





     





     


 


Insulin Detemir  35 units  10/21/19 07:00  10/21/19 06:10





  Levemir Vial  SQ   35 units





  AM [facility]   Administration





     





     





     





     


 


Nebivolol  2.5 mg  10/07/19 22:00  10/20/19 21:48





  Bystolic -  PO   2.5 mg





  HS [facility]   Administration





     





     





     





     


 


Nystatin  1 applic  10/10/19 11:30  10/20/19 21:49





  Nystop Powder -  TP   1 applic





  BID [facility]   Administration





     





     





     





     


 


Ondansetron HCl  4 mg  10/18/19 17:01  





  Zofran Injection  IVPUSH   





  Q6H PRN   





  NAUSEA AND/OR VOMITING   





     





     





     


 


Tamsulosin HCl  0.4 mg  10/16/19 08:45  10/20/19 21:48





  Flomax -  PO   0.4 mg





  BID@0830,2200 [facility]   Administration





     





     





     





     














Impression


1. DEB


2. DKA


3. hypernatremia


4. DM


5. dementia


6. cad


7. hyperkalemia





Plan


- renal function is stable


- can d/c fluids


- will see pt in office


- discussed diet with wife


- will need better glucose control


- will also need urology follow up after discharge

## 2019-10-21 NOTE — OP
DATE OF OPERATION:  10/18/2019

 

PREOPERATIVE DIAGNOSIS:  Gross hematuria, clot retention, urinary retention.  

 

POSTOPERATIVE DIAGNOSIS:  Hemorrhagic obstructing prostate gland, large blood clot

and bladder.  

 

OPERATIVE PROCEDURE:  Cystourethroscopy, evacuation of clots, TUR of hemorrhagic

prostate, fulguration of bleeding.  

 

ANESTHESIA:  General. 

 

Under above-stated anesthesia, patient was prepped and draped in the usual sterile

manner.  He is placed in the dorsal lithotomy position.  Mckeon catheter was in place,

and there was bright red blood in Mckeon.  Mckeon was removed.  Cystoscopy revealed a

normal anterior urethra.  Prostatic urethra revealed severe hemorrhagic bipolar

hypertrophy of the prostate.  There was active bleeding throughout the right and left

lobe.  The bladder was entered.  A large clot was seen in the bladder encompassing

80% of the lumen.  Therefore an Ellik evacuator was introduced, and the clot was

evacuated after vigorous irrigation with an Ellik evacuator.  Inspection of the

bladder revealed no lesions.  Ureteral orifices were within normal limits with efflux

of clear urine. The dome was clear.  Multiple areas on the prostate were bleeding,

therefore resection of the prostate was commenced at the 6 o'clock position of the

right lateral lobe.  This was carried on up to the 12 o'clock position.  The same

thing was done to the left lateral lobe.  The prostate chips were evacuated with an

Ellik evacuator.  Again hemostasis was secured with electrocoagulation.  No active

bleeding was noted.  The bladder was emptied.  The scope was removed.  The 24 [place]

3-way 30 mL Mckeon was inserted.  This was connected to continuous bladder irrigation.

 The patient tolerated the procedure well.  He returned to the recovery room in good

condition.  

 

 

BALJIT [id number]

DD: 10/18/2019 16:59

DT: 10/18/2019 18:29

Job #:  [id number]

## 2019-10-21 NOTE — DS
Physical Exam: 


SUBJECTIVE: HPI limited due to clinical condition.








OBJECTIVE:





 Vital Signs











 Period  Temp  Pulse  Resp  BP Sys/Ramírez  Pulse Ox


 


 Last 24 Hr  97.9 F-98.5 F  69-72  20-20  120-120/58-63  97-97








PHYSICAL EXAM





PE:


Gen: NAD, easily arousable


Lungs: CTA b/l, no wheezes. On RA


CARD: RRR no M/m/r


ABD: Soft, Nt/ND, normoactive BS


: Camacho catheter in place with clearing hematuria (pink today)


Ext: No edema, pulses intact througout





LABS


 Laboratory Results - last 24 hr











  10/17/19 10/20/19 10/20/19





  18:00 05:20 11:24


 


Sodium   138 


 


Potassium   4.9 


 


Chloride   104 


 


Carbon Dioxide   25 


 


Anion Gap   8 


 


BUN   17.3 


 


Creatinine   1.1 


 


Est GFR (CKD-EPI)AfAm   73.61 


 


Est GFR (CKD-EPI)NonAf   63.51 


 


POC Glucometer    241


 


Random Glucose   257 H 


 


Calcium   7.9 L 


 


Iron   25 L 


 


TIBC   144 L 


 


Iron Saturation   17 L 


 


Unsaturated IBC   119 L 


 


Ferritin   145.5 


 


Total Bilirubin   0.6 


 


AST   16 


 


ALT   18 


 


Alkaline Phosphatase   86 


 


Total Protein   5.5 L 


 


Albumin   2.1 L 


 


Blood Type  O POSITIVE  


 


Antibody Screen  Negative  


 


Crossmatch  See Detail  














  10/20/19 10/20/19 10/21/19





  16:21 22:08 06:11


 


Sodium   


 


Potassium   


 


Chloride   


 


Carbon Dioxide   


 


Anion Gap   


 


BUN   


 


Creatinine   


 


Est GFR (CKD-EPI)AfAm   


 


Est GFR (CKD-EPI)NonAf   


 


POC Glucometer  279  112  226


 


Random Glucose   


 


Calcium   


 


Iron   


 


TIBC   


 


Iron Saturation   


 


Unsaturated IBC   


 


Ferritin   


 


Total Bilirubin   


 


AST   


 


ALT   


 


Alkaline Phosphatase   


 


Total Protein   


 


Albumin   


 


Blood Type   


 


Antibody Screen   


 


Crossmatch   





Active Medications





Acetaminophen (Tylenol -)  650 mg PO Q4H PRN


   PRN Reason: FEVER


   Last Admin: 10/13/19 18:00 Dose:  650 mg


Atorvastatin Calcium (Lipitor -)  40 mg PO HS MARY


   Last Admin: 10/20/19 21:48 Dose:  40 mg


Donepezil HCl (Aricept -)  10 mg PO DAILY MARY


   Last Admin: 10/21/19 12:29 Dose:  10 mg


Ezetimibe (Zetia -)  10 mg PO HS MARY


   Last Admin: 10/20/19 21:48 Dose:  10 mg


Escitalopram Oxalate (Lexapro -)  10 mg PO DAILY [facility]


   Last Admin: 10/21/19 12:28 Dose:  10 mg


Ferrous Sulfate (Feosol -)  325 mg PO BIDWM [facility]


   Last Admin: 10/21/19 18:51 Dose:  325 mg


Finasteride (Proscar -)  5 mg PO DAILY [facility]


   Last Admin: 10/21/19 12:28 Dose:  5 mg


Insulin Aspart (Novolog Vial Sliding Scale -)  1 vial SQ ACHS [facility]; Protocol


   Last Admin: 10/21/19 17:30 Dose:  8 units


Insulin Aspart (Novolog Vial)  10 units SQ ACLD [facility]


   Last Admin: 10/21/19 17:30 Dose:  10 units


Insulin Detemir (Levemir Vial)  40 units SQ AM MARY


Nebivolol (Bystolic -)  2.5 mg PO HS [facility]


   Last Admin: 10/20/19 21:48 Dose:  2.5 mg


Nystatin (Nystop Powder -)  1 applic TP BID [facility]


   Last Admin: 10/21/19 12:29 Dose:  1 applic


Ondansetron HCl (Zofran Injection)  4 mg IVPUSH Q6H PRN


   PRN Reason: NAUSEA AND/OR VOMITING


Tamsulosin HCl (Flomax -)  0.4 mg PO BID@0830,2200 [facility]


   Last Admin: 10/21/19 12:28 Dose:  0.4 mg





 Microbiology





10/06/19 23:00   Blood - Peripheral Venous   Blood Culture - Final


                            NO GROWTH AFTER 5 DAYS INCUBATION


10/06/19 23:00   Blood - Peripheral Venous   Blood Culture - Final


                            NO GROWTH AFTER 5 DAYS INCUBATION


10/06/19 21:16   Urine - Urine Camacho   Urine Culture - Final


                            Strep Agalactiae Group B


                            Mr S Aureus


                            Staphylococcus Coagulase Neg








IMAGING:


ABD U/S:


IMPRESSION:


Limited examination likely due to the patient's body habitus.


The liver appears to be slightly hyperechoic suggestive of mild fatty 

infiltration versus


hepatocellular disease.


No gross gallstones identified. Borderline thickening of the gallbladder wall 

without evidence


of acute cholecystitis.


Nonvisualization of the pancreas.


Nonvisualization of the abdominal aorta and inferior vena cava.


Right renal simple cyst measuring 2.1 cm.





Abd/Pelvis CT:


IMPRESSION:


Constipation.


Mild fullness of the collecting system with a dilated bladder which could 

represent reflux.


Paraumbilical hernia with no stranding.


Hiatal hernia seen previously.


Renal cysts.





Head CT:


IMPRESSION:


No significant interval change


Moderate to marked ventricular dilatation with a lateral and third ventricles 

are relatively


more dilated than for. Degree of the ventricular dilatation is more than 

expected for the


degree of cortical atrophy.


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 

Sylvius


stenosis/narrowing.


Otherwise, no interval acute intracranial pathology is identified.


A preliminary report was forwarded by the [facility] service,








HOSPITAL COURSE:





Date of Admission:10/06/19





Date of Discharge: 10/21/19





Pt admitted to hospital on 10/6/19 due to HHS. Pt was placed on insulin gtt and 

HHS protocol until which he was able to transition to the floor alongside of 

subcutaneously insulin. Pt was seen by endocrinology with final insulin 

regiment being Levemir 15U BID, Novolog 2U before Lunch and dinner, alongside 

of ISS for further coverage if needed. During patient's workup he was found to 

have MRSA UTI which is being treated with Doxycycline 100mg BID for a total of 

5 days. Unfortunately patient was given trial of void period where he did not 

void for 24hrs. Camacho was placed, tamsulosin was increased to 0.4mg BID 

alongside of newly added Proscar 5mg qdaily. Pt's hospitalization was prolonged 

2/2 to insurance denial and denial of appeal. Unfortunately at this point pt 

began to have gross hematuria for which urology was consulted. Pt was assessed 

and underwent cystoscopy with Dr. BRANDT Durán revealing blood near prostate with 

clots. Pt received a TURP and was placed on CBI which has now been ceased. Pt 

has had no other signs of hematuria since CBI has been stopped. In addition, pt'

s glycemic control remains difficult to control and has been increased to 

Levemir 35U AM and pre-lunch/dinner 


]





Minutes to complete discharge: 33





<Collins Beal - Last Filed: 10/22/19 06:15>


Physical Exam: 





VS, labs, imaging reviewed


NAD, AAO, resting in bed


RRR s1/2 no mgr


Scattered crackles improved from yesterday, w. sym exp


NT ND +BS


CN2-12 wnl, no fnd


At baseline without any acute agitation but will become upset when redirected 

and refuses some therapy.





Overall the plan is to discharge home with services.  Agree with plan as 

outlined in resident note. 





To the reader, please pardon the multiple DC summaries thorughout the stay.  

Multiple DC attempts were planned and failed due to outside issues.





<Yurkiw,Tiburcio - Last Filed: 10/25/19 07:24>





Discharge Summary


Problems reviewed: Yes


Reason For Visit: ACUTE KIDNEY INJURY, DIABETIC KETOACIDOSIS, COLITI


Current Active Problems





DEB (acute kidney injury) (Acute)


DKA (diabetic ketoacidoses) (Acute)


Obesity (BMI 30-39.9) (Chronic)











- Home Medications


Comprehensive Discharge Medication List: 


Ambulatory Orders





Atorvastatin Ca [Lipitor] 40 mg PO DAILY 06/10/19 


Clopidogrel Bisulfate [Plavix] 75 mg PO DAILY 06/10/19 


Ezetimibe 10 mg PO HS 06/10/19 


Nebivolol HCl [Bystolic] 2.5 mg PO HS 06/10/19 


Tamsulosin HCl 0.4 mg PO DAILY 06/10/19 


Compression Socks, Medium [Futuro Restoring] 1 each MC DAILY #1 each 10/11/19 


Donepezil HCl [Aricept -] 5 mg PO DAILY  tablet 10/11/19 


Doxycycline Hyclate [Vibramycin -] 100 mg PO BID@1000,1800 3 Days #6 capsule 10/

11/19 


Escitalopram Oxalate [Lexapro -] 10 mg PO DAILY  tablet 10/11/19 


Insulin (Levemir) [Levemir Vial] 15 units SQ BID@0700,2200  units 10/11/19 


Insulin Aspart [Novolog] 2 unit SQ ACLD #1 cartridge 10/11/19 


Insulin Sliding Scale [Novolog Vial Sliding Scale -] 1 vial SQ ACHS  units 10/11

/19 


Tamsulosin HCl 0.4 mg PO BID #60 capsule 10/11/19 











<Collins Beal - Last Filed: 10/22/19 06:15>


Current Active Problems





DEB (acute kidney injury) (Acute)


DKA (diabetic ketoacidoses) (Acute)


Obesity (BMI 30-39.9) (Chronic)











- Home Medications


Comprehensive Discharge Medication List: 


Ambulatory Orders





Atorvastatin Ca [Lipitor] 40 mg PO DAILY 06/10/19 


Clopidogrel Bisulfate [Plavix] 75 mg PO DAILY 06/10/19 


Ezetimibe 10 mg PO HS 06/10/19 


Nebivolol HCl [Bystolic] 2.5 mg PO HS 06/10/19 


Tamsulosin HCl 0.4 mg PO DAILY 06/10/19 


Compression Socks, Medium [Futuro Restoring] 1 each MC DAILY #1 each 10/11/19 


Donepezil HCl [Aricept -] 5 mg PO DAILY  tablet 10/11/19 


Doxycycline Hyclate [Vibramycin -] 100 mg PO BID@1000,1800 3 Days #6 capsule 10/

11/19 


Escitalopram Oxalate [Lexapro -] 10 mg PO DAILY  tablet 10/11/19 


Insulin (Levemir) [Levemir Vial] 15 units SQ BID@0700,2200  units 10/11/19 


Insulin Aspart [Novolog] 2 unit SQ ACLD #1 cartridge 10/11/19 


Insulin Sliding Scale [Novolog Vial Sliding Scale -] 1 vial SQ ACHS  units 10/11

/19 


Tamsulosin HCl 0.4 mg PO BID #60 capsule 10/11/19 











<Tiburcio Molina - Last Filed: 10/25/19 07:24>


Condition: Stable





- Instructions


Diet, Activity, Other Instructions: 


Please STOP using the insulin pump


Instead you will be using Levemir 34 qAM


Novolog 2U with Lunch and Dinner


Sliding scale as below for AS NEEDED coverage:


   100-150 0units


   151-200 4units


   201-250 6units


   251-300 8units


   301-350 10units


   351-400 12 units


   >400 14 units and go to the ER or call a doctor





You have completed antibiotics


Also continue taking Aricept 10mg daily and Lexapro 10mg daily as suggested by 

the neurologist


Continue Proscar 5mg daily.


We increased your Tamsulosin to 0.4mg TWICE daily and please use compression 

stockings to help with any orthostatic hypotension seen





Diet: Puree diet diabetic and sodium controlled with thin liquids





Follow-up with Dr. Melvin Durán regarding the camacho catheter within 3-5 days. 

Please maintain the camacho catheter with proper care the skilled nursing facility


Follow-up with Dr. Burnett for the diabetes.


Follow-up with Dr. Elkins in 3-5 days to update them on your care


Follow-up with the rest of the referrals as above


Referrals: 


Shanel Elkins MD [Staff Physician] - 


Ramírez Burnett MD [Staff Physician] - 2 Weeks


Melvin Durán MD [Staff Physician] - 1 Week


Mustapha Stokes MD [Staff Physician] - 1 Week


Nereyda Venegas DPM [Staff Physician] - 1 Month (For diabetic foot checks)


Disposition: VNS/HOME HEALTH CARE


This patient is new to me today: No


Emergency Visit: Yes


ED Registration Date: 10/06/19


Care time: The patient presented to the Emergency Department on the above date 

and was hospitalized for further evaluation of their emergent condition.


Critical Care patient: No





- Discharge Referral


Referred to [facility] No





<Collins Beal - Last Filed: 10/22/19 06:15>





ATTENDING PHYSICIAN STATEMENT





I saw and evaluated the patient.


I reviewed the resident's note and discussed the case with the resident.


I agree with the resident's findings and plan as documented.








SUBJECTIVE:








OBJECTIVE:








ASSESSMENT AND PLAN:








<Collins Beal - Last Filed: 10/22/19 06:15>





ATTENDING PHYSICIAN STATEMENT





I saw and evaluated the patient.


I reviewed the resident's note and discussed the case with the resident.


I agree with the resident's findings and plan as documented.








SUBJECTIVE:








OBJECTIVE:








ASSESSMENT AND PLAN:








<Tiburcio Molina - Last Filed: 10/25/19 07:24>

## 2019-10-22 LAB
ANION GAP SERPL CALC-SCNC: 9 MMOL/L (ref 8–16)
BUN SERPL-MCNC: 25 MG/DL (ref 7–18)
CALCIUM SERPL-MCNC: 8.4 MG/DL (ref 8.5–10.1)
CHLORIDE SERPL-SCNC: 105 MMOL/L (ref 98–107)
CO2 SERPL-SCNC: 25 MMOL/L (ref 21–32)
CREAT SERPL-MCNC: 1.3 MG/DL (ref 0.55–1.3)
DEPRECATED RDW RBC AUTO: 15.4 % (ref 11.9–15.9)
GLUCOSE SERPL-MCNC: 225 MG/DL (ref 74–106)
HCT VFR BLD CALC: 30.9 % (ref 35.4–49)
HGB BLD-MCNC: 10.1 GM/DL (ref 11.7–16.9)
MCH RBC QN AUTO: 29.3 PG (ref 25.7–33.7)
MCHC RBC AUTO-ENTMCNC: 32.8 G/DL (ref 32–35.9)
MCV RBC: 89.4 FL (ref 80–96)
PLATELET # BLD AUTO: 230 K/MM3 (ref 134–434)
PMV BLD: 8.4 FL (ref 7.5–11.1)
POTASSIUM SERPLBLD-SCNC: 4.8 MMOL/L (ref 3.5–5.1)
RBC # BLD AUTO: 3.46 M/MM3 (ref 4–5.6)
SODIUM SERPL-SCNC: 140 MMOL/L (ref 136–145)
WBC # BLD AUTO: 9.2 K/MM3 (ref 4–10)

## 2019-10-22 RX ADMIN — ATORVASTATIN CALCIUM SCH MG: 40 TABLET, FILM COATED ORAL at 21:20

## 2019-10-22 RX ADMIN — FINASTERIDE SCH MG: 5 TABLET, FILM COATED ORAL at 11:13

## 2019-10-22 RX ADMIN — FERROUS SULFATE TAB EC 324 MG (65 MG FE EQUIVALENT) SCH MG: 324 (65 FE) TABLET DELAYED RESPONSE at 18:08

## 2019-10-22 RX ADMIN — NYSTATIN SCH APPLIC: 100000 POWDER TOPICAL at 11:14

## 2019-10-22 RX ADMIN — INSULIN ASPART SCH UNITS: 100 INJECTION, SOLUTION INTRAVENOUS; SUBCUTANEOUS at 11:12

## 2019-10-22 RX ADMIN — ACETAMINOPHEN PRN MG: 325 TABLET ORAL at 21:20

## 2019-10-22 RX ADMIN — INSULIN ASPART SCH: 100 INJECTION, SOLUTION INTRAVENOUS; SUBCUTANEOUS at 22:49

## 2019-10-22 RX ADMIN — INSULIN ASPART SCH UNITS: 100 INJECTION, SOLUTION INTRAVENOUS; SUBCUTANEOUS at 11:15

## 2019-10-22 RX ADMIN — NEBIVOLOL HYDROCHLORIDE SCH MG: 2.5 TABLET ORAL at 21:21

## 2019-10-22 RX ADMIN — FERROUS SULFATE TAB EC 324 MG (65 MG FE EQUIVALENT) SCH MG: 324 (65 FE) TABLET DELAYED RESPONSE at 11:13

## 2019-10-22 RX ADMIN — TAMSULOSIN HYDROCHLORIDE SCH MG: 0.4 CAPSULE ORAL at 11:13

## 2019-10-22 RX ADMIN — DONEPEZIL HYDROCHLORIDE SCH MG: 10 TABLET, FILM COATED ORAL at 11:13

## 2019-10-22 RX ADMIN — TAMSULOSIN HYDROCHLORIDE SCH MG: 0.4 CAPSULE ORAL at 21:21

## 2019-10-22 RX ADMIN — EZETIMIBE SCH MG: 10 TABLET ORAL at 21:19

## 2019-10-22 RX ADMIN — INSULIN ASPART SCH UNITS: 100 INJECTION, SOLUTION INTRAVENOUS; SUBCUTANEOUS at 06:58

## 2019-10-22 RX ADMIN — ESCITALOPRAM OXALATE SCH MG: 10 TABLET, FILM COATED ORAL at 11:13

## 2019-10-22 RX ADMIN — INSULIN DETEMIR SCH UNITS: 100 INJECTION, SOLUTION SUBCUTANEOUS at 06:57

## 2019-10-22 RX ADMIN — NYSTATIN SCH APPLIC: 100000 POWDER TOPICAL at 21:21

## 2019-10-22 RX ADMIN — INSULIN ASPART SCH: 100 INJECTION, SOLUTION INTRAVENOUS; SUBCUTANEOUS at 17:46

## 2019-10-22 NOTE — PN
Progress Note, Physician


History of Present Illness: 





stable


no new issues








- Current Medication List


Current Medications: 


Active Medications





Acetaminophen (Tylenol -)  650 mg PO Q4H PRN


   PRN Reason: FEVER


   Last Admin: 10/13/19 18:00 Dose:  650 mg


Atorvastatin Calcium (Lipitor -)  40 mg PO HS [facility]


   Last Admin: 10/21/19 22:20 Dose:  40 mg


Donepezil HCl (Aricept -)  10 mg PO DAILY [facility]


   Last Admin: 10/22/19 11:13 Dose:  10 mg


Ezetimibe (Zetia -)  10 mg PO HS [facility]


   Last Admin: 10/21/19 22:28 Dose:  10 mg


Escitalopram Oxalate (Lexapro -)  10 mg PO DAILY [facility]


   Last Admin: 10/22/19 11:13 Dose:  10 mg


Ferrous Sulfate (Feosol -)  325 mg PO BIDWM [facility]


   Last Admin: 10/22/19 11:13 Dose:  325 mg


Finasteride (Proscar -)  5 mg PO DAILY [facility]


   Last Admin: 10/22/19 11:13 Dose:  5 mg


Insulin Aspart (Novolog Vial Sliding Scale -)  1 vial SQ ACHS [facility]; Protocol


   Last Admin: 10/22/19 11:12 Dose:  6 units


Insulin Aspart (Novolog Vial)  10 units SQ ACLD [facility]


   Last Admin: 10/22/19 11:15 Dose:  10 units


Insulin Detemir (Levemir Vial)  40 units SQ AM [facility]


   Last Admin: 10/22/19 06:57 Dose:  40 units


Nebivolol (Bystolic -)  2.5 mg PO HS [facility]


   Last Admin: 10/21/19 22:20 Dose:  2.5 mg


Nystatin (Nystop Powder -)  1 applic TP BID [facility]


   Last Admin: 10/22/19 11:14 Dose:  1 applic


Ondansetron HCl (Zofran Injection)  4 mg IVPUSH Q6H PRN


   PRN Reason: NAUSEA AND/OR VOMITING


Tamsulosin HCl (Flomax -)  0.4 mg PO BID@0830,2200 [facility]


   Last Admin: 10/22/19 11:13 Dose:  0.4 mg











- Objective


Vital Signs: 


 Vital Signs











Temperature  97.6 F   10/22/19 06:00


 


Pulse Rate  57 L  10/22/19 06:00


 


Respiratory Rate  20   10/22/19 06:00


 


Blood Pressure  120/56 L  10/22/19 06:00


 


O2 Sat by Pulse Oximetry (%)  97   10/21/19 21:00











Constitutional: Yes: No Distress, Calm


Cardiovascular: Yes: S1, S2


Respiratory: Yes: Regular, CTA Bilaterally


Gastrointestinal: Yes: Normal Bowel Sounds, Soft


Musculoskeletal: Yes: WNL


Extremities: Yes: Other


Neurological: Yes: Alert, Other


Psychiatric: Yes: Other


Labs: 


 CBC, BMP





 10/22/19 06:30 





 10/22/19 06:30 





 INR, PTT











INR  1.42  (0.83-1.09)  H  10/19/19  06:38    














Assessment/Plan





79 year old man with a history of HTN, hyperlipidemia, CAD, MI, CABG, chronic 

diastolic heart failure, type 2 DM, stage 3 CKD, COPD, dementia who presented 

to the ED with altered mental status. 





1. Acute metabolic encephalopathy 


2. Acute kidney injury


3. Abdominal pain 


4.  UTI





5. Lactic acidosis


6. Hypernatremia


7. Hypophosphatemia


8. HTN


9. Hyperlipidemia


10. CAD, history of MI, CABG


11. Chronic diastolic heart failure


12. Stage 3 CKD


13. COPD


14. Alzheimer dementia





plan


continue current mgmt


monitor


monitor for output


nephro on case


rest as per the team

## 2019-10-22 NOTE — PN
Addendum entered and electronically signed by Collins Beal, RESIDENT  10/23/19 

08:43: 





Physical pertaining to day of 10/22/19:





Gen: NAD, awake, alert


HEENT: NC/AT, RUBIO, MMM


Neck: No JVD


LUNG: CTA bilaterally, however poor inspiratory effort, On RA


CARD: RRR no murmurs appreciated


ABD: Soft, Nt/ND, normoactive BS:


: Camacho catheter in place, no bleeding from meatus, tea-coloured urine in 

collection bag


EXT: No edema appreciated, cap refill <2sec


Skin: No rashes or lesions noted





Original Note:








Progress Note (short form)





- Note


Progress Note: 





Spoke to sonOleg earlier around 1500h and discussed how patient's Hgb has 

improved, sodium and creatine have normalized, and how CBI was discontinued 

with expected follow-up on outpatient visit. Son verbalized understanding of 

this. Also reinforced pt's increased Levemir and relative improvement of 

glucose levels. Despite conversation pt's son had appealed discharge (notified 

by CM/social work) without significant reason as to what he disagrees with. 

Will follow-up tomorrow with appeal process.





Exam remains unchanged from prior note at this time. See plan per previous note 

which is to continue. No labs needed in AM as patient has stabilized and is 

medically optimized for discharge to outpatient setting.





<Collins Beal - Last Filed: 10/22/19 20:46>





- Note


Progress Note: 








Seen and examined; agree with resident note aside from as supplemented below by 

myself.  Independently reviewed all labs, vitals, and diagnostics.  In addition 

I verified all key historic and PE findings. 





No new complaints; no agitation, resting in bed, camacho draining pinkish urine 

without notable change from yesterday.  We will discuss ongoing with urology 

given son's concerns.  Especially given the underlying dementia with potential 

for trauma, we would suspect that this patient has risk for recurring hematuria

, but the risks of not having a camacho with potential for clot leading to 

obstructing process is greater risk than not.  Thus will defer to uro services. 





Discussed with CM regarding home services and family appeal.  I spoke with 

Deo extensively





10 sys ROS done and negative aside from HPI





NAD, AAO, resting in bed


NC AT EOMI PERRLA


Neurologically unchanged; wont participate in full exam but CN2-12 wnl, moves 

all 4 ext with normal sensory


NT ND +BS


HR wnl, no fnd


Lungs CTAB, w/ sym exp


Skin without rashes or breakdown


Not agitated, cannot do full psych exam, restricted insight and judgment 

secondary to clinical condition





1) DKA 2/2 issues with managing insulin pump, resolved-DC on SQ insulin


2) Uncontrolled DM -Continually adjusting insulin.  Complete control has proven 

difficult with this patient but is preferable to the ongoing issue with pump.  

No further input from endo noted.  


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  

Can discuss therapeutic tap as needed but per neuro likely should be done OP.  

Will continue to monitor.  


4) Acute Cystitis with Hematuria vs. Alexis Hematuria- Abx per ID; continue to 

monitor.  Ongoing trauma 2/2 underlying dementia vs. infectious origin.


5) Urinary Obstruction - Continue tamsulosin and finasteride.


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 

secondary to diabetes mellitus being so uncontrolled.  We will continue to 

monitor.  He should follow-up with his nephrologist upon discharge.  


7) Hx CAD s/p CABG


   *There is no documented history of any recent stents, etc. etc. that would 

necessitate the absolute use of Plavix.  However the patient's hemoglobin went 

from 9-10, which does not endorse any worsening bleeding.  Due to this, I will 

elect to continue it from now, and I will speak with resident team about 

obtaining more records so we can elucidate the patient's full cardiac history 

and discontinue this medication if indeed is not indicated.  He is not having 

any chest pain or atypical cardiac symptoms are apparent to myself or nursing.


8) Underlying Dementia (documented as Alzheimer's Type)


   *Worsened by NPH and likely toxic metabolic encephalopathy due to the acute 

issues.  Son tells me that he transfers and ambulates on his own. 


9) Hx D-CHF


10) Lactic Acidosis, resolved


11) Hypernatremia, resolved


12) Hx HTN


13) Hx HLD


14) Hx COPD, no acute exacerbation








<Tiburcio Molina - Last Filed: 10/25/19 07:34>

## 2019-10-22 NOTE — PN
Progress Note, Physician


History of Present Illness: 





Pt seen and examined at bedside. His hematuria is worse today. 





- Current Medication List


Current Medications: 


Active Medications





Acetaminophen (Tylenol -)  650 mg PO Q4H PRN


   PRN Reason: FEVER


   Last Admin: 10/13/19 18:00 Dose:  650 mg


Atorvastatin Calcium (Lipitor -)  40 mg PO HS [facility]


   Last Admin: 10/21/19 22:20 Dose:  40 mg


Donepezil HCl (Aricept -)  10 mg PO DAILY [facility]


   Last Admin: 10/22/19 11:13 Dose:  10 mg


Ezetimibe (Zetia -)  10 mg PO HS [facility]


   Last Admin: 10/21/19 22:28 Dose:  10 mg


Escitalopram Oxalate (Lexapro -)  10 mg PO DAILY [facility]


   Last Admin: 10/22/19 11:13 Dose:  10 mg


Ferrous Sulfate (Feosol -)  325 mg PO BIDWM [facility]


   Last Admin: 10/22/19 11:13 Dose:  325 mg


Finasteride (Proscar -)  5 mg PO DAILY [facility]


   Last Admin: 10/22/19 11:13 Dose:  5 mg


Insulin Aspart (Novolog Vial Sliding Scale -)  1 vial SQ ACHS [facility]; Protocol


   Last Admin: 10/22/19 11:12 Dose:  6 units


Insulin Aspart (Novolog Vial)  10 units SQ ACLD [facility]


   Last Admin: 10/22/19 11:15 Dose:  10 units


Insulin Detemir (Levemir Vial)  40 units SQ AM [facility]


   Last Admin: 10/22/19 06:57 Dose:  40 units


Nebivolol (Bystolic -)  2.5 mg PO HS [facility]


   Last Admin: 10/21/19 22:20 Dose:  2.5 mg


Nystatin (Nystop Powder -)  1 applic TP BID [facility]


   Last Admin: 10/22/19 11:14 Dose:  1 applic


Ondansetron HCl (Zofran Injection)  4 mg IVPUSH Q6H PRN


   PRN Reason: NAUSEA AND/OR VOMITING


Tamsulosin HCl (Flomax -)  0.4 mg PO BID@0830,2200 [facility]


   Last Admin: 10/22/19 11:13 Dose:  0.4 mg











- Objective


Vital Signs: 


 Vital Signs











Temperature  97.6 F   10/22/19 06:00


 


Pulse Rate  57 L  10/22/19 06:00


 


Respiratory Rate  20   10/22/19 06:00


 


Blood Pressure  120/56 L  10/22/19 06:00


 


O2 Sat by Pulse Oximetry (%)  97   10/21/19 21:00











Constitutional: Yes: Calm


HENT: Yes: Atraumatic


Cardiovascular: Yes: S1, S2


Respiratory: Yes: CTA Bilaterally


Gastrointestinal: Yes: Soft


Genitourinary: Yes: WNL


Edema: Yes


Edema: LLE: Trace, RLE: Trace


Neurological: Yes: Confusion


Psychiatric: Yes: Oriented


Labs: 


 CBC, BMP





 10/22/19 06:30 





 10/22/19 06:30 





 INR, PTT











INR  1.42  (0.83-1.09)  H  10/19/19  06:38    














Problem List





- Problems


(1) DEB (acute kidney injury)


Code(s): N17.9 - ACUTE KIDNEY FAILURE, UNSPECIFIED   





(2) DKA (diabetic ketoacidoses)


Code(s): E11.10 - TYPE 2 DIABETES MELLITUS WITH KETOACIDOSIS WITHOUT COMA   


Qualifiers: 


   Diabetes mellitus type: type 2   Diabetes mellitus complication detail: 

without coma   Qualified Code(s): E11.10 - Type 2 diabetes mellitus with 

ketoacidosis without coma   





(3) Acute renal failure


Code(s): N17.9 - ACUTE KIDNEY FAILURE, UNSPECIFIED   





(4) Hypernatremia


Code(s): E87.0 - HYPEROSMOLALITY AND HYPERNATREMIA   





Assessment/Plan


 Current Medications











Generic Name Dose Route Start Last Admin





  Trade Name Freq  PRN Reason Stop Dose Admin


 


Acetaminophen  650 mg  10/09/19 10:34  10/13/19 18:00





  Tylenol -  PO   650 mg





  Q4H PRN   Administration





  FEVER   





     





     





     


 


Atorvastatin Calcium  40 mg  10/07/19 22:00  10/21/19 22:20





  Lipitor -  PO   40 mg





  HS MARY   Administration





     





     





     





     


 


Donepezil HCl  10 mg  10/16/19 10:00  10/22/19 11:13





  Aricept -  PO   10 mg





  DAILY MARY   Administration





     





     





     





     


 


Ezetimibe  10 mg  10/07/19 22:00  10/21/19 22:28





  Zetia -  PO   10 mg





  HS MARY   Administration





     





     





     





     


 


Escitalopram Oxalate  10 mg  10/16/19 10:00  10/22/19 11:13





  Lexapro -  PO   10 mg





  DAILY MARY   Administration





     





     





     





     


 


Ferrous Sulfate  325 mg  10/20/19 17:30  10/22/19 11:13





  Feosol -  PO   325 mg





  BIDWM MARY   Administration





     





     





     





     


 


Finasteride  5 mg  10/16/19 10:00  10/22/19 11:13





  Proscar -  PO   5 mg





  DAILY MARY   Administration





     





     





     





     


 


Insulin Aspart  1 vial  10/10/19 00:52  10/22/19 11:12





  Novolog Vial Sliding Scale -  SQ   6 units





  ACHS MARY   Administration





     





     





  Protocol   





     


 


Insulin Aspart  10 units  10/20/19 16:30  10/22/19 11:15





  Novolog Vial  SQ   10 units





  ACLD MARY   Administration





     





     





     





     


 


Insulin Detemir  40 units  10/22/19 07:00  10/22/19 06:57





  Levemir Vial  SQ   40 units





  AM MARY   Administration





     





     





     





     


 


Nebivolol  2.5 mg  10/07/19 22:00  10/21/19 22:20





  Bystolic -  PO   2.5 mg





  HS MARY   Administration





     





     





     





     


 


Nystatin  1 applic  10/10/19 11:30  10/22/19 11:14





  Nystop Powder -  TP   1 applic





  BID MARY   Administration





     





     





     





     


 


Ondansetron HCl  4 mg  10/18/19 17:01  





  Zofran Injection  IVPUSH   





  Q6H PRN   





  NAUSEA AND/OR VOMITING   





     





     





     


 


Tamsulosin HCl  0.4 mg  10/16/19 08:45  10/22/19 11:13





  Flomax -  PO   0.4 mg





  BID@0830,2200 [facility]   Administration





     





     





     





     














Impression


1. DEB


2. DKA


3. hypernatremia


4. DM


5. dementia


6. cad


7. hyperkalemia





Plan


- urology recalled


- renal function stable


- diabetic diet


- will see pt in office


- discussed diet with wife at bedside


- will need better glucose control

## 2019-10-22 NOTE — PATH
Surgical Pathology Report



Patient Name:  DAVID DIEZ

Accession #:  [id number]

Med. Rec. #:  [id number]                                                        

   /Age/Gender:  1940 (Age: 79) / M

Account:  [id number]                                                          

             Location: [facility] MED/SURG

Taken:  10/18/2019

Received:  10/21/2019

Reported:  10/22/2019

Physicians:  Aldo Durán

  



Specimen(s) Received

 PROSTATE CHIPS 





Clinical History

DEB, diabetic ketoacidosis, hematuria







Final Diagnosis

PROSTATE TISSUE, TRANSURETHRAL RESECTION OF THE PROSTATE:

BENIGN PROSTATIC TISSUE WITH PREDOMINANTLY STROMAL HYPERPLASIA, ACUTE AND

CHRONIC PROSTATITIS.

ADJACENT UROTHELIAL MUCOSA WITH CHRONIC INFLAMMATION.

SEPARATE PORTIONS OF BLOOD CLOT.





***Electronically Signed***

Lennox Coffey M.D.





Gross Description

Received in formalin labeled "prostate tissue," is a 2 g, 3.5 x 3.2 x 0.4 cm

aggregate of tan, firm to rubbery portions of tissue, consistent with prostate

tissue. Also received within the same container is a 14.0 x 11.0 x 2.0 cm

aggregate of red-brown blood clot. The specimen is submitted in 5 cassettes as

follows: [id number] submitted prostate tissue; 3-5-representative blood clot

DL/10/21/2019



saudi/10/21/2019

## 2019-10-23 RX ADMIN — INSULIN ASPART SCH UNITS: 100 INJECTION, SOLUTION INTRAVENOUS; SUBCUTANEOUS at 13:35

## 2019-10-23 RX ADMIN — EZETIMIBE SCH MG: 10 TABLET ORAL at 21:51

## 2019-10-23 RX ADMIN — TAMSULOSIN HYDROCHLORIDE SCH MG: 0.4 CAPSULE ORAL at 11:08

## 2019-10-23 RX ADMIN — NYSTATIN SCH APPLIC: 100000 POWDER TOPICAL at 11:10

## 2019-10-23 RX ADMIN — INSULIN ASPART SCH UNITS: 100 INJECTION, SOLUTION INTRAVENOUS; SUBCUTANEOUS at 17:43

## 2019-10-23 RX ADMIN — INSULIN ASPART SCH: 100 INJECTION, SOLUTION INTRAVENOUS; SUBCUTANEOUS at 22:06

## 2019-10-23 RX ADMIN — FERROUS SULFATE TAB EC 324 MG (65 MG FE EQUIVALENT) SCH MG: 324 (65 FE) TABLET DELAYED RESPONSE at 11:09

## 2019-10-23 RX ADMIN — DONEPEZIL HYDROCHLORIDE SCH MG: 10 TABLET, FILM COATED ORAL at 11:09

## 2019-10-23 RX ADMIN — TAMSULOSIN HYDROCHLORIDE SCH MG: 0.4 CAPSULE ORAL at 21:51

## 2019-10-23 RX ADMIN — INSULIN ASPART SCH UNITS: 100 INJECTION, SOLUTION INTRAVENOUS; SUBCUTANEOUS at 13:36

## 2019-10-23 RX ADMIN — INSULIN DETEMIR SCH: 100 INJECTION, SOLUTION SUBCUTANEOUS at 06:05

## 2019-10-23 RX ADMIN — ATORVASTATIN CALCIUM SCH MG: 40 TABLET, FILM COATED ORAL at 21:51

## 2019-10-23 RX ADMIN — ESCITALOPRAM OXALATE SCH MG: 10 TABLET, FILM COATED ORAL at 11:09

## 2019-10-23 RX ADMIN — FERROUS SULFATE TAB EC 324 MG (65 MG FE EQUIVALENT) SCH MG: 324 (65 FE) TABLET DELAYED RESPONSE at 17:45

## 2019-10-23 RX ADMIN — NYSTATIN SCH APPLIC: 100000 POWDER TOPICAL at 21:51

## 2019-10-23 RX ADMIN — FINASTERIDE SCH MG: 5 TABLET, FILM COATED ORAL at 11:09

## 2019-10-23 RX ADMIN — NEBIVOLOL HYDROCHLORIDE SCH MG: 2.5 TABLET ORAL at 21:51

## 2019-10-23 RX ADMIN — INSULIN ASPART SCH: 100 INJECTION, SOLUTION INTRAVENOUS; SUBCUTANEOUS at 06:05

## 2019-10-23 NOTE — PN
Progress Note (short form)





- Note


Progress Note: 





Pt. seen today. S/p TURP. Camacho draining some what bloody urine. Will leave 

camacho in and discharge with camacho home. Will follow in the office for removal 

of Camacho.


Thank you

## 2019-10-23 NOTE — DS
Physical Exam: 


SUBJECTIVE: Pt's son appealed discharge due to concern about NPH. Discussed 

with neurology who was recalled and reported that he would be a poor candidate 

for any shunting at this time. Neurosurgical opinion also discussed and agreed 

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 

comorbid conditions.





OBJECTIVE:





 Vital Signs











 Period  Temp  Pulse  Resp  BP Sys/Ramírez  Pulse Ox


 


 Last 24 Hr  97.8 F-98.1 F  56-66  18-20  114-130/53-63  97-97








PHYSICAL EXAM





Gen: NAD, awake, alert


HEENT: NC/AT, RUBIO, MMM


Neck: No JVD


LUNG: CTA bilaterally, however poor inspiratory effort, On RA


CARD: RRR no murmurs appreciated


ABD: Soft, Nt/ND, normoactive BS:


: Camacho catheter in place, clearing urine in collection bag


EXT: No edema appreciated, cap refill <2sec


Skin: No rashes or lesions noted





LABS


 Laboratory Results - last 24 hr











  10/22/19 10/22/19 10/22/19





  06:30 11:04 17:20


 


Sodium  140  


 


Potassium  4.8  


 


Chloride  105  


 


Carbon Dioxide  25  


 


Anion Gap  9  


 


BUN  25.0 H  


 


Creatinine  1.3  


 


Est GFR (CKD-EPI)AfAm  60.15  


 


Est GFR (CKD-EPI)NonAf  51.90  


 


POC Glucometer   270  107


 


Random Glucose  225 H  


 


Calcium  8.4 L  














  10/22/19 10/23/19 10/23/19





  22:45 05:50 06:16


 


Sodium   


 


Potassium   


 


Chloride   


 


Carbon Dioxide   


 


Anion Gap   


 


BUN   


 


Creatinine   


 


Est GFR (CKD-EPI)AfAm   


 


Est GFR (CKD-EPI)NonAf   


 


POC Glucometer  100  52  85


 


Random Glucose   


 


Calcium   











Active Medications





Acetaminophen (Tylenol -)  650 mg PO Q4H PRN


   PRN Reason: FEVER


   Last Admin: 10/13/19 18:00 Dose:  650 mg


Atorvastatin Calcium (Lipitor -)  40 mg PO HS [facility]


   Last Admin: 10/20/19 21:48 Dose:  40 mg


Donepezil HCl (Aricept -)  10 mg PO DAILY [facility]


   Last Admin: 10/21/19 12:29 Dose:  10 mg


Ezetimibe (Zetia -)  10 mg PO HS [facility]


   Last Admin: 10/20/19 21:48 Dose:  10 mg


Escitalopram Oxalate (Lexapro -)  10 mg PO DAILY [facility]


   Last Admin: 10/21/19 12:28 Dose:  10 mg


Ferrous Sulfate (Feosol -)  325 mg PO BIDWM [facility]


   Last Admin: 10/21/19 18:51 Dose:  325 mg


Finasteride (Proscar -)  5 mg PO DAILY [facility]


   Last Admin: 10/21/19 12:28 Dose:  5 mg


Insulin Aspart (Novolog Vial Sliding Scale -)  1 vial SQ ACHS [facility]; Protocol


   Last Admin: 10/21/19 17:30 Dose:  8 units


Insulin Aspart (Novolog Vial)  10 units SQ ACLD [facility]


   Last Admin: 10/21/19 17:30 Dose:  10 units


Insulin Detemir (Levemir Vial)  40 units SQ AM [facility]


Nebivolol (Bystolic -)  2.5 mg PO HS [facility]


   Last Admin: 10/20/19 21:48 Dose:  2.5 mg


Nystatin (Nystop Powder -)  1 applic TP BID [facility]


   Last Admin: 10/21/19 12:29 Dose:  1 applic


Ondansetron HCl (Zofran Injection)  4 mg IVPUSH Q6H PRN


   PRN Reason: NAUSEA AND/OR VOMITING


Tamsulosin HCl (Flomax -)  0.4 mg PO BID@0830,2200 [facility]


   Last Admin: 10/21/19 12:28 Dose:  0.4 mg





 Microbiology





10/06/19 23:00   Blood - Peripheral Venous   Blood Culture - Final


                            NO GROWTH AFTER 5 DAYS INCUBATION


10/06/19 23:00   Blood - Peripheral Venous   Blood Culture - Final


                            NO GROWTH AFTER 5 DAYS INCUBATION


10/06/19 21:16   Urine - Urine Camacho   Urine Culture - Final


                            Strep Agalactiae Group B


                            Mr S Aureus


                            Staphylococcus Coagulase Neg








IMAGING:


ABD U/S:


IMPRESSION:


Limited examination likely due to the patient's body habitus.


The liver appears to be slightly hyperechoic suggestive of mild fatty 

infiltration versus


hepatocellular disease.


No gross gallstones identified. Borderline thickening of the gallbladder wall 

without evidence


of acute cholecystitis.


Nonvisualization of the pancreas.


Nonvisualization of the abdominal aorta and inferior vena cava.


Right renal simple cyst measuring 2.1 cm.





Abd/Pelvis CT:


IMPRESSION:


Constipation.


Mild fullness of the collecting system with a dilated bladder which could 

represent reflux.


Paraumbilical hernia with no stranding.


Hiatal hernia seen previously.


Renal cysts.





Head CT:


IMPRESSION:


No significant interval change


Moderate to marked ventricular dilatation with a lateral and third ventricles 

are relatively


more dilated than for. Degree of the ventricular dilatation is more than 

expected for the


degree of cortical atrophy.


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 

Sylvius


stenosis/narrowing.


Otherwise, no interval acute intracranial pathology is identified.


A preliminary report was forwarded by the [facility] service,








HOSPITAL COURSE:





Date of Admission:10/06/19





Date of Discharge: 10/23/19





Pt admitted to hospital on 10/6/19 due to HHS. Pt was placed on insulin gtt and 

HHS protocol until which he was able to transition to the floor alongside of 

subcutaneously insulin. Pt was seen by endocrinology with final insulin 

regiment being Levemir 38U BID, Novolog 9U before Lunch and dinner, alongside 

of ISS for further coverage if needed. During patient's workup he was found to 

have MRSA UTI which is being treated with Doxycycline 100mg BID for a total of 

5 days. Unfortunately patient was given trial of void period where he did not 

void for 24hrs. Camacho was placed, tamsulosin was increased to 0.4mg BID 

alongside of newly added Proscar 5mg qdaily. Pt's hospitalization was prolonged 

2/2 to insurance denial and denial of appeal. Unfortunately at this point pt 

began to have gross hematuria for which urology was consulted. Pt was assessed 

and underwent cystoscopy with Dr. BRANDT Durán revealing blood near prostate with 

clots. Pt received a TURP and was placed on CBI which has now been ceased. Pt 

has had no other signs of hematuria since CBI has been stopped. Pt's glucose 

now well controlled. Son appealed discharge due to concern of NPH condition; 

neurology and neurosurgery recalled as above. 


Minutes to complete discharge: 33





<Collins Beal - Last Filed: 10/23/19 21:07>


Physical Exam: 


SUBJECTIVE: Patient seen and examined








OBJECTIVE:





 Vital Signs











 Period  Temp  Pulse  Resp  BP Sys/Ramírez  Pulse Ox


 


 Last 24 Hr  97.4 F-98.7 F  65-75  18-20  116-139/50-84  96-97








PHYSICAL EXAM





GENERAL: The patient is awake, alert, and fully oriented, in no acute distress.


HEAD: Normal with no signs of trauma.


EYES: PERRL, extraocular movements intact, sclera anicteric, conjunctiva clear. 


ENT: Ears normal, nares patent, oropharynx clear without exudates, moist mucous 

membranes.


NECK: Trachea midline, full range of motion, supple. 


LUNGS: Breath sounds equal, clear to auscultation bilaterally, no wheezes, no 

crackles, no accessory muscle use. 


HEART: Regular rate and rhythm, S1, S2 without murmur, rub or gallop.


ABDOMEN: Soft, nontender, nondistended, normoactive bowel sounds, no guarding, 

no rebound, no hepatosplenomegaly, no masses.


EXTREMITIES: 2+ pulses, warm, well-perfused, no edema. 


NEUROLOGICAL: Cranial nerves II through XII grossly intact. Normal speech, gait 

not observed.


PSYCH: Normal mood, normal affect.


SKIN: Warm, dry, normal turgor, no rashes or lesions noted.





LABS


 Laboratory Results - last 24 hr











  10/24/19 10/24/19 10/24/19





  11:00 12:00 17:22


 


WBC   7.9 


 


RBC   3.41 L 


 


Hgb   9.9 L 


 


Hct   30.7 L 


 


MCV   90.0 


 


MCH   28.9 


 


MCHC   32.1 


 


RDW   15.7 


 


Plt Count   236 


 


MPV   7.8 


 


POC Glucometer  316   99














  10/24/19 10/25/19 10/25/19





  23:43 00:11 05:58


 


WBC   


 


RBC   


 


Hgb   


 


Hct   


 


MCV   


 


MCH   


 


MCHC   


 


RDW   


 


Plt Count   


 


MPV   


 


POC Glucometer  53  126  52














  10/25/19 10/25/19





  06:14 06:31


 


WBC  


 


RBC  


 


Hgb  


 


Hct  


 


MCV  


 


MCH  


 


MCHC  


 


RDW  


 


Plt Count  


 


MPV  


 


POC Glucometer  167  150











HOSPITAL COURSE:





Date of Admission:10/06/19





Date of Discharge: 10/25/19














<Tiburcio Molina - Last Filed: 10/28/19 11:54>





Discharge Summary


Problems reviewed: Yes


Reason For Visit: ACUTE KIDNEY INJURY, DIABETIC KETOACIDOSIS, COLITI


Current Active Problems





DEB (acute kidney injury) (Acute)


DKA (diabetic ketoacidoses) (Acute)


Obesity (BMI 30-39.9) (Chronic)











- Home Medications


Comprehensive Discharge Medication List: 


Ambulatory Orders





Atorvastatin Ca [Lipitor] 40 mg PO DAILY 06/10/19 


Clopidogrel Bisulfate [Plavix] 75 mg PO DAILY 06/10/19 


Ezetimibe 10 mg PO HS 06/10/19 


Nebivolol HCl [Bystolic] 2.5 mg PO HS 06/10/19 


Tamsulosin HCl 0.4 mg PO DAILY 06/10/19 


Compression Socks, Medium [Futuro Restoring] 1 each MC DAILY #1 each 10/11/19 


Donepezil HCl [Aricept -] 5 mg PO DAILY  tablet 10/11/19 


Doxycycline Hyclate [Vibramycin -] 100 mg PO BID@1000,1800 3 Days #6 capsule 10/

11/19 


Escitalopram Oxalate [Lexapro -] 10 mg PO DAILY  tablet 10/11/19 


Insulin (Levemir) [Levemir Vial] 15 units SQ BID@0700,2200  units 10/11/19 


Insulin Aspart [Novolog] 2 unit SQ ACLD #1 cartridge 10/11/19 


Insulin Sliding Scale [Novolog Vial Sliding Scale -] 1 vial SQ ACHS  units 10/11

/19 


Tamsulosin HCl 0.4 mg PO BID #60 capsule 10/11/19 











<Collins Beal - Last Filed: 10/23/19 21:07>


Current Active Problems





DEB (acute kidney injury) (Acute)


DKA (diabetic ketoacidoses) (Acute)


Obesity (BMI 30-39.9) (Chronic)











- Home Medications


Comprehensive Discharge Medication List: 


Ambulatory Orders





Atorvastatin Ca [Lipitor] 40 mg PO DAILY 06/10/19 


Clopidogrel Bisulfate [Plavix] 75 mg PO DAILY 06/10/19 


Ezetimibe 10 mg PO HS 06/10/19 


Nebivolol HCl [Bystolic] 2.5 mg PO HS 06/10/19 


Tamsulosin HCl 0.4 mg PO DAILY 06/10/19 


Compression Socks, Medium [Futuro Restoring] 1 each MC DAILY #1 each 10/11/19 


Donepezil HCl [Aricept -] 5 mg PO DAILY  tablet 10/11/19 


Doxycycline Hyclate [Vibramycin -] 100 mg PO BID@1000,1800 3 Days #6 capsule 10/

11/19 


Escitalopram Oxalate [Lexapro -] 10 mg PO DAILY  tablet 10/11/19 


Insulin (Levemir) [Levemir Vial] 15 units SQ BID@0700,2200  units 10/11/19 


Insulin Aspart [Novolog] 2 unit SQ ACLD #1 cartridge 10/11/19 


Insulin Sliding Scale [Novolog Vial Sliding Scale -] 1 vial SQ ACHS  units 10/11

/19 


Tamsulosin HCl 0.4 mg PO BID #60 capsule 10/11/19 











<Tiburcio Molina - Last Filed: 10/28/19 11:54>


Condition: Stable





- Instructions


Diet, Activity, Other Instructions: 


Please STOP using the insulin pump


Instead you will be using Levemir 34 qAM


Novolog 2U with Lunch and Dinner


Sliding scale as below for AS NEEDED coverage:


   100-150 0units


   151-200 4units


   201-250 6units


   251-300 8units


   301-350 10units


   351-400 12 units


   >400 14 units and go to the ER or call a doctor





You have completed antibiotics


Also continue taking Aricept 10mg daily and Lexapro 10mg daily as suggested by 

the neurologist


Continue Proscar 5mg daily.


We increased your Tamsulosin to 0.4mg TWICE daily and please use compression 

stockings to help with any orthostatic hypotension seen





Diet: Puree diet diabetic and sodium controlled with thin liquids





Follow-up with Dr. Melvin Durán regarding the camacho catheter within 3-5 days. 

Please maintain the camacho catheter with proper care the [facility] nursing facility


Follow-up with Dr. Burnett for the diabetes.


Follow-up with Dr. Elkins in 3-5 days to update them on your care


Follow-up with the rest of the referrals as above


Referrals: 


Shanel Elkins MD [Staff Physician] - 


Ramírez Burnett MD [Staff Physician] - 2 Weeks


Melvin Durán MD [Staff Physician] - 1 Week


Mustapha Stokes MD [Staff Physician] - 1 Week


Nereyda Venegas DPM [Staff Physician] - 1 Month (For diabetic foot checks)


Disposition: VNS/HOME HEALTH CARE


This patient is new to me today: No


Emergency Visit: Yes


ED Registration Date: 10/06/19


Care time: The patient presented to the Emergency Department on the above date 

and was hospitalized for further evaluation of their emergent condition.


Critical Care patient: No





- Discharge Referral


Referred to [facility] No





<Collins Beal - Last Filed: 10/23/19 21:07>





ATTENDING PHYSICIAN STATEMENT





I saw and evaluated the patient.


I reviewed the resident's note and discussed the case with the resident.


I agree with the resident's findings and plan as documented.








SUBJECTIVE:








OBJECTIVE:








ASSESSMENT AND PLAN:








<Collins Beal - Last Filed: 10/23/19 21:07>





Seen and examined; agree with resident note aside from as supplemented below by 

myself.  Independently reviewed all labs, vitals, and diagnostics.  In addition 

I verified all key historic and PE findings. 





Discussed with CM regarding home services and family appeal.  Again, to 

reiterate, he was initially treated for DKA and UTI and developed hematuria and 

this has been repeatedly recurring alongside difficult to control blood sugars.

  Multiple rebleeds have complicated discharge-this is being actively managed 

by urology who cleared the patient for DC each time.  Due to the repeated 

issues of rebleeds and contested DCs different notes have been used.  Please 

keep in mind each day is defined by that days encounter from medicine with PE 

documented.





Unchanges; no issues overnight.





10 sys ROS done and negative aside from HPI





NAD, AAO, resting in bed


NC AT EOMI PERRLA


Neurologically unchanged; wont participate in full exam but CN2-12 wnl, moves 

all 4 ext with normal sensory


NT ND +BS


HR wnl, no fnd


Lungs CTAB, w/ sym exp


Skin without rashes or breakdown


Not agitated, cannot do full psych exam, restricted insight and judgment 

secondary to clinical condition





1) DKA 2/2 issues with managing insulin pump, resolved-DC on SQ insulin


2) Uncontrolled DM -Continually adjusting insulin.  Complete control has proven 

difficult with this patient but is preferable to the ongoing issue with pump.  

No further input from endo noted.  


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  

Can discuss therapeutic tap as needed but per neuro likely should be done OP.  

Will continue to monitor.  


4) Acute Cystitis with Hematuria vs. Alexis Hematuria- Abx per ID; continue to 

monitor.  Ongoing trauma 2/2 underlying dementia vs. infectious origin.


5) Urinary Obstruction - Continue tamsulosin and finasteride.


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 

secondary to diabetes mellitus being so uncontrolled.  We will continue to 

monitor.  He should follow-up with his nephrologist upon discharge.  


7) Hx CAD s/p CABG


8) Underlying Dementia (documented as Alzheimer's Type)


9) Hx D-CHF


10) Lactic Acidosis, resolved


11) Hypernatremia, resolved


12) Hx HTN


13) Hx HLD


14) Hx COPD, no acute exacerbation





Discussed with neuro/nsgy regarding the NPH-no inpatient intervention per 

Arturo WALSH/Jc WALSH


Continue to discuss hematuria issue with urology; will need definitive plan as 

requiring intermittent CBI.


Continue monitoring sugars and adjusting; has helped since family has been 

prohibited from bringing meals.





Full Code








<Tiburcio Molina - Last Filed: 10/28/19 11:54>

## 2019-10-23 NOTE — PN
Progress Note, Physician


History of Present Illness: 





stable


having hematuria





- Current Medication List


Current Medications: 


Active Medications





Acetaminophen (Tylenol -)  650 mg PO Q4H PRN


   PRN Reason: FEVER


   Last Admin: 10/22/19 21:20 Dose:  650 mg


Atorvastatin Calcium (Lipitor -)  40 mg PO HS [facility]


   Last Admin: 10/22/19 21:20 Dose:  40 mg


Donepezil HCl (Aricept -)  10 mg PO DAILY [facility]


   Last Admin: 10/22/19 11:13 Dose:  10 mg


Ezetimibe (Zetia -)  10 mg PO HS [facility]


   Last Admin: 10/22/19 21:19 Dose:  10 mg


Escitalopram Oxalate (Lexapro -)  10 mg PO DAILY [facility]


   Last Admin: 10/22/19 11:13 Dose:  10 mg


Ferrous Sulfate (Feosol -)  325 mg PO BIDWM [facility]


   Last Admin: 10/22/19 18:08 Dose:  325 mg


Finasteride (Proscar -)  5 mg PO DAILY [facility]


   Last Admin: 10/22/19 11:13 Dose:  5 mg


Insulin Aspart (Novolog Vial Sliding Scale -)  1 vial SQ ACHS [facility]; Protocol


   Last Admin: 10/23/19 06:05 Dose:  Not Given


Insulin Aspart (Novolog Vial)  9 units SQ ACLD [facility]


Insulin Detemir (Levemir Vial)  38 units SQ AM [facility]


Nebivolol (Bystolic -)  2.5 mg PO HS [facility]


   Last Admin: 10/22/19 21:21 Dose:  2.5 mg


Nystatin (Nystop Powder -)  1 applic TP BID [facility]


   Last Admin: 10/22/19 21:21 Dose:  1 applic


Ondansetron HCl (Zofran Injection)  4 mg IVPUSH Q6H PRN


   PRN Reason: NAUSEA AND/OR VOMITING


Tamsulosin HCl (Flomax -)  0.4 mg PO BID@0830,2200 [facility]


   Last Admin: 10/22/19 21:21 Dose:  0.4 mg











- Objective


Vital Signs: 


 Vital Signs











Temperature  97.8 F   10/23/19 06:05


 


Pulse Rate  65   10/23/19 06:05


 


Respiratory Rate  19   10/23/19 06:05


 


Blood Pressure  130/61   10/23/19 06:05


 


O2 Sat by Pulse Oximetry (%)  97   10/22/19 21:00











Constitutional: Yes: No Distress, Calm


Cardiovascular: Yes: S1, S2


Respiratory: Yes: Regular, CTA Bilaterally


Gastrointestinal: Yes: Normal Bowel Sounds, Soft


Genitourinary: Yes: Mckeon Present


Musculoskeletal: Yes: WNL


Extremities: Yes: WNL


Neurological: Yes: Alert


Labs: 


 CBC, BMP





 10/22/19 06:30 





 10/22/19 06:30 





 INR, PTT











INR  1.42  (0.83-1.09)  H  10/19/19  06:38    














Assessment/Plan





79 year old man with a history of HTN, hyperlipidemia, CAD, MI, CABG, chronic 

diastolic heart failure, type 2 DM, stage 3 CKD, COPD, dementia who presented 

to the ED with altered mental status. 





1. Acute metabolic encephalopathy 


2. Acute kidney injury


3. Abdominal pain 


4.  UTI





5. Lactic acidosis


6. Hypernatremia


7. Hypophosphatemia


8. HTN


9. Hyperlipidemia


10. CAD, history of MI, CABG


11. Chronic diastolic heart failure


12. Stage 3 CKD


13. COPD


14. Alzheimer dementia





plan


monitor for hematuria


urology to revisit


rest as per the team

## 2019-10-23 NOTE — PN
Progress Note, Physician


History of Present Illness: 





Pt seen and examined at bedside. He is tolerating diet. Urine is clearing up. 





- Current Medication List


Current Medications: 


Active Medications





Acetaminophen (Tylenol -)  650 mg PO Q4H PRN


   PRN Reason: FEVER


   Last Admin: 10/22/19 21:20 Dose:  650 mg


Atorvastatin Calcium (Lipitor -)  40 mg PO HS [facility]


   Last Admin: 10/22/19 21:20 Dose:  40 mg


Donepezil HCl (Aricept -)  10 mg PO DAILY [facility]


   Last Admin: 10/23/19 11:09 Dose:  10 mg


Ezetimibe (Zetia -)  10 mg PO HS [facility]


   Last Admin: 10/22/19 21:19 Dose:  10 mg


Escitalopram Oxalate (Lexapro -)  10 mg PO DAILY [facility]


   Last Admin: 10/23/19 11:09 Dose:  10 mg


Ferrous Sulfate (Feosol -)  325 mg PO BIDWM [facility]


   Last Admin: 10/23/19 11:09 Dose:  325 mg


Finasteride (Proscar -)  5 mg PO DAILY [facility]


   Last Admin: 10/23/19 11:09 Dose:  5 mg


Insulin Aspart (Novolog Vial Sliding Scale -)  1 vial SQ ACHS [facility]; Protocol


   Last Admin: 10/23/19 06:05 Dose:  Not Given


Insulin Aspart (Novolog Vial)  9 units SQ ACLD [facility]


Insulin Detemir (Levemir Vial)  38 units SQ AM [facility]


Nebivolol (Bystolic -)  2.5 mg PO HS [facility]


   Last Admin: 10/22/19 21:21 Dose:  2.5 mg


Nystatin (Nystop Powder -)  1 applic TP BID [facility]


   Last Admin: 10/23/19 11:10 Dose:  1 applic


Ondansetron HCl (Zofran Injection)  4 mg IVPUSH Q6H PRN


   PRN Reason: NAUSEA AND/OR VOMITING


Tamsulosin HCl (Flomax -)  0.4 mg PO BID@0830,2200 [facility]


   Last Admin: 10/23/19 11:08 Dose:  0.4 mg











- Objective


Vital Signs: 


 Vital Signs











Temperature  97.8 F   10/23/19 06:05


 


Pulse Rate  65   10/23/19 06:05


 


Respiratory Rate  19   10/23/19 06:05


 


Blood Pressure  130/61   10/23/19 06:05


 


O2 Sat by Pulse Oximetry (%)  97   10/22/19 21:00











Constitutional: Yes: Calm


HENT: Yes: Atraumatic


Neck: Yes: Supple


Cardiovascular: Yes: S1, S2


Respiratory: Yes: CTA Bilaterally


Gastrointestinal: Yes: Normal Bowel Sounds, Soft


Genitourinary: Yes: WNL


Musculoskeletal: Yes: WNL


Edema: No


Neurological: Yes: Confusion


Labs: 


 CBC, BMP





 10/22/19 06:30 





 10/22/19 06:30 





 INR, PTT











INR  1.42  (0.83-1.09)  H  10/19/19  06:38    














Problem List





- Problems


(1) DEB (acute kidney injury)


Code(s): N17.9 - ACUTE KIDNEY FAILURE, UNSPECIFIED   





(2) DKA (diabetic ketoacidoses)


Code(s): E11.10 - TYPE 2 DIABETES MELLITUS WITH KETOACIDOSIS WITHOUT COMA   


Qualifiers: 


   Diabetes mellitus type: type 2   Diabetes mellitus complication detail: 

without coma   Qualified Code(s): E11.10 - Type 2 diabetes mellitus with 

ketoacidosis without coma   





(3) Acute renal failure


Code(s): N17.9 - ACUTE KIDNEY FAILURE, UNSPECIFIED   





(4) Hypernatremia


Code(s): E87.0 - HYPEROSMOLALITY AND HYPERNATREMIA   





Assessment/Plan


 Current Medications











Generic Name Dose Route Start Last Admin





  Trade Name Freq  PRN Reason Stop Dose Admin


 


Acetaminophen  650 mg  10/09/19 10:34  10/22/19 21:20





  Tylenol -  PO   650 mg





  Q4H PRN   Administration





  FEVER   





     





     





     


 


Atorvastatin Calcium  40 mg  10/07/19 22:00  10/22/19 21:20





  Lipitor -  PO   40 mg





  HS MARY   Administration





     





     





     





     


 


Donepezil HCl  10 mg  10/16/19 10:00  10/23/19 11:09





  Aricept -  PO   10 mg





  DAILY MARY   Administration





     





     





     





     


 


Ezetimibe  10 mg  10/07/19 22:00  10/22/19 21:19





  Zetia -  PO   10 mg





  HS [facility]   Administration





     





     





     





     


 


Escitalopram Oxalate  10 mg  10/16/19 10:00  10/23/19 11:09





  Lexapro -  PO   10 mg





  DAILY MARY   Administration





     





     





     





     


 


Ferrous Sulfate  325 mg  10/20/19 17:30  10/23/19 11:09





  Feosol -  PO   325 mg





  BIDWM [facility]   Administration





     





     





     





     


 


Finasteride  5 mg  10/16/19 10:00  10/23/19 11:09





  Proscar -  PO   5 mg





  DAILY MARY   Administration





     





     





     





     


 


Insulin Aspart  1 vial  10/10/19 00:52  10/23/19 06:05





  Novolog Vial Sliding Scale -  SQ   Not Given





  ACHS [facility]   





     





     





  Protocol   





     


 


Insulin Aspart  9 units  10/23/19 08:49  





  Novolog Vial  SQ   





  ACLD MARY   





     





     





     





     


 


Insulin Detemir  38 units  10/23/19 08:49  





  Levemir Vial  SQ   





  AM MARY   





     





     





     





     


 


Nebivolol  2.5 mg  10/07/19 22:00  10/22/19 21:21





  Bystolic -  PO   2.5 mg





  HS M[facility]   Administration





     





     





     





     


 


Nystatin  1 applic  10/10/19 11:30  10/23/19 11:10





  Nystop Powder -  TP   1 applic





  BID MARY   Administration





     





     





     





     


 


Ondansetron HCl  4 mg  10/18/19 17:01  





  Zofran Injection  IVPUSH   





  Q6H PRN   





  NAUSEA AND/OR VOMITING   





     





     





     


 


Tamsulosin HCl  0.4 mg  10/16/19 08:45  10/23/19 11:08





  Flomax -  PO   0.4 mg





  BID@0830,2200 [facility]   Administration





     





     





     





     














Impression


1. DEB


2. DKA


3. hypernatremia


4. DM


5. dementia


6. cad


7. hyperkalemia





Plan


- urology input appreciated


- no new labs


- outpt follow up, spoke to family


- discussed diet with family as well


- diabetic diet

## 2019-10-24 LAB
DEPRECATED RDW RBC AUTO: 15.7 % (ref 11.9–15.9)
HCT VFR BLD CALC: 30.7 % (ref 35.4–49)
HGB BLD-MCNC: 9.9 GM/DL (ref 11.7–16.9)
MCH RBC QN AUTO: 28.9 PG (ref 25.7–33.7)
MCHC RBC AUTO-ENTMCNC: 32.1 G/DL (ref 32–35.9)
MCV RBC: 90 FL (ref 80–96)
PLATELET # BLD AUTO: 236 K/MM3 (ref 134–434)
PMV BLD: 7.8 FL (ref 7.5–11.1)
RBC # BLD AUTO: 3.41 M/MM3 (ref 4–5.6)
WBC # BLD AUTO: 7.9 K/MM3 (ref 4–10)

## 2019-10-24 RX ADMIN — INSULIN ASPART SCH UNITS: 100 INJECTION, SOLUTION INTRAVENOUS; SUBCUTANEOUS at 11:58

## 2019-10-24 RX ADMIN — NYSTATIN SCH APPLIC: 100000 POWDER TOPICAL at 23:00

## 2019-10-24 RX ADMIN — EZETIMIBE SCH MG: 10 TABLET ORAL at 23:00

## 2019-10-24 RX ADMIN — ESCITALOPRAM OXALATE SCH MG: 10 TABLET, FILM COATED ORAL at 09:25

## 2019-10-24 RX ADMIN — INSULIN ASPART SCH UNITS: 100 INJECTION, SOLUTION INTRAVENOUS; SUBCUTANEOUS at 06:13

## 2019-10-24 RX ADMIN — FINASTERIDE SCH MG: 5 TABLET, FILM COATED ORAL at 09:25

## 2019-10-24 RX ADMIN — TAMSULOSIN HYDROCHLORIDE SCH MG: 0.4 CAPSULE ORAL at 09:26

## 2019-10-24 RX ADMIN — FERROUS SULFATE TAB EC 324 MG (65 MG FE EQUIVALENT) SCH MG: 324 (65 FE) TABLET DELAYED RESPONSE at 09:26

## 2019-10-24 RX ADMIN — INSULIN ASPART SCH: 100 INJECTION, SOLUTION INTRAVENOUS; SUBCUTANEOUS at 23:00

## 2019-10-24 RX ADMIN — ATORVASTATIN CALCIUM SCH MG: 40 TABLET, FILM COATED ORAL at 23:00

## 2019-10-24 RX ADMIN — FERROUS SULFATE TAB EC 324 MG (65 MG FE EQUIVALENT) SCH MG: 324 (65 FE) TABLET DELAYED RESPONSE at 17:25

## 2019-10-24 RX ADMIN — DONEPEZIL HYDROCHLORIDE SCH MG: 10 TABLET, FILM COATED ORAL at 09:26

## 2019-10-24 RX ADMIN — INSULIN ASPART SCH UNITS: 100 INJECTION, SOLUTION INTRAVENOUS; SUBCUTANEOUS at 11:59

## 2019-10-24 RX ADMIN — TAMSULOSIN HYDROCHLORIDE SCH MG: 0.4 CAPSULE ORAL at 23:00

## 2019-10-24 RX ADMIN — NEBIVOLOL HYDROCHLORIDE SCH MG: 2.5 TABLET ORAL at 23:00

## 2019-10-24 RX ADMIN — INSULIN ASPART SCH UNITS: 100 INJECTION, SOLUTION INTRAVENOUS; SUBCUTANEOUS at 17:25

## 2019-10-24 RX ADMIN — INSULIN DETEMIR SCH UNITS: 100 INJECTION, SOLUTION SUBCUTANEOUS at 06:13

## 2019-10-24 RX ADMIN — INSULIN ASPART SCH: 100 INJECTION, SOLUTION INTRAVENOUS; SUBCUTANEOUS at 17:26

## 2019-10-24 RX ADMIN — NYSTATIN SCH APPLIC: 100000 POWDER TOPICAL at 09:32

## 2019-10-24 NOTE — PN
Progress Note, Physician


History of Present Illness: 





Pt seen and examined at bedside. He still has hematuria. 





- Current Medication List


Current Medications: 


Active Medications





Acetaminophen (Tylenol -)  650 mg PO Q4H PRN


   PRN Reason: FEVER


   Last Admin: 10/22/19 21:20 Dose:  650 mg


Atorvastatin Calcium (Lipitor -)  40 mg PO HS [facility]


   Last Admin: 10/23/19 21:51 Dose:  40 mg


Donepezil HCl (Aricept -)  10 mg PO DAILY [facility]


   Last Admin: 10/24/19 09:26 Dose:  10 mg


Ezetimibe (Zetia -)  10 mg PO HS [facility]


   Last Admin: 10/23/19 21:51 Dose:  10 mg


Escitalopram Oxalate (Lexapro -)  10 mg PO DAILY [facility]


   Last Admin: 10/24/19 09:25 Dose:  10 mg


Ferrous Sulfate (Feosol -)  325 mg PO BIDWM [facility]


   Last Admin: 10/24/19 09:26 Dose:  325 mg


Finasteride (Proscar -)  5 mg PO DAILY [facility]


   Last Admin: 10/24/19 09:25 Dose:  5 mg


Insulin Aspart (Novolog Vial)  5 units SQ ACLD [facility]


   Last Admin: 10/24/19 11:58 Dose:  5 units


Insulin Aspart (Novolog Vial Sliding Scale -)  1 vial SQ ACHS [facility]; Protocol


   Last Admin: 10/24/19 11:59 Dose:  10 units


Insulin Detemir (Levemir Vial)  38 units SQ AM [facility]


   Last Admin: 10/24/19 06:13 Dose:  38 units


Nebivolol (Bystolic -)  2.5 mg PO HS [facility]


   Last Admin: 10/23/19 21:51 Dose:  2.5 mg


Nystatin (Nystop Powder -)  1 applic TP BID [facility]


   Last Admin: 10/24/19 09:32 Dose:  1 applic


Ondansetron HCl (Zofran Injection)  4 mg IVPUSH Q6H PRN


   PRN Reason: NAUSEA AND/OR VOMITING


Tamsulosin HCl (Flomax -)  0.4 mg PO BID@0830,2200 [facility]


   Last Admin: 10/24/19 09:26 Dose:  0.4 mg











- Objective


Vital Signs: 


 Vital Signs











Temperature  97.8 F   10/24/19 14:38


 


Pulse Rate  65   10/24/19 14:38


 


Respiratory Rate  20   10/24/19 14:38


 


Blood Pressure  121/84   10/24/19 14:38


 


O2 Sat by Pulse Oximetry (%)  97   10/24/19 09:00











Constitutional: Yes: Calm


HENT: Yes: Atraumatic


Neck: Yes: Supple


Cardiovascular: Yes: S1, S2


Respiratory: Yes: CTA Bilaterally


Gastrointestinal: Yes: Soft


Genitourinary: Yes: Mckeon Present, Hematuria


Musculoskeletal: Yes: Muscle Weakness


Edema: No


Neurological: Yes: Confusion


Labs: 


 CBC, BMP





 10/24/19 12:00 





 10/22/19 06:30 





 INR, PTT











INR  1.42  (0.83-1.09)  H  10/19/19  06:38    














Problem List





- Problems


(1) DEB (acute kidney injury)


Code(s): N17.9 - ACUTE KIDNEY FAILURE, UNSPECIFIED   





(2) DKA (diabetic ketoacidoses)


Code(s): E11.10 - TYPE 2 DIABETES MELLITUS WITH KETOACIDOSIS WITHOUT COMA   


Qualifiers: 


   Diabetes mellitus type: type 2   Diabetes mellitus complication detail: 

without coma   Qualified Code(s): E11.10 - Type 2 diabetes mellitus with 

ketoacidosis without coma   





(3) Acute renal failure


Code(s): N17.9 - ACUTE KIDNEY FAILURE, UNSPECIFIED   





(4) Hypernatremia


Code(s): E87.0 - HYPEROSMOLALITY AND HYPERNATREMIA   





Assessment/Plan


 Current Medications











Generic Name Dose Route Start Last Admin





  Trade Name Freq  PRN Reason Stop Dose Admin


 


Acetaminophen  650 mg  10/09/19 10:34  10/22/19 21:20





  Tylenol -  PO   650 mg





  Q4H PRN   Administration





  FEVER   





     





     





     


 


Atorvastatin Calcium  40 mg  10/07/19 22:00  10/23/19 21:51





  Lipitor -  PO   40 mg





  HS [facility]   Administration





     





     





     





     


 


Donepezil HCl  10 mg  10/16/19 10:00  10/24/19 09:26





  Aricept -  PO   10 mg





  DAILY M[facility]   Administration





     





     





     





     


 


Ezetimibe  10 mg  10/07/19 22:00  10/23/19 21:51





  Zetia -  PO   10 mg





  HS [facility]   Administration





     





     





     





     


 


Escitalopram Oxalate  10 mg  10/16/19 10:00  10/24/19 09:25





  Lexapro -  PO   10 mg





  DAILY [facility]   Administration





     





     





     





     


 


Ferrous Sulfate  325 mg  10/20/19 17:30  10/24/19 09:26





  Feosol -  PO   325 mg





  BIDWM [facility]   Administration





     





     





     





     


 


Finasteride  5 mg  10/16/19 10:00  10/24/19 09:25





  Proscar -  PO   5 mg





  DAILY [facility]   Administration





     





     





     





     


 


Insulin Aspart  5 units  10/24/19 07:03  10/24/19 11:58





  Novolog Vial  SQ   5 units





  ACLD MARY   Administration





     





     





     





     


 


Insulin Aspart  1 vial  10/24/19 07:03  10/24/19 11:59





  Novolog Vial Sliding Scale -  SQ   10 units





  [facility]   Administration





     





     





  Protocol   





     


 


Insulin Detemir  38 units  10/23/19 08:49  10/24/19 06:13





  Levemir Vial  SQ   38 units





  AM M[facility]   Administration





     





     





     





     


 


Nebivolol  2.5 mg  10/07/19 22:00  10/23/19 21:51





  Bystolic -  PO   2.5 mg





  HS M[facility]   Administration





     





     





     





     


 


Nystatin  1 applic  10/10/19 11:30  10/24/19 09:32





  Nystop Powder -  TP   1 applic





  BID MARY   Administration





     





     





     





     


 


Ondansetron HCl  4 mg  10/18/19 17:01  





  Zofran Injection  IVPUSH   





  Q6H PRN   





  NAUSEA AND/OR VOMITING   





     





     





     


 


Tamsulosin HCl  0.4 mg  10/16/19 08:45  10/24/19 09:26





  Flomax -  PO   0.4 mg





  BID@0830,2200 M[facility]   Administration





     





     





     





     














Impression


1. DEB


2. DKA


3. hypernatremia


4. DM


5. dementia


6. cad


7. hyperkalemia


8. hematuria





Plan


- urology follow up for persistent hematuria


- check bmp


- monitor hg


- diabetic diet

## 2019-10-24 NOTE — PN
Progress Note (short form)





- Note


Progress Note: 





UROLOGY NOTE


S/P TUVP on CBI developed clots retention.


 camacho catheter irrigation done and bladder drained.


plan to continue CBI.

## 2019-10-24 NOTE — PN
Progress Note (short form)





- Note


Progress Note: 





HPI: Pt was roby hematuria. Pt remains sitting in bed without any events 

overnight. HPI limited due to clinical condition





PE:


Gen: NAD, awake, alert


HEENT: NC/AT, RUBIO, MMM


Neck: No JVD


LUNG: CTA bilaterally, however poor inspiratory effort, On RA


CARD: RRR no murmurs appreciated


ABD: Soft, Nt/ND, normoactive BS:


: Camacho catheter in place, slight blood from meatus and diaper, free flowing 

camacho with gross hematuria


EXT: No edema appreciated, cap refill <2sec


Skin: No rashes or lesions noted





 CBC, BMP





 10/24/19 12:00 





 10/22/19 06:30 








Active Medications





Acetaminophen (Tylenol -)  650 mg PO Q4H PRN


   PRN Reason: FEVER


   Last Admin: 10/22/19 21:20 Dose:  650 mg


Atorvastatin Calcium (Lipitor -)  40 mg PO HS [facility]


   Last Admin: 10/23/19 21:51 Dose:  40 mg


Donepezil HCl (Aricept -)  10 mg PO DAILY [facility]


   Last Admin: 10/24/19 09:26 Dose:  10 mg


Ezetimibe (Zetia -)  10 mg PO HS [facility]


   Last Admin: 10/23/19 21:51 Dose:  10 mg


Escitalopram Oxalate (Lexapro -)  10 mg PO DAILY [facility]


   Last Admin: 10/24/19 09:25 Dose:  10 mg


Ferrous Sulfate (Feosol -)  325 mg PO BIDWM [facility]


   Last Admin: 10/24/19 09:26 Dose:  325 mg


Finasteride (Proscar -)  5 mg PO DAILY [facility]


   Last Admin: 10/24/19 09:25 Dose:  5 mg


Insulin Aspart (Novolog Vial)  5 units SQ ACLD [facility]


   Last Admin: 10/24/19 11:58 Dose:  5 units


Insulin Aspart (Novolog Vial Sliding Scale -)  1 vial SQ ACHS [facility]; Protocol


   Last Admin: 10/24/19 11:59 Dose:  10 units


Insulin Detemir (Levemir Vial)  38 units SQ AM [facility]


   Last Admin: 10/24/19 06:13 Dose:  38 units


Nebivolol (Bystolic -)  2.5 mg PO HS [facility]


   Last Admin: 10/23/19 21:51 Dose:  2.5 mg


Nystatin (Nystop Powder -)  1 applic TP BID [facility]


   Last Admin: 10/24/19 09:32 Dose:  1 applic


Ondansetron HCl (Zofran Injection)  4 mg IVPUSH Q6H PRN


   PRN Reason: NAUSEA AND/OR VOMITING


Tamsulosin HCl (Flomax -)  0.4 mg PO BID@0830,2200 [facility]


   Last Admin: 10/24/19 09:26 Dose:  0.4 mg





Assessment and Plan:


DKA (resolved)


Hypernatremia (resolved)


Acute kidney injury (Resolved)


Type 2 DM


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


Hematuria





--Left message for Dr. Durán to be recalled for hematuria; provided number for 

him to call me back


--CBC stat for f/u regarding pt's H/H


--Continue glycemic control:


   Levemir 38U AM


   Novolog scheduled 5U ACLD


   ISS for breakthrough


   BGM ACHS


--If camacho stops draining regularly will attempt to flush


--Discussed with Dr. Stokes and Dr. Greene how even trial of LP or  shunting 

not viable for patient's NPH


--Contnue Flomax 0.4mg BID PO


--Continue Proscar 5mg qdaily


--Lexapro 10mg and Aricept 10mg qdaily





FEN:


   Fluids: PO encouragement


   Nutrition:





PPX:


   DVT - scds





Dispo: Awaiting urology f/u


Case discussed with Dr. Tracy Beal, DO - IM PGY-3





<Collins Beal - Last Filed: 10/24/19 16:44>





- Note


Progress Note: 








Seen and examined; agree with resident note aside from as supplemented below by 

myself.  Independently reviewed all labs, vitals, and diagnostics.  In addition 

I verified all key historic and PE findings. 





No new complaints; no agitation, hematuria returned so checking CBC and will 

call Dr. Durán to discuss.  Will have team call and update son. 


He remains stable; we continue to titrate his insulin.  Since he stopped taking 

sweets from family increased glycemic control.


Yesterday the son told me that he was concerned about the comment that NPH was 

nonoperative from an inpatient standpoint; I assured him that I would speak 

with neurosurgery to confirm.  I spoke with Dr. Greene who declined inpatient 

consultation but informed me that the correct treatment indeed would be a trial 

LP; resident team did discuss with Dr. Stokes who agreed and stated that the 

procedure would be deferred inpatient.  The patient's son was informed and I 

defer to the expertise of the consulting specialists.





Overall, the hematuria continues to keep the patient inpatient. 





10 sys ROS done and negative aside from HPI





NAD, AAO, resting in bed


NC AT EOMI PERRLA


Neurologically unchanged; wont participate in full exam but CN2-12 wnl, moves 

all 4 ext with normal sensory


NT ND +BS


HR wnl, no fnd


Lungs CTAB, w/ sym exp


Skin without rashes or breakdown


Not agitated, cannot do full psych exam, restricted insight and judgment 

secondary to clinical condition





1) DKA 2/2 issues with managing insulin pump, resolved-DC on SQ insulin


2) Uncontrolled DM -Continually adjusting insulin.  Complete control has proven 

difficult with this patient but is preferable to the ongoing issue with pump.  

No further input from endo noted.  Lowering premeal today due to slight hypo.


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  

Can discuss therapeutic tap as needed but per neuro likely should be done OP.  

Will continue to monitor.  


4) Acute Cystitis with Hematuria vs. Roby Hematuria- Abx per ID; continue to 

monitor.  Ongoing trauma 2/2 underlying dementia vs. infectious origin.


5) Urinary Obstruction - Continue tamsulosin and finasteride.


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 

secondary to diabetes mellitus being so uncontrolled.  We will continue to 

monitor.  He should follow-up with his nephrologist upon discharge.  


7) Hx CAD s/p CABG


   *There is no documented history of any recent stents, etc. etc. that would 

necessitate the absolute use of Plavix.  However the patient's hemoglobin went 

from 9-10, which does not endorse any worsening bleeding.  Due to this, I will 

elect to continue it from now, and I will speak with resident team about 

obtaining more records so we can elucidate the patient's full cardiac history 

and discontinue this medication if indeed is not indicated.  He is not having 

any chest pain or atypical cardiac symptoms are apparent to myself or nursing.


8) Underlying Dementia (documented as Alzheimer's Type)


   *Worsened by NPH and likely toxic metabolic encephalopathy due to the acute 

issues.  Son tells me that he transfers and ambulates on his own. 


9) Hx D-CHF


10) Lactic Acidosis, resolved


11) Hypernatremia, resolved


12) Hx HTN


13) Hx HLD


14) Hx COPD, no acute exacerbation








<Tiburcio Molina - Last Filed: 10/25/19 07:39>

## 2019-10-24 NOTE — PN
Progress Note, Physician


History of Present Illness: 





hematuria


urology to have a look





- Current Medication List


Current Medications: 


Active Medications





Acetaminophen (Tylenol -)  650 mg PO Q4H PRN


   PRN Reason: FEVER


   Last Admin: 10/22/19 21:20 Dose:  650 mg


Atorvastatin Calcium (Lipitor -)  40 mg PO HS [facility]


   Last Admin: 10/23/19 21:51 Dose:  40 mg


Donepezil HCl (Aricept -)  10 mg PO DAILY [facility]


   Last Admin: 10/24/19 09:26 Dose:  10 mg


Ezetimibe (Zetia -)  10 mg PO HS [facility]


   Last Admin: 10/23/19 21:51 Dose:  10 mg


Escitalopram Oxalate (Lexapro -)  10 mg PO DAILY [facility]


   Last Admin: 10/24/19 09:25 Dose:  10 mg


Ferrous Sulfate (Feosol -)  325 mg PO BIDWM [facility]


   Last Admin: 10/24/19 09:26 Dose:  325 mg


Finasteride (Proscar -)  5 mg PO DAILY [facility]


   Last Admin: 10/24/19 09:25 Dose:  5 mg


Insulin Aspart (Novolog Vial)  5 units SQ ACLD [facility]


Insulin Aspart (Novolog Vial Sliding Scale -)  1 vial SQ ACHS [facility]; Protocol


Insulin Detemir (Levemir Vial)  38 units SQ AM [facility]


   Last Admin: 10/24/19 06:13 Dose:  38 units


Nebivolol (Bystolic -)  2.5 mg PO HS [facility]


   Last Admin: 10/23/19 21:51 Dose:  2.5 mg


Nystatin (Nystop Powder -)  1 applic TP BID [facility]


   Last Admin: 10/24/19 09:32 Dose:  1 applic


Ondansetron HCl (Zofran Injection)  4 mg IVPUSH Q6H PRN


   PRN Reason: NAUSEA AND/OR VOMITING


Tamsulosin HCl (Flomax -)  0.4 mg PO BID@0830,2200 [facility]


   Last Admin: 10/24/19 09:26 Dose:  0.4 mg











- Objective


Vital Signs: 


 Vital Signs











Temperature  97.7 F   10/24/19 09:37


 


Pulse Rate  75   10/24/19 09:37


 


Respiratory Rate  18   10/24/19 09:37


 


Blood Pressure  139/53 L  10/24/19 09:37


 


O2 Sat by Pulse Oximetry (%)  97   10/23/19 21:00











Constitutional: Yes: No Distress, Calm


Cardiovascular: Yes: S1, S2


Respiratory: Yes: Regular, CTA Bilaterally


Gastrointestinal: Yes: Normal Bowel Sounds, Soft


Genitourinary: Yes: Mckeon Present


Musculoskeletal: Yes: WNL


Extremities: Yes: WNL


Labs: 


 CBC, BMP





 10/22/19 06:30 





 10/22/19 06:30 





 INR, PTT











INR  1.42  (0.83-1.09)  H  10/19/19  06:38    














Assessment/Plan





79 year old man with a history of HTN, hyperlipidemia, CAD, MI, CABG, chronic 

diastolic heart failure, type 2 DM, stage 3 CKD, COPD, dementia who presented 

to the ED with altered mental status. 





1. Acute metabolic encephalopathy 


2. Acute kidney injury


3. Abdominal pain 


4.  UTI





5. Lactic acidosis


6. Hypernatremia


7. Hypophosphatemia


8. HTN


9. Hyperlipidemia


10. CAD, history of MI, CABG


11. Chronic diastolic heart failure


12. Stage 3 CKD


13. COPD


14. Alzheimer dementia





plan


monitor for hematuria


monitor for her output


urology


rest as per the team

## 2019-10-25 LAB
DEPRECATED RDW RBC AUTO: 15.5 % (ref 11.9–15.9)
HCT VFR BLD CALC: 29.9 % (ref 35.4–49)
HGB BLD-MCNC: 9.7 GM/DL (ref 11.7–16.9)
MCH RBC QN AUTO: 29.1 PG (ref 25.7–33.7)
MCHC RBC AUTO-ENTMCNC: 32.4 G/DL (ref 32–35.9)
MCV RBC: 89.6 FL (ref 80–96)
PLATELET # BLD AUTO: 219 K/MM3 (ref 134–434)
PMV BLD: 8.4 FL (ref 7.5–11.1)
RBC # BLD AUTO: 3.34 M/MM3 (ref 4–5.6)
WBC # BLD AUTO: 7.8 K/MM3 (ref 4–10)

## 2019-10-25 RX ADMIN — INSULIN ASPART SCH UNITS: 100 INJECTION, SOLUTION INTRAVENOUS; SUBCUTANEOUS at 12:30

## 2019-10-25 RX ADMIN — FERROUS SULFATE TAB EC 324 MG (65 MG FE EQUIVALENT) SCH MG: 324 (65 FE) TABLET DELAYED RESPONSE at 09:37

## 2019-10-25 RX ADMIN — ESCITALOPRAM OXALATE SCH MG: 10 TABLET, FILM COATED ORAL at 09:37

## 2019-10-25 RX ADMIN — DONEPEZIL HYDROCHLORIDE SCH MG: 10 TABLET, FILM COATED ORAL at 09:37

## 2019-10-25 RX ADMIN — NEBIVOLOL HYDROCHLORIDE SCH MG: 2.5 TABLET ORAL at 23:10

## 2019-10-25 RX ADMIN — INSULIN ASPART SCH UNIT: 100 INJECTION, SOLUTION INTRAVENOUS; SUBCUTANEOUS at 12:31

## 2019-10-25 RX ADMIN — INSULIN ASPART SCH: 100 INJECTION, SOLUTION INTRAVENOUS; SUBCUTANEOUS at 23:11

## 2019-10-25 RX ADMIN — INSULIN ASPART SCH UNIT: 100 INJECTION, SOLUTION INTRAVENOUS; SUBCUTANEOUS at 17:33

## 2019-10-25 RX ADMIN — TAMSULOSIN HYDROCHLORIDE SCH MG: 0.4 CAPSULE ORAL at 23:10

## 2019-10-25 RX ADMIN — FERROUS SULFATE TAB EC 324 MG (65 MG FE EQUIVALENT) SCH MG: 324 (65 FE) TABLET DELAYED RESPONSE at 17:34

## 2019-10-25 RX ADMIN — EZETIMIBE SCH MG: 10 TABLET ORAL at 23:11

## 2019-10-25 RX ADMIN — FINASTERIDE SCH MG: 5 TABLET, FILM COATED ORAL at 09:37

## 2019-10-25 RX ADMIN — INSULIN ASPART SCH: 100 INJECTION, SOLUTION INTRAVENOUS; SUBCUTANEOUS at 06:01

## 2019-10-25 RX ADMIN — ATORVASTATIN CALCIUM SCH MG: 40 TABLET, FILM COATED ORAL at 23:10

## 2019-10-25 RX ADMIN — INSULIN DETEMIR SCH: 100 INJECTION, SOLUTION SUBCUTANEOUS at 07:00

## 2019-10-25 RX ADMIN — TAMSULOSIN HYDROCHLORIDE SCH MG: 0.4 CAPSULE ORAL at 09:37

## 2019-10-25 RX ADMIN — INSULIN DETEMIR SCH: 100 INJECTION, SOLUTION SUBCUTANEOUS at 06:01

## 2019-10-25 RX ADMIN — INSULIN ASPART SCH UNITS: 100 INJECTION, SOLUTION INTRAVENOUS; SUBCUTANEOUS at 17:33

## 2019-10-25 RX ADMIN — NYSTATIN SCH APPLIC: 100000 POWDER TOPICAL at 12:31

## 2019-10-25 RX ADMIN — NYSTATIN SCH APPLIC: 100000 POWDER TOPICAL at 23:11

## 2019-10-25 NOTE — PN
Progress Note, Physician


History of Present Illness: 





Pt seen and examined at bedside. He is now on CBI. He denies shortness of 

breath. 





- Current Medication List


Current Medications: 


Active Medications





Acetaminophen (Tylenol -)  650 mg PO Q4H PRN


   PRN Reason: FEVER


   Last Admin: 10/22/19 21:20 Dose:  650 mg


Atorvastatin Calcium (Lipitor -)  40 mg PO HS [facility]


   Last Admin: 10/24/19 23:00 Dose:  40 mg


Donepezil HCl (Aricept -)  10 mg PO DAILY [facility]


   Last Admin: 10/25/19 09:37 Dose:  10 mg


Ezetimibe (Zetia -)  10 mg PO HS [facility]


   Last Admin: 10/24/19 23:00 Dose:  10 mg


Escitalopram Oxalate (Lexapro -)  10 mg PO DAILY [facility]


   Last Admin: 10/25/19 09:37 Dose:  10 mg


Ferrous Sulfate (Feosol -)  325 mg PO BIDWM [facility]


   Last Admin: 10/25/19 09:37 Dose:  325 mg


Finasteride (Proscar -)  5 mg PO DAILY [facility]


   Last Admin: 10/25/19 09:37 Dose:  5 mg


Insulin Aspart (Novolog Vial Sliding Scale -)  1 vial SQ ACHS [facility]; Protocol


   Last Admin: 10/25/19 12:30 Dose:  8 units


Insulin Aspart (Novolog Vial)  2 units SQ ACLD [facility]


   Last Admin: 10/25/19 12:31 Dose:  2 unit


Insulin Detemir (Levemir Vial)  34 units SQ AM [facility]


   Last Admin: 10/25/19 07:00 Dose:  Not Given


Nebivolol (Bystolic -)  2.5 mg PO HS [facility]


   Last Admin: 10/24/19 23:00 Dose:  2.5 mg


Nystatin (Nystop Powder -)  1 applic TP BID [facility]


   Last Admin: 10/25/19 12:31 Dose:  1 applic


Ondansetron HCl (Zofran Injection)  4 mg IVPUSH Q6H PRN


   PRN Reason: NAUSEA AND/OR VOMITING


Tamsulosin HCl (Flomax -)  0.4 mg PO BID@0830,2200 [facility]


   Last Admin: 10/25/19 09:37 Dose:  0.4 mg











- Objective


Vital Signs: 


 Vital Signs











Temperature  98 F   10/25/19 14:01


 


Pulse Rate  62   10/25/19 14:01


 


Respiratory Rate  20   10/25/19 14:01


 


Blood Pressure  136/67   10/25/19 14:01


 


O2 Sat by Pulse Oximetry (%)  100   10/25/19 09:00











Constitutional: Yes: Calm


Eyes: Yes: Conjunctiva Clear


HENT: Yes: Atraumatic


Cardiovascular: Yes: S1, S2


Respiratory: Yes: CTA Bilaterally


Gastrointestinal: Yes: Soft


Genitourinary: Yes: Other (pt on CBI, hematuria is improved)


Edema: LLE: Trace, RLE: Trace


Neurological: Yes: Confusion


Labs: 


 CBC, BMP





 10/25/19 06:25 





 10/22/19 06:30 





 INR, PTT











INR  1.42  (0.83-1.09)  H  10/19/19  06:38    














Problem List





- Problems


(1) DEB (acute kidney injury)


Code(s): N17.9 - ACUTE KIDNEY FAILURE, UNSPECIFIED   





(2) DKA (diabetic ketoacidoses)


Code(s): E11.10 - TYPE 2 DIABETES MELLITUS WITH KETOACIDOSIS WITHOUT COMA   


Qualifiers: 


   Diabetes mellitus type: type 2   Diabetes mellitus complication detail: 

without coma   Qualified Code(s): E11.10 - Type 2 diabetes mellitus with 

ketoacidosis without coma   





(3) Acute renal failure


Code(s): N17.9 - ACUTE KIDNEY FAILURE, UNSPECIFIED   





(4) Hypernatremia


Code(s): E87.0 - HYPEROSMOLALITY AND HYPERNATREMIA   





Assessment/Plan


 Current Medications











Generic Name Dose Route Start Last Admin





  Trade Name Freq  PRN Reason Stop Dose Admin


 


Acetaminophen  650 mg  10/09/19 10:34  10/22/19 21:20





  Tylenol -  PO   650 mg





  Q4H PRN   Administration





  FEVER   





     





     





     


 


Atorvastatin Calcium  40 mg  10/07/19 22:00  10/24/19 23:00





  Lipitor -  PO   40 mg





  HS [facility]ARY   Administration





     





     





     





     


 


Donepezil HCl  10 mg  10/16/19 10:00  10/25/19 09:37





  Aricept -  PO   10 mg





  DAILY MARY   Administration





     





     





     





     


 


Ezetimibe  10 mg  10/07/19 22:00  10/24/19 23:00





  Zetia -  PO   10 mg





  HS [facility]   Administration





     





     





     





     


 


Escitalopram Oxalate  10 mg  10/16/19 10:00  10/25/19 09:37





  Lexapro -  PO   10 mg





  DAILY MARY   Administration





     





     





     





     


 


Ferrous Sulfate  325 mg  10/20/19 17:30  10/25/19 09:37





  Feosol -  PO   325 mg





  BIDWM [facility]   Administration





     





     





     





     


 


Finasteride  5 mg  10/16/19 10:00  10/25/19 09:37





  Proscar -  PO   5 mg





  DAILY M[facility]   Administration





     





     





     





     


 


Insulin Aspart  1 vial  10/24/19 07:03  10/25/19 12:30





  Novolog Vial Sliding Scale -  SQ   8 units





  [facility]   Administration





     





     





  Protocol   





     


 


Insulin Aspart  2 units  10/25/19 06:21  10/25/19 12:31





  Novolog Vial  SQ   2 unit





  ACLD MARY   Administration





     





     





     





     


 


Insulin Detemir  34 units  10/25/19 06:21  10/25/19 07:00





  Levemir Vial  SQ   Not Given





  AM MARY   





     





     





     





     


 


Nebivolol  2.5 mg  10/07/19 22:00  10/24/19 23:00





  Bystolic -  PO   2.5 mg





  HS [facility]   Administration





     





     





     





     


 


Nystatin  1 applic  10/10/19 11:30  10/25/19 12:31





  Nystop Powder -  TP   1 applic





  BID MARY   Administration





     





     





     





     


 


Ondansetron HCl  4 mg  10/18/19 17:01  





  Zofran Injection  IVPUSH   





  Q6H PRN   





  NAUSEA AND/OR VOMITING   





     





     





     


 


Tamsulosin HCl  0.4 mg  10/16/19 08:45  10/25/19 09:37





  Flomax -  PO   0.4 mg





  BID@0830,2200 [facility]   Administration





     





     





     





     














Impression


1. DEB


2. DKA


3. hypernatremia


4. DM


5. dementia


6. cad


7. hyperkalemia


8. hematuria





Plan


- pt now on CBA


- hematuria is improving


- monitor bmp while on cbi, will order


- urology follow up


- monitor hg


- diabetic diet

## 2019-10-25 NOTE — PN
Progress Note, Physician


History of Present Illness: 





urology note noted


developed clots on cbi


hematuria





- Current Medication List


Current Medications: 


Active Medications





Acetaminophen (Tylenol -)  650 mg PO Q4H PRN


   PRN Reason: FEVER


   Last Admin: 10/22/19 21:20 Dose:  650 mg


Atorvastatin Calcium (Lipitor -)  40 mg PO HS [facility]


   Last Admin: 10/24/19 23:00 Dose:  40 mg


Donepezil HCl (Aricept -)  10 mg PO DAILY [facility]


   Last Admin: 10/25/19 09:37 Dose:  10 mg


Ezetimibe (Zetia -)  10 mg PO HS [facility]


   Last Admin: 10/24/19 23:00 Dose:  10 mg


Escitalopram Oxalate (Lexapro -)  10 mg PO DAILY [facility]


   Last Admin: 10/25/19 09:37 Dose:  10 mg


Ferrous Sulfate (Feosol -)  325 mg PO BIDWM [facility]


   Last Admin: 10/25/19 09:37 Dose:  325 mg


Finasteride (Proscar -)  5 mg PO DAILY [facility]


   Last Admin: 10/25/19 09:37 Dose:  5 mg


Insulin Aspart (Novolog Vial Sliding Scale -)  1 vial SQ ACHS [facility]; Protocol


   Last Admin: 10/25/19 12:30 Dose:  8 units


Insulin Aspart (Novolog Vial)  2 units SQ ACLD [facility]


   Last Admin: 10/25/19 12:31 Dose:  2 unit


Insulin Detemir (Levemir Vial)  34 units SQ AM [facility]


   Last Admin: 10/25/19 07:00 Dose:  Not Given


Nebivolol (Bystolic -)  2.5 mg PO [facility]


   Last Admin: 10/24/19 23:00 Dose:  2.5 mg


Nystatin (Nystop Powder -)  1 applic TP BID [facility]


   Last Admin: 10/25/19 12:31 Dose:  1 applic


Ondansetron HCl (Zofran Injection)  4 mg IVPUSH Q6H PRN


   PRN Reason: NAUSEA AND/OR VOMITING


Tamsulosin HCl (Flomax -)  0.4 mg PO BID@0830,2200 [facility]


   Last Admin: 10/25/19 09:37 Dose:  0.4 mg











- Objective


Vital Signs: 


 Vital Signs











Temperature  98.7 F   10/25/19 05:43


 


Pulse Rate  69   10/25/19 05:43


 


Respiratory Rate  18   10/25/19 05:43


 


Blood Pressure  137/65   10/25/19 05:43


 


O2 Sat by Pulse Oximetry (%)  96   10/24/19 21:00











Constitutional: Yes: No Distress, Calm


Cardiovascular: Yes: S1, S2


Respiratory: Yes: Regular, CTA Bilaterally


Gastrointestinal: Yes: Normal Bowel Sounds, Soft


Genitourinary: Yes: Mckeon Present, Other


Musculoskeletal: Yes: WNL


Extremities: Yes: WNL


Neurological: Yes: Alert, Other


Labs: 


 CBC, BMP





 10/25/19 06:25 





 10/22/19 06:30 





 INR, PTT











INR  1.42  (0.83-1.09)  H  10/19/19  06:38    














Assessment/Plan





79 year old man with a history of HTN, hyperlipidemia, CAD, MI, CABG, chronic 

diastolic heart failure, type 2 DM, stage 3 CKD, COPD, dementia who presented 

to the ED with altered mental status. 





1. Acute metabolic encephalopathy 


2. Acute kidney injury


3. Abdominal pain 


4.  UTI





5. Lactic acidosis


6. Hypernatremia


7. Hypophosphatemia


8. HTN


9. Hyperlipidemia


10. CAD, history of MI, CABG


11. Chronic diastolic heart failure


12. Stage 3 CKD


13. COPD


14. Alzheimer dementia





plan


monitor for hematuria


monitor for her output

## 2019-10-25 NOTE — PN
Progress Note (short form)





- Note


Progress Note: 





HPI: Pt initiated on CBI. Hypoglycemia noted last night and given D50 with 

notable increase. Otherwise HPI limited and rest remains unchanged





PE:


Gen: NAD, awake, alert


HEENT: NC/AT, RUBIO, MMM


Neck: No JVD


LUNG: CTA bilaterally, however poor inspiratory effort, On RA


CARD: RRR no murmurs appreciated


ABD: Soft, Nt/ND, normoactive BS:


: Mckeon catheter in place, no blood near meatus, CBI running at time of exam


EXT: No edema appreciated, cap refill <2sec


Skin: No rashes or lesions noted





 CBC, BMP





 10/25/19 06:25 





 10/22/19 06:30 











Active Medications





Acetaminophen (Tylenol -)  650 mg PO Q4H PRN


   PRN Reason: FEVER


   Last Admin: 10/22/19 21:20 Dose:  650 mg


Atorvastatin Calcium (Lipitor -)  40 mg PO HS [facility]


   Last Admin: 10/24/19 23:00 Dose:  40 mg


Donepezil HCl (Aricept -)  10 mg PO DAILY [facility]


   Last Admin: 10/25/19 09:37 Dose:  10 mg


Ezetimibe (Zetia -)  10 mg PO HS [facility]


   Last Admin: 10/24/19 23:00 Dose:  10 mg


Escitalopram Oxalate (Lexapro -)  10 mg PO DAILY [facility]


   Last Admin: 10/25/19 09:37 Dose:  10 mg


Ferrous Sulfate (Feosol -)  325 mg PO BIDWM [facility]


   Last Admin: 10/25/19 09:37 Dose:  325 mg


Finasteride (Proscar -)  5 mg PO DAILY [facility]


   Last Admin: 10/25/19 09:37 Dose:  5 mg


Insulin Aspart (Novolog Vial Sliding Scale -)  1 vial SQ ACHS [facility]; Protocol


   Last Admin: 10/25/19 12:30 Dose:  8 units


Insulin Aspart (Novolog Vial)  2 units SQ ACLD [facility]


   Last Admin: 10/25/19 12:31 Dose:  2 unit


Insulin Detemir (Levemir Vial)  34 units SQ AM [facility]


   Last Admin: 10/25/19 07:00 Dose:  Not Given


Nebivolol (Bystolic -)  2.5 mg PO HS [facility]


   Last Admin: 10/24/19 23:00 Dose:  2.5 mg


Nystatin (Nystop Powder -)  1 applic TP BID [facility]


   Last Admin: 10/25/19 12:31 Dose:  1 applic


Ondansetron HCl (Zofran Injection)  4 mg IVPUSH Q6H PRN


   PRN Reason: NAUSEA AND/OR VOMITING


Tamsulosin HCl (Flomax -)  0.4 mg PO BID@0830,2200 [facility]


   Last Admin: 10/25/19 09:37 Dose:  0.4 mg











Assessment and Plan:


DKA (resolved)


Hypernatremia (resolved)


Acute kidney injury (Resolved)


Type 2 DM


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


Hematuria





--Urology saw patient and CBI reinitiated at this time


--H/H remains stable


--Notable hypoglycemia during pre-dinner checks; decreased regiment to:


   Levemir 34U AM


   Novolog scheduled 2U ACLD


   ISS for breakthrough


   BGM ACHS


--Discussed with Dr. Stokes and Dr. Greene how even trial of LP or  shunting 

not viable for patient's NPH


--Contnue Flomax 0.4mg BID PO


--Continue Proscar 5mg qdaily


--Lexapro 10mg and Aricept 10mg qdaily





FEN:


   Fluids: PO encouragement





PPX:


   DVT - scds





Dispo: CBI for now


Case discussed with Dr. Tracy Beal, DO - IM PGY-3





<Collins Beal - Last Filed: 10/25/19 16:59>





- Note


Progress Note: 





Seen and examined; agree with resident note aside from as supplemented below by 

myself.  Independently reviewed all labs, vitals, and diagnostics.  In addition 

I verified all key historic and PE findings. 








Unchanged; no issues overnight.  Hematuria recurred despite plans for DC.  He 

will likely be kept inpatient though the weekend pending further urologic 

intervention.  Spoke with resident team at length regarding need to determine 

with urology definitive meaures.  We are holding AC with SCDs for DVT ppx (

plavix can be held given acute significant bleed and no recent stents).  Will 

continue to monitor and redirect.  No issues with agitation as Zyprexa has 

continued to have a positive effect.  Overall will monitor inpatient for the 

weekend and discuss DC with consulting providers.





10 sys ROS done and negative aside from HPI





NAD, AAO, resting in bed


NC AT EOMI PERRLA


Neurologically unchanged; wont participate in full exam but CN2-12 wnl, moves 

all 4 ext with normal sensory


NT ND +BS


HR wnl, no fnd


Lungs CTAB, w/ sym exp


Skin without rashes or breakdown


Not agitated, cannot do full psych exam, restricted insight and judgment 

secondary to clinical condition





1) DKA 2/2 issues with managing insulin pump, resolved-DC on SQ insulin


2) Uncontrolled DM -Continually adjusting insulin.  Complete control has proven 

difficult with this patient but is preferable to the ongoing issue with pump.  

No further input from endo noted.  


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  

Can discuss therapeutic tap as needed but per neuro likely should be done OP.  

Will continue to monitor.  


4) Acute Cystitis with Hematuria vs. Alexis Hematuria- Abx per ID; continue to 

monitor.  Ongoing trauma 2/2 underlying dementia vs. infectious origin.


5) Urinary Obstruction - Continue tamsulosin and finasteride.


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 

secondary to diabetes mellitus being so uncontrolled.  We will continue to 

monitor.  He should follow-up with his nephrologist upon discharge.  


7) Hx CAD s/p CABG


8) Underlying Dementia (documented as Alzheimer's Type)


9) Hx D-CHF


10) Lactic Acidosis, resolved


11) Hypernatremia, resolved


12) Hx HTN


13) Hx HLD


14) Hx COPD, no acute exacerbation





Discussed with neuro/nsgy regarding the NPH-no inpatient intervention per 

Arturo WALSH/Jc WALSH


Continue to discuss hematuria issue with urology; will need definitive plan as 

requiring intermittent CBI.


Continue monitoring sugars and adjusting; has helped since family has been 

prohibited from bringing meals.





Full Code





<Tiburcio Molina - Last Filed: 10/28/19 11:57>

## 2019-10-26 LAB
ANION GAP SERPL CALC-SCNC: 7 MMOL/L (ref 8–16)
BUN SERPL-MCNC: 15.3 MG/DL (ref 7–18)
CALCIUM SERPL-MCNC: 8.7 MG/DL (ref 8.5–10.1)
CHLORIDE SERPL-SCNC: 107 MMOL/L (ref 98–107)
CO2 SERPL-SCNC: 27 MMOL/L (ref 21–32)
CREAT SERPL-MCNC: 0.9 MG/DL (ref 0.55–1.3)
DEPRECATED RDW RBC AUTO: 15.7 % (ref 11.9–15.9)
GLUCOSE SERPL-MCNC: 172 MG/DL (ref 74–106)
HCT VFR BLD CALC: 29.9 % (ref 35.4–49)
HGB BLD-MCNC: 9.7 GM/DL (ref 11.7–16.9)
MAGNESIUM SERPL-MCNC: 2.5 MG/DL (ref 1.8–2.4)
MCH RBC QN AUTO: 29.3 PG (ref 25.7–33.7)
MCHC RBC AUTO-ENTMCNC: 32.5 G/DL (ref 32–35.9)
MCV RBC: 90.2 FL (ref 80–96)
PLATELET # BLD AUTO: 220 K/MM3 (ref 134–434)
PMV BLD: 7.8 FL (ref 7.5–11.1)
POTASSIUM SERPLBLD-SCNC: 4.9 MMOL/L (ref 3.5–5.1)
RBC # BLD AUTO: 3.31 M/MM3 (ref 4–5.6)
SODIUM SERPL-SCNC: 141 MMOL/L (ref 136–145)
WBC # BLD AUTO: 8.5 K/MM3 (ref 4–10)

## 2019-10-26 RX ADMIN — FERROUS SULFATE TAB EC 324 MG (65 MG FE EQUIVALENT) SCH MG: 324 (65 FE) TABLET DELAYED RESPONSE at 08:45

## 2019-10-26 RX ADMIN — INSULIN ASPART SCH UNITS: 100 INJECTION, SOLUTION INTRAVENOUS; SUBCUTANEOUS at 12:18

## 2019-10-26 RX ADMIN — ATORVASTATIN CALCIUM SCH MG: 40 TABLET, FILM COATED ORAL at 23:04

## 2019-10-26 RX ADMIN — TAMSULOSIN HYDROCHLORIDE SCH MG: 0.4 CAPSULE ORAL at 08:45

## 2019-10-26 RX ADMIN — FERROUS SULFATE TAB EC 324 MG (65 MG FE EQUIVALENT) SCH MG: 324 (65 FE) TABLET DELAYED RESPONSE at 17:52

## 2019-10-26 RX ADMIN — ESCITALOPRAM OXALATE SCH MG: 10 TABLET, FILM COATED ORAL at 09:39

## 2019-10-26 RX ADMIN — TAMSULOSIN HYDROCHLORIDE SCH MG: 0.4 CAPSULE ORAL at 23:03

## 2019-10-26 RX ADMIN — INSULIN ASPART SCH: 100 INJECTION, SOLUTION INTRAVENOUS; SUBCUTANEOUS at 17:52

## 2019-10-26 RX ADMIN — INSULIN ASPART SCH UNIT: 100 INJECTION, SOLUTION INTRAVENOUS; SUBCUTANEOUS at 12:18

## 2019-10-26 RX ADMIN — DONEPEZIL HYDROCHLORIDE SCH MG: 10 TABLET, FILM COATED ORAL at 09:39

## 2019-10-26 RX ADMIN — INSULIN DETEMIR SCH UNITS: 100 INJECTION, SOLUTION SUBCUTANEOUS at 06:27

## 2019-10-26 RX ADMIN — INSULIN ASPART SCH UNITS: 100 INJECTION, SOLUTION INTRAVENOUS; SUBCUTANEOUS at 06:27

## 2019-10-26 RX ADMIN — INSULIN ASPART SCH: 100 INJECTION, SOLUTION INTRAVENOUS; SUBCUTANEOUS at 23:03

## 2019-10-26 RX ADMIN — FINASTERIDE SCH MG: 5 TABLET, FILM COATED ORAL at 09:39

## 2019-10-26 RX ADMIN — NEBIVOLOL HYDROCHLORIDE SCH MG: 2.5 TABLET ORAL at 23:03

## 2019-10-26 RX ADMIN — NYSTATIN SCH APPLIC: 100000 POWDER TOPICAL at 23:04

## 2019-10-26 RX ADMIN — NYSTATIN SCH APPLIC: 100000 POWDER TOPICAL at 09:40

## 2019-10-26 RX ADMIN — EZETIMIBE SCH MG: 10 TABLET ORAL at 23:03

## 2019-10-26 NOTE — PN
Physical Exam: 


SUBJECTIVE: Patient seen and examined


Patient is alert and awake 


no hypoglycemia


no fever and he is on CBI and it is draining 








OBJECTIVE:





 Vital Signs











 Period  Temp  Pulse  Resp  BP Sys/Ramírez  Pulse Ox


 


 Last 24 Hr  97.4 F-98 F  60-69  20-22  121-141/56-82  100











GENERAL: The patient is awake, alert, 


HEAD: Normal with no signs of trauma


NECK: Trachea midline, 


LUNGS: Breath sounds equal, clear to auscultation bilaterally, no wheezes, no 

crackles, no 


accessory muscle use. 


HEART: Regular rate and rhythm, S1, S2 without murmur, rub or gallop.


ABDOMEN: Soft, nontender, nondistended, normoactive bowel sounds, no guarding, 

no 


rebound, no hepatosplenomegaly, no masses.


EXTREMITIES: ch changes in his legs 


NEUROLOGICAL: Cranial nerves II through XII grossly intact. Normal speech, gait 

not 


observed.








 Laboratory Results - last 24 hr











  10/25/19 10/25/19 10/26/19





  16:31 23:08 05:25


 


WBC   


 


RBC   


 


Hgb   


 


Hct   


 


MCV   


 


MCH   


 


MCHC   


 


RDW   


 


Plt Count   


 


MPV   


 


Sodium    141


 


Potassium    4.9


 


Chloride    107


 


Carbon Dioxide    27


 


Anion Gap    7 L


 


BUN    15.3


 


Creatinine    0.9


 


Est GFR (CKD-EPI)AfAm    93.82


 


Est GFR (CKD-EPI)NonAf    80.95


 


POC Glucometer  222  138 


 


Random Glucose    172 H


 


Calcium    8.7


 


Magnesium    2.5 H














  10/26/19 10/26/19 10/26/19





  05:25 05:26 11:43


 


WBC  8.5  


 


RBC  3.31 L  


 


Hgb  9.7 L  


 


Hct  29.9 L  


 


MCV  90.2  


 


MCH  29.3  


 


MCHC  32.5  


 


RDW  15.7  


 


Plt Count  220  


 


MPV  7.8  


 


Sodium   


 


Potassium   


 


Chloride   


 


Carbon Dioxide   


 


Anion Gap   


 


BUN   


 


Creatinine   


 


Est GFR (CKD-EPI)AfAm   


 


Est GFR (CKD-EPI)NonAf   


 


POC Glucometer   167  235


 


Random Glucose   


 


Calcium   


 


Magnesium   








Active Medications











Generic Name Dose Route Start Last Admin





  Trade Name Freq  PRN Reason Stop Dose Admin


 


Acetaminophen  650 mg  10/09/19 10:34  10/22/19 21:20





  Tylenol -  PO   650 mg





  Q4H PRN   Administration





  FEVER   





     





     





     


 


Atorvastatin Calcium  40 mg  10/07/19 22:00  10/25/19 23:10





  Lipitor -  PO   40 mg





  HS MARY   Administration





     





     





     





     


 


Donepezil HCl  10 mg  10/16/19 10:00  10/26/19 09:39





  Aricept -  PO   10 mg





  DAILY MARY   Administration





     





     





     





     


 


Ezetimibe  10 mg  10/07/19 22:00  10/25/19 23:11





  Zetia -  PO   10 mg





  HS [facility]   Administration





     





     





     





     


 


Escitalopram Oxalate  10 mg  10/16/19 10:00  10/26/19 09:39





  Lexapro -  PO   10 mg





  DAILY MARY   Administration





     





     





     





     


 


Ferrous Sulfate  325 mg  10/20/19 17:30  10/26/19 08:45





  Feosol -  PO   325 mg





  BIDWM [facility]   Administration





     





     





     





     


 


Finasteride  5 mg  10/16/19 10:00  10/26/19 09:39





  Proscar -  PO   5 mg





  DAILY M[facility]   Administration





     





     





     





     


 


Insulin Aspart  1 vial  10/24/19 07:03  10/26/19 06:27





  Novolog Vial Sliding Scale -  SQ   4 units





  [facility]   Administration





     





     





  Protocol   





     


 


Insulin Aspart  2 units  10/25/19 06:21  10/25/19 17:33





  Novolog Vial  SQ   2 unit





  ACLD [facility]   Administration





     





     





     





     


 


Insulin Detemir  34 units  10/25/19 06:21  10/26/19 06:27





  Levemir Vial  SQ   34 units





  AM M[facility]   Administration





     





     





     





     


 


Nebivolol  2.5 mg  10/07/19 22:00  10/25/19 23:10





  Bystolic -  PO   2.5 mg





  HS [facility]   Administration





     





     





     





     


 


Nystatin  1 applic  10/10/19 11:30  10/26/19 09:40





  Nystop Powder -  TP   1 applic





  BID MARY   Administration





     





     





     





     


 


Ondansetron HCl  4 mg  10/18/19 17:01  





  Zofran Injection  IVPUSH   





  Q6H PRN   





  NAUSEA AND/OR VOMITING   





     





     





     


 


Tamsulosin HCl  0.4 mg  10/16/19 08:45  10/26/19 08:45





  Flomax -  PO   0.4 mg





  BID@0830,2200 [facility]   Administration





     





     





     





     











ASSESSMENT/PLAN:


DKA (resolved)





Hypernatremia and sodium is normal 





Acute kidney injury now normal creatinine 





Type 2 DM


--Notable hypoglycemia during pre-dinner checks; decreased regiment to:


   Levemir 34U AM


   Novolog scheduled 2U ACLD


   ISS for breakthrough


   BGM ACHS


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


continue demenita meds.


Hematuria and BPH 


--Urology saw patient and CBI  at this time


--H/H remains stable


--Contnue Flomax 0.4mg BID PO


--Continue Proscar 5mg qdaily


--Lexapro 10mg and Aricept 10mg qdaily





FEN:


   Fluids: PO encouragement





PPX:


   DVT - scds








Visit type





- Emergency Visit


Emergency Visit: Yes


ED Registration Date: 10/06/19


Care time: The patient presented to the Emergency Department on the above date 

and was hospitalized for further evaluation of their emergent condition.





- New Patient


This patient is new to me today: Yes


Date on this admission: 10/26/19





- Critical Care


Critical Care patient: No





- Discharge Referral


Referred to [facility] No

## 2019-10-26 NOTE — PN
Progress Note, Physician


History of Present Illness: 





Pt seen and examined at bedside. He appears comfortable. 





- Current Medication List


Current Medications: 


Active Medications





Acetaminophen (Tylenol -)  650 mg PO Q4H PRN


   PRN Reason: FEVER


   Last Admin: 10/22/19 21:20 Dose:  650 mg


Atorvastatin Calcium (Lipitor -)  40 mg PO HS [facility]


   Last Admin: 10/25/19 23:10 Dose:  40 mg


Donepezil HCl (Aricept -)  10 mg PO DAILY [facility]


   Last Admin: 10/26/19 09:39 Dose:  10 mg


Ezetimibe (Zetia -)  10 mg PO HS [facility]


   Last Admin: 10/25/19 23:11 Dose:  10 mg


Escitalopram Oxalate (Lexapro -)  10 mg PO DAILY [facility]


   Last Admin: 10/26/19 09:39 Dose:  10 mg


Ferrous Sulfate (Feosol -)  325 mg PO BIDWM [facility]


   Last Admin: 10/26/19 08:45 Dose:  325 mg


Finasteride (Proscar -)  5 mg PO DAILY [facility]


   Last Admin: 10/26/19 09:39 Dose:  5 mg


Insulin Aspart (Novolog Vial Sliding Scale -)  1 vial SQ ACHS [facility]; Protocol


   Last Admin: 10/26/19 12:18 Dose:  6 units


Insulin Aspart (Novolog Vial)  2 units SQ ACLD [facility]


   Last Admin: 10/26/19 12:18 Dose:  2 unit


Insulin Detemir (Levemir Vial)  34 units SQ AM [facility]


   Last Admin: 10/26/19 06:27 Dose:  34 units


Nebivolol (Bystolic -)  2.5 mg PO HS [facility]


   Last Admin: 10/25/19 23:10 Dose:  2.5 mg


Nystatin (Nystop Powder -)  1 applic TP BID [facility]


   Last Admin: 10/26/19 09:40 Dose:  1 applic


Ondansetron HCl (Zofran Injection)  4 mg IVPUSH Q6H PRN


   PRN Reason: NAUSEA AND/OR VOMITING


Tamsulosin HCl (Flomax -)  0.4 mg PO BID@0830,2200 [facility]


   Last Admin: 10/26/19 08:45 Dose:  0.4 mg











- Objective


Vital Signs: 


 Vital Signs











Temperature  97.4 F L  10/26/19 14:58


 


Pulse Rate  65   10/26/19 14:58


 


Respiratory Rate  18   10/26/19 14:58


 


Blood Pressure  116/48 L  10/26/19 14:58


 


O2 Sat by Pulse Oximetry (%)  98   10/26/19 09:00











Constitutional: Yes: Calm


Eyes: Yes: Conjunctiva Clear


HENT: Yes: Atraumatic


Neck: Yes: Supple


Cardiovascular: Yes: S1, S2


Gastrointestinal: Yes: Soft


Genitourinary: Yes: Other (cbi)


Musculoskeletal: Yes: WNL


Edema: No


Neurological: Yes: Oriented


Labs: 


 CBC, BMP





 10/26/19 05:25 





 10/26/19 05:25 





 INR, PTT











INR  1.42  (0.83-1.09)  H  10/19/19  06:38    














Problem List





- Problems


(1) DEB (acute kidney injury)


Code(s): N17.9 - ACUTE KIDNEY FAILURE, UNSPECIFIED   





(2) DKA (diabetic ketoacidoses)


Code(s): E11.10 - TYPE 2 DIABETES MELLITUS WITH KETOACIDOSIS WITHOUT COMA   


Qualifiers: 


   Diabetes mellitus type: type 2   Diabetes mellitus complication detail: 

without coma   Qualified Code(s): E11.10 - Type 2 diabetes mellitus with 

ketoacidosis without coma   





(3) Acute renal failure


Code(s): N17.9 - ACUTE KIDNEY FAILURE, UNSPECIFIED   





(4) Hypernatremia


Code(s): E87.0 - HYPEROSMOLALITY AND HYPERNATREMIA   





Assessment/Plan


 Current Medications











Generic Name Dose Route Start Last Admin





  Trade Name Freq  PRN Reason Stop Dose Admin


 


Acetaminophen  650 mg  10/09/19 10:34  10/22/19 21:20





  Tylenol -  PO   650 mg





  Q4H PRN   Administration





  FEVER   





     





     





     


 


Atorvastatin Calcium  40 mg  10/07/19 22:00  10/25/19 23:10





  Lipitor -  PO   40 mg





  HS MARY   Administration





     





     





     





     


 


Donepezil HCl  10 mg  10/16/19 10:00  10/26/19 09:39





  Aricept -  PO   10 mg





  DAILY MARY   Administration





     





     





     





     


 


Ezetimibe  10 mg  10/07/19 22:00  10/25/19 23:11





  Zetia -  PO   10 mg





  HS MARY   Administration





     





     





     





     


 


Escitalopram Oxalate  10 mg  10/16/19 10:00  10/26/19 09:39





  Lexapro -  PO   10 mg





  DAILY MARY   Administration





     





     





     





     


 


Ferrous Sulfate  325 mg  10/20/19 17:30  10/26/19 08:45





  Feosol -  PO   325 mg





  BIDWM MARY   Administration





     





     





     





     


 


Finasteride  5 mg  10/16/19 10:00  10/26/19 09:39





  Proscar -  PO   5 mg





  DAILY MARY   Administration





     





     





     





     


 


Insulin Aspart  1 vial  10/24/19 07:03  10/26/19 12:18





  Novolog Vial Sliding Scale -  SQ   6 units





  [facility] MARY   Administration





     





     





  Protocol   





     


 


Insulin Aspart  2 units  10/25/19 06:21  10/26/19 12:18





  Novolog Vial  SQ   2 unit





  ACLD MARY   Administration





     





     





     





     


 


Insulin Detemir  34 units  10/25/19 06:21  10/26/19 06:27





  Levemir Vial  SQ   34 units





  AM MARY   Administration





     





     





     





     


 


Nebivolol  2.5 mg  10/07/19 22:00  10/25/19 23:10





  Bystolic -  PO   2.5 mg





  HS MARY   Administration





     





     





     





     


 


Nystatin  1 applic  10/10/19 11:30  10/26/19 09:40





  Nystop Powder -  TP   1 applic





  BID MARY   Administration





     





     





     





     


 


Ondansetron HCl  4 mg  10/18/19 17:01  





  Zofran Injection  IVPUSH   





  Q6H PRN   





  NAUSEA AND/OR VOMITING   





     





     





     


 


Tamsulosin HCl  0.4 mg  10/16/19 08:45  10/26/19 08:45





  Flomax -  PO   0.4 mg





  BID@0830,2200 MARY   Administration





     





     





     





     














Impression


1. DEB


2. DKA


3. hypernatremia


4. DM


5. dementia


6. cad


7. hyperkalemia


8. hematuria





Plan


- pt still on cbi


- check labs daily while on cbi


- hematuria improved


- urology follow up


- monitor hg


- diabetic diet

## 2019-10-26 NOTE — PN
Progress Note, Physician


History of Present Illness: 





Pt is alert, without distress. On CBI now. 





- Current Medication List


Current Medications: 


Active Medications





Acetaminophen (Tylenol -)  650 mg PO Q4H PRN


   PRN Reason: FEVER


   Last Admin: 10/22/19 21:20 Dose:  650 mg


Atorvastatin Calcium (Lipitor -)  40 mg PO HS [facility]


   Last Admin: 10/25/19 23:10 Dose:  40 mg


Donepezil HCl (Aricept -)  10 mg PO DAILY [facility]


   Last Admin: 10/26/19 09:39 Dose:  10 mg


Ezetimibe (Zetia -)  10 mg PO HS [facility]


   Last Admin: 10/25/19 23:11 Dose:  10 mg


Escitalopram Oxalate (Lexapro -)  10 mg PO DAILY [facility]


   Last Admin: 10/26/19 09:39 Dose:  10 mg


Ferrous Sulfate (Feosol -)  325 mg PO BIDWM [facility]


   Last Admin: 10/26/19 08:45 Dose:  325 mg


Finasteride (Proscar -)  5 mg PO DAILY [facility]


   Last Admin: 10/26/19 09:39 Dose:  5 mg


Insulin Aspart (Novolog Vial Sliding Scale -)  1 vial SQ ACHS [facility]; Protocol


   Last Admin: 10/26/19 12:18 Dose:  6 units


Insulin Aspart (Novolog Vial)  2 units SQ ACLD [facility]


   Last Admin: 10/26/19 12:18 Dose:  2 unit


Insulin Detemir (Levemir Vial)  34 units SQ AM [facility]


   Last Admin: 10/26/19 06:27 Dose:  34 units


Nebivolol (Bystolic -)  2.5 mg PO HS [facility]


   Last Admin: 10/25/19 23:10 Dose:  2.5 mg


Nystatin (Nystop Powder -)  1 applic TP BID [facility]


   Last Admin: 10/26/19 09:40 Dose:  1 applic


Ondansetron HCl (Zofran Injection)  4 mg IVPUSH Q6H PRN


   PRN Reason: NAUSEA AND/OR VOMITING


Tamsulosin HCl (Flomax -)  0.4 mg PO BID@0830,2200 [facility]


   Last Admin: 10/26/19 08:45 Dose:  0.4 mg











- Objective


Vital Signs: 


 Vital Signs











Temperature  98.1 F   10/26/19 10:00


 


Pulse Rate  62   10/26/19 10:00


 


Respiratory Rate  19   10/26/19 10:00


 


Blood Pressure  113/97   10/26/19 10:00


 


O2 Sat by Pulse Oximetry (%)  98   10/26/19 09:00











Constitutional: Yes: No Distress


Cardiovascular: Yes: Regular Rate and Rhythm


Respiratory: Yes: Regular


Gastrointestinal: Yes: Normal Bowel Sounds, Soft


Genitourinary: Yes: Other (CBI)


Extremities: Yes: WNL


Labs: 


 CBC, BMP





 10/26/19 05:25 





 10/26/19 05:25 





 INR, PTT











INR  1.42  (0.83-1.09)  H  10/19/19  06:38    








 Microbiology





10/06/19 23:00   Blood - Peripheral Venous   Blood Culture - Final


                            NO GROWTH AFTER 5 DAYS INCUBATION


10/06/19 23:00   Blood - Peripheral Venous   Blood Culture - Final


                            NO GROWTH AFTER 5 DAYS INCUBATION


10/06/19 21:16   Urine - Urine Mckeon   Urine Culture - Final


                            Strep Agalactiae Group B


                            Mr S Aureus


                            Staphylococcus Coagulase Neg











Problem List





- Problems


(1) DEB (acute kidney injury)


Code(s): N17.9 - ACUTE KIDNEY FAILURE, UNSPECIFIED   





(2) Altered mental status


Code(s): R41.82 - ALTERED MENTAL STATUS, UNSPECIFIED   


Qualifiers: 


   Altered mental status type: somnolence   Qualified Code(s): R40.0 - 

Somnolence   





(3) Urinary retention


Code(s): R33.9 - RETENTION OF URINE, UNSPECIFIED   





(4) CAD (coronary artery disease)


Code(s): I25.10 - ATHSCL HEART DISEASE OF NATIVE CORONARY ARTERY W/O ANG PCTRS 

  





(5) CKD stage 3 due to type 2 diabetes mellitus


Code(s): E11.22 - TYPE 2 DIABETES MELLITUS W DIABETIC CHRONIC KIDNEY DISEASE; 

N18.3 - CHRONIC KIDNEY DISEASE, STAGE 3 (MODERATE)   





(6) Hyperlipidemia


Code(s): E78.5 - HYPERLIPIDEMIA, UNSPECIFIED   





(7) Hypertension


Code(s): I10 - ESSENTIAL (PRIMARY) HYPERTENSION   





(8) Type 2 diabetes mellitus


Code(s): E11.9 - TYPE 2 DIABETES MELLITUS WITHOUT COMPLICATIONS   





Assessment/Plan





s/p UTI


DEB


DM


Dementia





-- hematuria, CBI started


-- s/p course of antibiotics


-- afebrile, vitals stable, continue monitor

## 2019-10-27 RX ADMIN — INSULIN ASPART SCH UNITS: 100 INJECTION, SOLUTION INTRAVENOUS; SUBCUTANEOUS at 21:55

## 2019-10-27 RX ADMIN — NEBIVOLOL HYDROCHLORIDE SCH MG: 2.5 TABLET ORAL at 21:38

## 2019-10-27 RX ADMIN — DONEPEZIL HYDROCHLORIDE SCH MG: 10 TABLET, FILM COATED ORAL at 09:19

## 2019-10-27 RX ADMIN — NYSTATIN SCH APPLIC: 100000 POWDER TOPICAL at 09:19

## 2019-10-27 RX ADMIN — ACETAMINOPHEN PRN MG: 325 TABLET ORAL at 13:29

## 2019-10-27 RX ADMIN — INSULIN ASPART SCH UNITS: 100 INJECTION, SOLUTION INTRAVENOUS; SUBCUTANEOUS at 11:40

## 2019-10-27 RX ADMIN — FERROUS SULFATE TAB EC 324 MG (65 MG FE EQUIVALENT) SCH MG: 324 (65 FE) TABLET DELAYED RESPONSE at 16:54

## 2019-10-27 RX ADMIN — INSULIN ASPART SCH: 100 INJECTION, SOLUTION INTRAVENOUS; SUBCUTANEOUS at 17:01

## 2019-10-27 RX ADMIN — TAMSULOSIN HYDROCHLORIDE SCH MG: 0.4 CAPSULE ORAL at 09:18

## 2019-10-27 RX ADMIN — NYSTATIN SCH APPLIC: 100000 POWDER TOPICAL at 21:39

## 2019-10-27 RX ADMIN — INSULIN DETEMIR SCH UNITS: 100 INJECTION, SOLUTION SUBCUTANEOUS at 10:25

## 2019-10-27 RX ADMIN — INSULIN ASPART SCH UNIT: 100 INJECTION, SOLUTION INTRAVENOUS; SUBCUTANEOUS at 11:41

## 2019-10-27 RX ADMIN — FINASTERIDE SCH MG: 5 TABLET, FILM COATED ORAL at 09:18

## 2019-10-27 RX ADMIN — ESCITALOPRAM OXALATE SCH MG: 10 TABLET, FILM COATED ORAL at 09:18

## 2019-10-27 RX ADMIN — TAMSULOSIN HYDROCHLORIDE SCH MG: 0.4 CAPSULE ORAL at 21:39

## 2019-10-27 RX ADMIN — FERROUS SULFATE TAB EC 324 MG (65 MG FE EQUIVALENT) SCH MG: 324 (65 FE) TABLET DELAYED RESPONSE at 09:18

## 2019-10-27 RX ADMIN — INSULIN DETEMIR SCH: 100 INJECTION, SOLUTION SUBCUTANEOUS at 06:12

## 2019-10-27 RX ADMIN — INSULIN ASPART SCH: 100 INJECTION, SOLUTION INTRAVENOUS; SUBCUTANEOUS at 06:13

## 2019-10-27 RX ADMIN — EZETIMIBE SCH MG: 10 TABLET ORAL at 21:39

## 2019-10-27 RX ADMIN — ATORVASTATIN CALCIUM SCH MG: 40 TABLET, FILM COATED ORAL at 21:39

## 2019-10-27 NOTE — PN
Physical Exam: 


SUBJECTIVE: Patient seen and examined


no change and still on cbi and yesterday he has dropped his sugar to 40 and 

given d50 and got better 


today he is alert and awake 





OBJECTIVE:





 Vital Signs











 Period  Temp  Pulse  Resp  BP Sys/Ramírez  Pulse Ox


 


 Last 24 Hr  97.4 F-98.3 F  60-71  18-22  116-148/48-69  96-98











GENERAL: The patient is awake, alert, in no acute distress.


HEAD: Normal with no signs of trauma.





NECK: Trachea midline, full range of motion, supple. 


LUNGS: Breath sounds equal, clear to auscultation bilaterally, no wheezes, no 

crackles, no 


accessory muscle use. 


HEART: Regular rate and rhythm, S1, S2 without murmur, rub or gallop.


ABDOMEN: Soft, nontender, nondistended, normoactive bowel sounds, no guarding, 

no 


rebound, no hepatosplenomegaly, no masses.


EXTREMITIES: 2+ pulses, warm, well-perfused, no edema. 


NEUROLOGICAL: Cranial nerves II through XII grossly intact. 











 Laboratory Results - last 24 hr











  10/26/19 10/26/19 10/26/19





  11:43 16:59 17:01


 


POC Glucometer  235  49  49














  10/26/19 10/26/19 10/27/19





  17:55 23:02 06:03


 


POC Glucometer  111  142  70














  10/27/19





  10:23


 


POC Glucometer  205








Active Medications











Generic Name Dose Route Start Last Admin





  Trade Name Freq  PRN Reason Stop Dose Admin


 


Acetaminophen  650 mg  10/09/19 10:34  10/22/19 21:20





  Tylenol -  PO   650 mg





  Q4H PRN   Administration





  FEVER   





     





     





     


 


Atorvastatin Calcium  40 mg  10/07/19 22:00  10/26/19 23:04





  Lipitor -  PO   40 mg





  HS MARY   Administration





     





     





     





     


 


Donepezil HCl  10 mg  10/16/19 10:00  10/27/19 09:19





  Aricept -  PO   10 mg





  DAILY MARY   Administration





     





     





     





     


 


Ezetimibe  10 mg  10/07/19 22:00  10/26/19 23:03





  Zetia -  PO   10 mg





  HS MARY   Administration





     





     





     





     


 


Escitalopram Oxalate  10 mg  10/16/19 10:00  10/27/19 09:18





  Lexapro -  PO   10 mg





  DAILY MARY   Administration





     





     





     





     


 


Ferrous Sulfate  325 mg  10/20/19 17:30  10/27/19 09:18





  Feosol -  PO   325 mg





  BIDWM MARY   Administration





     





     





     





     


 


Finasteride  5 mg  10/16/19 10:00  10/27/19 09:18





  Proscar -  PO   5 mg





  DAILY MARY   Administration





     





     





     





     


 


Insulin Aspart  1 vial  10/24/19 07:03  10/27/19 06:13





  Novolog Vial Sliding Scale -  SQ   Not Given





  [facility] MARY   





     





     





  Protocol   





     


 


Insulin Aspart  2 units  10/25/19 06:21  10/26/19 17:52





  Novolog Vial  SQ   Not Given





  ACLD MARY   





     





     





     





     


 


Insulin Detemir  28 units  10/27/19 09:00  10/27/19 10:25





  Levemir Vial  SQ   28 units





  AM MARY   Administration





     





     





     





     


 


Nebivolol  2.5 mg  10/07/19 22:00  10/26/19 23:03





  Bystolic -  PO   2.5 mg





  HS MARY   Administration





     





     





     





     


 


Nystatin  1 applic  10/10/19 11:30  10/27/19 09:19





  Nystop Powder -  TP   1 applic





  BID MARY   Administration





     





     





     





     


 


Ondansetron HCl  4 mg  10/18/19 17:01  





  Zofran Injection  IVPUSH   





  Q6H PRN   





  NAUSEA AND/OR VOMITING   





     





     





     


 


Tamsulosin HCl  0.4 mg  10/16/19 08:45  10/27/19 09:18





  Flomax -  PO   0.4 mg





  BID@0830,2200 MARY   Administration





     





     





     





     











ASSESSMENT/PLAN:





Acute kidney injury now normal creatinine 


seen by nephro today 





Type 2 DM


Notable hypoglycemia during pre-dinner checks; decreased regiment to:


   lower Levemir 28 U AM due to hypoglycemia 


   Novolog scheduled 2U ACLD


   ISS for breakthrough


   BGM ACHS


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


continue demenita meds.





Hematuria and BPH 


Urology saw patient and CBI  at this time


H/H remains stable


Contnue Flomax 0.4mg BID PO


Continue Proscar 5mg qdaily


Lexapro 10mg and Aricept 10mg qdaily











Visit type





- Emergency Visit


Emergency Visit: Yes


ED Registration Date: 10/06/19


Care time: The patient presented to the Emergency Department on the above date 

and was hospitalized for further evaluation of their emergent condition.





- New Patient


This patient is new to me today: No





- Critical Care


Critical Care patient: No





- Discharge Referral


Referred to [facility] No

## 2019-10-27 NOTE — PN
Progress Note, Physician


History of Present Illness: 





Pt seen and examined at bedside. He has no complaints. He developed hematuria 

when the CBI is stopped. 





- Current Medication List


Current Medications: 


Active Medications





Acetaminophen (Tylenol -)  650 mg PO Q4H PRN


   PRN Reason: FEVER


   Last Admin: 10/27/19 13:29 Dose:  650 mg


Atorvastatin Calcium (Lipitor -)  40 mg PO HS Counts include 234 beds at the [facility]


   Last Admin: 10/26/19 23:04 Dose:  40 mg


Donepezil HCl (Aricept -)  10 mg PO DAILY Counts include 234 beds at the [facility]


   Last Admin: 10/27/19 09:19 Dose:  10 mg


Ezetimibe (Zetia -)  10 mg PO HS Counts include 234 beds at the [facility]


   Last Admin: 10/26/19 23:03 Dose:  10 mg


Escitalopram Oxalate (Lexapro -)  10 mg PO DAILY Counts include 234 beds at the [facility]


   Last Admin: 10/27/19 09:18 Dose:  10 mg


Ferrous Sulfate (Feosol -)  325 mg PO BIDWM Counts include 234 beds at the [facility]


   Last Admin: 10/27/19 16:54 Dose:  325 mg


Finasteride (Proscar -)  5 mg PO DAILY Counts include 234 beds at the [facility]


   Last Admin: 10/27/19 09:18 Dose:  5 mg


Insulin Aspart (Novolog Vial Sliding Scale -)  1 vial SQ ACHS Counts include 234 beds at the [facility]; Protocol


   Last Admin: 10/27/19 17:01 Dose:  Not Given


Insulin Aspart (Novolog Vial)  2 units SQ ACLD Counts include 234 beds at the [facility]


   Last Admin: 10/27/19 17:01 Dose:  Not Given


Insulin Detemir (Levemir Vial)  28 units SQ AM Counts include 234 beds at the [facility]


   Last Admin: 10/27/19 10:25 Dose:  28 units


Nebivolol (Bystolic -)  2.5 mg PO HS Counts include 234 beds at the [facility]


   Last Admin: 10/26/19 23:03 Dose:  2.5 mg


Nystatin (Nystop Powder -)  1 applic TP BID Counts include 234 beds at the [facility]


   Last Admin: 10/27/19 09:19 Dose:  1 applic


Ondansetron HCl (Zofran Injection)  4 mg IVPUSH Q6H PRN


   PRN Reason: NAUSEA AND/OR VOMITING


Tamsulosin HCl (Flomax -)  0.4 mg PO BID@0830,2200 Counts include 234 beds at the [facility]


   Last Admin: 10/27/19 09:18 Dose:  0.4 mg











- Objective


Vital Signs: 


 Vital Signs











Temperature  97.3 F L  10/27/19 14:03


 


Pulse Rate  65   10/27/19 14:03


 


Respiratory Rate  20   10/27/19 14:03


 


Blood Pressure  142/66   10/27/19 14:03


 


O2 Sat by Pulse Oximetry (%)  96   10/27/19 09:00











Constitutional: Yes: Calm


HENT: Yes: Atraumatic


Neck: Yes: Supple


Cardiovascular: Yes: S1, S2


Respiratory: Yes: CTA Bilaterally


Gastrointestinal: Yes: Normal Bowel Sounds, Soft


Genitourinary: Yes: Other (cbi)


Musculoskeletal: Yes: WNL


Edema: Yes


Edema: LLE: Trace, RLE: Trace


Neurological: Yes: Oriented


Labs: 


 CBC, BMP





 10/26/19 05:25 





 10/26/19 05:25 





 INR, PTT











INR  1.42  (0.83-1.09)  H  10/19/19  06:38    














Problem List





- Problems


(1) DEB (acute kidney injury)


Code(s): N17.9 - ACUTE KIDNEY FAILURE, UNSPECIFIED   





(2) DKA (diabetic ketoacidoses)


Code(s): E11.10 - TYPE 2 DIABETES MELLITUS WITH KETOACIDOSIS WITHOUT COMA   


Qualifiers: 


   Diabetes mellitus type: type 2   Diabetes mellitus complication detail: 

without coma   Qualified Code(s): E11.10 - Type 2 diabetes mellitus with 

ketoacidosis without coma   





(3) Acute renal failure


Code(s): N17.9 - ACUTE KIDNEY FAILURE, UNSPECIFIED   





(4) Hypernatremia


Code(s): E87.0 - HYPEROSMOLALITY AND HYPERNATREMIA   





Assessment/Plan


 Current Medications











Generic Name Dose Route Start Last Admin





  Trade Name Freq  PRN Reason Stop Dose Admin


 


Acetaminophen  650 mg  10/09/19 10:34  10/27/19 13:29





  Tylenol -  PO   650 mg





  Q4H PRN   Administration





  FEVER   





     





     





     


 


Atorvastatin Calcium  40 mg  10/07/19 22:00  10/26/19 23:04





  Lipitor -  PO   40 mg





  HS MARY   Administration





     





     





     





     


 


Donepezil HCl  10 mg  10/16/19 10:00  10/27/19 09:19





  Aricept -  PO   10 mg





  DAILY MARY   Administration





     





     





     





     


 


Ezetimibe  10 mg  10/07/19 22:00  10/26/19 23:03





  Zetia -  PO   10 mg





  HS MARY   Administration





     





     





     





     


 


Escitalopram Oxalate  10 mg  10/16/19 10:00  10/27/19 09:18





  Lexapro -  PO   10 mg





  DAILY MARY   Administration





     





     





     





     


 


Ferrous Sulfate  325 mg  10/20/19 17:30  10/27/19 16:54





  Feosol -  PO   325 mg





  BIDWM MARY   Administration





     





     





     





     


 


Finasteride  5 mg  10/16/19 10:00  10/27/19 09:18





  Proscar -  PO   5 mg





  DAILY MARY   Administration





     





     





     





     


 


Insulin Aspart  1 vial  10/24/19 07:03  10/27/19 17:01





  Novolog Vial Sliding Scale -  SQ   Not Given





  [facility] MARY   





     





     





  Protocol   





     


 


Insulin Aspart  2 units  10/25/19 06:21  10/27/19 17:01





  Novolog Vial  SQ   Not Given





  ACLD MARY   





     





     





     





     


 


Insulin Detemir  28 units  10/27/19 09:00  10/27/19 10:25





  Levemir Vial  SQ   28 units





  AM MARY   Administration





     





     





     





     


 


Nebivolol  2.5 mg  10/07/19 22:00  10/26/19 23:03





  Bystolic -  PO   2.5 mg





  HS MARY   Administration





     





     





     





     


 


Nystatin  1 applic  10/10/19 11:30  10/27/19 09:19





  Nystop Powder -  TP   1 applic





  BID MARY   Administration





     





     





     





     


 


Ondansetron HCl  4 mg  10/18/19 17:01  





  Zofran Injection  IVPUSH   





  Q6H PRN   





  NAUSEA AND/OR VOMITING   





     





     





     


 


Tamsulosin HCl  0.4 mg  10/16/19 08:45  10/27/19 09:18





  Flomax -  PO   0.4 mg





  BID@0830,2200 MARY   Administration





     





     





     





     














Impression


1. DEB


2. DKA


3. hypernatremia


4. DM


5. dementia


6. cad


7. hyperkalemia


8. hematuria





Plan


- urology follow up is pending


- check cbc and bmp


- avoid nsaids and nephrotoxins


- hematuria returns when cbi is stopped


- monitor hg


- diabetic diet

## 2019-10-27 NOTE — PN
Progress Note, Physician


History of Present Illness: 





Pt is alert, responsive, without distress. CBI ongoing, less hematuria. 





- Current Medication List


Current Medications: 


Active Medications





Acetaminophen (Tylenol -)  650 mg PO Q4H PRN


   PRN Reason: FEVER


   Last Admin: 10/27/19 13:29 Dose:  650 mg


Atorvastatin Calcium (Lipitor -)  40 mg PO HS [facility]


   Last Admin: 10/26/19 23:04 Dose:  40 mg


Donepezil HCl (Aricept -)  10 mg PO DAILY [facility]


   Last Admin: 10/27/19 09:19 Dose:  10 mg


Ezetimibe (Zetia -)  10 mg PO HS [facility]


   Last Admin: 10/26/19 23:03 Dose:  10 mg


Escitalopram Oxalate (Lexapro -)  10 mg PO DAILY [facility]


   Last Admin: 10/27/19 09:18 Dose:  10 mg


Ferrous Sulfate (Feosol -)  325 mg PO BIDWM [facility]


   Last Admin: 10/27/19 09:18 Dose:  325 mg


Finasteride (Proscar -)  5 mg PO DAILY [facility]


   Last Admin: 10/27/19 09:18 Dose:  5 mg


Insulin Aspart (Novolog Vial Sliding Scale -)  1 vial SQ ACHS [facility]; Protocol


   Last Admin: 10/27/19 11:40 Dose:  6 units


Insulin Aspart (Novolog Vial)  2 units SQ ACLD [facility]


   Last Admin: 10/27/19 11:41 Dose:  2 unit


Insulin Detemir (Levemir Vial)  28 units SQ AM [facility]


   Last Admin: 10/27/19 10:25 Dose:  28 units


Nebivolol (Bystolic -)  2.5 mg PO HS [facility]


   Last Admin: 10/26/19 23:03 Dose:  2.5 mg


Nystatin (Nystop Powder -)  1 applic TP BID [facility]


   Last Admin: 10/27/19 09:19 Dose:  1 applic


Ondansetron HCl (Zofran Injection)  4 mg IVPUSH Q6H PRN


   PRN Reason: NAUSEA AND/OR VOMITING


Tamsulosin HCl (Flomax -)  0.4 mg PO BID@0830,2200 [facility]


   Last Admin: 10/27/19 09:18 Dose:  0.4 mg











- Objective


Vital Signs: 


 Vital Signs











Temperature  97.3 F L  10/27/19 14:03


 


Pulse Rate  65   10/27/19 14:03


 


Respiratory Rate  20   10/27/19 14:03


 


Blood Pressure  142/66   10/27/19 14:03


 


O2 Sat by Pulse Oximetry (%)  96   10/27/19 09:00











Constitutional: Yes: No Distress, Calm


Cardiovascular: Yes: Regular Rate and Rhythm


Gastrointestinal: Yes: Normal Bowel Sounds, Soft


Genitourinary: Yes: Mckeon Present, Hematuria


Neurological: Yes: Alert


Labs: 


 CBC, BMP





 10/26/19 05:25 





 10/26/19 05:25 





 INR, PTT











INR  1.42  (0.83-1.09)  H  10/19/19  06:38    








 Microbiology





10/06/19 23:00   Blood - Peripheral Venous   Blood Culture - Final


                            NO GROWTH AFTER 5 DAYS INCUBATION


10/06/19 23:00   Blood - Peripheral Venous   Blood Culture - Final


                            NO GROWTH AFTER 5 DAYS INCUBATION


10/06/19 21:16   Urine - Urine Mckeon   Urine Culture - Final


                            Strep Agalactiae Group B


                            Mr S Aureus


                            Staphylococcus Coagulase Neg











Problem List





- Problems


(1) DEB (acute kidney injury)


Code(s): N17.9 - ACUTE KIDNEY FAILURE, UNSPECIFIED   





(2) Altered mental status


Code(s): R41.82 - ALTERED MENTAL STATUS, UNSPECIFIED   


Qualifiers: 


   Altered mental status type: somnolence   Qualified Code(s): R40.0 - 

Somnolence   





(3) Urinary retention


Code(s): R33.9 - RETENTION OF URINE, UNSPECIFIED   





(4) CAD (coronary artery disease)


Code(s): I25.10 - ATHSCL HEART DISEASE OF NATIVE CORONARY ARTERY W/O ANG PCTRS 

  





(5) CKD stage 3 due to type 2 diabetes mellitus


Code(s): E11.22 - TYPE 2 DIABETES MELLITUS W DIABETIC CHRONIC KIDNEY DISEASE; 

N18.3 - CHRONIC KIDNEY DISEASE, STAGE 3 (MODERATE)   





(6) Hyperlipidemia


Code(s): E78.5 - HYPERLIPIDEMIA, UNSPECIFIED   





(7) Hypertension


Code(s): I10 - ESSENTIAL (PRIMARY) HYPERTENSION   





(8) Type 2 diabetes mellitus


Code(s): E11.9 - TYPE 2 DIABETES MELLITUS WITHOUT COMPLICATIONS   





Assessment/Plan





s/p UTI


DEB


Hematuria 


DM


Dementia





-- on CBI


-- s/p course of antibiotics, remains afebrile, without distress


-- continue monitor

## 2019-10-28 LAB
ALBUMIN SERPL-MCNC: 2.4 G/DL (ref 3.4–5)
ALP SERPL-CCNC: 123 U/L (ref 45–117)
ALT SERPL-CCNC: 25 U/L (ref 13–61)
ANION GAP SERPL CALC-SCNC: 5 MMOL/L (ref 8–16)
AST SERPL-CCNC: 21 U/L (ref 15–37)
BASOPHILS # BLD: 1.1 % (ref 0–2)
BILIRUB SERPL-MCNC: 0.5 MG/DL (ref 0.2–1)
BUN SERPL-MCNC: 12.9 MG/DL (ref 7–18)
CALCIUM SERPL-MCNC: 8.5 MG/DL (ref 8.5–10.1)
CHLORIDE SERPL-SCNC: 104 MMOL/L (ref 98–107)
CO2 SERPL-SCNC: 30 MMOL/L (ref 21–32)
CREAT SERPL-MCNC: 0.9 MG/DL (ref 0.55–1.3)
DEPRECATED RDW RBC AUTO: 15.6 % (ref 11.9–15.9)
EOSINOPHIL # BLD: 5.4 % (ref 0–4.5)
GLUCOSE SERPL-MCNC: 85 MG/DL (ref 74–106)
HCT VFR BLD CALC: 32 % (ref 35.4–49)
HGB BLD-MCNC: 10.2 GM/DL (ref 11.7–16.9)
LYMPHOCYTES # BLD: 20.1 % (ref 8–40)
MCH RBC QN AUTO: 28.7 PG (ref 25.7–33.7)
MCHC RBC AUTO-ENTMCNC: 31.9 G/DL (ref 32–35.9)
MCV RBC: 90 FL (ref 80–96)
MONOCYTES # BLD AUTO: 11.4 % (ref 3.8–10.2)
NEUTROPHILS # BLD: 62 % (ref 42.8–82.8)
PLATELET # BLD AUTO: 212 K/MM3 (ref 134–434)
PMV BLD: 7.9 FL (ref 7.5–11.1)
POTASSIUM SERPLBLD-SCNC: 4.2 MMOL/L (ref 3.5–5.1)
PROT SERPL-MCNC: 6.3 G/DL (ref 6.4–8.2)
RBC # BLD AUTO: 3.55 M/MM3 (ref 4–5.6)
SODIUM SERPL-SCNC: 138 MMOL/L (ref 136–145)
WBC # BLD AUTO: 7.6 K/MM3 (ref 4–10)

## 2019-10-28 RX ADMIN — ESCITALOPRAM OXALATE SCH MG: 10 TABLET, FILM COATED ORAL at 11:26

## 2019-10-28 RX ADMIN — TAMSULOSIN HYDROCHLORIDE SCH MG: 0.4 CAPSULE ORAL at 11:26

## 2019-10-28 RX ADMIN — INSULIN ASPART SCH UNITS: 100 INJECTION, SOLUTION INTRAVENOUS; SUBCUTANEOUS at 13:23

## 2019-10-28 RX ADMIN — INSULIN ASPART SCH UNITS: 100 INJECTION, SOLUTION INTRAVENOUS; SUBCUTANEOUS at 13:24

## 2019-10-28 RX ADMIN — INSULIN ASPART SCH: 100 INJECTION, SOLUTION INTRAVENOUS; SUBCUTANEOUS at 06:59

## 2019-10-28 RX ADMIN — NEBIVOLOL HYDROCHLORIDE SCH MG: 2.5 TABLET ORAL at 21:28

## 2019-10-28 RX ADMIN — FERROUS SULFATE TAB EC 324 MG (65 MG FE EQUIVALENT) SCH: 324 (65 FE) TABLET DELAYED RESPONSE at 17:31

## 2019-10-28 RX ADMIN — DONEPEZIL HYDROCHLORIDE SCH MG: 10 TABLET, FILM COATED ORAL at 11:26

## 2019-10-28 RX ADMIN — FINASTERIDE SCH MG: 5 TABLET, FILM COATED ORAL at 11:29

## 2019-10-28 RX ADMIN — INSULIN DETEMIR SCH UNITS: 100 INJECTION, SOLUTION SUBCUTANEOUS at 11:26

## 2019-10-28 RX ADMIN — FERROUS SULFATE TAB EC 324 MG (65 MG FE EQUIVALENT) SCH MG: 324 (65 FE) TABLET DELAYED RESPONSE at 11:26

## 2019-10-28 RX ADMIN — ATORVASTATIN CALCIUM SCH MG: 40 TABLET, FILM COATED ORAL at 21:28

## 2019-10-28 RX ADMIN — INSULIN ASPART SCH: 100 INJECTION, SOLUTION INTRAVENOUS; SUBCUTANEOUS at 21:28

## 2019-10-28 RX ADMIN — INSULIN ASPART SCH UNITS: 100 INJECTION, SOLUTION INTRAVENOUS; SUBCUTANEOUS at 17:33

## 2019-10-28 RX ADMIN — EZETIMIBE SCH MG: 10 TABLET ORAL at 21:28

## 2019-10-28 RX ADMIN — NYSTATIN SCH APPLIC: 100000 POWDER TOPICAL at 11:28

## 2019-10-28 RX ADMIN — NYSTATIN SCH APPLIC: 100000 POWDER TOPICAL at 21:28

## 2019-10-28 RX ADMIN — TAMSULOSIN HYDROCHLORIDE SCH MG: 0.4 CAPSULE ORAL at 21:28

## 2019-10-28 RX ADMIN — INSULIN ASPART SCH UNITS: 100 INJECTION, SOLUTION INTRAVENOUS; SUBCUTANEOUS at 17:32

## 2019-10-28 NOTE — PN
Progress Note, Physician


History of Present Illness: 





stable


cbi ongoing


hematuria clearing up





- Current Medication List


Current Medications: 


Active Medications





Acetaminophen (Tylenol -)  650 mg PO Q4H PRN


   PRN Reason: FEVER


   Last Admin: 10/27/19 13:29 Dose:  650 mg


Atorvastatin Calcium (Lipitor -)  40 mg PO HS [facility]


   Last Admin: 10/27/19 21:39 Dose:  40 mg


Donepezil HCl (Aricept -)  10 mg PO DAILY [facility]


   Last Admin: 10/28/19 11:26 Dose:  10 mg


Ezetimibe (Zetia -)  10 mg PO HS [facility]


   Last Admin: 10/27/19 21:39 Dose:  10 mg


Escitalopram Oxalate (Lexapro -)  10 mg PO DAILY [facility]


   Last Admin: 10/28/19 11:26 Dose:  10 mg


Ferrous Sulfate (Feosol -)  325 mg PO BIDWM [facility]


   Last Admin: 10/28/19 11:26 Dose:  325 mg


Finasteride (Proscar -)  5 mg PO DAILY [facility]


   Last Admin: 10/28/19 11:29 Dose:  5 mg


Insulin Aspart (Novolog Vial Sliding Scale -)  [facility] SQ [facility]; Protocol


   Last Admin: 10/28/19 06:59 Dose:  Not Given


Insulin Aspart (Novolog Vial)  2 units SQ ACLD [facility]


Insulin Detemir (Levemir Vial)  28 units SQ AM [facility]


   Last Admin: 10/28/19 11:26 Dose:  28 units


Nebivolol (Bystolic -)  2.5 mg PO HS [facility]


   Last Admin: 10/27/19 21:38 Dose:  2.5 mg


Nystatin (Nystop Powder -)  1 applic TP BID [facility]


   Last Admin: 10/28/19 11:28 Dose:  1 applic


Ondansetron HCl (Zofran Injection)  4 mg IVPUSH Q6H PRN


   PRN Reason: NAUSEA AND/OR VOMITING


Tamsulosin HCl (Flomax -)  0.4 mg PO BID@0830,2200 [facility]


   Last Admin: 10/28/19 11:26 Dose:  0.4 mg











- Objective


Vital Signs: 


 Vital Signs











Temperature  97.8 F   10/28/19 09:01


 


Pulse Rate  60   10/28/19 09:01


 


Respiratory Rate  20   10/28/19 09:01


 


Blood Pressure  136/73   10/28/19 09:01


 


O2 Sat by Pulse Oximetry (%)  96   10/28/19 08:55











Constitutional: Yes: No Distress, Calm


Cardiovascular: Yes: S1, S2


Respiratory: Yes: Regular, CTA Bilaterally


Gastrointestinal: Yes: Normal Bowel Sounds, Soft


Genitourinary: Yes: Mckeon Present, Other (cbi)


Musculoskeletal: Yes: WNL


Extremities: Yes: WNL


Neurological: Yes: Alert, Other (blind)


Labs: 


 CBC, BMP





 10/28/19 06:30 





 10/28/19 06:30 





 INR, PTT











INR  1.42  (0.83-1.09)  H  10/19/19  06:38    














Assessment/Plan





79 year old man with a history of HTN, hyperlipidemia, CAD, MI, CABG, chronic 

diastolic heart failure, type 2 DM, stage 3 CKD, COPD, dementia who presented 

to the ED with altered mental status. 





1. Acute metabolic encephalopathy 


2. Acute kidney injury


3. Abdominal pain 


4.  UTI





5. Lactic acidosis


6. Hypernatremia


7. Hypophosphatemia


8. HTN


9. Hyperlipidemia


10. CAD, history of MI, CABG


11. Chronic diastolic heart failure


12. Stage 3 CKD


13. COPD


14. Alzheimer dementia





plan


continue current mgmt


monitor


as per urology


nephro on case


rest as per the team

## 2019-10-28 NOTE — PN
Progress Note, Physician


History of Present Illness: 





Pt seen and examined at bedside. He appears comfortable. 





- Current Medication List


Current Medications: 


Active Medications





Acetaminophen (Tylenol -)  650 mg PO Q4H PRN


   PRN Reason: FEVER


   Last Admin: 10/27/19 13:29 Dose:  650 mg


Atorvastatin Calcium (Lipitor -)  40 mg PO HS [facility]


   Last Admin: 10/27/19 21:39 Dose:  40 mg


Donepezil HCl (Aricept -)  10 mg PO DAILY [facility]


   Last Admin: 10/28/19 11:26 Dose:  10 mg


Ezetimibe (Zetia -)  10 mg PO HS [facility]


   Last Admin: 10/27/19 21:39 Dose:  10 mg


Escitalopram Oxalate (Lexapro -)  10 mg PO DAILY [facility]


   Last Admin: 10/28/19 11:26 Dose:  10 mg


Ferrous Sulfate (Feosol -)  325 mg PO BIDWM [facility]


   Last Admin: 10/28/19 11:26 Dose:  325 mg


Finasteride (Proscar -)  5 mg PO DAILY [facility]


   Last Admin: 10/28/19 11:29 Dose:  5 mg


Insulin Aspart (Novolog Vial Sliding Scale -)  1 vial SQ ACHS [facility]; Protocol


   Last Admin: 10/28/19 13:23 Dose:  6 units


Insulin Aspart (Novolog Vial)  2 units SQ ACLD [facility]


   Last Admin: 10/28/19 13:24 Dose:  2 units


Insulin Detemir (Levemir Vial)  28 units SQ AM [facility]


   Last Admin: 10/28/19 11:26 Dose:  28 units


Nebivolol (Bystolic -)  2.5 mg PO HS [facility]


   Last Admin: 10/27/19 21:38 Dose:  2.5 mg


Nystatin (Nystop Powder -)  1 applic TP BID [facility]


   Last Admin: 10/28/19 11:28 Dose:  1 applic


Ondansetron HCl (Zofran Injection)  4 mg IVPUSH Q6H PRN


   PRN Reason: NAUSEA AND/OR VOMITING


Tamsulosin HCl (Flomax -)  0.4 mg PO BID@0830,2200 [facility]


   Last Admin: 10/28/19 11:26 Dose:  0.4 mg











- Objective


Vital Signs: 


 Vital Signs











Temperature  97.8 F   10/28/19 09:01


 


Pulse Rate  60   10/28/19 09:01


 


Respiratory Rate  20   10/28/19 09:01


 


Blood Pressure  136/73   10/28/19 09:01


 


O2 Sat by Pulse Oximetry (%)  96   10/28/19 08:55











Constitutional: Yes: Calm


Eyes: Yes: Other (blind)


HENT: Yes: Atraumatic


Neck: Yes: Supple


Cardiovascular: Yes: S1, S2


Respiratory: Yes: CTA Bilaterally


Gastrointestinal: Yes: Soft


Genitourinary: Yes: Other (pt on CBI)


Musculoskeletal: Yes: WNL


Edema: LLE: Trace, RLE: Trace


Neurological: Yes: Oriented


Labs: 


 CBC, BMP





 10/28/19 06:30 





 10/28/19 06:30 





 INR, PTT











INR  1.42  (0.83-1.09)  H  10/19/19  06:38    














Problem List





- Problems


(1) DEB (acute kidney injury)


Code(s): N17.9 - ACUTE KIDNEY FAILURE, UNSPECIFIED   





(2) DKA (diabetic ketoacidoses)


Code(s): E11.10 - TYPE 2 DIABETES MELLITUS WITH KETOACIDOSIS WITHOUT COMA   


Qualifiers: 


   Diabetes mellitus type: type 2   Diabetes mellitus complication detail: 

without coma   Qualified Code(s): E11.10 - Type 2 diabetes mellitus with 

ketoacidosis without coma   





(3) Acute renal failure


Code(s): N17.9 - ACUTE KIDNEY FAILURE, UNSPECIFIED   





(4) Hypernatremia


Code(s): E87.0 - HYPEROSMOLALITY AND HYPERNATREMIA   





Assessment/Plan


 Current Medications











Generic Name Dose Route Start Last Admin





  Trade Name Freq  PRN Reason Stop Dose Admin


 


Acetaminophen  650 mg  10/09/19 10:34  10/27/19 13:29





  Tylenol -  PO   650 mg





  Q4H PRN   Administration





  FEVER   





     





     





     


 


Atorvastatin Calcium  40 mg  10/07/19 22:00  10/27/19 21:39





  Lipitor -  PO   40 mg





  HS MARY   Administration





     





     





     





     


 


Donepezil HCl  10 mg  10/16/19 10:00  10/28/19 11:26





  Aricept -  PO   10 mg





  DAILY MARY   Administration





     





     





     





     


 


Ezetimibe  10 mg  10/07/19 22:00  10/27/19 21:39





  Zetia -  PO   10 mg





  HS MARY   Administration





     





     





     





     


 


Escitalopram Oxalate  10 mg  10/16/19 10:00  10/28/19 11:26





  Lexapro -  PO   10 mg





  DAILY MARY   Administration





     





     





     





     


 


Ferrous Sulfate  325 mg  10/20/19 17:30  10/28/19 11:26





  Feosol -  PO   325 mg





  BIDWM [facility]ARY   Administration





     





     





     





     


 


Finasteride  5 mg  10/16/19 10:00  10/28/19 11:29





  Proscar -  PO   5 mg





  DAILY MARY   Administration





     





     





     





     


 


Insulin Aspart  1 vial  10/24/19 07:03  10/28/19 13:23





  Novolog Vial Sliding Scale -  SQ   6 units





  [facility]   Administration





     





     





  Protocol   





     


 


Insulin Aspart  2 units  10/28/19 07:48  10/28/19 13:24





  Novolog Vial  SQ   2 units





  ACLD M[facility]   Administration





     





     





     





     


 


Insulin Detemir  28 units  10/27/19 09:00  10/28/19 11:26





  Levemir Vial  SQ   28 units





  AM [facility]   Administration





     





     





     





     


 


Nebivolol  2.5 mg  10/07/19 22:00  10/27/19 21:38





  Bystolic -  PO   2.5 mg





  HS MARY   Administration





     





     





     





     


 


Nystatin  1 applic  10/10/19 11:30  10/28/19 11:28





  Nystop Powder -  TP   1 applic





  BID [facility]   Administration





     





     





     





     


 


Ondansetron HCl  4 mg  10/18/19 17:01  





  Zofran Injection  IVPUSH   





  Q6H PRN   





  NAUSEA AND/OR VOMITING   





     





     





     


 


Tamsulosin HCl  0.4 mg  10/16/19 08:45  10/28/19 11:26





  Flomax -  PO   0.4 mg





  BID@0830,2200 [facility]   Administration





     





     





     





     














Impression


1. DEB


2. DKA


3. hypernatremia


4. DM


5. dementia


6. cad


7. hyperkalemia


8. hematuria





Plan


- renal function stable


- pending urology follow up


- avoid nsaids and nephrotoxins


- hematuria returns when cbi is stopped


- diabetic diet

## 2019-10-28 NOTE — PN
Progress Note (short form)





- Note


Progress Note: 





HPI: Pt remains on CBI. Hypoglycemia noted over weekend with subsequent 

decreased in insulin. Otherwise HPI limited





PE:


Gen: NAD, awake, alert


HEENT: NC/AT, RUBIO, MMM


Neck: No JVD


LUNG: CTA bilaterally, however poor inspiratory effort, On RA


CARD: RRR no murmurs appreciated


ABD: Soft, Nt/ND, normoactive BS:


: Mckeon catheter in place CBI ongoing


EXT: No edema appreciated, cap refill <2sec


Skin: No rashes or lesions noted


 CBC, BMP





 10/28/19 06:30 





 10/28/19 06:30 








Active Medications





Acetaminophen (Tylenol -)  650 mg PO Q4H PRN


   PRN Reason: FEVER


   Last Admin: 10/27/19 13:29 Dose:  650 mg


Atorvastatin Calcium (Lipitor -)  40 mg PO HS [facility]


   Last Admin: 10/28/19 21:28 Dose:  40 mg


Donepezil HCl (Aricept -)  10 mg PO DAILY [facility]


   Last Admin: 10/28/19 11:26 Dose:  10 mg


Ezetimibe (Zetia -)  10 mg PO HS [facility]


   Last Admin: 10/28/19 21:28 Dose:  10 mg


Escitalopram Oxalate (Lexapro -)  10 mg PO DAILY [facility]


   Last Admin: 10/28/19 11:26 Dose:  10 mg


Ferrous Sulfate (Feosol -)  325 mg PO BIDWM [facility]


   Last Admin: 10/28/19 17:31 Dose:  Not Given


Finasteride (Proscar -)  5 mg PO DAILY [facility]


   Last Admin: 10/28/19 11:29 Dose:  5 mg


Insulin Aspart (Novolog Vial Sliding Scale -)  1 vial SQ ACHS [facility]; Protocol


   Last Admin: 10/28/19 21:28 Dose:  Not Given


Insulin Aspart (Novolog Vial)  2 units SQ ACLD [facility]


   Last Admin: 10/28/19 17:33 Dose:  2 units


Insulin Detemir (Levemir Vial)  28 units SQ AM [facility]


   Last Admin: 10/28/19 11:26 Dose:  28 units


Nebivolol (Bystolic -)  2.5 mg PO HS [facility]


   Last Admin: 10/28/19 21:28 Dose:  2.5 mg


Nystatin (Nystop Powder -)  1 applic TP BID [facility]


   Last Admin: 10/28/19 21:28 Dose:  1 applic


Ondansetron HCl (Zofran Injection)  4 mg IVPUSH Q6H PRN


   PRN Reason: NAUSEA AND/OR VOMITING


Tamsulosin HCl (Flomax -)  0.4 mg PO BID@0830,2200 [facility]


   Last Admin: 10/28/19 21:28 Dose:  0.4 mg








Microbiology





10/06/19 23:00   Blood - Peripheral Venous   Blood Culture - Final


                            NO GROWTH AFTER 5 DAYS INCUBATION


10/06/19 23:00   Blood - Peripheral Venous   Blood Culture - Final


                            NO GROWTH AFTER 5 DAYS INCUBATION


10/06/19 21:16   Urine - Urine Mckeon   Urine Culture - Final


                            Strep Agalactiae Group B


                            Mr S Aureus


                            Staphylococcus Coagulase Neg











Assessment and Plan:


DKA (resolved)


Hypernatremia (resolved)


Acute kidney injury (Resolved)


Type 2 DM


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


Hematuria





--CBI continues; will discuss with urology when CBI should be discontinued


--H/H remains stable


--Notable hypoglycemia during pre-dinner checks; decreased regiment to:


   Levemir 28U AM


   Novolog scheduled 2U ACLD


   ISS for breakthrough


   BGM ACHS


--Discussed with Dr. Stokes and Dr. Greene how even trial of LP or  shunting 

not viable for patient's NPH


--Contnue Flomax 0.4mg BID PO


--Continue Proscar 5mg qdaily


--Lexapro 10mg and Aricept 10mg qdaily





FEN:


   Fluids: PO encouragement





PPX:


   DVT - scds





Dispo: Continue monitoring


Case discussed with Dr. Tracy Beal, DO - IM PGY-3





<Collins Beal - Last Filed: 10/28/19 23:34>





- Note


Progress Note: 





I have seen and examined the indicated patient independently/along with the 

resident team. I have personally verified all lebron exam findings and historical 

components. I have personally interpreted all diagnostics indicated per todays 

orders and reviewed interpretation of indicated subspecialty services. This 

patient meets a high level of medical complexity and warrants indicated LOC to 

avoid decompensation and worsening of the indicated illness. 








S:


Agree with historical findings as outlined in resident documentation regarding 

history of present illness.  No further events communicated to myself from 

overnight.   No callback and no response from resident team regarding callback.

  Unfortunately course regarding overall plan for hematuria remains unknown.  

Son remains extraordinarily hesitant to bring patient home without definitive 

plan for resoluteion of hematuria.  Discussed with nursing; will need to 

discuss with administration regarding overall plan given protracted course of 

admit and ongoing issue.  He has no clots today and no s/s obstruction.  





10 system ROS Unreliable


Medication list reviewed; as documented per orders and above.








O:


All vital signs reviewed per ER records and are as per EMR


NAD, AAOx1 but limited communciation as ESL with underlying organic dementia, 

Resting in bed


NC AT EOMI PERRLA


Neck supple, trachea midline, no roby LN


RRR s1/2


Lungs CTAB, w/ sym expansion


NT ND +BS


No skin breakdown or rashes noted


CN2-12 wnl, no new focal deficits noted


Muscle tone normal, no deficits in motor function or strength noted


Normal mood, appropriate behavior, average insight





EKG:


CXR:


Other Imaging:


Prior Diagnostics:





Seen and examined; agree with resident note aside from as supplemented below by 

myself.  Independently reviewed all labs, vitals, and diagnostics.  In addition 

I verified all key historic and PE findings. 








Unchanged; no issues overnight.  Hematuria recurred despite plans for DC.  He 

will likely be kept inpatient though the weekend pending further urologic 

intervention.  Spoke with resident team at length regarding need to determine 

with urology definitive meaures.  We are holding AC with SCDs for DVT ppx (

plavix can be held given acute significant bleed and no recent stents).  Will 

continue to monitor and redirect.  No issues with agitation as Zyprexa has 

continued to have a positive effect.  Overall will monitor inpatient for the 

weekend and discuss DC with consulting providers.








1) DKA 2/2 issues with managing insulin pump, resolved-DC on SQ insulin


2) Uncontrolled DM -Continually adjusting insulin.  Complete control has proven 

difficult with this patient but is preferable to the ongoing issue with pump.  

No further input from endo noted.  Avoiding hypoglycemia but has intermittent 

spikes of hyper.  Will need endocrine followup post DC.


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  

Can discuss therapeutic tap as needed but per neuro and neurosurgery with 

extensive discussion as documented last week this likely should be done OP.  

Will continue to monitor.  Informed son of this developent as his mentation was 

a barrier to initial DC planning to facility


4) Acute Cystitis with Hematuria vs. Roby Hematuria- Abx per ID; continue to 

monitor.  Ongoing trauma 2/2 underlying dementia vs. infectious origin.


5) Urinary Obstruction - Continue tamsulosin and finasteride.


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 

secondary to diabetes mellitus being so uncontrolled.  We will continue to 

monitor.  He should follow-up with his nephrologist upon discharge.  


7) Hx CAD s/p CABG


8) Underlying Dementia (documented as Alzheimer's Type)


9) Hx D-CHF


10) Lactic Acidosis, resolved


11) Hypernatremia, resolved


12) Hx HTN


13) Hx HLD


14) Hx COPD, no acute exacerbation


***) Ongoing hematuria remains barrier to DC.  Pending final urology recs.





Discussed case at length with  Savanna Perera who agrees that inpateint 

care is warranted given the ongoing hematuria.  It had resolution briefly last 

week then severe recurrance that prompted the CBI per uro.  Initial DC held due 

to facility reluctance for acceptance due to ongoing cognitive impairment 

secondary to organic dementia/NPH that per Dr. Morris and Dr. Greene may be 

worked up as an outpatient.  His initial reason for admission was due to DKA 

secondary to noncompliance with insulin pump exacerbated likely by his and his 

wife's underlying cognitive issues and poor diet.  He was transitioned to SQ 

insulin with good effect and no further DKA or profound hypoglycemia but his 

glucose remains difficult to control.  Initially this was noted to be due to 

ongoing high sugar foods being given by family but now noted to be ongoing but 

to a lesser degree.  Tapering insulin.  Seen once by [facility] here and will be 

referred to their services for continued outpatient workup; they agreed with 

discontinuing the pump.  Providing extensive teaching to family regarding home 

care as insurance will not approve facility despite multiple conversations and 

attempts as documented by myself and CM and SW earlier in the chart.  

Continuing CBI until definitive plan per uro.  Monitoring glucose and CBC 

carefully.





<Tiburcio Molina - Last Filed: 10/30/19 00:48>

## 2019-10-28 NOTE — PN
Progress Note, SLP





- Note


Progress Note: 





 Selected Entries











  10/08/19 10/08/19 10/08/19





  02:00 06:00 10:59


 


Breakfast   75%


 


Lunch   


 


Supper   


 


Temperature 99.3 F 99.0 F 














  10/27/19 10/27/19 10/27/19





  06:00 09:23 09:45


 


Breakfast   100%


 


Lunch   


 


Supper   


 


Temperature 97.6 F 98.3 F 














  10/27/19 10/27/19 10/27/19





  13:07 14:03 21:31


 


Breakfast   


 


Lunch 100%  


 


Supper   100%


 


Temperature  97.3 F L 97.5 F L














  10/27/19 10/28/19 10/28/19





  22:00 06:00 09:01


 


Breakfast   


 


Lunch   


 


Supper   


 


Temperature 98.1 F 97.5 F L 97.8 F








 Laboratory Tests











  10/06/19 10/07/19 10/26/19





  21:00 03:00 05:25


 


WBC  18.0 H  15.8 H  8.5














  10/28/19





  06:30


 


WBC  7.6








Pt reported to be tolerating puree/thin liquids. Nursing feels he should not be 

upgraded as he sometimes chews on meds and then coughs responsively. Tolerating 

diet. 


Monitor tolerance

## 2019-10-29 LAB
ANION GAP SERPL CALC-SCNC: 6 MMOL/L (ref 8–16)
BUN SERPL-MCNC: 13.1 MG/DL (ref 7–18)
CALCIUM SERPL-MCNC: 8.9 MG/DL (ref 8.5–10.1)
CHLORIDE SERPL-SCNC: 105 MMOL/L (ref 98–107)
CO2 SERPL-SCNC: 29 MMOL/L (ref 21–32)
CREAT SERPL-MCNC: 0.9 MG/DL (ref 0.55–1.3)
DEPRECATED RDW RBC AUTO: 15.5 % (ref 11.9–15.9)
GLUCOSE SERPL-MCNC: 66 MG/DL (ref 74–106)
HCT VFR BLD CALC: 31.1 % (ref 35.4–49)
HGB BLD-MCNC: 10.2 GM/DL (ref 11.7–16.9)
MAGNESIUM SERPL-MCNC: 2.5 MG/DL (ref 1.8–2.4)
MCH RBC QN AUTO: 29 PG (ref 25.7–33.7)
MCHC RBC AUTO-ENTMCNC: 32.7 G/DL (ref 32–35.9)
MCV RBC: 88.7 FL (ref 80–96)
PLATELET # BLD AUTO: 218 K/MM3 (ref 134–434)
PMV BLD: 7.8 FL (ref 7.5–11.1)
POTASSIUM SERPLBLD-SCNC: 4.6 MMOL/L (ref 3.5–5.1)
RBC # BLD AUTO: 3.51 M/MM3 (ref 4–5.6)
SODIUM SERPL-SCNC: 141 MMOL/L (ref 136–145)
WBC # BLD AUTO: 7.9 K/MM3 (ref 4–10)

## 2019-10-29 RX ADMIN — FERROUS SULFATE TAB EC 324 MG (65 MG FE EQUIVALENT) SCH: 324 (65 FE) TABLET DELAYED RESPONSE at 16:40

## 2019-10-29 RX ADMIN — INSULIN ASPART SCH UNITS: 100 INJECTION, SOLUTION INTRAVENOUS; SUBCUTANEOUS at 12:09

## 2019-10-29 RX ADMIN — INSULIN ASPART SCH UNITS: 100 INJECTION, SOLUTION INTRAVENOUS; SUBCUTANEOUS at 22:08

## 2019-10-29 RX ADMIN — FERROUS SULFATE TAB EC 324 MG (65 MG FE EQUIVALENT) SCH: 324 (65 FE) TABLET DELAYED RESPONSE at 10:06

## 2019-10-29 RX ADMIN — FINASTERIDE SCH: 5 TABLET, FILM COATED ORAL at 10:06

## 2019-10-29 RX ADMIN — INSULIN ASPART SCH UNITS: 100 INJECTION, SOLUTION INTRAVENOUS; SUBCUTANEOUS at 16:24

## 2019-10-29 RX ADMIN — ATORVASTATIN CALCIUM SCH MG: 40 TABLET, FILM COATED ORAL at 22:03

## 2019-10-29 RX ADMIN — TAMSULOSIN HYDROCHLORIDE SCH: 0.4 CAPSULE ORAL at 10:06

## 2019-10-29 RX ADMIN — ESCITALOPRAM OXALATE SCH: 10 TABLET, FILM COATED ORAL at 10:06

## 2019-10-29 RX ADMIN — NEBIVOLOL HYDROCHLORIDE SCH MG: 2.5 TABLET ORAL at 22:02

## 2019-10-29 RX ADMIN — INSULIN ASPART SCH UNITS: 100 INJECTION, SOLUTION INTRAVENOUS; SUBCUTANEOUS at 12:10

## 2019-10-29 RX ADMIN — EZETIMIBE SCH MG: 10 TABLET ORAL at 22:02

## 2019-10-29 RX ADMIN — TAMSULOSIN HYDROCHLORIDE SCH MG: 0.4 CAPSULE ORAL at 22:03

## 2019-10-29 RX ADMIN — INSULIN DETEMIR SCH: 100 INJECTION, SOLUTION SUBCUTANEOUS at 07:18

## 2019-10-29 RX ADMIN — NYSTATIN SCH: 100000 POWDER TOPICAL at 11:41

## 2019-10-29 RX ADMIN — DONEPEZIL HYDROCHLORIDE SCH: 10 TABLET, FILM COATED ORAL at 10:06

## 2019-10-29 RX ADMIN — INSULIN ASPART SCH: 100 INJECTION, SOLUTION INTRAVENOUS; SUBCUTANEOUS at 07:18

## 2019-10-29 RX ADMIN — NYSTATIN SCH APPLIC: 100000 POWDER TOPICAL at 23:16

## 2019-10-29 NOTE — PN
Progress Note, Physician


History of Present Illness: 





Pt seen and examined at bedside. He is awake and alert. He does complains of 

some shortness of breath. 





- Current Medication List


Current Medications: 


Active Medications





Acetaminophen (Tylenol -)  650 mg PO Q4H PRN


   PRN Reason: FEVER


   Last Admin: 10/27/19 13:29 Dose:  650 mg


Atorvastatin Calcium (Lipitor -)  40 mg PO HS [facility]


   Last Admin: 10/28/19 21:28 Dose:  40 mg


Donepezil HCl (Aricept -)  10 mg PO DAILY [facility]


   Last Admin: 10/29/19 10:06 Dose:  Not Given


Ezetimibe (Zetia -)  10 mg PO HS [facility]


   Last Admin: 10/28/19 21:28 Dose:  10 mg


Escitalopram Oxalate (Lexapro -)  10 mg PO DAILY [facility]


   Last Admin: 10/29/19 10:06 Dose:  Not Given


Ferrous Sulfate (Feosol -)  325 mg PO BIDWM [facility]


   Last Admin: 10/29/19 16:40 Dose:  Not Given


Finasteride (Proscar -)  5 mg PO DAILY [facility]


   Last Admin: 10/29/19 10:06 Dose:  Not Given


Insulin Aspart (Novolog Vial Sliding Scale -)  1 vial SQ ACHS [facility]; Protocol


   Last Admin: 10/29/19 16:24 Dose:  4 units


Insulin Aspart (Novolog Vial)  2 units SQ ACLD [facility]


   Last Admin: 10/29/19 16:24 Dose:  2 units


Insulin Detemir (Levemir Vial)  28 units SQ AM [facility]


   Last Admin: 10/29/19 07:18 Dose:  Not Given


Nebivolol (Bystolic -)  2.5 mg PO HS [facility]


   Last Admin: 10/28/19 21:28 Dose:  2.5 mg


Nystatin (Nystop Powder -)  1 applic TP BID [facility]


   Last Admin: 10/29/19 11:41 Dose:  Not Given


Ondansetron HCl (Zofran Injection)  4 mg IVPUSH Q6H PRN


   PRN Reason: NAUSEA AND/OR VOMITING


Tamsulosin HCl (Flomax -)  0.4 mg PO BID@0830,2200 [facility]


   Last Admin: 10/29/19 10:06 Dose:  Not Given











- Objective


Vital Signs: 


 Vital Signs











Temperature  99.2 F   10/29/19 15:11


 


Pulse Rate  64   10/29/19 15:11


 


Respiratory Rate  20   10/29/19 15:11


 


Blood Pressure  133/70   10/29/19 15:11


 


O2 Sat by Pulse Oximetry (%)  96   10/29/19 08:49











Constitutional: Yes: Calm


Eyes: Yes: Conjunctiva Clear


HENT: Yes: Atraumatic


Cardiovascular: Yes: S1, S2


Respiratory: Yes: CTA Bilaterally


Gastrointestinal: Yes: Normal Bowel Sounds, Soft, Abdomen, Obese


Genitourinary: Yes: Mckeon Present, Other (cbi)


Edema: Yes


Edema: LLE: 1+, RLE: 1+


Integumentary: Yes: WNL


Neurological: Yes: Confusion


Labs: 


 CBC, BMP





 10/29/19 06:42 





 10/29/19 06:42 





 INR, PTT











INR  1.42  (0.83-1.09)  H  10/19/19  06:38    














Problem List





- Problems


(1) DEB (acute kidney injury)


Code(s): N17.9 - ACUTE KIDNEY FAILURE, UNSPECIFIED   





(2) DKA (diabetic ketoacidoses)


Code(s): E11.10 - TYPE 2 DIABETES MELLITUS WITH KETOACIDOSIS WITHOUT COMA   


Qualifiers: 


   Diabetes mellitus type: type 2   Diabetes mellitus complication detail: 

without coma   Qualified Code(s): E11.10 - Type 2 diabetes mellitus with 

ketoacidosis without coma   





(3) Acute renal failure


Code(s): N17.9 - ACUTE KIDNEY FAILURE, UNSPECIFIED   





(4) Hypernatremia


Code(s): E87.0 - HYPEROSMOLALITY AND HYPERNATREMIA   





Assessment/Plan


 Current Medications











Generic Name Dose Route Start Last Admin





  Trade Name Freq  PRN Reason Stop Dose Admin


 


Acetaminophen  650 mg  10/09/19 10:34  10/27/19 13:29





  Tylenol -  PO   650 mg





  Q4H PRN   Administration





  FEVER   





     





     





     


 


Atorvastatin Calcium  40 mg  10/07/19 22:00  10/28/19 21:28





  Lipitor -  PO   40 mg





  HS [facility]   Administration





     





     





     





     


 


Donepezil HCl  10 mg  10/16/19 10:00  10/29/19 10:06





  Aricept -  PO   Not Given





  DAILY [facility]   





     





     





     





     


 


Ezetimibe  10 mg  10/07/19 22:00  10/28/19 21:28





  Zetia -  PO   10 mg





  [facility]   Administration





     





     





     





     


 


Escitalopram Oxalate  10 mg  10/16/19 10:00  10/29/19 10:06





  Lexapro -  PO   Not Given





  DAILY [facility]   





     





     





     





     


 


Ferrous Sulfate  325 mg  10/20/19 17:30  10/29/19 16:40





  Feosol -  PO   Not Given





  BIDWM [facility]   





     





     





     





     


 


Finasteride  5 mg  10/16/19 10:00  10/29/19 10:06





  Proscar -  PO   Not Given





  DAILY [facility]   





     





     





     





     


 


Insulin Aspart  1 vial  10/24/19 07:03  10/29/19 16:24





  Novolog Vial Sliding Scale -  SQ   4 units





  [facility]   Administration





     





     





  Protocol   





     


 


Insulin Aspart  2 units  10/28/19 07:48  10/29/19 16:24





  Novolog Vial  SQ   2 units





  ACLD [facility]   Administration





     





     





     





     


 


Insulin Detemir  28 units  10/27/19 09:00  10/29/19 07:18





  Levemir Vial  SQ   Not Given





  AM [facility]   





     





     





     





     


 


Nebivolol  2.5 mg  10/07/19 22:00  10/28/19 21:28





  Bystolic -  PO   2.5 mg





  [facility]   Administration





     





     





     





     


 


Nystatin  1 applic  10/10/19 11:30  10/29/19 11:41





  Nystop Powder -  TP   Not Given





  BID [facility]   





     





     





     





     


 


Ondansetron HCl  4 mg  10/18/19 17:01  





  Zofran Injection  IVPUSH   





  Q6H PRN   





  NAUSEA AND/OR VOMITING   





     





     





     


 


Tamsulosin HCl  0.4 mg  10/16/19 08:45  10/29/19 10:06





  Flomax -  PO   Not Given





  BID@0830,2200 [facility]   





     





     





     





     














Impression


1. DEB


2. DKA


3. hypernatremia


4. DM


5. dementia


6. cad


7. hyperkalemia


8. hematuria





Plan


- renal function stable


- still pending urology


- will order a po dose of lasix


- repeat labs in am


- diabetic diet

## 2019-10-29 NOTE — PN
Progress Note, Physician


History of Present Illness: 





stable


on cbi








- Current Medication List


Current Medications: 


Active Medications





Acetaminophen (Tylenol -)  650 mg PO Q4H PRN


   PRN Reason: FEVER


   Last Admin: 10/27/19 13:29 Dose:  650 mg


Atorvastatin Calcium (Lipitor -)  40 mg PO HS [facility]


   Last Admin: 10/28/19 21:28 Dose:  40 mg


Donepezil HCl (Aricept -)  10 mg PO DAILY [facility]


   Last Admin: 10/29/19 10:06 Dose:  Not Given


Ezetimibe (Zetia -)  10 mg PO HS [facility]


   Last Admin: 10/28/19 21:28 Dose:  10 mg


Escitalopram Oxalate (Lexapro -)  10 mg PO DAILY [facility]


   Last Admin: 10/29/19 10:06 Dose:  Not Given


Ferrous Sulfate (Feosol -)  325 mg PO BIDWM [facility]


   Last Admin: 10/29/19 10:06 Dose:  Not Given


Finasteride (Proscar -)  5 mg PO DAILY [facility]


   Last Admin: 10/29/19 10:06 Dose:  Not Given


Insulin Aspart (Novolog Vial Sliding Scale -)  1 vial SQ ACHS [facility]; Protocol


   Last Admin: 10/29/19 07:18 Dose:  Not Given


Insulin Aspart (Novolog Vial)  2 units SQ ACLD [facility]


   Last Admin: 10/28/19 17:33 Dose:  2 units


Insulin Detemir (Levemir Vial)  28 units SQ AM [facility]


   Last Admin: 10/29/19 07:18 Dose:  Not Given


Nebivolol (Bystolic -)  2.5 mg PO HS [facility]


   Last Admin: 10/28/19 21:28 Dose:  2.5 mg


Nystatin (Nystop Powder -)  1 applic TP BID [facility]


   Last Admin: 10/29/19 11:41 Dose:  Not Given


Ondansetron HCl (Zofran Injection)  4 mg IVPUSH Q6H PRN


   PRN Reason: NAUSEA AND/OR VOMITING


Tamsulosin HCl (Flomax -)  0.4 mg PO BID@0830,2200 [facility]


   Last Admin: 10/29/19 10:06 Dose:  Not Given











- Objective


Vital Signs: 


 Vital Signs











Temperature  97.6 F   10/29/19 06:00


 


Pulse Rate  76   10/29/19 06:00


 


Respiratory Rate  20   10/29/19 08:49


 


Blood Pressure  118/72   10/29/19 06:00


 


O2 Sat by Pulse Oximetry (%)  96   10/29/19 08:49











Constitutional: Yes: No Distress, Calm


Cardiovascular: Yes: Regular Rate and Rhythm


Respiratory: Yes: Regular, CTA Bilaterally


Gastrointestinal: Yes: Normal Bowel Sounds, Soft


Musculoskeletal: Yes: WNL


Extremities: Yes: WNL


Neurological: Yes: Alert


Labs: 


 CBC, BMP





 10/29/19 06:42 





 10/29/19 06:42 





 INR, PTT











INR  1.42  (0.83-1.09)  H  10/19/19  06:38    














Assessment/Plan





79 year old man with a history of HTN, hyperlipidemia, CAD, MI, CABG, chronic 

diastolic heart failure, type 2 DM, stage 3 CKD, COPD, dementia who presented 

to the ED with altered mental status. 





1. Acute metabolic encephalopathy 


2. Acute kidney injury


3. Abdominal pain 


4.  UTI





5. Lactic acidosis


6. Hypernatremia


7. Hypophosphatemia


8. HTN


9. Hyperlipidemia


10. CAD, history of MI, CABG


11. Chronic diastolic heart failure


12. Stage 3 CKD


13. COPD


14. Alzheimer dementia





plan


continue current mgmt


monitor


as per urology


nephro on case


rest as per the team

## 2019-10-29 NOTE — PN
Progress Note (short form)





- Note


Progress Note: 





HPI: Pt remains on CBI. Hypoglycemia noted still, however improving compared to 

over weekend. Otherwise HPI limited





PE:


Gen: NAD, awake, alert


HEENT: NC/AT, RUBIO, MMM


Neck: No JVD


LUNG: CTA bilaterally, however poor inspiratory effort, On RA


CARD: RRR no murmurs appreciated


ABD: Soft, Nt/ND, normoactive BS:


: Mckeon catheter in place CBI ongoing


EXT: No edema appreciated, cap refill <2sec


Skin: No rashes or lesions noted





 CBC, BMP





 10/29/19 06:42 





 10/29/19 06:42 





Microbiology





10/06/19 23:00   Blood - Peripheral Venous   Blood Culture - Final


                            NO GROWTH AFTER 5 DAYS INCUBATION


10/06/19 23:00   Blood - Peripheral Venous   Blood Culture - Final


                            NO GROWTH AFTER 5 DAYS INCUBATION


10/06/19 21:16   Urine - Urine Mckeon   Urine Culture - Final


                            Strep Agalactiae Group B


                            Mr S Aureus


                            Staphylococcus Coagulase Neg








Active Medications





Acetaminophen (Tylenol -)  650 mg PO Q4H PRN


   PRN Reason: FEVER


   Last Admin: 10/27/19 13:29 Dose:  650 mg


Atorvastatin Calcium (Lipitor -)  40 mg PO HS [facility]


   Last Admin: 10/29/19 22:03 Dose:  40 mg


Donepezil HCl (Aricept -)  10 mg PO DAILY [facility]


   Last Admin: 10/29/19 10:06 Dose:  Not Given


Ezetimibe (Zetia -)  10 mg PO HS [facility]


   Last Admin: 10/29/19 22:02 Dose:  10 mg


Escitalopram Oxalate (Lexapro -)  10 mg PO DAILY [facility]


   Last Admin: 10/29/19 10:06 Dose:  Not Given


Ferrous Sulfate (Feosol -)  325 mg PO BIDWM [facility]


   Last Admin: 10/29/19 16:40 Dose:  Not Given


Finasteride (Proscar -)  5 mg PO DAILY [facility]


   Last Admin: 10/29/19 10:06 Dose:  Not Given


Insulin Aspart (Novolog Vial Sliding Scale -)  1 vial SQ ACHS [facility]; Protocol


   Last Admin: 10/29/19 22:08 Dose:  6 units


Insulin Aspart (Novolog Vial)  2 units SQ ACLD [facility]


   Last Admin: 10/29/19 16:24 Dose:  2 units


Insulin Detemir (Levemir Vial)  28 units SQ AM [facility]


   Last Admin: 10/29/19 07:18 Dose:  Not Given


Nebivolol (Bystolic -)  2.5 mg PO HS [facility]


   Last Admin: 10/29/19 22:02 Dose:  2.5 mg


Nystatin (Nystop Powder -)  1 applic TP BID [facility]


   Last Admin: 10/29/19 23:16 Dose:  1 applic


Ondansetron HCl (Zofran Injection)  4 mg IVPUSH Q6H PRN


   PRN Reason: NAUSEA AND/OR VOMITING


Tamsulosin HCl (Flomax -)  0.4 mg PO BID@0830,2200 [facility]


   Last Admin: 10/29/19 22:03 Dose:  0.4 mg





Assessment and Plan:


DKA (resolved)


Hypernatremia (resolved)


Acute kidney injury (Resolved)


Type 2 DM


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


Hematuria





--CBI continues; still awaiting further urology input


   --Likely d/c CBI tomorrow


--H/H remains stable


--Notable hypoglycemia during pre-dinner checks; decreased regiment to:


   Levemir 26U AM


   Novolog scheduled 2U ACLD


   ISS for breakthrough


   BGM ACHS


--Discussed with Dr. Stokes and Dr. Greene how even trial of LP or  shunting 

not viable for patient's NPH


--Contnue Flomax 0.4mg BID PO


--Continue Proscar 5mg qdaily


--Lexapro 10mg and Aricept 10mg qdaily





FEN:


   Fluids: PO encouragement





PPX:


   DVT - scds





Dispo: Continue monitoring


Case discussed with Dr. Tracy Beal, DO - IM PGY-3





<Collins Beal - Last Filed: 10/29/19 23:22>





- Note


Progress Note: 





I have seen and examined the indicated patient independently/along with the 

resident team. I have personally verified all lebron exam findings and historical 

components. I have personally interpreted all diagnostics indicated per todays 

orders and reviewed interpretation of indicated subspecialty services. This 

patient meets a high level of medical complexity and warrants indicated LOC to 

avoid decompensation and worsening of the indicated illness. 








S:


Agree with historical findings as outlined in resident documentation regarding 

history of present illness.  No further events communicated to myself from 

overnight.   No callback and no response from resident team regarding callback.

  Unfortunately course regarding overall plan for hematuria remains unknown.  

Son remains extraordinarily hesitant to bring patient home without definitive 

plan for resoluteion of hematuria.  Discussed with nursing; will need to 

discuss with administration regarding overall plan given protracted course of 

admit and ongoing issue.  He has no clots today and no s/s obstruction.  





10 system ROS Unreliable


Medication list reviewed; as documented per orders and above.








O:


All vital signs reviewed per ER records and are as per EMR


NAD, AAOx1 but limited communciation as ESL with underlying organic dementia, 

Resting in bed


NC AT EOMI PERRLA


Neck supple, trachea midline, no roby LN


RRR s1/2


Lungs CTAB, w/ sym expansion


NT ND +BS


No skin breakdown or rashes noted


CN2-12 wnl, no new focal deficits noted


Muscle tone normal, no deficits in motor function or strength noted


Normal mood, appropriate behavior, average insight





EKG:


CXR:


Other Imaging:


Prior Diagnostics:





Seen and examined; agree with resident note aside from as supplemented below by 

myself.  Independently reviewed all labs, vitals, and diagnostics.  In addition 

I verified all key historic and PE findings. 








Unchanged; no issues overnight.  Hematuria recurred despite plans for DC.  He 

will likely be kept inpatient though the weekend pending further urologic 

intervention.  Spoke with resident team at length regarding need to determine 

with urology definitive meaures.  We are holding AC with SCDs for DVT ppx (

plavix can be held given acute significant bleed and no recent stents).  Will 

continue to monitor and redirect.  No issues with agitation as Zyprexa has 

continued to have a positive effect.  Overall will monitor inpatient for the 

weekend and discuss DC with consulting providers.








1) DKA 2/2 issues with managing insulin pump, resolved-DC on SQ insulin


2) Uncontrolled DM -Continually adjusting insulin.  Complete control has proven 

difficult with this patient but is preferable to the ongoing issue with pump.  

No further input from endo noted.  Avoiding hypoglycemia but has intermittent 

spikes of hyper.  Will need endocrine followup post DC.


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  

Can discuss therapeutic tap as needed but per neuro and neurosurgery with 

extensive discussion as documented last week this likely should be done OP.  

Will continue to monitor.  Informed son of this developent as his mentation was 

a barrier to initial DC planning to facility


4) Acute Cystitis with Hematuria vs. Roby Hematuria- Abx per ID; continue to 

monitor.  Ongoing trauma 2/2 underlying dementia vs. infectious origin.


5) Urinary Obstruction - Continue tamsulosin and finasteride.


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 

secondary to diabetes mellitus being so uncontrolled.  We will continue to 

monitor.  He should follow-up with his nephrologist upon discharge.  


7) Hx CAD s/p CABG


8) Underlying Dementia (documented as Alzheimer's Type)


9) Hx D-CHF


10) Lactic Acidosis, resolved


11) Hypernatremia, resolved


12) Hx HTN


13) Hx HLD


14) Hx COPD, no acute exacerbation


***) Ongoing hematuria remains barrier to DC.  Pending final urology recs.





Discussed case at length with  Savanna Perera who agrees that inpateint 

care is warranted given the ongoing hematuria.  It had resolution briefly last 

week then severe recurrance that prompted the CBI per uro.  Initial DC held due 

to facility reluctance for acceptance due to ongoing cognitive impairment 

secondary to organic dementia/NPH that per Dr. Morris and Dr. Greene may be 

worked up as an outpatient.  His initial reason for admission was due to DKA 

secondary to noncompliance with insulin pump exacerbated likely by his and his 

wife's underlying cognitive issues and poor diet.  He was transitioned to SQ 

insulin with good effect and no further DKA or profound hypoglycemia but his 

glucose remains difficult to control.  Initially this was noted to be due to 

ongoing high sugar foods being given by family but now noted to be ongoing but 

to a lesser degree.  Tapering insulin.  Seen once by endo here and will be 

referred to their services for continued outpatient workup; they agreed with 

discontinuing the pump.  Providing extensive teaching to family regarding home 

care as insurance will not approve facility despite multiple conversations and 

attempts as documented by myself and CM and SW earlier in the chart.  

Continuing CBI until definitive plan per uro.  Monitoring glucose and CBC 

carefully.








Spoke to STEF Ozuna who was in touch with Dr. Durán's partner who will be by to 

assess the patient and to discuss the further discharge planning required from 

urological perspective.  Asked to be called to the floor when they arrive.


No new issues per patient or family; remains at mental status baseline and is 

continuously stable on their Zyprexa.





Once urology sees them, pending confirmation of transport and already arranged 

for home services, they will be cleared for discharge.





Full Code





<Tiburcio Molina - Last Filed: 10/30/19 00:50>

## 2019-10-30 LAB
ALBUMIN SERPL-MCNC: 2.5 G/DL (ref 3.4–5)
ALP SERPL-CCNC: 130 U/L (ref 45–117)
ALT SERPL-CCNC: 24 U/L (ref 13–61)
ANION GAP SERPL CALC-SCNC: 6 MMOL/L (ref 8–16)
AST SERPL-CCNC: 17 U/L (ref 15–37)
BILIRUB SERPL-MCNC: 0.6 MG/DL (ref 0.2–1)
BUN SERPL-MCNC: 17.2 MG/DL (ref 7–18)
CALCIUM SERPL-MCNC: 8.4 MG/DL (ref 8.5–10.1)
CHLORIDE SERPL-SCNC: 103 MMOL/L (ref 98–107)
CO2 SERPL-SCNC: 27 MMOL/L (ref 21–32)
CREAT SERPL-MCNC: 1 MG/DL (ref 0.55–1.3)
DEPRECATED RDW RBC AUTO: 15.7 % (ref 11.9–15.9)
GLUCOSE SERPL-MCNC: 178 MG/DL (ref 74–106)
HCT VFR BLD CALC: 32.3 % (ref 35.4–49)
HGB BLD-MCNC: 10.4 GM/DL (ref 11.7–16.9)
MAGNESIUM SERPL-MCNC: 2.5 MG/DL (ref 1.8–2.4)
MCH RBC QN AUTO: 28.8 PG (ref 25.7–33.7)
MCHC RBC AUTO-ENTMCNC: 32.2 G/DL (ref 32–35.9)
MCV RBC: 89.4 FL (ref 80–96)
PHOSPHATE SERPL-MCNC: 2.9 MG/DL (ref 2.5–4.9)
PLATELET # BLD AUTO: 206 K/MM3 (ref 134–434)
PMV BLD: 7.9 FL (ref 7.5–11.1)
POTASSIUM SERPLBLD-SCNC: 4.8 MMOL/L (ref 3.5–5.1)
PROT SERPL-MCNC: 6.5 G/DL (ref 6.4–8.2)
RBC # BLD AUTO: 3.61 M/MM3 (ref 4–5.6)
SODIUM SERPL-SCNC: 137 MMOL/L (ref 136–145)
WBC # BLD AUTO: 7.4 K/MM3 (ref 4–10)

## 2019-10-30 RX ADMIN — EZETIMIBE SCH MG: 10 TABLET ORAL at 22:49

## 2019-10-30 RX ADMIN — NYSTATIN SCH APPLIC: 100000 POWDER TOPICAL at 09:39

## 2019-10-30 RX ADMIN — INSULIN ASPART SCH UNITS: 100 INJECTION, SOLUTION INTRAVENOUS; SUBCUTANEOUS at 11:37

## 2019-10-30 RX ADMIN — FERROUS SULFATE TAB EC 324 MG (65 MG FE EQUIVALENT) SCH: 324 (65 FE) TABLET DELAYED RESPONSE at 07:52

## 2019-10-30 RX ADMIN — INSULIN ASPART SCH UNITS: 100 INJECTION, SOLUTION INTRAVENOUS; SUBCUTANEOUS at 06:41

## 2019-10-30 RX ADMIN — FINASTERIDE SCH MG: 5 TABLET, FILM COATED ORAL at 09:39

## 2019-10-30 RX ADMIN — TAMSULOSIN HYDROCHLORIDE SCH: 0.4 CAPSULE ORAL at 07:52

## 2019-10-30 RX ADMIN — INSULIN ASPART SCH UNITS: 100 INJECTION, SOLUTION INTRAVENOUS; SUBCUTANEOUS at 16:47

## 2019-10-30 RX ADMIN — INSULIN ASPART SCH UNITS: 100 INJECTION, SOLUTION INTRAVENOUS; SUBCUTANEOUS at 16:41

## 2019-10-30 RX ADMIN — INSULIN DETEMIR SCH: 100 INJECTION, SOLUTION SUBCUTANEOUS at 09:40

## 2019-10-30 RX ADMIN — INSULIN ASPART SCH UNITS: 100 INJECTION, SOLUTION INTRAVENOUS; SUBCUTANEOUS at 22:57

## 2019-10-30 RX ADMIN — DONEPEZIL HYDROCHLORIDE SCH MG: 10 TABLET, FILM COATED ORAL at 09:39

## 2019-10-30 RX ADMIN — NEBIVOLOL HYDROCHLORIDE SCH MG: 2.5 TABLET ORAL at 22:49

## 2019-10-30 RX ADMIN — FERROUS SULFATE TAB EC 324 MG (65 MG FE EQUIVALENT) SCH MG: 324 (65 FE) TABLET DELAYED RESPONSE at 16:41

## 2019-10-30 RX ADMIN — ATORVASTATIN CALCIUM SCH MG: 40 TABLET, FILM COATED ORAL at 22:49

## 2019-10-30 RX ADMIN — TAMSULOSIN HYDROCHLORIDE SCH MG: 0.4 CAPSULE ORAL at 22:49

## 2019-10-30 RX ADMIN — NYSTATIN SCH APPLIC: 100000 POWDER TOPICAL at 22:51

## 2019-10-30 RX ADMIN — ESCITALOPRAM OXALATE SCH MG: 10 TABLET, FILM COATED ORAL at 09:39

## 2019-10-30 NOTE — PN
Progress Note, Physician


History of Present Illness: 





stable


on cbi


urine clearing up





- Current Medication List


Current Medications: 


Active Medications





Acetaminophen (Tylenol -)  650 mg PO Q4H PRN


   PRN Reason: FEVER


   Last Admin: 10/27/19 13:29 Dose:  650 mg


Atorvastatin Calcium (Lipitor -)  40 mg PO HS [facility]


   Last Admin: 10/29/19 22:03 Dose:  40 mg


Donepezil HCl (Aricept -)  10 mg PO DAILY [facility]


   Last Admin: 10/30/19 09:39 Dose:  10 mg


Ezetimibe (Zetia -)  10 mg PO HS [facility]


   Last Admin: 10/29/19 22:02 Dose:  10 mg


Escitalopram Oxalate (Lexapro -)  10 mg PO DAILY [facility]


   Last Admin: 10/30/19 09:39 Dose:  10 mg


Ferrous Sulfate (Feosol -)  325 mg PO BIDWM [facility]


   Last Admin: 10/30/19 07:52 Dose:  Not Given


Finasteride (Proscar -)  5 mg PO DAILY [facility]


   Last Admin: 10/30/19 09:39 Dose:  5 mg


Insulin Aspart (Novolog Vial Sliding Scale -)  1 vial SQ ACHS [facility]; Protocol


   Last Admin: 10/30/19 06:41 Dose:  4 units


Insulin Aspart (Novolog Vial)  2 units SQ ACLD [facility]


   Last Admin: 10/29/19 16:24 Dose:  2 units


Insulin Detemir (Levemir Vial)  26 units SQ AM [facility]


   Last Admin: 10/30/19 09:40 Dose:  Not Given


Nebivolol (Bystolic -)  2.5 mg PO HS [facility]


   Last Admin: 10/29/19 22:02 Dose:  2.5 mg


Nystatin (Nystop Powder -)  1 applic TP BID [facility]


   Last Admin: 10/30/19 09:39 Dose:  1 applic


Ondansetron HCl (Zofran Injection)  4 mg IVPUSH Q6H PRN


   PRN Reason: NAUSEA AND/OR VOMITING


Tamsulosin HCl (Flomax -)  0.4 mg PO BID@0830,2200 [facility]


   Last Admin: 10/30/19 07:52 Dose:  Not Given











- Objective


Vital Signs: 


 Vital Signs











Temperature  97.5 F L  10/30/19 10:00


 


Pulse Rate  60   10/30/19 10:00


 


Respiratory Rate  18   10/30/19 10:00


 


Blood Pressure  117/53 L  10/30/19 10:00


 


O2 Sat by Pulse Oximetry (%)  100   10/30/19 09:00











Constitutional: Yes: No Distress, Calm


Cardiovascular: Yes: S1, S2


Respiratory: Yes: Regular, CTA Bilaterally


Gastrointestinal: Yes: Normal Bowel Sounds, Soft


Genitourinary: Yes: Mckeon Present


Musculoskeletal: Yes: WNL


Extremities: Yes: WNL


Neurological: Yes: Alert, Other


Psychiatric: Yes: Other


Labs: 


 CBC, BMP





 10/30/19 06:10 





 10/30/19 06:10 





 INR, PTT











INR  1.42  (0.83-1.09)  H  10/19/19  06:38    














Assessment/Plan





79 year old man with a history of HTN, hyperlipidemia, CAD, MI, CABG, chronic 

diastolic heart failure, type 2 DM, stage 3 CKD, COPD, dementia who presented 

to the ED with altered mental status. 





1. Acute metabolic encephalopathy 


2. Acute kidney injury


3. Abdominal pain 


4.  UTI





5. Lactic acidosis


6. Hypernatremia


7. Hypophosphatemia


8. HTN


9. Hyperlipidemia


10. CAD, history of MI, CABG


11. Chronic diastolic heart failure


12. Stage 3 CKD


13. COPD


14. Alzheimer dementia





plan


continue current mgmt


monitor


as per urology


nephro on case


rest as per the team

## 2019-10-30 NOTE — PN
Progress Note (short form)





- Note


Progress Note: 





HPI: Pt remains on CBI. No hypoglycemia overnight. Otherwise HPI limited





PE:


Gen: NAD, awake, alert


HEENT: NC/AT, RUBIO, MMM


Neck: No JVD


LUNG: CTA bilaterally, however poor inspiratory effort, On RA


CARD: RRR no murmurs appreciated


ABD: Soft, Nt/ND, normoactive BS:


: Mckeon catheter in place CBI ongoing


EXT: No edema appreciated, cap refill <2sec


Skin: No rashes or lesions noted


 CBC, BMP





 10/30/19 06:10 





 10/30/19 06:10 








 Hepatic Panel











Total Bilirubin  0.6 mg/dL (0.2-1)   10/30/19  06:10    


 


AST  17 U/L (15-37)   10/30/19  06:10    


 


ALT  24 U/L (13-61)   10/30/19  06:10    


 


Alkaline Phosphatase  130 U/L ()  H  10/30/19  06:10    


 


Albumin  2.5 g/dl (3.4-5.0)  L  10/30/19  06:10    











Microbiology





10/06/19 23:00   Blood - Peripheral Venous   Blood Culture - Final


                            NO GROWTH AFTER 5 DAYS INCUBATION


10/06/19 23:00   Blood - Peripheral Venous   Blood Culture - Final


                            NO GROWTH AFTER 5 DAYS INCUBATION


10/06/19 21:16   Urine - Urine Mckeon   Urine Culture - Final


                            Strep Agalactiae Group B


                            Mr S Aureus


                            Staphylococcus Coagulase Neg








Active Medications





Acetaminophen (Tylenol -)  650 mg PO Q4H PRN


   PRN Reason: FEVER


   Last Admin: 10/27/19 13:29 Dose:  650 mg


Atorvastatin Calcium (Lipitor -)  40 mg PO HS [facility]


   Last Admin: 10/29/19 22:03 Dose:  40 mg


Donepezil HCl (Aricept -)  10 mg PO DAILY [facility]


   Last Admin: 10/29/19 10:06 Dose:  Not Given


Ezetimibe (Zetia -)  10 mg PO HS [facility]


   Last Admin: 10/29/19 22:02 Dose:  10 mg


Escitalopram Oxalate (Lexapro -)  10 mg PO DAILY [facility]


   Last Admin: 10/29/19 10:06 Dose:  Not Given


Ferrous Sulfate (Feosol -)  325 mg PO BIDWM [facility]


   Last Admin: 10/29/19 16:40 Dose:  Not Given


Finasteride (Proscar -)  5 mg PO DAILY [facility]


   Last Admin: 10/29/19 10:06 Dose:  Not Given


Insulin Aspart (Novolog Vial Sliding Scale -)  1 vial SQ ACHS [facility]; Protocol


   Last Admin: 10/29/19 22:08 Dose:  6 units


Insulin Aspart (Novolog Vial)  2 units SQ ACLD [facility]


   Last Admin: 10/29/19 16:24 Dose:  2 units


Insulin Detemir (Levemir Vial)  28 units SQ AM [facility]


   Last Admin: 10/29/19 07:18 Dose:  Not Given


Nebivolol (Bystolic -)  2.5 mg PO HS [facility]


   Last Admin: 10/29/19 22:02 Dose:  2.5 mg


Nystatin (Nystop Powder -)  1 applic TP BID [facility]


   Last Admin: 10/29/19 23:16 Dose:  1 applic


Ondansetron HCl (Zofran Injection)  4 mg IVPUSH Q6H PRN


   PRN Reason: NAUSEA AND/OR VOMITING


Tamsulosin HCl (Flomax -)  0.4 mg PO BID@0830,2200 [facility]


   Last Admin: 10/29/19 22:03 Dose:  0.4 mg





Assessment and Plan:


DKA (resolved)


Hypernatremia (resolved)


Acute kidney injury (Resolved)


Type 2 DM


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


Hematuria





--Trialed CBI off with return of roby blood; still awaiting further urology 

input


   --H/H remains stable


   --Return to CBI as previously suggested


--Improved glycemic control; continue:


   Levemir 26U AM


   Novolog scheduled 2U ACLD


   ISS for breakthrough


   BGM ACHS


--Discussed with Dr. Stokes and Dr. Greene how even trial of LP or  shunting 

not viable for patient's NPH


--Contnue Flomax 0.4mg BID PO


--Continue Proscar 5mg qdaily


--Lexapro 10mg and Aricept 10mg qdaily





FEN:


   Fluids: PO encouragement





PPX:


   DVT - scds





Dispo: Continue monitoring; urology eval


Case discussed with Dr. Tracy Beal, DO - IM PGY-3





<Collins Beal - Last Filed: 10/30/19 22:45>





- Note


Progress Note: 





I have seen and examined the indicated patient independently/along with the 

resident team. I have personally verified all lebron exam findings and historical 

components. I have personally interpreted all diagnostics indicated per todays 

orders and reviewed interpretation of indicated subspecialty services. This 

patient meets a high level of medical complexity and warrants indicated LOC to 

avoid decompensation and worsening of the indicated illness. 








S:


Agree with historical findings as outlined in resident documentation regarding 

history of present illness.  No further events communicated to myself from 

overnight.   No callback and no response from resident team regarding callback.

  Unfortunately course regarding overall plan for hematuria remains unknown.  

Son remains extraordinarily hesitant to bring patient home without definitive 

plan for resoluteion of hematuria.  Discussed with nursing; will need to 

discuss with administration regarding overall plan given protracted course of 

admit and ongoing issue.  He has no clots today and no s/s obstruction.  





10 system ROS Unreliable


Medication list reviewed; as documented per orders and above.








O:


All vital signs reviewed per ER records and are as per EMR


NAD, AAOx1 but limited communciation as ESL with underlying organic dementia, 

Resting in bed


NC AT EOMI PERRLA


Neck supple, trachea midline, no roby LN


RRR s1/2


Lungs CTAB, w/ sym expansion


NT ND +BS


No skin breakdown or rashes noted


CN2-12 wnl, no new focal deficits noted


Muscle tone normal, no deficits in motor function or strength noted


Normal mood, appropriate behavior, average insight





EKG:


CXR:


Other Imaging:


Prior Diagnostics:





Seen and examined; agree with resident note aside from as supplemented below by 

myself.  Independently reviewed all labs, vitals, and diagnostics.  In addition 

I verified all key historic and PE findings. 








Unchanged; no issues overnight.  Hematuria recurred despite plans for DC.  He 

will likely be kept inpatient though the weekend pending further urologic 

intervention.  Spoke with resident team at length regarding need to determine 

with urology definitive meaures.  We are holding AC with SCDs for DVT ppx (

plavix can be held given acute significant bleed and no recent stents).  Will 

continue to monitor and redirect.  No issues with agitation as Zyprexa has 

continued to have a positive effect.  Overall will monitor inpatient for the 

weekend and discuss DC with consulting providers.








1) DKA 2/2 issues with managing insulin pump, resolved-DC on SQ insulin


2) Uncontrolled DM -Continually adjusting insulin.  Complete control has proven 

difficult with this patient but is preferable to the ongoing issue with pump.  

No further input from endo noted.  Avoiding hypoglycemia but has intermittent 

spikes of hyper.  Will need endocrine followup post DC.


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  

Can discuss therapeutic tap as needed but per neuro and neurosurgery with 

extensive discussion as documented last week this likely should be done OP.  

Will continue to monitor.  Informed son of this developent as his mentation was 

a barrier to initial DC planning to facility


4) Acute Cystitis with Hematuria vs. Roby Hematuria- Abx per ID; continue to 

monitor.  Ongoing trauma 2/2 underlying dementia vs. infectious origin.


5) Urinary Obstruction - Continue tamsulosin and finasteride.


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 

secondary to diabetes mellitus being so uncontrolled.  We will continue to 

monitor.  He should follow-up with his nephrologist upon discharge.  


7) Hx CAD s/p CABG


8) Underlying Dementia (documented as Alzheimer's Type)


9) Hx D-CHF


10) Lactic Acidosis, resolved


11) Hypernatremia, resolved


12) Hx HTN


13) Hx HLD


14) Hx COPD, no acute exacerbation


***) Ongoing hematuria remains barrier to DC.  Pending final urology recs.





Discussed case at length with  Savanna Perera who agrees that inpateint 

care is warranted given the ongoing hematuria.  It had resolution briefly last 

week then severe recurrance that prompted the CBI per uro.  Initial DC held due 

to facility reluctance for acceptance due to ongoing cognitive impairment 

secondary to organic dementia/NPH that per Dr. Morris and Dr. Greene may be 

worked up as an outpatient.  His initial reason for admission was due to DKA 

secondary to noncompliance with insulin pump exacerbated likely by his and his 

wife's underlying cognitive issues and poor diet.  He was transitioned to SQ 

insulin with good effect and no further DKA or profound hypoglycemia but his 

glucose remains difficult to control.  Initially this was noted to be due to 

ongoing high sugar foods being given by family but now noted to be ongoing but 

to a lesser degree.  Tapering insulin.  Seen once by ginger here and will be 

referred to their services for continued outpatient workup; they agreed with 

discontinuing the pump.  Providing extensive teaching to family regarding home 

care as insurance will not approve facility despite multiple conversations and 

attempts as documented by myself and CM and SW earlier in the chart.  

Continuing CBI until definitive plan per uro.  Monitoring glucose and CBC 

carefully.








<Tiburcio Molina - Last Filed: 10/31/19 22:52>

## 2019-10-30 NOTE — PN
Progress Note, Physician


History of Present Illness: 





Pt seen and examined at bedside. He is awake and alert. 





- Current Medication List


Current Medications: 


Active Medications





Acetaminophen (Tylenol -)  650 mg PO Q4H PRN


   PRN Reason: FEVER


   Last Admin: 10/27/19 13:29 Dose:  650 mg


Atorvastatin Calcium (Lipitor -)  40 mg PO HS [facility]


   Last Admin: 10/29/19 22:03 Dose:  40 mg


Donepezil HCl (Aricept -)  10 mg PO DAILY [facility]


   Last Admin: 10/30/19 09:39 Dose:  10 mg


Ezetimibe (Zetia -)  10 mg PO HS [facility]


   Last Admin: 10/29/19 22:02 Dose:  10 mg


Escitalopram Oxalate (Lexapro -)  10 mg PO DAILY [facility]


   Last Admin: 10/30/19 09:39 Dose:  10 mg


Ferrous Sulfate (Feosol -)  325 mg PO BIDWM [facility]


   Last Admin: 10/30/19 07:52 Dose:  Not Given


Finasteride (Proscar -)  5 mg PO DAILY [facility]


   Last Admin: 10/30/19 09:39 Dose:  5 mg


Insulin Aspart (Novolog Vial Sliding Scale -)  1 vial SQ ACHS [facility]; Protocol


   Last Admin: 10/30/19 11:37 Dose:  4 units


Insulin Aspart (Novolog Vial)  2 units SQ ACLD [facility]


   Last Admin: 10/30/19 11:37 Dose:  2 units


Insulin Detemir (Levemir Vial)  26 units SQ AM [facility]


   Last Admin: 10/30/19 09:40 Dose:  Not Given


Nebivolol (Bystolic -)  2.5 mg PO HS [facility]


   Last Admin: 10/29/19 22:02 Dose:  2.5 mg


Nystatin (Nystop Powder -)  1 applic TP BID [facility]


   Last Admin: 10/30/19 09:39 Dose:  1 applic


Ondansetron HCl (Zofran Injection)  4 mg IVPUSH Q6H PRN


   PRN Reason: NAUSEA AND/OR VOMITING


Tamsulosin HCl (Flomax -)  0.4 mg PO BID@0830,2200 [facility]


   Last Admin: 10/30/19 07:52 Dose:  Not Given











- Objective


Vital Signs: 


 Vital Signs











Temperature  97.5 F L  10/30/19 10:00


 


Pulse Rate  60   10/30/19 10:00


 


Respiratory Rate  18   10/30/19 10:00


 


Blood Pressure  117/53 L  10/30/19 10:00


 


O2 Sat by Pulse Oximetry (%)  100   10/30/19 09:00











Constitutional: Yes: Calm


Eyes: Yes: Conjunctiva Clear


HENT: Yes: Atraumatic


Neck: Yes: Supple


Cardiovascular: Yes: S1, S2


Respiratory: Yes: CTA Bilaterally


Gastrointestinal: Yes: Soft


Genitourinary: Yes: Mckeon Present, Other (on cbi)


Musculoskeletal: Yes: WNL


Edema: LLE: Trace, RLE: Trace


Neurological: Yes: Oriented


Labs: 


 CBC, BMP





 10/30/19 06:10 





 10/30/19 06:10 





 INR, PTT











INR  1.42  (0.83-1.09)  H  10/19/19  06:38    














Problem List





- Problems


(1) DEB (acute kidney injury)


Code(s): N17.9 - ACUTE KIDNEY FAILURE, UNSPECIFIED   





(2) DKA (diabetic ketoacidoses)


Code(s): E11.10 - TYPE 2 DIABETES MELLITUS WITH KETOACIDOSIS WITHOUT COMA   


Qualifiers: 


   Diabetes mellitus type: type 2   Diabetes mellitus complication detail: 

without coma   Qualified Code(s): E11.10 - Type 2 diabetes mellitus with 

ketoacidosis without coma   





(3) Acute renal failure


Code(s): N17.9 - ACUTE KIDNEY FAILURE, UNSPECIFIED   





(4) Hypernatremia


Code(s): E87.0 - HYPEROSMOLALITY AND HYPERNATREMIA   





Assessment/Plan


 Current Medications











Generic Name Dose Route Start Last Admin





  Trade Name Freq  PRN Reason Stop Dose Admin


 


Acetaminophen  650 mg  10/09/19 10:34  10/27/19 13:29





  Tylenol -  PO   650 mg





  Q4H PRN   Administration





  FEVER   





     





     





     


 


Atorvastatin Calcium  40 mg  10/07/19 22:00  10/29/19 22:03





  Lipitor -  PO   40 mg





  HS MARY   Administration





     





     





     





     


 


Donepezil HCl  10 mg  10/16/19 10:00  10/30/19 09:39





  Aricept -  PO   10 mg





  DAILY MARY   Administration





     





     





     





     


 


Ezetimibe  10 mg  10/07/19 22:00  10/29/19 22:02





  Zetia -  PO   10 mg





  HS MARY   Administration





     





     





     





     


 


Escitalopram Oxalate  10 mg  10/16/19 10:00  10/30/19 09:39





  Lexapro -  PO   10 mg





  DAILY MARY   Administration





     





     





     





     


 


Ferrous Sulfate  325 mg  10/20/19 17:30  10/30/19 07:52





  Feosol -  PO   Not Given





  BIDWM MARY   





     





     





     





     


 


Finasteride  5 mg  10/16/19 10:00  10/30/19 09:39





  Proscar -  PO   5 mg





  DAILY MARY   Administration





     





     





     





     


 


Insulin Aspart  1 vial  10/24/19 07:03  10/30/19 11:37





  Novolog Vial Sliding Scale -  SQ   4 units





  [facility] MARY   Administration





     





     





  Protocol   





     


 


Insulin Aspart  2 units  10/28/19 07:48  10/30/19 11:37





  Novolog Vial  SQ   2 units





  ACLD MARY   Administration





     





     





     





     


 


Insulin Detemir  26 units  10/30/19 10:00  10/30/19 09:40





  Levemir Vial  SQ   Not Given





  AM MARY   





     





     





     





     


 


Nebivolol  2.5 mg  10/07/19 22:00  10/29/19 22:02





  Bystolic -  PO   2.5 mg





  HS MARY   Administration





     





     





     





     


 


Nystatin  1 applic  10/10/19 11:30  10/30/19 09:39





  Nystop Powder -  TP   1 applic





  BID MARY   Administration





     





     





     





     


 


Ondansetron HCl  4 mg  10/18/19 17:01  





  Zofran Injection  IVPUSH   





  Q6H PRN   





  NAUSEA AND/OR VOMITING   





     





     





     


 


Tamsulosin HCl  0.4 mg  10/16/19 08:45  10/30/19 07:52





  Flomax -  PO   Not Given





  BID@0830,2200 MARY   





     





     





     





     














Impression


1. DEB


2. DKA


3. hypernatremia


4. DM


5. dementia


6. cad


7. hyperkalemia


8. hematuria





Plan


- clamp CBI and check for hematuria


- will need urology follow up


- restart cbi if he developed hematuria


- repeat labs in am


- diabetic diet

## 2019-10-31 LAB
ANION GAP SERPL CALC-SCNC: 7 MMOL/L (ref 8–16)
BASOPHILS # BLD: 1.2 % (ref 0–2)
BUN SERPL-MCNC: 16.8 MG/DL (ref 7–18)
CALCIUM SERPL-MCNC: 8.6 MG/DL (ref 8.5–10.1)
CHLORIDE SERPL-SCNC: 104 MMOL/L (ref 98–107)
CO2 SERPL-SCNC: 28 MMOL/L (ref 21–32)
CREAT SERPL-MCNC: 1 MG/DL (ref 0.55–1.3)
DEPRECATED RDW RBC AUTO: 15.3 % (ref 11.9–15.9)
EOSINOPHIL # BLD: 6.8 % (ref 0–4.5)
GLUCOSE SERPL-MCNC: 160 MG/DL (ref 74–106)
HCT VFR BLD CALC: 31.5 % (ref 35.4–49)
HGB BLD-MCNC: 10.2 GM/DL (ref 11.7–16.9)
LYMPHOCYTES # BLD: 24.1 % (ref 8–40)
MCH RBC QN AUTO: 28.5 PG (ref 25.7–33.7)
MCHC RBC AUTO-ENTMCNC: 32.2 G/DL (ref 32–35.9)
MCV RBC: 88.4 FL (ref 80–96)
MONOCYTES # BLD AUTO: 9.9 % (ref 3.8–10.2)
NEUTROPHILS # BLD: 58 % (ref 42.8–82.8)
PLATELET # BLD AUTO: 200 K/MM3 (ref 134–434)
PMV BLD: 7.9 FL (ref 7.5–11.1)
POTASSIUM SERPLBLD-SCNC: 4.6 MMOL/L (ref 3.5–5.1)
RBC # BLD AUTO: 3.57 M/MM3 (ref 4–5.6)
SODIUM SERPL-SCNC: 139 MMOL/L (ref 136–145)
WBC # BLD AUTO: 7.3 K/MM3 (ref 4–10)

## 2019-10-31 RX ADMIN — INSULIN ASPART SCH UNITS: 100 INJECTION, SOLUTION INTRAVENOUS; SUBCUTANEOUS at 06:21

## 2019-10-31 RX ADMIN — NEBIVOLOL HYDROCHLORIDE SCH MG: 2.5 TABLET ORAL at 22:13

## 2019-10-31 RX ADMIN — TAMSULOSIN HYDROCHLORIDE SCH MG: 0.4 CAPSULE ORAL at 10:25

## 2019-10-31 RX ADMIN — NYSTATIN SCH APPLIC: 100000 POWDER TOPICAL at 22:14

## 2019-10-31 RX ADMIN — DONEPEZIL HYDROCHLORIDE SCH MG: 10 TABLET, FILM COATED ORAL at 09:00

## 2019-10-31 RX ADMIN — INSULIN ASPART SCH UNITS: 100 INJECTION, SOLUTION INTRAVENOUS; SUBCUTANEOUS at 18:06

## 2019-10-31 RX ADMIN — FINASTERIDE SCH MG: 5 TABLET, FILM COATED ORAL at 10:25

## 2019-10-31 RX ADMIN — NYSTATIN SCH APPLIC: 100000 POWDER TOPICAL at 10:28

## 2019-10-31 RX ADMIN — FERROUS SULFATE TAB EC 324 MG (65 MG FE EQUIVALENT) SCH MG: 324 (65 FE) TABLET DELAYED RESPONSE at 10:25

## 2019-10-31 RX ADMIN — ATORVASTATIN CALCIUM SCH MG: 40 TABLET, FILM COATED ORAL at 22:13

## 2019-10-31 RX ADMIN — FERROUS SULFATE TAB EC 324 MG (65 MG FE EQUIVALENT) SCH MG: 324 (65 FE) TABLET DELAYED RESPONSE at 18:06

## 2019-10-31 RX ADMIN — INSULIN ASPART SCH UNITS: 100 INJECTION, SOLUTION INTRAVENOUS; SUBCUTANEOUS at 22:14

## 2019-10-31 RX ADMIN — ESCITALOPRAM OXALATE SCH MG: 10 TABLET, FILM COATED ORAL at 10:25

## 2019-10-31 RX ADMIN — TAMSULOSIN HYDROCHLORIDE SCH MG: 0.4 CAPSULE ORAL at 22:13

## 2019-10-31 RX ADMIN — INSULIN ASPART SCH: 100 INJECTION, SOLUTION INTRAVENOUS; SUBCUTANEOUS at 18:05

## 2019-10-31 RX ADMIN — EZETIMIBE SCH MG: 10 TABLET ORAL at 22:13

## 2019-10-31 RX ADMIN — INSULIN DETEMIR SCH UNITS: 100 INJECTION, SOLUTION SUBCUTANEOUS at 06:20

## 2019-10-31 RX ADMIN — INSULIN ASPART SCH UNITS: 100 INJECTION, SOLUTION INTRAVENOUS; SUBCUTANEOUS at 12:27

## 2019-10-31 RX ADMIN — INSULIN ASPART SCH UNITS: 100 INJECTION, SOLUTION INTRAVENOUS; SUBCUTANEOUS at 12:22

## 2019-10-31 NOTE — PN
Progress Note, Physician


History of Present Illness: 





Pt seen and examined at bedside. No new events. 





- Current Medication List


Current Medications: 


Active Medications





Acetaminophen (Tylenol -)  650 mg PO Q4H PRN


   PRN Reason: FEVER


   Last Admin: 10/27/19 13:29 Dose:  650 mg


Atorvastatin Calcium (Lipitor -)  40 mg PO HS [facility]


   Last Admin: 10/30/19 22:49 Dose:  40 mg


Donepezil HCl (Aricept -)  10 mg PO DAILY [facility]


   Last Admin: 10/31/19 09:00 Dose:  10 mg


Ezetimibe (Zetia -)  10 mg PO HS [facility]


   Last Admin: 10/30/19 22:49 Dose:  10 mg


Escitalopram Oxalate (Lexapro -)  10 mg PO DAILY [facility]


   Last Admin: 10/31/19 10:25 Dose:  10 mg


Ferrous Sulfate (Feosol -)  325 mg PO BIDWM [facility]


   Last Admin: 10/31/19 10:25 Dose:  325 mg


Finasteride (Proscar -)  5 mg PO DAILY [facility]


   Last Admin: 10/31/19 10:25 Dose:  5 mg


Insulin Aspart (Novolog Vial Sliding Scale -)  1 vial SQ ACHS [facility]; Protocol


   Last Admin: 10/31/19 12:22 Dose:  4 units


Insulin Aspart (Novolog Vial)  2 units SQ ACLD [facility]


   Last Admin: 10/31/19 12:27 Dose:  2 units


Insulin Detemir (Levemir Vial)  26 units SQ AM [facility]


   Last Admin: 10/31/19 06:20 Dose:  26 units


Nebivolol (Bystolic -)  2.5 mg PO HS [facility]


   Last Admin: 10/30/19 22:49 Dose:  2.5 mg


Nystatin (Nystop Powder -)  1 applic TP BID [facility]


   Last Admin: 10/31/19 10:28 Dose:  1 applic


Ondansetron HCl (Zofran Injection)  4 mg IVPUSH Q6H PRN


   PRN Reason: NAUSEA AND/OR VOMITING


Tamsulosin HCl (Flomax -)  0.4 mg PO BID@0830,2200 [facility]


   Last Admin: 10/31/19 10:25 Dose:  0.4 mg











- Objective


Vital Signs: 


 Vital Signs











Temperature  98 F   10/31/19 10:00


 


Pulse Rate  59 L  10/31/19 06:10


 


Respiratory Rate  18   10/31/19 10:00


 


Blood Pressure  145/72   10/31/19 10:00


 


O2 Sat by Pulse Oximetry (%)  100   10/31/19 09:00











Constitutional: Yes: Calm


HENT: Yes: Atraumatic


Cardiovascular: Yes: S1, S2


Respiratory: Yes: CTA Bilaterally


Gastrointestinal: Yes: Soft


Genitourinary: Yes: Mckeon Present, Other (cbi)


Musculoskeletal: Yes: WNL


Edema: No


Integumentary: Yes: WNL


Neurological: Yes: Confusion


Labs: 


 CBC, BMP





 10/31/19 06:18 





 10/31/19 06:18 





 INR, PTT











INR  1.42  (0.83-1.09)  H  10/19/19  06:38    














Problem List





- Problems


(1) DEB (acute kidney injury)


Code(s): N17.9 - ACUTE KIDNEY FAILURE, UNSPECIFIED   





(2) DKA (diabetic ketoacidoses)


Code(s): E11.10 - TYPE 2 DIABETES MELLITUS WITH KETOACIDOSIS WITHOUT COMA   


Qualifiers: 


   Diabetes mellitus type: type 2   Diabetes mellitus complication detail: 

without coma   Qualified Code(s): E11.10 - Type 2 diabetes mellitus with 

ketoacidosis without coma   





(3) Acute renal failure


Code(s): N17.9 - ACUTE KIDNEY FAILURE, UNSPECIFIED   





(4) Hypernatremia


Code(s): E87.0 - HYPEROSMOLALITY AND HYPERNATREMIA   





Assessment/Plan


 Current Medications











Generic Name Dose Route Start Last Admin





  Trade Name Freq  PRN Reason Stop Dose Admin


 


Acetaminophen  650 mg  10/09/19 10:34  10/27/19 13:29





  Tylenol -  PO   650 mg





  Q4H PRN   Administration





  FEVER   





     





     





     


 


Atorvastatin Calcium  40 mg  10/07/19 22:00  10/30/19 22:49





  Lipitor -  PO   40 mg





  HS MARY   Administration





     





     





     





     


 


Donepezil HCl  10 mg  10/16/19 10:00  10/31/19 09:00





  Aricept -  PO   10 mg





  DAILY MARY   Administration





     





     





     





     


 


Ezetimibe  10 mg  10/07/19 22:00  10/30/19 22:49





  Zetia -  PO   10 mg





  HS MARY   Administration





     





     





     





     


 


Escitalopram Oxalate  10 mg  10/16/19 10:00  10/31/19 10:25





  Lexapro -  PO   10 mg





  DAILY MARY   Administration





     





     





     





     


 


Ferrous Sulfate  325 mg  10/20/19 17:30  10/31/19 10:25





  Feosol -  PO   325 mg





  BIDWM MARY   Administration





     





     





     





     


 


Finasteride  5 mg  10/16/19 10:00  10/31/19 10:25





  Proscar -  PO   5 mg





  DAILY MARY   Administration





     





     





     





     


 


Insulin Aspart  1 vial  10/24/19 07:03  10/31/19 12:22





  Novolog Vial Sliding Scale -  SQ   4 units





  [facility] MARY   Administration





     





     





  Protocol   





     


 


Insulin Aspart  2 units  10/28/19 07:48  10/31/19 12:27





  Novolog Vial  SQ   2 units





  ACLD MARY   Administration





     





     





     





     


 


Insulin Detemir  26 units  10/30/19 10:00  10/31/19 06:20





  Levemir Vial  SQ   26 units





  AM MARY   Administration





     





     





     





     


 


Nebivolol  2.5 mg  10/07/19 22:00  10/30/19 22:49





  Bystolic -  PO   2.5 mg





  HS MARY   Administration





     





     





     





     


 


Nystatin  1 applic  10/10/19 11:30  10/31/19 10:28





  Nystop Powder -  TP   1 applic





  BID MARY   Administration





     





     





     





     


 


Ondansetron HCl  4 mg  10/18/19 17:01  





  Zofran Injection  IVPUSH   





  Q6H PRN   





  NAUSEA AND/OR VOMITING   





     





     





     


 


Tamsulosin HCl  0.4 mg  10/16/19 08:45  10/31/19 10:25





  Flomax -  PO   0.4 mg





  BID@0830,2200 MARY   Administration





     





     





     





     

















Impression


1. DEB


2. DKA


3. hypernatremia


4. DM


5. dementia


6. cad


7. hyperkalemia


8. hematuria





Plan


- pt developed hemturia when cbi clamped


- call for urology follow up


- renal function stable


- monitor labs


- will follow PRN

## 2019-10-31 NOTE — PN
Progress Note (short form)





- Note


Progress Note: 





HPI: Pt remains on CBI. No hypoglycemia overnight. Otherwise HPI limited


 Vital Signs











Temperature  97.5 F L  10/31/19 06:10


 


Pulse Rate  59 L  10/31/19 06:10


 


Respiratory Rate  18   10/31/19 06:10


 


Blood Pressure  136/63   10/31/19 06:10


 


O2 Sat by Pulse Oximetry (%)  100   10/30/19 21:00











PE:


Gen: NAD, awake, alert


HEENT: NC/AT, RUBIO, MMM


Neck: No JVD


LUNG: CTA bilaterally, however poor inspiratory effort, On RA


CARD: RRR no murmurs appreciated


ABD: Soft, Nt/ND, normoactive BS:


: Mckeon catheter in place CBI ongoing


EXT: No edema appreciated, cap refill <2sec


Skin: No rashes or lesions noted





 CBC, BMP





 10/31/19 06:18 





 10/31/19 06:18 








Microbiology





10/06/19 23:00   Blood - Peripheral Venous   Blood Culture - Final


                            NO GROWTH AFTER 5 DAYS INCUBATION


10/06/19 23:00   Blood - Peripheral Venous   Blood Culture - Final


                            NO GROWTH AFTER 5 DAYS INCUBATION


10/06/19 21:16   Urine - Urine Mckeon   Urine Culture - Final


                            Strep Agalactiae Group B


                            Mr S Aureus


                            Staphylococcus Coagulase Neg








Active Medications





Acetaminophen (Tylenol -)  650 mg PO Q4H PRN


   PRN Reason: FEVER


   Last Admin: 10/27/19 13:29 Dose:  650 mg


Atorvastatin Calcium (Lipitor -)  40 mg PO HS [facility]


   Last Admin: 10/30/19 22:49 Dose:  40 mg


Donepezil HCl (Aricept -)  10 mg PO DAILY [facility]


   Last Admin: 10/30/19 09:39 Dose:  10 mg


Ezetimibe (Zetia -)  10 mg PO HS [facility]


   Last Admin: 10/30/19 22:49 Dose:  10 mg


Escitalopram Oxalate (Lexapro -)  10 mg PO DAILY [facility]


   Last Admin: 10/30/19 09:39 Dose:  10 mg


Ferrous Sulfate (Feosol -)  325 mg PO BIDWM [facility]


   Last Admin: 10/30/19 16:41 Dose:  325 mg


Finasteride (Proscar -)  5 mg PO DAILY [facility]


   Last Admin: 10/30/19 09:39 Dose:  5 mg


Insulin Aspart (Novolog Vial Sliding Scale -)  1 vial SQ ACHS [facility]; Protocol


   Last Admin: 10/31/19 06:21 Dose:  4 units


Insulin Aspart (Novolog Vial)  2 units SQ ACLD [facility]


   Last Admin: 10/30/19 16:41 Dose:  2 units


Insulin Detemir (Levemir Vial)  26 units SQ AM [facility]


   Last Admin: 10/31/19 06:20 Dose:  26 units


Nebivolol (Bystolic -)  2.5 mg PO HS [facility]


   Last Admin: 10/30/19 22:49 Dose:  2.5 mg


Nystatin (Nystop Powder -)  1 applic TP BID [facility]


   Last Admin: 10/30/19 22:51 Dose:  1 applic


Ondansetron HCl (Zofran Injection)  4 mg IVPUSH Q6H PRN


   PRN Reason: NAUSEA AND/OR VOMITING


Tamsulosin HCl (Flomax -)  0.4 mg PO BID@0830,2200 [facility]


   Last Admin: 10/30/19 22:49 Dose:  0.4 mg











Assessment and Plan:


DKA (resolved)


Hypernatremia (resolved)


Acute kidney injury (Resolved)


Type 2 DM


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


Hematuria





--Continue CBI; still awaiting further urology input


   --H/H remains stable


   --Will likely need repeat cysto


--Improved glycemic control; continue:


   Levemir 26U AM


   Novolog scheduled 2U ACLD


   ISS for breakthrough


   BGM ACHS


--Discussed with Dr. Stokes and Dr. Greene how even trial of LP or  shunting 

not viable for patient's NPH


--Contnue Flomax 0.4mg BID PO


--Continue Proscar 5mg qdaily


--Lexapro 10mg and Aricept 10mg qdaily





FEN:


   Fluids: PO encouragement





PPX:


   DVT - scds





Dispo: Continue monitoring; urology eval


Case discussed with Dr. Tracy Beal, DO - IM PGY-3





<Collins Beal - Last Filed: 10/31/19 08:57>





- Note


Progress Note: 


Continues to pend urology consultation; disvcussed with case management, social 

work.  No hypoglycemia, no worsening hematuria. Mentation stable and at 

baseline.  Nontoxic appearing





VS labs imaging reviewed


I have seen and examined the indicated patient independently/along with the 

resident team. I have personally verified all lebron exam findings and historical 

components. I have personally interpreted all diagnostics indicated per todays 

orders and reviewed interpretation of indicated subspecialty services. This 

patient meets a high level of medical complexity and warrants indicated LOC to 

avoid decompensation and worsening of the indicated illness. 








S:


Agree with historical findings as outlined in resident documentation regarding 

history of present illness.  No further events communicated to myself from 

overnight.   No callback and no response from resident team regarding callback.

  Unfortunately course regarding overall plan for hematuria remains unknown.  

Son remains extraordinarily hesitant to bring patient home without definitive 

plan for resoluteion of hematuria.  Discussed with nursing; will need to 

discuss with administration regarding overall plan given protracted course of 

admit and ongoing issue.  He has no clots today and no s/s obstruction.  





10 system ROS Unreliable


Medication list reviewed; as documented per orders and above.








O:


All vital signs reviewed per ER records and are as per EMR


NAD, AAOx1 but limited communciation as ESL with underlying organic dementia, 

Resting in bed


NC AT EOMI PERRLA


Neck supple, trachea midline, no roby LN


RRR s1/2


Lungs CTAB, w/ sym expansion


NT ND +BS


No skin breakdown or rashes noted


CN2-12 wnl, no new focal deficits noted


Muscle tone normal, no deficits in motor function or strength noted


Normal mood, appropriate behavior, average insight





EKG:


CXR:


Other Imaging:


Prior Diagnostics:





Seen and examined; agree with resident note aside from as supplemented below by 

myself.  Independently reviewed all labs, vitals, and diagnostics.  In addition 

I verified all key historic and PE findings. 








Unchanged; no issues overnight.  Hematuria recurred despite plans for DC.  He 

will likely be kept inpatient though the weekend pending further urologic 

intervention.  Spoke with resident team at length regarding need to determine 

with urology definitive meaures.  We are holding AC with SCDs for DVT ppx (

plavix can be held given acute significant bleed and no recent stents).  Will 

continue to monitor and redirect.  No issues with agitation as Zyprexa has 

continued to have a positive effect.  Overall will monitor inpatient for the 

weekend and discuss DC with consulting providers.








1) DKA 2/2 issues with managing insulin pump, resolved-DC on SQ insulin


2) Uncontrolled DM -Continually adjusting insulin.  Complete control has proven 

difficult with this patient but is preferable to the ongoing issue with pump.  

No further input from endo noted.  Avoiding hypoglycemia but has intermittent 

spikes of hyper.  Will need endocrine followup post DC.


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  

Can discuss therapeutic tap as needed but per neuro and neurosurgery with 

extensive discussion as documented last week this likely should be done OP.  

Will continue to monitor.  Informed son of this developent as his mentation was 

a barrier to initial DC planning to facility


4) Acute Cystitis with Hematuria vs. Roby Hematuria- Abx per ID; continue to 

monitor.  Ongoing trauma 2/2 underlying dementia vs. infectious origin.


5) Urinary Obstruction - Continue tamsulosin and finasteride.


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 

secondary to diabetes mellitus being so uncontrolled.  We will continue to 

monitor.  He should follow-up with his nephrologist upon discharge.  


7) Hx CAD s/p CABG


8) Underlying Dementia (documented as Alzheimer's Type)


9) Hx D-CHF


10) Lactic Acidosis, resolved


11) Hypernatremia, resolved


12) Hx HTN


13) Hx HLD


14) Hx COPD, no acute exacerbation


***) Ongoing hematuria remains barrier to DC.  Pending final urology recs.





Discussed case at length with  Savanna Perera who agrees that inpateint 

care is warranted given the ongoing hematuria.  It had resolution briefly last 

week then severe recurrance that prompted the CBI per uro.  Initial DC held due 

to facility reluctance for acceptance due to ongoing cognitive impairment 

secondary to organic dementia/NPH that per Dr. Morris and Dr. Greene may be 

worked up as an outpatient.  His initial reason for admission was due to DKA 

secondary to noncompliance with insulin pump exacerbated likely by his and his 

wife's underlying cognitive issues and poor diet.  He was transitioned to SQ 

insulin with good effect and no further DKA or profound hypoglycemia but his 

glucose remains difficult to control.  Initially this was noted to be due to 

ongoing high sugar foods being given by family but now noted to be ongoing but 

to a lesser degree.  Tapering insulin.  Seen once by ginger here and will be 

referred to their services for continued outpatient workup; they agreed with 

discontinuing the pump.  Providing extensive teaching to family regarding home 

care as insurance will not approve facility despite multiple conversations and 

attempts as documented by myself and CM and SW earlier in the chart.  

Continuing CBI until definitive plan per uro.  Monitoring glucose and CBC 

carefully.








<Tiburcio Molina - Last Filed: 10/31/19 22:53>

## 2019-10-31 NOTE — PN
Progress Note, Physician


History of Present Illness: 





stable


had some hematuria


on cbi





- Current Medication List


Current Medications: 


Active Medications





Acetaminophen (Tylenol -)  650 mg PO Q4H PRN


   PRN Reason: FEVER


   Last Admin: 10/27/19 13:29 Dose:  650 mg


Atorvastatin Calcium (Lipitor -)  40 mg PO HS [facility]


   Last Admin: 10/30/19 22:49 Dose:  40 mg


Donepezil HCl (Aricept -)  10 mg PO DAILY [facility]


   Last Admin: 10/30/19 09:39 Dose:  10 mg


Ezetimibe (Zetia -)  10 mg PO HS [facility]


   Last Admin: 10/30/19 22:49 Dose:  10 mg


Escitalopram Oxalate (Lexapro -)  10 mg PO DAILY [facility]


   Last Admin: 10/30/19 09:39 Dose:  10 mg


Ferrous Sulfate (Feosol -)  325 mg PO BIDWM [facility]


   Last Admin: 10/30/19 16:41 Dose:  325 mg


Finasteride (Proscar -)  5 mg PO DAILY [facility]


   Last Admin: 10/30/19 09:39 Dose:  5 mg


Insulin Aspart (Novolog Vial Sliding Scale -)  1 vial SQ ACHS [facility]; Protocol


   Last Admin: 10/31/19 06:21 Dose:  4 units


Insulin Aspart (Novolog Vial)  2 units SQ ACLD [facility]


   Last Admin: 10/30/19 16:41 Dose:  2 units


Insulin Detemir (Levemir Vial)  26 units SQ AM [facility]


   Last Admin: 10/31/19 06:20 Dose:  26 units


Nebivolol (Bystolic -)  2.5 mg PO HS [facility]


   Last Admin: 10/30/19 22:49 Dose:  2.5 mg


Nystatin (Nystop Powder -)  1 applic TP BID [facility]


   Last Admin: 10/30/19 22:51 Dose:  1 applic


Ondansetron HCl (Zofran Injection)  4 mg IVPUSH Q6H PRN


   PRN Reason: NAUSEA AND/OR VOMITING


Tamsulosin HCl (Flomax -)  0.4 mg PO BID@0830,2200 [facility]


   Last Admin: 10/30/19 22:49 Dose:  0.4 mg











- Objective


Vital Signs: 


 Vital Signs











Temperature  97.5 F L  10/31/19 06:10


 


Pulse Rate  59 L  10/31/19 06:10


 


Respiratory Rate  18   10/31/19 06:10


 


Blood Pressure  136/63   10/31/19 06:10


 


O2 Sat by Pulse Oximetry (%)  100   10/30/19 21:00











Constitutional: Yes: No Distress, Calm


Cardiovascular: Yes: S1, S2


Respiratory: Yes: Regular, CTA Bilaterally


Gastrointestinal: Yes: Normal Bowel Sounds, Soft


Genitourinary: Yes: Mckeon Present, Hematuria


Musculoskeletal: Yes: WNL


Extremities: Yes: WNL


Neurological: Yes: Alert, Other (blind)


Labs: 


 CBC, BMP





 10/31/19 06:18 





 10/31/19 06:18 





 INR, PTT











INR  1.42  (0.83-1.09)  H  10/19/19  06:38    














Assessment/Plan





79 year old man with a history of HTN, hyperlipidemia, CAD, MI, CABG, chronic 

diastolic heart failure, type 2 DM, stage 3 CKD, COPD, dementia who presented 

to the ED with altered mental status. 





1. Acute metabolic encephalopathy 


2. Acute kidney injury


3. Abdominal pain 


4.  UTI





5. Lactic acidosis


6. Hypernatremia


7. Hypophosphatemia


8. HTN


9. Hyperlipidemia


10. CAD, history of MI, CABG


11. Chronic diastolic heart failure


12. Stage 3 CKD


13. COPD


14. Alzheimer dementia





plan


continue current mgmt


monitor


as per urology


nephro on case


rest as per the team

## 2019-11-01 LAB
ANION GAP SERPL CALC-SCNC: 6 MMOL/L (ref 8–16)
BUN SERPL-MCNC: 21.7 MG/DL (ref 7–18)
CALCIUM SERPL-MCNC: 8.5 MG/DL (ref 8.5–10.1)
CHLORIDE SERPL-SCNC: 104 MMOL/L (ref 98–107)
CO2 SERPL-SCNC: 28 MMOL/L (ref 21–32)
CREAT SERPL-MCNC: 1.2 MG/DL (ref 0.55–1.3)
DEPRECATED RDW RBC AUTO: 15.2 % (ref 11.9–15.9)
GLUCOSE SERPL-MCNC: 223 MG/DL (ref 74–106)
HCT VFR BLD CALC: 29.3 % (ref 35.4–49)
HGB BLD-MCNC: 9.7 GM/DL (ref 11.7–16.9)
MCH RBC QN AUTO: 29.2 PG (ref 25.7–33.7)
MCHC RBC AUTO-ENTMCNC: 33 G/DL (ref 32–35.9)
MCV RBC: 88.4 FL (ref 80–96)
PLATELET # BLD AUTO: 183 K/MM3 (ref 134–434)
PMV BLD: 8.1 FL (ref 7.5–11.1)
POTASSIUM SERPLBLD-SCNC: 5 MMOL/L (ref 3.5–5.1)
RBC # BLD AUTO: 3.32 M/MM3 (ref 4–5.6)
SODIUM SERPL-SCNC: 137 MMOL/L (ref 136–145)
WBC # BLD AUTO: 7.4 K/MM3 (ref 4–10)

## 2019-11-01 RX ADMIN — ATORVASTATIN CALCIUM SCH MG: 40 TABLET, FILM COATED ORAL at 22:10

## 2019-11-01 RX ADMIN — FERROUS SULFATE TAB EC 324 MG (65 MG FE EQUIVALENT) SCH MG: 324 (65 FE) TABLET DELAYED RESPONSE at 16:45

## 2019-11-01 RX ADMIN — ESCITALOPRAM OXALATE SCH MG: 10 TABLET, FILM COATED ORAL at 09:10

## 2019-11-01 RX ADMIN — INSULIN ASPART SCH: 100 INJECTION, SOLUTION INTRAVENOUS; SUBCUTANEOUS at 11:27

## 2019-11-01 RX ADMIN — NYSTATIN SCH APPLIC: 100000 POWDER TOPICAL at 09:10

## 2019-11-01 RX ADMIN — FINASTERIDE SCH MG: 5 TABLET, FILM COATED ORAL at 09:10

## 2019-11-01 RX ADMIN — TAMSULOSIN HYDROCHLORIDE SCH MG: 0.4 CAPSULE ORAL at 07:48

## 2019-11-01 RX ADMIN — TAMSULOSIN HYDROCHLORIDE SCH MG: 0.4 CAPSULE ORAL at 22:09

## 2019-11-01 RX ADMIN — INSULIN DETEMIR SCH UNITS: 100 INJECTION, SOLUTION SUBCUTANEOUS at 06:20

## 2019-11-01 RX ADMIN — INSULIN ASPART SCH UNITS: 100 INJECTION, SOLUTION INTRAVENOUS; SUBCUTANEOUS at 06:21

## 2019-11-01 RX ADMIN — INSULIN ASPART SCH: 100 INJECTION, SOLUTION INTRAVENOUS; SUBCUTANEOUS at 11:28

## 2019-11-01 RX ADMIN — EZETIMIBE SCH MG: 10 TABLET ORAL at 22:11

## 2019-11-01 RX ADMIN — DONEPEZIL HYDROCHLORIDE SCH MG: 10 TABLET, FILM COATED ORAL at 09:10

## 2019-11-01 RX ADMIN — INSULIN ASPART SCH UNITS: 100 INJECTION, SOLUTION INTRAVENOUS; SUBCUTANEOUS at 16:45

## 2019-11-01 RX ADMIN — FERROUS SULFATE TAB EC 324 MG (65 MG FE EQUIVALENT) SCH MG: 324 (65 FE) TABLET DELAYED RESPONSE at 07:48

## 2019-11-01 RX ADMIN — INSULIN ASPART SCH: 100 INJECTION, SOLUTION INTRAVENOUS; SUBCUTANEOUS at 22:16

## 2019-11-01 RX ADMIN — INSULIN ASPART SCH UNITS: 100 INJECTION, SOLUTION INTRAVENOUS; SUBCUTANEOUS at 16:44

## 2019-11-01 RX ADMIN — NEBIVOLOL HYDROCHLORIDE SCH MG: 2.5 TABLET ORAL at 22:10

## 2019-11-01 RX ADMIN — NYSTATIN SCH APPLIC: 100000 POWDER TOPICAL at 22:10

## 2019-11-01 NOTE — PN
Progress Note, Physician


History of Present Illness: 





stable


urine clear


wife in the room





- Current Medication List


Current Medications: 


Active Medications





Acetaminophen (Tylenol -)  650 mg PO Q4H PRN


   PRN Reason: FEVER


   Last Admin: 10/27/19 13:29 Dose:  650 mg


Atorvastatin Calcium (Lipitor -)  40 mg PO HS [facility]


   Last Admin: 10/31/19 22:13 Dose:  40 mg


Donepezil HCl (Aricept -)  10 mg PO DAILY [facility]


   Last Admin: 11/01/19 09:10 Dose:  10 mg


Ezetimibe (Zetia -)  10 mg PO HS [facility]


   Last Admin: 10/31/19 22:13 Dose:  10 mg


Escitalopram Oxalate (Lexapro -)  10 mg PO DAILY [facility]


   Last Admin: 11/01/19 09:10 Dose:  10 mg


Ferrous Sulfate (Feosol -)  325 mg PO BIDWM [facility]


   Last Admin: 11/01/19 07:48 Dose:  325 mg


Finasteride (Proscar -)  5 mg PO DAILY [facility]


   Last Admin: 11/01/19 09:10 Dose:  5 mg


Insulin Aspart (Novolog Vial Sliding Scale -)  1 vial SQ ACHS [facility]; Protocol


   Last Admin: 11/01/19 06:21 Dose:  6 units


Insulin Aspart (Novolog Vial)  2 units SQ ACLD [facility]


   Last Admin: 10/31/19 18:06 Dose:  2 units


Insulin Detemir (Levemir Vial)  26 units SQ AM [facility]


   Last Admin: 11/01/19 06:20 Dose:  26 units


Nebivolol (Bystolic -)  2.5 mg PO HS [facility]


   Last Admin: 10/31/19 22:13 Dose:  2.5 mg


Nystatin (Nystop Powder -)  1 applic TP BID [facility]


   Last Admin: 11/01/19 09:10 Dose:  1 applic


Ondansetron HCl (Zofran Injection)  4 mg IVPUSH Q6H PRN


   PRN Reason: NAUSEA AND/OR VOMITING


Tamsulosin HCl (Flomax -)  0.4 mg PO BID@0830,2200 [facility]


   Last Admin: 11/01/19 07:48 Dose:  0.4 mg











- Objective


Vital Signs: 


 Vital Signs











Temperature  97.7 F   11/01/19 06:08


 


Pulse Rate  64   11/01/19 06:08


 


Respiratory Rate  20   11/01/19 06:08


 


Blood Pressure  145/62   11/01/19 06:08


 


O2 Sat by Pulse Oximetry (%)  100   10/31/19 21:00











Constitutional: Yes: No Distress, Calm


Cardiovascular: Yes: S1, S2


Respiratory: Yes: Regular, CTA Bilaterally


Gastrointestinal: Yes: Normal Bowel Sounds, Soft


Genitourinary: Yes: Mckeon Present, Other (cbi)


Musculoskeletal: Yes: WNL


Extremities: Yes: WNL


Neurological: Yes: Alert, Other


Psychiatric: Yes: Other


Labs: 


 CBC, BMP





 11/01/19 06:04 





 11/01/19 06:04 





 INR, PTT











INR  1.42  (0.83-1.09)  H  10/19/19  06:38    














Assessment/Plan





79 year old man with a history of HTN, hyperlipidemia, CAD, MI, CABG, chronic 

diastolic heart failure, type 2 DM, stage 3 CKD, COPD, dementia who presented 

to the ED with altered mental status. 





1. Acute metabolic encephalopathy 


2. Acute kidney injury


3. Abdominal pain 


4.  UTI





5. Lactic acidosis


6. Hypernatremia


7. Hypophosphatemia


8. HTN


9. Hyperlipidemia


10. CAD, history of MI, CABG


11. Chronic diastolic heart failure


12. Stage 3 CKD


13. COPD


14. Alzheimer dementia





plan


continue current mgmt


monitor


as per urology


nephro on case


rest as per the team

## 2019-11-01 NOTE — PN
Progress Note (short form)





- Note


Progress Note: 





HPI: Pt remains on CBI. No hypoglycemia overnight. Otherwise HPI limited


 





PE:


Gen: NAD, awake, alert


HEENT: NC/AT, RUBIO, MMM


Neck: No JVD


LUNG: CTA bilaterally, however poor inspiratory effort, On RA


CARD: RRR no murmurs appreciated


ABD: Soft, Nt/ND, normoactive BS:


: Mckeon catheter in place CBI ongoing


EXT: No edema appreciated, cap refill <2sec


Skin: No rashes or lesions noted





  CBC, BMP





 11/01/19 06:04 





 11/01/19 06:04 











Microbiology





10/06/19 23:00   Blood - Peripheral Venous   Blood Culture - Final


                            NO GROWTH AFTER 5 DAYS INCUBATION


10/06/19 23:00   Blood - Peripheral Venous   Blood Culture - Final


                            NO GROWTH AFTER 5 DAYS INCUBATION


10/06/19 21:16   Urine - Urine Mckeon   Urine Culture - Final


                            Strep Agalactiae Group B


                            Mr S Aureus


                            Staphylococcus Coagulase Neg





Active Medications





Acetaminophen (Tylenol -)  650 mg PO Q4H PRN


   PRN Reason: FEVER


   Last Admin: 10/27/19 13:29 Dose:  650 mg


Atorvastatin Calcium (Lipitor -)  40 mg PO HS [facility]


   Last Admin: 10/31/19 22:13 Dose:  40 mg


Donepezil HCl (Aricept -)  10 mg PO DAILY [facility]


   Last Admin: 11/01/19 09:10 Dose:  10 mg


Ezetimibe (Zetia -)  10 mg PO HS [facility]


   Last Admin: 10/31/19 22:13 Dose:  10 mg


Escitalopram Oxalate (Lexapro -)  10 mg PO DAILY [facility]


   Last Admin: 11/01/19 09:10 Dose:  10 mg


Ferrous Sulfate (Feosol -)  325 mg PO BIDWM [facility]


   Last Admin: 11/01/19 16:45 Dose:  325 mg


Finasteride (Proscar -)  5 mg PO DAILY [facility]


   Last Admin: 11/01/19 09:10 Dose:  5 mg


Insulin Aspart (Novolog Vial Sliding Scale -)  1 vial SQ ACHS [facility]; Protocol


   Last Admin: 11/01/19 16:45 Dose:  8 units


Insulin Aspart (Novolog Vial)  2 units SQ ACLD [facility]


   Last Admin: 11/01/19 16:44 Dose:  2 units


Insulin Detemir (Levemir Vial)  26 units SQ AM [facility]


   Last Admin: 11/01/19 06:20 Dose:  26 units


Nebivolol (Bystolic -)  2.5 mg PO HS [facility]


   Last Admin: 10/31/19 22:13 Dose:  2.5 mg


Nystatin (Nystop Powder -)  1 applic TP BID [facility]


   Last Admin: 11/01/19 09:10 Dose:  1 applic


Ondansetron HCl (Zofran Injection)  4 mg IVPUSH Q6H PRN


   PRN Reason: NAUSEA AND/OR VOMITING


Tamsulosin HCl (Flomax -)  0.4 mg PO BID@0830,2200 [facility]


   Last Admin: 11/01/19 07:48 Dose:  0.4 mg











Assessment and Plan:


Hematuria


DKA (resolved)


Hypernatremia (resolved)


Acute kidney injury (Resolved)


Type 2 DM


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








--Hold CBI:


   --Urology saw patient; can continue holding CBI and monitor output


   --Pt's cystoscopy previously with only minimal hemorrhagic cystitis


   --H/H remains stable; continue to trend


--Glycemic control to continue:


   Levemir 26U AM


   Novolog scheduled 2U ACLD


   ISS for breakthrough


   BGM ACHS


--Discussed with Dr. Stokes and Dr. Greene how even trial of LP or  shunting 

not viable for patient's NPH


--Contnue Flomax 0.4mg BID PO


--Continue Proscar 5mg qdaily


--Lexapro 10mg and Aricept 10mg qdaily





FEN:


   Fluids: PO encouragement


   Electrolyte abnormalities: None


   Nutrition: Return to recommended diet





PPX:


   DVT - scds





Dispo: Continue monitoring 


Case discussed with Dr. Tracy Beal, DO - IM PGY-3





<Collins Beal - Last Filed: 11/01/19 19:36>





- Note


Progress Note: 





I have seen and examined the indicated patient independently/along with the 

resident team. I have personally verified all lebron exam findings and historical 

components. I have personally interpreted all diagnostics indicated per todays 

orders and reviewed interpretation of indicated subspecialty services. This 

patient meets a high level of medical complexity and warrants indicated LOC to 

avoid decompensation and worsening of the indicated illness. 








S:


CBI clamped per uro with planned cysto monday





10 system ROS Unreliable


Medication list reviewed; as documented per orders and above.








O:


All vital signs reviewed and are as per EMR


NAD, AAOx1 but limited communication as ESL with underlying organic dementia, 

Resting in bed


NC AT EOMI PERRLA


Neck supple, trachea midline, no roby LN


RRR s1/2


Lungs CTAB, w/ sym expansion


NT ND +BS


No skin breakdown or rashes noted


CN2-12 wnl, no new focal deficits noted


Muscle tone normal, no deficits in motor function or strength noted


Normal mood, appropriate behavior, below average insight








A/P





1) DKA 2/2 issues with managing insulin pump, resolved-DC on SQ insulin


2) Uncontrolled DM -Continually adjusting insulin.  Complete control has proven 

difficult with this patient but is preferable to the ongoing issue with pump.  

No further input from endo noted.  Avoiding hypoglycemia but has intermittent 

spikes of hyper.  Will need endocrine followup post DC.


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  

Can discuss therapeutic tap as needed but per neuro and neurosurgery with 

extensive discussion as documented last week this likely should be done OP.  

Will continue to monitor.  Informed son of this developent as his mentation was 

a barrier to initial DC planning to facility


4) Acute Cystitis with Hematuria vs. Roby Hematuria- Abx per ID; continue to 

monitor.  Ongoing trauma 2/2 underlying dementia vs. infectious origin.


5) Urinary Obstruction - Continue tamsulosin and finasteride.


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 

secondary to diabetes mellitus being so uncontrolled.  We will continue to 

monitor.  He should follow-up with his nephrologist upon discharge.  


7) Hx CAD s/p CABG


8) Underlying Dementia (documented as Alzheimer's Type)


9) Hx D-CHF


10) Lactic Acidosis, resolved


11) Hypernatremia, resolved


12) Hx HTN


13) Hx HLD


14) Hx COPD, no acute exacerbation


15) Ongoing Hematura-h/h stable, pending cysto on monday.





Discussed case at length with  Savanna Perera who agrees that inpateint 

care is warranted given the ongoing hematuria.  It had resolution briefly last 

week then severe recurrance that prompted the CBI per uro.  Initial DC held due 

to facility reluctance for acceptance due to ongoing cognitive impairment 

secondary to organic dementia/NPH that per Dr. Morris and Dr. Greene may be 

worked up as an outpatient.  His initial reason for admission was due to DKA 

secondary to noncompliance with insulin pump exacerbated likely by his and his 

wife's underlying cognitive issues and poor diet.  He was transitioned to SQ 

insulin with good effect and no further DKA or profound hypoglycemia but his 

glucose remains difficult to control.  Initially this was noted to be due to 

ongoing high sugar foods being given by family but now noted to be ongoing but 

to a lesser degree.  Tapering insulin.  Seen once by ginger here and will be 

referred to their services for continued outpatient workup; they agreed with 

discontinuing the pump.  Providing extensive teaching to family regarding home 

care as insurance will not approve facility despite multiple conversations and 

attempts as documented by myself and DAVID and ABRAHAN earlier in the chart.  








<Tiburcio Molina - Last Filed: 11/03/19 08:28>

## 2019-11-01 NOTE — PN
Progress Note (short form)





- Note


Progress Note: 





UROLOGY NOTE. pt. with h/o gross hematuria,cbi was d/c this am ,urine is clear, 

abd. is soft,n/t, bs++ h/h=9.7/29.3, inr=1.42, wbc=7.4 plan-will d/c camacho in 

am.

## 2019-11-02 LAB
ANION GAP SERPL CALC-SCNC: 4 MMOL/L (ref 8–16)
BUN SERPL-MCNC: 19.6 MG/DL (ref 7–18)
CALCIUM SERPL-MCNC: 8.6 MG/DL (ref 8.5–10.1)
CHLORIDE SERPL-SCNC: 106 MMOL/L (ref 98–107)
CO2 SERPL-SCNC: 29 MMOL/L (ref 21–32)
CREAT SERPL-MCNC: 1 MG/DL (ref 0.55–1.3)
DEPRECATED RDW RBC AUTO: 15.6 % (ref 11.9–15.9)
GLUCOSE SERPL-MCNC: 158 MG/DL (ref 74–106)
HCT VFR BLD CALC: 28.9 % (ref 35.4–49)
HGB BLD-MCNC: 9.6 GM/DL (ref 11.7–16.9)
MCH RBC QN AUTO: 29.3 PG (ref 25.7–33.7)
MCHC RBC AUTO-ENTMCNC: 33.3 G/DL (ref 32–35.9)
MCV RBC: 87.8 FL (ref 80–96)
PLATELET # BLD AUTO: 171 K/MM3 (ref 134–434)
PMV BLD: 7.9 FL (ref 7.5–11.1)
POTASSIUM SERPLBLD-SCNC: 4.5 MMOL/L (ref 3.5–5.1)
RBC # BLD AUTO: 3.29 M/MM3 (ref 4–5.6)
SODIUM SERPL-SCNC: 139 MMOL/L (ref 136–145)
WBC # BLD AUTO: 7.2 K/MM3 (ref 4–10)

## 2019-11-02 RX ADMIN — INSULIN ASPART SCH UNITS: 100 INJECTION, SOLUTION INTRAVENOUS; SUBCUTANEOUS at 16:38

## 2019-11-02 RX ADMIN — ESCITALOPRAM OXALATE SCH MG: 10 TABLET, FILM COATED ORAL at 09:32

## 2019-11-02 RX ADMIN — NYSTATIN SCH APPLIC: 100000 POWDER TOPICAL at 22:15

## 2019-11-02 RX ADMIN — NEBIVOLOL HYDROCHLORIDE SCH MG: 2.5 TABLET ORAL at 22:15

## 2019-11-02 RX ADMIN — TAMSULOSIN HYDROCHLORIDE SCH MG: 0.4 CAPSULE ORAL at 08:05

## 2019-11-02 RX ADMIN — IPRATROPIUM BROMIDE AND ALBUTEROL SULFATE PRN AMP: .5; 3 SOLUTION RESPIRATORY (INHALATION) at 23:01

## 2019-11-02 RX ADMIN — FINASTERIDE SCH MG: 5 TABLET, FILM COATED ORAL at 09:32

## 2019-11-02 RX ADMIN — FERROUS SULFATE TAB EC 324 MG (65 MG FE EQUIVALENT) SCH MG: 324 (65 FE) TABLET DELAYED RESPONSE at 16:38

## 2019-11-02 RX ADMIN — INSULIN ASPART SCH: 100 INJECTION, SOLUTION INTRAVENOUS; SUBCUTANEOUS at 06:34

## 2019-11-02 RX ADMIN — INSULIN DETEMIR SCH UNITS: 100 INJECTION, SOLUTION SUBCUTANEOUS at 06:33

## 2019-11-02 RX ADMIN — INSULIN ASPART SCH UNITS: 100 INJECTION, SOLUTION INTRAVENOUS; SUBCUTANEOUS at 22:32

## 2019-11-02 RX ADMIN — DONEPEZIL HYDROCHLORIDE SCH MG: 10 TABLET, FILM COATED ORAL at 09:32

## 2019-11-02 RX ADMIN — EZETIMIBE SCH MG: 10 TABLET ORAL at 22:15

## 2019-11-02 RX ADMIN — INSULIN ASPART SCH UNITS: 100 INJECTION, SOLUTION INTRAVENOUS; SUBCUTANEOUS at 11:49

## 2019-11-02 RX ADMIN — ATORVASTATIN CALCIUM SCH MG: 40 TABLET, FILM COATED ORAL at 22:15

## 2019-11-02 RX ADMIN — FERROUS SULFATE TAB EC 324 MG (65 MG FE EQUIVALENT) SCH MG: 324 (65 FE) TABLET DELAYED RESPONSE at 08:05

## 2019-11-02 RX ADMIN — TAMSULOSIN HYDROCHLORIDE SCH MG: 0.4 CAPSULE ORAL at 22:15

## 2019-11-02 RX ADMIN — NYSTATIN SCH APPLIC: 100000 POWDER TOPICAL at 09:32

## 2019-11-02 NOTE — PN
Progress Note, Physician


History of Present Illness: 





no new issues


plan to remove foleys today





- Current Medication List


Current Medications: 


Active Medications





Acetaminophen (Tylenol -)  650 mg PO Q4H PRN


   PRN Reason: FEVER


   Last Admin: 10/27/19 13:29 Dose:  650 mg


Atorvastatin Calcium (Lipitor -)  40 mg PO HS [facility]


   Last Admin: 11/01/19 22:10 Dose:  40 mg


Donepezil HCl (Aricept -)  10 mg PO DAILY [facility]


   Last Admin: 11/02/19 09:32 Dose:  10 mg


Ezetimibe (Zetia -)  10 mg PO HS [facility]


   Last Admin: 11/01/19 22:11 Dose:  10 mg


Escitalopram Oxalate (Lexapro -)  10 mg PO DAILY [facility]


   Last Admin: 11/02/19 09:32 Dose:  10 mg


Ferrous Sulfate (Feosol -)  325 mg PO BIDWM [facility]


   Last Admin: 11/02/19 08:05 Dose:  325 mg


Finasteride (Proscar -)  5 mg PO DAILY [facility]


   Last Admin: 11/02/19 09:32 Dose:  5 mg


Insulin Aspart (Novolog Vial Sliding Scale -)  1 vial SQ ACHS [facility]; Protocol


   Last Admin: 11/02/19 11:49 Dose:  4 units


Insulin Aspart (Novolog Vial)  2 units SQ ACLD [facility]


   Last Admin: 11/02/19 11:49 Dose:  2 units


Insulin Detemir (Levemir Vial)  26 units SQ AM [facility]


   Last Admin: 11/02/19 06:33 Dose:  26 units


Nebivolol (Bystolic -)  2.5 mg PO [facility]


   Last Admin: 11/01/19 22:10 Dose:  2.5 mg


Nystatin (Nystop Powder -)  1 applic TP BID [facility]


   Last Admin: 11/02/19 09:32 Dose:  1 applic


Ondansetron HCl (Zofran Injection)  4 mg IVPUSH Q6H PRN


   PRN Reason: NAUSEA AND/OR VOMITING


Tamsulosin HCl (Flomax -)  0.4 mg PO BID@0830,2200 [facility]


   Last Admin: 11/02/19 08:05 Dose:  0.4 mg











- Objective


Vital Signs: 


 Vital Signs











Temperature  98.8 F   11/02/19 10:00


 


Pulse Rate  76   11/02/19 10:00


 


Respiratory Rate  20   11/02/19 10:00


 


Blood Pressure  139/78   11/02/19 10:00


 


O2 Sat by Pulse Oximetry (%)  96   11/02/19 09:00











Constitutional: Yes: No Distress, Calm


Cardiovascular: Yes: S1, S2


Respiratory: Yes: Regular, CTA Bilaterally


Gastrointestinal: Yes: Normal Bowel Sounds, Soft


Genitourinary: Yes: Mckeon Present


Musculoskeletal: Yes: WNL


Extremities: Yes: WNL


Neurological: Yes: Alert, Oriented


Psychiatric: Yes: Alert, Oriented


Labs: 


 CBC, BMP





 11/02/19 06:20 





 11/02/19 06:20 





 INR, PTT











INR  1.42  (0.83-1.09)  H  10/19/19  06:38    














Assessment/Plan





79 year old man with a history of HTN, hyperlipidemia, CAD, MI, CABG, chronic 

diastolic heart failure, type 2 DM, stage 3 CKD, COPD, dementia who presented 

to the ED with altered mental status. 





1. Acute metabolic encephalopathy 


2. Acute kidney injury


3. Abdominal pain 


4.  UTI





5. Lactic acidosis


6. Hypernatremia


7. Hypophosphatemia


8. HTN


9. Hyperlipidemia


10. CAD, history of MI, CABG


11. Chronic diastolic heart failure


12. Stage 3 CKD


13. COPD


14. Alzheimer dementia





plan


continue current mgmt


monitor for hematuria


patient stable

## 2019-11-02 NOTE — PN
Physical Exam: 


SUBJECTIVE: Patient seen and examined; no new complaints.  Planning cysto for 

monday.   Instructed to keep CBI clamped even with hematuria. No labs tomorrow; 

check INR/CBC/BMP monday with type and cross.





10 sys ROS done and less reliable given underlying mentation but stable








OBJECTIVE:





 Vital Signs











 Period  Temp  Pulse  Resp  BP Sys/Ramírez  Pulse Ox


 


 Last 24 Hr  97.5 F-99.0 F  54-76  20-20  116-154/52-78  96-99

















All vital signs reviewed and are as per EMR


NAD, AAOx1 but limited communciation as ESL with underlying organic dementia, 

Resting in bed


NC AT E[facility] PERRLA


Neck supple, trachea midline, no roby LN


RRR s1/2


Lungs CTAB, w/ sym expansion


Mckeon inserted with CBI clamped


NT ND +BS


No skin breakdown or rashes noted


CN2-12 wnl, no new focal deficits noted


Muscle tone normal, no deficits in motor function or strength noted


Normal mood, appropriate behavior, below average insight secondary to clinical 

condition








 Laboratory Results - last 24 hr











  11/01/19 11/01/19 11/02/19





  11:12 22:13 06:20


 


WBC    7.2


 


RBC    3.29 L


 


Hgb    9.6 L


 


Hct    28.9 L


 


MCV    87.8


 


MCH    29.3


 


MCHC    33.3


 


RDW    15.6


 


Plt Count    171


 


MPV    7.9


 


Sodium   


 


Potassium   


 


Chloride   


 


Carbon Dioxide   


 


Anion Gap   


 


BUN   


 


Creatinine   


 


Est GFR (CKD-EPI)AfAm   


 


Est GFR (CKD-EPI)NonAf   


 


POC Glucometer  277  83 


 


Random Glucose   


 


Calcium   














  11/02/19 11/02/19





  06:20 06:30


 


WBC  


 


RBC  


 


Hgb  


 


Hct  


 


MCV  


 


MCH  


 


MCHC  


 


RDW  


 


Plt Count  


 


MPV  


 


Sodium  139 


 


Potassium  4.5 


 


Chloride  106 


 


Carbon Dioxide  29 


 


Anion Gap  4 L 


 


BUN  19.6 H 


 


Creatinine  1.0 


 


Est GFR (CKD-EPI)AfAm  82.60 


 


Est GFR (CKD-EPI)NonAf  71.27 


 


POC Glucometer   148


 


Random Glucose  158 H 


 


Calcium  8.6 








Active Medications











Generic Name Dose Route Start Last Admin





  Trade Name Freq  PRN Reason Stop Dose Admin


 


Acetaminophen  650 mg  10/09/19 10:34  10/27/19 13:29





  Tylenol -  PO   650 mg





  Q4H PRN   Administration





  FEVER   





     





     





     


 


Atorvastatin Calcium  40 mg  10/07/19 22:00  11/01/19 22:10





  Lipitor -  PO   40 mg





  HS MARY   Administration





     





     





     





     


 


Donepezil HCl  10 mg  10/16/19 10:00  11/02/19 09:32





  Aricept -  PO   10 mg





  DAILY MARY   Administration





     





     





     





     


 


Ezetimibe  10 mg  10/07/19 22:00  11/01/19 22:11





  Zetia -  PO   10 mg





  [facility]   Administration





     





     





     





     


 


Escitalopram Oxalate  10 mg  10/16/19 10:00  11/02/19 09:32





  Lexapro -  PO   10 mg





  DAILY MARY   Administration





     





     





     





     


 


Ferrous Sulfate  325 mg  10/20/19 17:30  11/02/19 08:05





  Feosol -  PO   325 mg





  BIDWM [facility]   Administration





     





     





     





     


 


Finasteride  5 mg  10/16/19 10:00  11/02/19 09:32





  Proscar -  PO   5 mg





  DAILY [facility]   Administration





     





     





     





     


 


Insulin Aspart  1 vial  10/24/19 07:03  11/02/19 06:34





  Novolog Vial Sliding Scale -  SQ   Not Given





  [facility]   





     





     





  Protocol   





     


 


Insulin Aspart  2 units  10/28/19 07:48  11/01/19 16:44





  Novolog Vial  SQ   2 units





  ACLD MARY   Administration





     





     





     





     


 


Insulin Detemir  26 units  10/30/19 10:00  11/02/19 06:33





  Levemir Vial  SQ   26 units





  AM MARY   Administration





     





     





     





     


 


Nebivolol  2.5 mg  10/07/19 22:00  11/01/19 22:10





  Bystolic -  PO   2.5 mg





  HS [facility]   Administration





     





     





     





     


 


Nystatin  1 applic  10/10/19 11:30  11/02/19 09:32





  Nystop Powder -  TP   1 applic





  BID MARY   Administration





     





     





     





     


 


Ondansetron HCl  4 mg  10/18/19 17:01  





  Zofran Injection  IVPUSH   





  Q6H PRN   





  NAUSEA AND/OR VOMITING   





     





     





     


 


Tamsulosin HCl  0.4 mg  10/16/19 08:45  11/02/19 08:05





  Flomax -  PO   0.4 mg





  BID@0830,2200 M[facility]   Administration





     





     





     





     











ASSESSMENT/PLAN:


Patient presented initially for DKA (secondary to underlying social issueds 

with his dementia and his wife's dementia leading to issues with insulin pump 

operation, seen by Dr. Burnett and taken off pump) complicated by UTI and 

obstructive uropathy secondary to clots with recurring hematuria.  Pending 

repeat cysto on monday with urology team. Protracted admission due to recurring 

hematuria, complex discharge planning, etc.  He is overall improved and his 

mentation is stable.  No medication changes.  Continue monitoring glucose, etc.

  Problem list and plan remain unchanged today due to him staying inpatient in 

anticipation of this procedure.  Will be discharged home with home health 

services following procedure pending urological clearance.  He will need close 

followup with uro, PCP, and endo at [place]. Despite our best efforts he was not 

able to be placed and this was discussed extensively with his son Grant.








Visit type





- Emergency Visit


Emergency Visit: Yes


ED Registration Date: 10/06/19


Care time: The patient presented to the Emergency Department on the above date 

and was hospitalized for further evaluation of their emergent condition.





- New Patient


This patient is new to me today: No





- Critical Care


Critical Care patient: No

## 2019-11-03 RX ADMIN — INSULIN ASPART SCH UNITS: 100 INJECTION, SOLUTION INTRAVENOUS; SUBCUTANEOUS at 21:28

## 2019-11-03 RX ADMIN — NYSTATIN SCH APPLIC: 100000 POWDER TOPICAL at 21:27

## 2019-11-03 RX ADMIN — INSULIN ASPART SCH UNITS: 100 INJECTION, SOLUTION INTRAVENOUS; SUBCUTANEOUS at 11:36

## 2019-11-03 RX ADMIN — FINASTERIDE SCH MG: 5 TABLET, FILM COATED ORAL at 08:59

## 2019-11-03 RX ADMIN — INSULIN ASPART SCH UNITS: 100 INJECTION, SOLUTION INTRAVENOUS; SUBCUTANEOUS at 17:10

## 2019-11-03 RX ADMIN — EZETIMIBE SCH MG: 10 TABLET ORAL at 21:27

## 2019-11-03 RX ADMIN — ESCITALOPRAM OXALATE SCH MG: 10 TABLET, FILM COATED ORAL at 08:59

## 2019-11-03 RX ADMIN — INSULIN ASPART SCH UNITS: 100 INJECTION, SOLUTION INTRAVENOUS; SUBCUTANEOUS at 06:06

## 2019-11-03 RX ADMIN — NYSTATIN SCH APPLIC: 100000 POWDER TOPICAL at 08:59

## 2019-11-03 RX ADMIN — TAMSULOSIN HYDROCHLORIDE SCH MG: 0.4 CAPSULE ORAL at 08:53

## 2019-11-03 RX ADMIN — DONEPEZIL HYDROCHLORIDE SCH MG: 10 TABLET, FILM COATED ORAL at 08:59

## 2019-11-03 RX ADMIN — FERROUS SULFATE TAB EC 324 MG (65 MG FE EQUIVALENT) SCH MG: 324 (65 FE) TABLET DELAYED RESPONSE at 17:10

## 2019-11-03 RX ADMIN — FERROUS SULFATE TAB EC 324 MG (65 MG FE EQUIVALENT) SCH MG: 324 (65 FE) TABLET DELAYED RESPONSE at 08:53

## 2019-11-03 RX ADMIN — INSULIN ASPART SCH UNITS: 100 INJECTION, SOLUTION INTRAVENOUS; SUBCUTANEOUS at 11:37

## 2019-11-03 RX ADMIN — NEBIVOLOL HYDROCHLORIDE SCH MG: 2.5 TABLET ORAL at 21:55

## 2019-11-03 RX ADMIN — TAMSULOSIN HYDROCHLORIDE SCH MG: 0.4 CAPSULE ORAL at 21:26

## 2019-11-03 RX ADMIN — IPRATROPIUM BROMIDE AND ALBUTEROL SULFATE PRN AMP: .5; 3 SOLUTION RESPIRATORY (INHALATION) at 15:50

## 2019-11-03 RX ADMIN — INSULIN DETEMIR SCH UNITS: 100 INJECTION, SOLUTION SUBCUTANEOUS at 06:05

## 2019-11-03 RX ADMIN — ATORVASTATIN CALCIUM SCH MG: 40 TABLET, FILM COATED ORAL at 21:26

## 2019-11-03 RX ADMIN — INSULIN ASPART SCH UNITS: 100 INJECTION, SOLUTION INTRAVENOUS; SUBCUTANEOUS at 17:12

## 2019-11-03 NOTE — PN
Progress Note, Physician


Chief Complaint: 





doing well, no acute distress, appears comfortable, 





- Current Medication List


Current Medications: 


Active Medications





Acetaminophen (Tylenol -)  650 mg PO Q4H PRN


   PRN Reason: FEVER


   Last Admin: 10/27/19 13:29 Dose:  650 mg


Albuterol/Ipratropium (Duoneb -)  1 amp NEB Q6H PRN


   PRN Reason: SHORTNESS OF BREATH


   Last Admin: 11/02/19 23:01 Dose:  1 amp


Atorvastatin Calcium (Lipitor -)  40 mg PO HS [facility]


   Last Admin: 11/02/19 22:15 Dose:  40 mg


Donepezil HCl (Aricept -)  10 mg PO DAILY [facility]


   Last Admin: 11/03/19 08:59 Dose:  10 mg


Ezetimibe (Zetia -)  10 mg PO HS [facility]


   Last Admin: 11/02/19 22:15 Dose:  10 mg


Escitalopram Oxalate (Lexapro -)  10 mg PO DAILY [facility]


   Last Admin: 11/03/19 08:59 Dose:  10 mg


Ferrous Sulfate (Feosol -)  325 mg PO BIDWM [facility]


   Last Admin: 11/03/19 08:53 Dose:  325 mg


Finasteride (Proscar -)  5 mg PO DAILY [facility]


   Last Admin: 11/03/19 08:59 Dose:  5 mg


Insulin Aspart (Novolog Vial Sliding Scale -)  1 vial SQ ACHS [facility]; Protocol


   Last Admin: 11/03/19 11:37 Dose:  4 units


Insulin Aspart (Novolog Vial)  2 units SQ ACLD [facility]


   Last Admin: 11/03/19 11:36 Dose:  2 units


Insulin Detemir (Levemir Vial)  26 units SQ AM [facility]


   Last Admin: 11/03/19 06:05 Dose:  26 units


Nebivolol (Bystolic -)  2.5 mg PO HS [facility]


   Last Admin: 11/02/19 22:15 Dose:  2.5 mg


Nystatin (Nystop Powder -)  1 applic TP BID [facility]


   Last Admin: 11/03/19 08:59 Dose:  1 applic


Ondansetron HCl (Zofran Injection)  4 mg IVPUSH Q6H PRN


   PRN Reason: NAUSEA AND/OR VOMITING


Tamsulosin HCl (Flomax -)  0.4 mg PO BID@0830,2200 [facility]


   Last Admin: 11/03/19 08:53 Dose:  0.4 mg











- Objective


Vital Signs: 


 Vital Signs











Temperature  98.9 F   11/03/19 08:57


 


Pulse Rate  60   11/03/19 08:57


 


Respiratory Rate  18   11/03/19 09:00


 


Blood Pressure  151/72   11/03/19 08:57


 


O2 Sat by Pulse Oximetry (%)  100   11/03/19 09:00











Labs: 


 CBC, BMP





 11/02/19 06:20 





 11/02/19 06:20 





 INR, PTT











INR  1.42  (0.83-1.09)  H  10/19/19  06:38    














Impression/Plan


Impression/Plan: 








ASSESSMENT/PLAN:


Patient presented initially for DKA (secondary to underlying social issueds 

with his dementia and his wife's dementia leading to issues with insulin pump 

operation, seen by Dr. Burnett and taken off pump) complicated by UTI and 

obstructive uropathy secondary to clots with recurring hematuria.  


Pending repeat cysto on monday with urology team. Protracted admission due to 

recurring hematuria, complex discharge planning, etc.  He is overall improved 

and his mentation is stable.  No medication changes. 


DM,


 Continue monitoring glucose, et


  Will be discharged home with home health services following procedure pending 

urological clearance.  He will need close followup with uro, PCP, and endo at 

[place]. 





Visit type





- Emergency Visit


Emergency Visit: No





- New Patient


This patient is new to me today: Yes


Date on this admission: 11/03/19





- Critical Care


Critical Care patient: No





- Discharge Referral


Referred to [facility] No

## 2019-11-03 NOTE — PN
Progress Note, Physician


History of Present Illness: 





stable


no hematuria


foleys still present





- Current Medication List


Current Medications: 


Active Medications





Acetaminophen (Tylenol -)  650 mg PO Q4H PRN


   PRN Reason: FEVER


   Last Admin: 10/27/19 13:29 Dose:  650 mg


Albuterol/Ipratropium (Duoneb -)  1 amp NEB Q6H PRN


   PRN Reason: SHORTNESS OF BREATH


   Last Admin: 11/02/19 23:01 Dose:  1 amp


Atorvastatin Calcium (Lipitor -)  40 mg PO HS [facility]


   Last Admin: 11/02/19 22:15 Dose:  40 mg


Donepezil HCl (Aricept -)  10 mg PO DAILY [facility]


   Last Admin: 11/03/19 08:59 Dose:  10 mg


Ezetimibe (Zetia -)  10 mg PO HS [facility]


   Last Admin: 11/02/19 22:15 Dose:  10 mg


Escitalopram Oxalate (Lexapro -)  10 mg PO DAILY [facility]


   Last Admin: 11/03/19 08:59 Dose:  10 mg


Ferrous Sulfate (Feosol -)  325 mg PO BIDWM [facility]


   Last Admin: 11/03/19 08:53 Dose:  325 mg


Finasteride (Proscar -)  5 mg PO DAILY [facility]


   Last Admin: 11/03/19 08:59 Dose:  5 mg


Insulin Aspart (Novolog Vial Sliding Scale -)  1 vial SQ ACHS [facility]; Protocol


   Last Admin: 11/03/19 06:06 Dose:  8 units


Insulin Aspart (Novolog Vial)  2 units SQ ACLD [facility]


   Last Admin: 11/02/19 16:38 Dose:  2 units


Insulin Detemir (Levemir Vial)  26 units SQ AM [facility]


   Last Admin: 11/03/19 06:05 Dose:  26 units


Nebivolol (Bystolic -)  2.5 mg PO HS [facility]


   Last Admin: 11/02/19 22:15 Dose:  2.5 mg


Nystatin (Nystop Powder -)  1 applic TP BID [facility]


   Last Admin: 11/03/19 08:59 Dose:  1 applic


Ondansetron HCl (Zofran Injection)  4 mg IVPUSH Q6H PRN


   PRN Reason: NAUSEA AND/OR VOMITING


Tamsulosin HCl (Flomax -)  0.4 mg PO BID@0830,2200 [facility]


   Last Admin: 11/03/19 08:53 Dose:  0.4 mg











- Objective


Vital Signs: 


 Vital Signs











Temperature  98.9 F   11/03/19 08:57


 


Pulse Rate  60   11/03/19 08:57


 


Respiratory Rate  18   11/03/19 09:00


 


Blood Pressure  151/72   11/03/19 08:57


 


O2 Sat by Pulse Oximetry (%)  100   11/03/19 09:00











Constitutional: Yes: No Distress, Calm


Cardiovascular: Yes: S1, S2


Respiratory: Yes: Regular, CTA Bilaterally


Gastrointestinal: Yes: Normal Bowel Sounds, Soft


Genitourinary: Yes: Mckeon Present


Musculoskeletal: Yes: WNL


Extremities: Yes: WNL


Neurological: Yes: Alert, Other


Labs: 


 CBC, BMP





 11/02/19 06:20 





 11/02/19 06:20 





 INR, PTT











INR  1.42  (0.83-1.09)  H  10/19/19  06:38    














Assessment/Plan





79 year old man with a history of HTN, hyperlipidemia, CAD, MI, CABG, chronic 

diastolic heart failure, type 2 DM, stage 3 CKD, COPD, dementia who presented 

to the ED with altered mental status. 





1. Acute metabolic encephalopathy 


2. Acute kidney injury


3. Abdominal pain 


4.  UTI





5. Lactic acidosis


6. Hypernatremia


7. Hypophosphatemia


8. HTN


9. Hyperlipidemia


10. CAD, history of MI, CABG


11. Chronic diastolic heart failure


12. Stage 3 CKD


13. COPD


14. Alzheimer dementia





plan


continue current mgmt


monitor for hematuria


patient stable

## 2019-11-04 LAB
ALBUMIN SERPL-MCNC: 2.7 G/DL (ref 3.4–5)
ALP SERPL-CCNC: 119 U/L (ref 45–117)
ALT SERPL-CCNC: 22 U/L (ref 13–61)
ANION GAP SERPL CALC-SCNC: 5 MMOL/L (ref 8–16)
AST SERPL-CCNC: 23 U/L (ref 15–37)
BASOPHILS # BLD: 0.9 % (ref 0–2)
BILIRUB SERPL-MCNC: 0.5 MG/DL (ref 0.2–1)
BUN SERPL-MCNC: 18.1 MG/DL (ref 7–18)
CALCIUM SERPL-MCNC: 8.7 MG/DL (ref 8.5–10.1)
CHLORIDE SERPL-SCNC: 106 MMOL/L (ref 98–107)
CO2 SERPL-SCNC: 27 MMOL/L (ref 21–32)
CREAT SERPL-MCNC: 0.9 MG/DL (ref 0.55–1.3)
DEPRECATED RDW RBC AUTO: 15.5 % (ref 11.9–15.9)
EOSINOPHIL # BLD: 6 % (ref 0–4.5)
GLUCOSE SERPL-MCNC: 131 MG/DL (ref 74–106)
HCT VFR BLD CALC: 33.2 % (ref 35.4–49)
HGB BLD-MCNC: 10.8 GM/DL (ref 11.7–16.9)
LYMPHOCYTES # BLD: 21.1 % (ref 8–40)
MCH RBC QN AUTO: 28.9 PG (ref 25.7–33.7)
MCHC RBC AUTO-ENTMCNC: 32.6 G/DL (ref 32–35.9)
MCV RBC: 88.7 FL (ref 80–96)
MONOCYTES # BLD AUTO: 10.5 % (ref 3.8–10.2)
NEUTROPHILS # BLD: 61.5 % (ref 42.8–82.8)
PLATELET # BLD AUTO: 155 K/MM3 (ref 134–434)
PMV BLD: 8.4 FL (ref 7.5–11.1)
POTASSIUM SERPLBLD-SCNC: 4.6 MMOL/L (ref 3.5–5.1)
PROT SERPL-MCNC: 6.5 G/DL (ref 6.4–8.2)
RBC # BLD AUTO: 3.75 M/MM3 (ref 4–5.6)
SODIUM SERPL-SCNC: 138 MMOL/L (ref 136–145)
WBC # BLD AUTO: 8.1 K/MM3 (ref 4–10)

## 2019-11-04 RX ADMIN — INSULIN ASPART SCH UNITS: 100 INJECTION, SOLUTION INTRAVENOUS; SUBCUTANEOUS at 17:16

## 2019-11-04 RX ADMIN — INSULIN DETEMIR SCH UNITS: 100 INJECTION, SOLUTION SUBCUTANEOUS at 09:51

## 2019-11-04 RX ADMIN — IPRATROPIUM BROMIDE AND ALBUTEROL SULFATE PRN AMP: .5; 3 SOLUTION RESPIRATORY (INHALATION) at 07:03

## 2019-11-04 RX ADMIN — NYSTATIN SCH APPLIC: 100000 POWDER TOPICAL at 21:25

## 2019-11-04 RX ADMIN — INSULIN ASPART SCH UNITS: 100 INJECTION, SOLUTION INTRAVENOUS; SUBCUTANEOUS at 12:17

## 2019-11-04 RX ADMIN — TAMSULOSIN HYDROCHLORIDE SCH MG: 0.4 CAPSULE ORAL at 09:52

## 2019-11-04 RX ADMIN — NEBIVOLOL HYDROCHLORIDE SCH MG: 2.5 TABLET ORAL at 21:25

## 2019-11-04 RX ADMIN — INSULIN ASPART SCH: 100 INJECTION, SOLUTION INTRAVENOUS; SUBCUTANEOUS at 06:01

## 2019-11-04 RX ADMIN — INSULIN ASPART SCH UNITS: 100 INJECTION, SOLUTION INTRAVENOUS; SUBCUTANEOUS at 21:38

## 2019-11-04 RX ADMIN — FERROUS SULFATE TAB EC 324 MG (65 MG FE EQUIVALENT) SCH MG: 324 (65 FE) TABLET DELAYED RESPONSE at 09:51

## 2019-11-04 RX ADMIN — TAMSULOSIN HYDROCHLORIDE SCH MG: 0.4 CAPSULE ORAL at 21:24

## 2019-11-04 RX ADMIN — BETHANECHOL CHLORIDE SCH MG: 25 TABLET ORAL at 21:24

## 2019-11-04 RX ADMIN — ESCITALOPRAM OXALATE SCH MG: 10 TABLET, FILM COATED ORAL at 09:51

## 2019-11-04 RX ADMIN — ATORVASTATIN CALCIUM SCH MG: 40 TABLET, FILM COATED ORAL at 21:25

## 2019-11-04 RX ADMIN — INSULIN ASPART SCH: 100 INJECTION, SOLUTION INTRAVENOUS; SUBCUTANEOUS at 17:16

## 2019-11-04 RX ADMIN — NYSTATIN SCH APPLIC: 100000 POWDER TOPICAL at 09:52

## 2019-11-04 RX ADMIN — INSULIN ASPART SCH UNITS: 100 INJECTION, SOLUTION INTRAVENOUS; SUBCUTANEOUS at 12:18

## 2019-11-04 RX ADMIN — DONEPEZIL HYDROCHLORIDE SCH MG: 10 TABLET, FILM COATED ORAL at 09:52

## 2019-11-04 RX ADMIN — FINASTERIDE SCH MG: 5 TABLET, FILM COATED ORAL at 09:52

## 2019-11-04 RX ADMIN — EZETIMIBE SCH MG: 10 TABLET ORAL at 21:25

## 2019-11-04 RX ADMIN — FERROUS SULFATE TAB EC 324 MG (65 MG FE EQUIVALENT) SCH MG: 324 (65 FE) TABLET DELAYED RESPONSE at 17:16

## 2019-11-04 NOTE — PN
Progress Note (short form)





- Note


Progress Note: 





HPI: Weekend events noted. Camacho discontinued at 6am today. HPI limited due to 

patient's severe NPH


 





PE:


Gen: NAD, awake, alert


HEENT: NC/AT, RUBIO, MMM


Neck: No JVD


LUNG: CTA bilaterally, no wheezes or rales noted


CARD: RRR no murmurs appreciated


ABD: Soft, Nt/ND, normoactive BS 


EXT: No edema appreciated, cap refill <2sec


Skin: No rashes or lesions noted


 CBC, BMP





 11/04/19 06:32 





 11/04/19 06:32 











Microbiology





10/06/19 23:00   Blood - Peripheral Venous   Blood Culture - Final


                            NO GROWTH AFTER 5 DAYS INCUBATION


10/06/19 23:00   Blood - Peripheral Venous   Blood Culture - Final


                            NO GROWTH AFTER 5 DAYS INCUBATION


10/06/19 21:16   Urine - Urine Camacho   Urine Culture - Final


                            Strep Agalactiae Group B


                            Mr S Aureus


                            Staphylococcus Coagulase Neg


Active Medications





Acetaminophen (Tylenol -)  650 mg PO Q4H PRN


   PRN Reason: FEVER


   Last Admin: 10/27/19 13:29 Dose:  650 mg


Albuterol/Ipratropium (Duoneb -)  1 amp NEB Q6H PRN


   PRN Reason: SHORTNESS OF BREATH


   Last Admin: 11/04/19 07:03 Dose:  1 amp


Atorvastatin Calcium (Lipitor -)  40 mg PO HS [facility]


   Last Admin: 11/03/19 21:26 Dose:  40 mg


Donepezil HCl (Aricept -)  10 mg PO DAILY [facility]


   Last Admin: 11/04/19 09:52 Dose:  10 mg


Ezetimibe (Zetia -)  10 mg PO HS [facility]


   Last Admin: 11/03/19 21:27 Dose:  10 mg


Escitalopram Oxalate (Lexapro -)  10 mg PO DAILY [facility]


   Last Admin: 11/04/19 09:51 Dose:  10 mg


Ferrous Sulfate (Feosol -)  325 mg PO BIDWM [facility]


   Last Admin: 11/04/19 17:16 Dose:  325 mg


Finasteride (Proscar -)  5 mg PO DAILY [facility]


   Last Admin: 11/04/19 09:52 Dose:  5 mg


Insulin Aspart (Novolog Vial Sliding Scale -)  1 vial SQ ACHS [facility]; Protocol


   Last Admin: 11/04/19 17:16 Dose:  Not Given


Insulin Aspart (Novolog Vial)  2 units SQ ACLD [facility]


   Last Admin: 11/04/19 17:16 Dose:  2 units


Insulin Detemir (Levemir Vial)  26 units SQ AM [facility]


   Last Admin: 11/04/19 09:51 Dose:  26 units


Nebivolol (Bystolic -)  2.5 mg PO HS [facility]


   Last Admin: 11/03/19 21:55 Dose:  2.5 mg


Nystatin (Nystop Powder -)  1 applic TP BID [facility]


   Last Admin: 11/04/19 09:52 Dose:  1 applic


Ondansetron HCl (Zofran Injection)  4 mg IVPUSH Q6H PRN


   PRN Reason: NAUSEA AND/OR VOMITING


Tamsulosin HCl (Flomax -)  0.4 mg PO BID@0830,2200 [facility]


   Last Admin: 11/04/19 09:52 Dose:  0.4 mg











Assessment and Plan:


Hematuria


DKA (resolved)


Hypernatremia (resolved)


Acute kidney injury (Resolved)


Type 2 DM


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








--Camacho discontinued at 6am and awaiting trial of void


   --Bladder scan at noon and can decided if camacho insertion at that time based 

on volume


--H/H remains stable; will trend


--Glycemic control to continue:


   Levemir 26U AM


   Novolog scheduled 2U ACLD


   ISS for breakthrough


   BGM ACHS


--Discussed with Dr. Stokes and Dr. Greene how even trial of LP or  shunting 

not viable for patient's NPH


--Contnue Flomax 0.4mg BID PO


--Continue Proscar 5mg qdaily


--Lexapro 10mg and Aricept 10mg qdaily





FEN:


   Fluids: PO encouragement


   Electrolyte abnormalities: None


   Nutrition: Return to recommended diet





PPX:


   DVT - scds





Dispo: Continue monitoring 


Case discussed with Dr. Rufina Beal, DO - IM PGY-3

## 2019-11-04 NOTE — PN
Progress Note, Physician





- Current Medication List


Current Medications: 


Active Medications





Acetaminophen (Tylenol -)  650 mg PO Q4H PRN


   PRN Reason: FEVER


   Last Admin: 10/27/19 13:29 Dose:  650 mg


Albuterol/Ipratropium (Duoneb -)  1 amp NEB Q6H PRN


   PRN Reason: SHORTNESS OF BREATH


   Last Admin: 11/04/19 07:03 Dose:  1 amp


Atorvastatin Calcium (Lipitor -)  40 mg PO HS [facility]


   Last Admin: 11/03/19 21:26 Dose:  40 mg


Donepezil HCl (Aricept -)  10 mg PO DAILY [facility]


   Last Admin: 11/04/19 09:52 Dose:  10 mg


Ezetimibe (Zetia -)  10 mg PO HS [facility]


   Last Admin: 11/03/19 21:27 Dose:  10 mg


Escitalopram Oxalate (Lexapro -)  10 mg PO DAILY [facility]


   Last Admin: 11/04/19 09:51 Dose:  10 mg


Ferrous Sulfate (Feosol -)  325 mg PO BIDWM [facility]


   Last Admin: 11/04/19 09:51 Dose:  325 mg


Finasteride (Proscar -)  5 mg PO DAILY [facility]


   Last Admin: 11/04/19 09:52 Dose:  5 mg


Insulin Aspart (Novolog Vial Sliding Scale -)  1 vial SQ ACHS [facility]; Protocol


   Last Admin: 11/04/19 06:01 Dose:  Not Given


Insulin Aspart (Novolog Vial)  2 units SQ ACLD [facility]


   Last Admin: 11/03/19 17:12 Dose:  2 units


Insulin Detemir (Levemir Vial)  26 units SQ AM [facility]


   Last Admin: 11/04/19 09:51 Dose:  26 units


Nebivolol (Bystolic -)  2.5 mg PO HS [facility]


   Last Admin: 11/03/19 21:55 Dose:  2.5 mg


Nystatin (Nystop Powder -)  1 applic TP BID [facility]


   Last Admin: 11/04/19 09:52 Dose:  1 applic


Ondansetron HCl (Zofran Injection)  4 mg IVPUSH Q6H PRN


   PRN Reason: NAUSEA AND/OR VOMITING


Tamsulosin HCl (Flomax -)  0.4 mg PO BID@0830,2200 [facility]


   Last Admin: 11/04/19 09:52 Dose:  0.4 mg











- Objective


Vital Signs: 


 Vital Signs











Temperature  97.8 F   11/04/19 05:57


 


Pulse Rate  73   11/04/19 05:57


 


Respiratory Rate  20   11/04/19 05:57


 


Blood Pressure  157/60   11/04/19 05:57


 


O2 Sat by Pulse Oximetry (%)  100   11/03/19 21:00











Labs: 


 CBC, BMP





 11/04/19 06:32 





 11/04/19 06:32 





 INR, PTT











INR  1.42  (0.83-1.09)  H  10/19/19  06:38

## 2019-11-04 NOTE — PN
Teaching Attending Note


Name of Resident: Collins Beal





ATTENDING PHYSICIAN STATEMENT





I saw and evaluated the patient.


I reviewed the resident's note and discussed the case with the resident.


I agree with the resident's findings and plan as documented.








SUBJECTIVE: Patient is comfortable lying in bed. He is confused.








OBJECTIVE:


 Vital Signs











 Period  Temp  Pulse  Resp  BP Sys/Ramírez  Pulse Ox


 


 Last 24 Hr  97.7 F-98.3 F  62-88  20-20  118-157/52-68  100








HEART: S1S2, RRR


LUNGS: Clear


ABDOMEN: Soft, non-distended, non-tender, normal BS


EXTREMITIES: No edema





 Laboratory Results - last 24 hr











  11/03/19 11/03/19 11/03/19





  11:36 17:09 20:47


 


WBC   


 


RBC   


 


Hgb   


 


Hct   


 


MCV   


 


MCH   


 


MCHC   


 


RDW   


 


Plt Count   


 


MPV   


 


Absolute Neuts (auto)   


 


Neutrophils %   


 


Lymphocytes %   


 


Monocytes %   


 


Eosinophils %   


 


Basophils %   


 


Nucleated RBC %   


 


Sodium   


 


Potassium   


 


Chloride   


 


Carbon Dioxide   


 


Anion Gap   


 


BUN   


 


Creatinine   


 


Est GFR (CKD-EPI)AfAm   


 


Est GFR (CKD-EPI)NonAf   


 


POC Glucometer  170  165  181


 


Random Glucose   


 


Calcium   


 


Total Bilirubin   


 


AST   


 


ALT   


 


Alkaline Phosphatase   


 


Total Protein   


 


Albumin   














  11/04/19 11/04/19 11/04/19





  05:23 06:32 06:32


 


WBC   8.1 


 


RBC   3.75 L 


 


Hgb   10.8 L 


 


Hct   33.2 L 


 


MCV   88.7 


 


MCH   28.9 


 


MCHC   32.6 


 


RDW   15.5 


 


Plt Count   155 


 


MPV   8.4 


 


Absolute Neuts (auto)   5.0 


 


Neutrophils %   61.5 


 


Lymphocytes %   21.1 


 


Monocytes %   10.5 H 


 


Eosinophils %   6.0 H 


 


Basophils %   0.9 


 


Nucleated RBC %   0 


 


Sodium    138


 


Potassium    4.6


 


Chloride    106


 


Carbon Dioxide    27


 


Anion Gap    5 L


 


BUN    18.1 H


 


Creatinine    0.9


 


Est GFR (CKD-EPI)AfAm    93.82


 


Est GFR (CKD-EPI)NonAf    80.95


 


POC Glucometer  110  


 


Random Glucose    131 H


 


Calcium    8.7


 


Total Bilirubin    0.5


 


AST    23


 


ALT    22


 


Alkaline Phosphatase    119 H


 


Total Protein    6.5


 


Albumin    2.7 L








 Current Medications











Generic Name Dose Route Start Last Admin





  Trade Name Freq  PRN Reason Stop Dose Admin


 


Acetaminophen  650 mg  10/09/19 10:34  10/27/19 13:29





  Tylenol -  PO   650 mg





  Q4H PRN   Administration





  FEVER   





     





     





     


 


Albuterol/Ipratropium  1 amp  11/02/19 12:45  11/04/19 07:03





  Duoneb -  NEB   1 amp





  Q6H PRN   Administration





  SHORTNESS OF BREATH   





     





     





     


 


Atorvastatin Calcium  40 mg  10/07/19 22:00  11/03/19 21:26





  Lipitor -  PO   40 mg





  HS MARY   Administration





     





     





     





     


 


Donepezil HCl  10 mg  10/16/19 10:00  11/03/19 08:59





  Aricept -  PO   10 mg





  DAILY MARY   Administration





     





     





     





     


 


Ezetimibe  10 mg  10/07/19 22:00  11/03/19 21:27





  Zetia -  PO   10 mg





  HS MARY   Administration





     





     





     





     


 


Escitalopram Oxalate  10 mg  10/16/19 10:00  11/03/19 08:59





  Lexapro -  PO   10 mg





  DAILY MARY   Administration





     





     





     





     


 


Ferrous Sulfate  325 mg  10/20/19 17:30  11/03/19 17:10





  Feosol -  PO   325 mg





  BIDWM MARY   Administration





     





     





     





     


 


Finasteride  5 mg  10/16/19 10:00  11/03/19 08:59





  Proscar -  PO   5 mg





  DAILY MARY   Administration





     





     





     





     


 


Insulin Aspart  1 vial  10/24/19 07:03  11/04/19 06:01





  Novolog Vial Sliding Scale -  SQ   Not Given





  [facility]   





     





     





  Protocol   





     


 


Insulin Aspart  2 units  10/28/19 07:48  11/03/19 17:12





  Novolog Vial  SQ   2 units





  ACLD MARY   Administration





     





     





     





     


 


Insulin Detemir  26 units  10/30/19 10:00  11/03/19 06:05





  Levemir Vial  SQ   26 units





  AM MARY   Administration





     





     





     





     


 


Nebivolol  2.5 mg  10/07/19 22:00  11/03/19 21:55





  Bystolic -  PO   2.5 mg





  HS MARY   Administration





     





     





     





     


 


Nystatin  1 applic  10/10/19 11:30  11/03/19 21:27





  Nystop Powder -  TP   1 applic





  BID MARY   Administration





     





     





     





     


 


Ondansetron HCl  4 mg  10/18/19 17:01  





  Zofran Injection  IVPUSH   





  Q6H PRN   





  NAUSEA AND/OR VOMITING   





     





     





     


 


Tamsulosin HCl  0.4 mg  10/16/19 08:45  11/03/19 21:26





  Flomax -  PO   0.4 mg





  BID@0830,2200 MARY   Administration





     





     





     





     














ASSESSMENT AND PLAN:


This is a 79 year old man with a history of HTN, hyperlipidemia, CAD, MI, CABG, 

chronic diastolic heart failure, type 2 DM, stage 3 CKD, COPD, dementia who 

presented to the ED with altered mental status. 





1. BPH with obstruction, gross hematuria


   - s/p evacuation of clots, TURP, TUVP on 10/18


   - CBI discontinued and urine has remained clear


   - Mckeon removed this morning for voiding trial and so far patient has not 

voided


   - Continue Flomax, Proscar


   - Monitor urine output - might need to re-insert Mckeon


2. Acute metabolic encephalopathy secondary to hyperosmolar hyperglycemic state 


   - Resolved


3. Type 2 DM, uncontrolled


   - Control is better with Levemir, standing Novolog, Novolog sliding scale


4. Acute kidney injury


   - Resolved


5. Hypernatremia


   - Resolved


6. Hypophosphatemia


   - Resolved


7. Lactic acidosis


   - Resolved


8. HTN


   - Continue Bystolic


9. Hyperlipidemia


   - Continue Lipitor, Zetia


10. CAD, history of MI, CABG


   - Continue Bystolic, Lipitor, Zetia


11. Chronic diastolic heart failure


   - Stable


12. Stage 3 CKD


   - Stable


13. COPD


   - Stable


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


   - Not a candidate for  shunt


   - Continue Aricept


15. UTI


   - Treated with Doxycycline, Levaquin

## 2019-11-04 NOTE — PN
Progress Note, Physician


History of Present Illness: 





Pt seen and examined at bedside. He did not tolerated the voiding trial. 





- Current Medication List


Current Medications: 


Active Medications





Acetaminophen (Tylenol -)  650 mg PO Q4H PRN


   PRN Reason: FEVER


   Last Admin: 10/27/19 13:29 Dose:  650 mg


Albuterol/Ipratropium (Duoneb -)  1 amp NEB Q6H PRN


   PRN Reason: SHORTNESS OF BREATH


   Last Admin: 11/04/19 07:03 Dose:  1 amp


Atorvastatin Calcium (Lipitor -)  40 mg PO HS [facility]


   Last Admin: 11/03/19 21:26 Dose:  40 mg


Donepezil HCl (Aricept -)  10 mg PO DAILY [facility]


   Last Admin: 11/04/19 09:52 Dose:  10 mg


Ezetimibe (Zetia -)  10 mg PO HS [facility]


   Last Admin: 11/03/19 21:27 Dose:  10 mg


Escitalopram Oxalate (Lexapro -)  10 mg PO DAILY [facility]


   Last Admin: 11/04/19 09:51 Dose:  10 mg


Ferrous Sulfate (Feosol -)  325 mg PO BIDWM [facility]


   Last Admin: 11/04/19 09:51 Dose:  325 mg


Finasteride (Proscar -)  5 mg PO DAILY [facility]


   Last Admin: 11/04/19 09:52 Dose:  5 mg


Insulin Aspart (Novolog Vial Sliding Scale -)  1 vial SQ ACHS [facility]; Protocol


   Last Admin: 11/04/19 12:17 Dose:  8 units


Insulin Aspart (Novolog Vial)  2 units SQ ACLD [facility]


   Last Admin: 11/04/19 12:18 Dose:  2 units


Insulin Detemir (Levemir Vial)  26 units SQ AM [facility]


   Last Admin: 11/04/19 09:51 Dose:  26 units


Nebivolol (Bystolic -)  2.5 mg PO HS [facility]


   Last Admin: 11/03/19 21:55 Dose:  2.5 mg


Nystatin (Nystop Powder -)  1 applic TP BID [facility]


   Last Admin: 11/04/19 09:52 Dose:  1 applic


Ondansetron HCl (Zofran Injection)  4 mg IVPUSH Q6H PRN


   PRN Reason: NAUSEA AND/OR VOMITING


Tamsulosin HCl (Flomax -)  0.4 mg PO BID@0830,2200 [facility]


   Last Admin: 11/04/19 09:52 Dose:  0.4 mg











- Objective


Vital Signs: 


 Vital Signs











Temperature  97.9 F   11/04/19 15:21


 


Pulse Rate  68   11/04/19 15:21


 


Respiratory Rate  20   11/04/19 15:21


 


Blood Pressure  133/51 L  11/04/19 15:21


 


O2 Sat by Pulse Oximetry (%)  100   11/04/19 09:00











Constitutional: Yes: Calm


HENT: Yes: Atraumatic


Neck: Yes: Supple


Cardiovascular: Yes: S1, S2


Respiratory: Yes: CTA Bilaterally


Gastrointestinal: Yes: Soft, Abdomen, Obese


Genitourinary: Yes: Other (camacho removed however he did not void in 8 hours)


Musculoskeletal: Yes: Muscle Weakness


Edema: No


Neurological: Yes: Confusion


Labs: 


 CBC, BMP





 11/04/19 06:32 





 11/04/19 06:32 





 INR, PTT











INR  1.42  (0.83-1.09)  H  10/19/19  06:38    














Problem List





- Problems


(1) DEB (acute kidney injury)


Code(s): N17.9 - ACUTE KIDNEY FAILURE, UNSPECIFIED   





(2) DKA (diabetic ketoacidoses)


Code(s): E11.10 - TYPE 2 DIABETES MELLITUS WITH KETOACIDOSIS WITHOUT COMA   


Qualifiers: 


   Diabetes mellitus type: type 2   Diabetes mellitus complication detail: 

without coma   Qualified Code(s): E11.10 - Type 2 diabetes mellitus with 

ketoacidosis without coma   





(3) Acute renal failure


Code(s): N17.9 - ACUTE KIDNEY FAILURE, UNSPECIFIED   





(4) Hypernatremia


Code(s): E87.0 - HYPEROSMOLALITY AND HYPERNATREMIA   





Assessment/Plan


 Current Medications











Generic Name Dose Route Start Last Admin





  Trade Name Freq  PRN Reason Stop Dose Admin


 


Acetaminophen  650 mg  10/09/19 10:34  10/27/19 13:29





  Tylenol -  PO   650 mg





  Q4H PRN   Administration





  FEVER   





     





     





     


 


Albuterol/Ipratropium  1 amp  11/02/19 12:45  11/04/19 07:03





  Duoneb -  NEB   1 amp





  Q6H PRN   Administration





  SHORTNESS OF BREATH   





     





     





     


 


Atorvastatin Calcium  40 mg  10/07/19 22:00  11/03/19 21:26





  Lipitor -  PO   40 mg





  HS MARY   Administration





     





     





     





     


 


Donepezil HCl  10 mg  10/16/19 10:00  11/04/19 09:52





  Aricept -  PO   10 mg





  DAILY MARY   Administration





     





     





     





     


 


Ezetimibe  10 mg  10/07/19 22:00  11/03/19 21:27





  Zetia -  PO   10 mg





  HS MARY   Administration





     





     





     





     


 


Escitalopram Oxalate  10 mg  10/16/19 10:00  11/04/19 09:51





  Lexapro -  PO   10 mg





  DAILY MARY   Administration





     





     





     





     


 


Ferrous Sulfate  325 mg  10/20/19 17:30  11/04/19 09:51





  Feosol -  PO   325 mg





  BIDWM MARY   Administration





     





     





     





     


 


Finasteride  5 mg  10/16/19 10:00  11/04/19 09:52





  Proscar -  PO   5 mg





  DAILY MARY   Administration





     





     





     





     


 


Insulin Aspart  1 vial  10/24/19 07:03  11/04/19 12:17





  Novolog Vial Sliding Scale -  SQ   8 units





  [facility] MARY   Administration





     





     





  Protocol   





     


 


Insulin Aspart  2 units  10/28/19 07:48  11/04/19 12:18





  Novolog Vial  SQ   2 units





  ACLD MARY   Administration





     





     





     





     


 


Insulin Detemir  26 units  10/30/19 10:00  11/04/19 09:51





  Levemir Vial  SQ   26 units





  AM MARY   Administration





     





     





     





     


 


Nebivolol  2.5 mg  10/07/19 22:00  11/03/19 21:55





  Bystolic -  PO   2.5 mg





  HS MARY   Administration





     





     





     





     


 


Nystatin  1 applic  10/10/19 11:30  11/04/19 09:52





  Nystop Powder -  TP   1 applic





  BID MARY   Administration





     





     





     





     


 


Ondansetron HCl  4 mg  10/18/19 17:01  





  Zofran Injection  IVPUSH   





  Q6H PRN   





  NAUSEA AND/OR VOMITING   





     





     





     


 


Tamsulosin HCl  0.4 mg  10/16/19 08:45  11/04/19 09:52





  Flomax -  PO   0.4 mg





  BID@0830,2200 MARY   Administration





     





     





     





     

















Impression


1. DEB


2. DKA


3. hypernatremia


4. DM


5. dementia


6. cad


7. hyperkalemia


8. hematuria





Plan


- renal function is stable


- urology follow up


- pt getting camacho replaced for retention


- monitor labs


- will follow PRN

## 2019-11-05 LAB
ANION GAP SERPL CALC-SCNC: 4 MMOL/L (ref 8–16)
BUN SERPL-MCNC: 17.1 MG/DL (ref 7–18)
CALCIUM SERPL-MCNC: 8.7 MG/DL (ref 8.5–10.1)
CHLORIDE SERPL-SCNC: 105 MMOL/L (ref 98–107)
CO2 SERPL-SCNC: 29 MMOL/L (ref 21–32)
CREAT SERPL-MCNC: 1.1 MG/DL (ref 0.55–1.3)
DEPRECATED RDW RBC AUTO: 15.6 % (ref 11.9–15.9)
GLUCOSE SERPL-MCNC: 108 MG/DL (ref 74–106)
HCT VFR BLD CALC: 31.1 % (ref 35.4–49)
HGB BLD-MCNC: 10.3 GM/DL (ref 11.7–16.9)
MCH RBC QN AUTO: 29 PG (ref 25.7–33.7)
MCHC RBC AUTO-ENTMCNC: 33 G/DL (ref 32–35.9)
MCV RBC: 87.8 FL (ref 80–96)
PLATELET # BLD AUTO: 162 K/MM3 (ref 134–434)
PMV BLD: 8.4 FL (ref 7.5–11.1)
POTASSIUM SERPLBLD-SCNC: 4.3 MMOL/L (ref 3.5–5.1)
RBC # BLD AUTO: 3.54 M/MM3 (ref 4–5.6)
SODIUM SERPL-SCNC: 137 MMOL/L (ref 136–145)
WBC # BLD AUTO: 8.7 K/MM3 (ref 4–10)

## 2019-11-05 RX ADMIN — NEBIVOLOL HYDROCHLORIDE SCH MG: 2.5 TABLET ORAL at 22:12

## 2019-11-05 RX ADMIN — FINASTERIDE SCH MG: 5 TABLET, FILM COATED ORAL at 11:02

## 2019-11-05 RX ADMIN — DONEPEZIL HYDROCHLORIDE SCH MG: 10 TABLET, FILM COATED ORAL at 11:02

## 2019-11-05 RX ADMIN — INSULIN DETEMIR SCH UNITS: 100 INJECTION, SOLUTION SUBCUTANEOUS at 06:14

## 2019-11-05 RX ADMIN — FERROUS SULFATE TAB EC 324 MG (65 MG FE EQUIVALENT) SCH MG: 324 (65 FE) TABLET DELAYED RESPONSE at 11:02

## 2019-11-05 RX ADMIN — INSULIN ASPART SCH UNITS: 100 INJECTION, SOLUTION INTRAVENOUS; SUBCUTANEOUS at 12:03

## 2019-11-05 RX ADMIN — BETHANECHOL CHLORIDE SCH MG: 25 TABLET ORAL at 22:13

## 2019-11-05 RX ADMIN — NYSTATIN SCH APPLIC: 100000 POWDER TOPICAL at 11:02

## 2019-11-05 RX ADMIN — INSULIN ASPART SCH UNITS: 100 INJECTION, SOLUTION INTRAVENOUS; SUBCUTANEOUS at 17:12

## 2019-11-05 RX ADMIN — BETHANECHOL CHLORIDE SCH MG: 25 TABLET ORAL at 05:39

## 2019-11-05 RX ADMIN — INSULIN ASPART SCH UNITS: 100 INJECTION, SOLUTION INTRAVENOUS; SUBCUTANEOUS at 17:13

## 2019-11-05 RX ADMIN — ATORVASTATIN CALCIUM SCH MG: 40 TABLET, FILM COATED ORAL at 22:11

## 2019-11-05 RX ADMIN — ESCITALOPRAM OXALATE SCH MG: 10 TABLET, FILM COATED ORAL at 11:02

## 2019-11-05 RX ADMIN — IPRATROPIUM BROMIDE AND ALBUTEROL SULFATE PRN AMP: .5; 3 SOLUTION RESPIRATORY (INHALATION) at 11:06

## 2019-11-05 RX ADMIN — TAMSULOSIN HYDROCHLORIDE SCH MG: 0.4 CAPSULE ORAL at 22:11

## 2019-11-05 RX ADMIN — INSULIN ASPART SCH UNITS: 100 INJECTION, SOLUTION INTRAVENOUS; SUBCUTANEOUS at 22:12

## 2019-11-05 RX ADMIN — EZETIMIBE SCH MG: 10 TABLET ORAL at 22:12

## 2019-11-05 RX ADMIN — BETHANECHOL CHLORIDE SCH: 25 TABLET ORAL at 15:47

## 2019-11-05 RX ADMIN — FERROUS SULFATE TAB EC 324 MG (65 MG FE EQUIVALENT) SCH MG: 324 (65 FE) TABLET DELAYED RESPONSE at 17:12

## 2019-11-05 RX ADMIN — TAMSULOSIN HYDROCHLORIDE SCH MG: 0.4 CAPSULE ORAL at 11:02

## 2019-11-05 RX ADMIN — INSULIN ASPART SCH: 100 INJECTION, SOLUTION INTRAVENOUS; SUBCUTANEOUS at 06:13

## 2019-11-05 RX ADMIN — IPRATROPIUM BROMIDE AND ALBUTEROL SULFATE PRN AMP: .5; 3 SOLUTION RESPIRATORY (INHALATION) at 21:06

## 2019-11-05 RX ADMIN — NYSTATIN SCH APPLIC: 100000 POWDER TOPICAL at 22:12

## 2019-11-05 RX ADMIN — INSULIN ASPART SCH UNITS: 100 INJECTION, SOLUTION INTRAVENOUS; SUBCUTANEOUS at 12:04

## 2019-11-05 NOTE — PN
Teaching Attending Note


Name of Resident: Collins Beal





ATTENDING PHYSICIAN STATEMENT





I saw and evaluated the patient.


I reviewed the resident's note and discussed the case with the resident.


I agree with the resident's findings and plan as documented.








SUBJECTIVE: Patient appears comfortable.








OBJECTIVE:


 Vital Signs











 Period  Temp  Pulse  Resp  BP Sys/Ramírez  Pulse Ox


 


 Last 24 Hr  97.4 F-98.0 F  64-97  20-20  129-149/68-84  100








HEART: S1S2, RRR


LUNGS: Clear


ABDOMEN: Soft, non-distended, non-tender, normal BS


EXTREMITIES: No edema





 Laboratory Results - last 24 hr











  11/04/19 11/04/19 11/05/19





  17:03 21:29 05:40


 


WBC   


 


RBC   


 


Hgb   


 


Hct   


 


MCV   


 


MCH   


 


MCHC   


 


RDW   


 


Plt Count   


 


MPV   


 


Sodium   


 


Potassium   


 


Chloride   


 


Carbon Dioxide   


 


Anion Gap   


 


BUN   


 


Creatinine   


 


Est GFR (CKD-EPI)AfAm   


 


Est GFR (CKD-EPI)NonAf   


 


POC Glucometer  128  191  52


 


Random Glucose   


 


Calcium   














  11/05/19 11/05/19 11/05/19





  06:10 06:10 06:11


 


WBC  8.7  


 


RBC  3.54 L  


 


Hgb  10.3 L  


 


Hct  31.1 L  


 


MCV  87.8  


 


MCH  29.0  


 


MCHC  33.0  


 


RDW  15.6  


 


Plt Count  162  


 


MPV  8.4  


 


Sodium   137 


 


Potassium   4.3 


 


Chloride   105 


 


Carbon Dioxide   29 


 


Anion Gap   4 L 


 


BUN   17.1 


 


Creatinine   1.1 


 


Est GFR (CKD-EPI)AfAm   73.61 


 


Est GFR (CKD-EPI)NonAf   63.51 


 


POC Glucometer    81


 


Random Glucose   108 H 


 


Calcium   8.7 














  11/05/19





  11:18


 


WBC 


 


RBC 


 


Hgb 


 


Hct 


 


MCV 


 


MCH 


 


MCHC 


 


RDW 


 


Plt Count 


 


MPV 


 


Sodium 


 


Potassium 


 


Chloride 


 


Carbon Dioxide 


 


Anion Gap 


 


BUN 


 


Creatinine 


 


Est GFR (CKD-EPI)AfAm 


 


Est GFR (CKD-EPI)NonAf 


 


POC Glucometer  195


 


Random Glucose 


 


Calcium 








 Current Medications











Generic Name Dose Route Start Last Admin





  Trade Name Freq  PRN Reason Stop Dose Admin


 


Acetaminophen  650 mg  10/09/19 10:34  10/27/19 13:29





  Tylenol -  PO   650 mg





  Q4H PRN   Administration





  FEVER   





     





     





     


 


Albuterol/Ipratropium  1 amp  11/02/19 12:45  11/05/19 11:06





  Duoneb -  NEB   1 amp





  Q6H PRN   Administration





  SHORTNESS OF BREATH   





     





     





     


 


Atorvastatin Calcium  40 mg  10/07/19 22:00  11/04/19 21:25





  Lipitor -  PO   40 mg





  HS MARY   Administration





     





     





     





     


 


Bethanechol Chloride  25 mg  11/04/19 22:00  11/05/19 05:39





  Urecholine -  PO   25 mg





  TID MARY   Administration





     





     





     





     


 


Donepezil HCl  10 mg  10/16/19 10:00  11/05/19 11:02





  Aricept -  PO   10 mg





  DAILY MARY   Administration





     





     





     





     


 


Ezetimibe  10 mg  10/07/19 22:00  11/04/19 21:25





  Zetia -  PO   10 mg





  HS MARY   Administration





     





     





     





     


 


Escitalopram Oxalate  10 mg  10/16/19 10:00  11/05/19 11:02





  Lexapro -  PO   10 mg





  DAILY MARY   Administration





     





     





     





     


 


Ferrous Sulfate  325 mg  10/20/19 17:30  11/05/19 11:02





  Feosol -  PO   325 mg





  BIDWM MARY   Administration





     





     





     





     


 


Finasteride  5 mg  10/16/19 10:00  11/05/19 11:02





  Proscar -  PO   5 mg





  DAILY MARY   Administration





     





     





     





     


 


Insulin Aspart  1 vial  10/24/19 07:03  11/05/19 12:03





  Novolog Vial Sliding Scale -  SQ   4 units





  [facility] MARY   Administration





     





     





  Protocol   





     


 


Insulin Aspart  2 units  10/28/19 07:48  11/05/19 12:04





  Novolog Vial  SQ   2 units





  ACLD MARY   Administration





     





     





     





     


 


Insulin Detemir  24 units  11/05/19 07:12  





  Levemir Vial  SQ   





  AM MARY   





     





     





     





     


 


Nebivolol  2.5 mg  10/07/19 22:00  11/04/19 21:25





  Bystolic -  PO   2.5 mg





  HS MARY   Administration





     





     





     





     


 


Nystatin  1 applic  10/10/19 11:30  11/05/19 11:02





  Nystop Powder -  TP   1 applic





  BID MARY   Administration





     





     





     





     


 


Ondansetron HCl  4 mg  10/18/19 17:01  





  Zofran Injection  IVPUSH   





  Q6H PRN   





  NAUSEA AND/OR VOMITING   





     





     





     


 


Tamsulosin HCl  0.4 mg  10/16/19 08:45  11/05/19 11:02





  Flomax -  PO   0.4 mg





  BID@0830,2200 MARY   Administration





     





     





     





     














ASSESSMENT AND PLAN:


This is a 79 year old man with a history of HTN, hyperlipidemia, CAD, MI, CABG, 

chronic diastolic heart failure, type 2 DM, stage 3 CKD, COPD, dementia who 

presented to the ED with altered mental status. 





1. BPH with obstruction, gross hematuria


   - s/p evacuation of clots, TURP, TUVP on 10/18


   - CBI discontinued and urine has remained clear


   - Patient failed voiding trial yesterday and Mckeon has been re-inserted - 

will discharge with Mckeon in place


   - Continue Flomax, Proscar


2. Acute metabolic encephalopathy secondary to hyperosmolar hyperglycemic state 


   - Resolved


3. Type 2 DM, uncontrolled


   - Control is better with Levemir, standing Novolog, Novolog sliding scale


4. Acute kidney injury


   - Resolved


5. Hypernatremia


   - Resolved


6. Hypophosphatemia


   - Resolved


7. Lactic acidosis


   - Resolved


8. HTN


   - Continue Bystolic


9. Hyperlipidemia


   - Continue Lipitor, Zetia


10. CAD, history of MI, CABG


   - Continue Bystolic, Lipitor, Zetia


11. Chronic diastolic heart failure


   - Stable


12. Stage 3 CKD


   - Stable


13. COPD


   - Stable


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


   - Not a candidate for  shunt


   - Continue Aricept


15. UTI


   - Treated with Doxycycline, Levaquin


16. Disposition


   - Ok for discharge home with Mckeon and VNS

## 2019-11-05 NOTE — PN
Progress Note, Physician


History of Present Illness: 





Pt seen and examined. He appears comfortable. Urine is clear. 





- Current Medication List


Current Medications: 


Active Medications





Acetaminophen (Tylenol -)  650 mg PO Q4H PRN


   PRN Reason: FEVER


   Last Admin: 10/27/19 13:29 Dose:  650 mg


Albuterol/Ipratropium (Duoneb -)  1 amp NEB Q6H PRN


   PRN Reason: SHORTNESS OF BREATH


   Last Admin: 11/05/19 11:06 Dose:  1 amp


Atorvastatin Calcium (Lipitor -)  40 mg PO HS [facility]


   Last Admin: 11/04/19 21:25 Dose:  40 mg


Bethanechol Chloride (Urecholine -)  25 mg PO TID [facility]


   Last Admin: 11/05/19 15:47 Dose:  Not Given


Donepezil HCl (Aricept -)  10 mg PO DAILY [facility]


   Last Admin: 11/05/19 11:02 Dose:  10 mg


Ezetimibe (Zetia -)  10 mg PO HS [facility]


   Last Admin: 11/04/19 21:25 Dose:  10 mg


Escitalopram Oxalate (Lexapro -)  10 mg PO DAILY [facility]


   Last Admin: 11/05/19 11:02 Dose:  10 mg


Ferrous Sulfate (Feosol -)  325 mg PO BIDWM [facility]


   Last Admin: 11/05/19 11:02 Dose:  325 mg


Finasteride (Proscar -)  5 mg PO DAILY [facility]


   Last Admin: 11/05/19 11:02 Dose:  5 mg


Insulin Aspart (Novolog Vial Sliding Scale -)  1 vial SQ ACHS [facility]; Protocol


   Last Admin: 11/05/19 12:03 Dose:  4 units


Insulin Aspart (Novolog Vial)  2 units SQ ACLD [facility]


   Last Admin: 11/05/19 12:04 Dose:  2 units


Insulin Detemir (Levemir Vial)  24 units SQ AM MARY


Nebivolol (Bystolic -)  2.5 mg PO HS [facility]


   Last Admin: 11/04/19 21:25 Dose:  2.5 mg


Nystatin (Nystop Powder -)  1 applic TP BID [facility]


   Last Admin: 11/05/19 11:02 Dose:  1 applic


Ondansetron HCl (Zofran Injection)  4 mg IVPUSH Q6H PRN


   PRN Reason: NAUSEA AND/OR VOMITING


Tamsulosin HCl (Flomax -)  0.4 mg PO BID@0830,2200 [facility]


   Last Admin: 11/05/19 11:02 Dose:  0.4 mg











- Objective


Vital Signs: 


 Vital Signs











Temperature  97.4 F L  11/05/19 13:43


 


Pulse Rate  71   11/05/19 13:43


 


Respiratory Rate  20   11/05/19 13:43


 


Blood Pressure  129/68   11/05/19 13:43


 


O2 Sat by Pulse Oximetry (%)  95   11/05/19 09:00











Constitutional: Yes: Calm


HENT: Yes: Atraumatic


Cardiovascular: Yes: S1, S2


Respiratory: Yes: CTA Bilaterally


Gastrointestinal: Yes: Soft


Genitourinary: Yes: Mckeon Present.  No: Hematuria


Musculoskeletal: Yes: WNL


Edema: No


Neurological: Yes: Confusion


Labs: 


 CBC, BMP





 11/05/19 06:10 





 11/05/19 06:10 





 INR, PTT











INR  1.42  (0.83-1.09)  H  10/19/19  06:38    














Problem List





- Problems


(1) DEB (acute kidney injury)


Code(s): N17.9 - ACUTE KIDNEY FAILURE, UNSPECIFIED   





(2) DKA (diabetic ketoacidoses)


Code(s): E11.10 - TYPE 2 DIABETES MELLITUS WITH KETOACIDOSIS WITHOUT COMA   


Qualifiers: 


   Diabetes mellitus type: type 2   Diabetes mellitus complication detail: 

without coma   Qualified Code(s): E11.10 - Type 2 diabetes mellitus with 

ketoacidosis without coma   





(3) Acute renal failure


Code(s): N17.9 - ACUTE KIDNEY FAILURE, UNSPECIFIED   





(4) Hypernatremia


Code(s): E87.0 - HYPEROSMOLALITY AND HYPERNATREMIA   





Assessment/Plan


 Current Medications











Generic Name Dose Route Start Last Admin





  Trade Name Cliffq  PRN Reason Stop Dose Admin


 


Acetaminophen  650 mg  10/09/19 10:34  10/27/19 13:29





  Tylenol -  PO   650 mg





  Q4H PRN   Administration





  FEVER   





     





     





     


 


Albuterol/Ipratropium  1 amp  11/02/19 12:45  11/05/19 11:06





  Duoneb -  NEB   1 amp





  Q6H PRN   Administration





  SHORTNESS OF BREATH   





     





     





     


 


Atorvastatin Calcium  40 mg  10/07/19 22:00  11/04/19 21:25





  Lipitor -  PO   40 mg





  [facility] MARY   Administration





     





     





     





     


 


Bethanechol Chloride  25 mg  11/04/19 22:00  11/05/19 15:47





  Urecholine -  PO   Not Given





  TID MARY   





     





     





     





     


 


Donepezil HCl  10 mg  10/16/19 10:00  11/05/19 11:02





  Aricept -  PO   10 mg





  DAILY MARY   Administration





     





     





     





     


 


Ezetimibe  10 mg  10/07/19 22:00  11/04/19 21:25





  Zetia -  PO   10 mg





  [facility]   Administration





     





     





     





     


 


Escitalopram Oxalate  10 mg  10/16/19 10:00  11/05/19 11:02





  Lexapro -  PO   10 mg





  DAILY MARY   Administration





     





     





     





     


 


Ferrous Sulfate  325 mg  10/20/19 17:30  11/05/19 11:02





  Feosol -  PO   325 mg





  BIDWM MARY   Administration





     





     





     





     


 


Finasteride  5 mg  10/16/19 10:00  11/05/19 11:02





  Proscar -  PO   5 mg





  DAILY MARY   Administration





     





     





     





     


 


Insulin Aspart  1 vial  10/24/19 07:03  11/05/19 12:03





  Novolog Vial Sliding Scale -  SQ   4 units





  [facility] MARY   Administration





     





     





  Protocol   





     


 


Insulin Aspart  2 units  10/28/19 07:48  11/05/19 12:04





  Novolog Vial  SQ   2 units





  ACLD MARY   Administration





     





     





     





     


 


Insulin Detemir  24 units  11/05/19 07:12  





  Levemir Vial  SQ   





  AM MARY   





     





     





     





     


 


Nebivolol  2.5 mg  10/07/19 22:00  11/04/19 21:25





  Bystolic -  PO   2.5 mg





  [facility]   Administration





     





     





     





     


 


Nystatin  1 applic  10/10/19 11:30  11/05/19 11:02





  Nystop Powder -  TP   1 applic





  BID MARY   Administration





     





     





     





     


 


Ondansetron HCl  4 mg  10/18/19 17:01  





  Zofran Injection  IVPUSH   





  Q6H PRN   





  NAUSEA AND/OR VOMITING   





     





     





     


 


Tamsulosin HCl  0.4 mg  10/16/19 08:45  11/05/19 11:02





  Flomax -  PO   0.4 mg





  BID@0830,2200 MARY   Administration





     





     





     





     

















Impression


1. DEB


2. DKA


3. hypernatremia


4. DM


5. dementia


6. cad


7. hyperkalemia


8. hematuria





Plan


- sodium has remained stable


- urine is clear


- renal function is stable


- monitor labs

## 2019-11-05 NOTE — DS
Physical Exam: 





UPDATE: PT's son opted to pay out-of-pocket expense to [facility] and has until 

noon to provide funds. Pending funds will d/c to [facility]


--------------------


SUBJECTIVE: Kristy coloured urine noted; no acute events overnight. HPI limited








OBJECTIVE:





 Vital Signs











 Period  Temp  Pulse  Resp  BP Sys/Ramírez  Pulse Ox


 


 Last 24 Hr  97.4 F-98.0 F  64-97  20-20  129-149/68-84  








PHYSICAL EXAM


Gen: NAD, awake, alert


HEENT: NC/AT, RUBIO, MMM


Neck: No JVD


LUNG: CTA bilaterally, no wheezes or rales noted


CARD: RRR no murmurs appreciated


ABD: Soft, Nt/ND, normoactive BS 


EXT: No edema appreciated, cap refill <2sec


Skin: No rashes or lesions noted


 





LABS


 Laboratory Results - last 24 hr











  11/04/19 11/05/19 11/05/19





  21:29 05:40 06:10


 


WBC    8.7


 


RBC    3.54 L


 


Hgb    10.3 L


 


Hct    31.1 L


 


MCV    87.8


 


MCH    29.0


 


MCHC    33.0


 


RDW    15.6


 


Plt Count    162


 


MPV    8.4


 


Sodium   


 


Potassium   


 


Chloride   


 


Carbon Dioxide   


 


Anion Gap   


 


BUN   


 


Creatinine   


 


Est GFR (CKD-EPI)AfAm   


 


Est GFR (CKD-EPI)NonAf   


 


POC Glucometer  191  52 


 


Random Glucose   


 


Calcium   














  11/05/19 11/05/19 11/05/19





  06:10 06:11 11:18


 


WBC   


 


RBC   


 


Hgb   


 


Hct   


 


MCV   


 


MCH   


 


MCHC   


 


RDW   


 


Plt Count   


 


MPV   


 


Sodium  137  


 


Potassium  4.3  


 


Chloride  105  


 


Carbon Dioxide  29  


 


Anion Gap  4 L  


 


BUN  17.1  


 


Creatinine  1.1  


 


Est GFR (CKD-EPI)AfAm  73.61  


 


Est GFR (CKD-EPI)NonAf  63.51  


 


POC Glucometer   81  195


 


Random Glucose  108 H  


 


Calcium  8.7  














  11/05/19





  17:10


 


WBC 


 


RBC 


 


Hgb 


 


Hct 


 


MCV 


 


MCH 


 


MCHC 


 


RDW 


 


Plt Count 


 


MPV 


 


Sodium 


 


Potassium 


 


Chloride 


 


Carbon Dioxide 


 


Anion Gap 


 


BUN 


 


Creatinine 


 


Est GFR (CKD-EPI)AfAm 


 


Est GFR (CKD-EPI)NonAf 


 


POC Glucometer  268


 


Random Glucose 


 


Calcium 








IMAGING:


ABD U/S:


IMPRESSION:


Limited examination likely due to the patient's body habitus.


The liver appears to be slightly hyperechoic suggestive of mild fatty 

infiltration versus


hepatocellular disease.


No gross gallstones identified. Borderline thickening of the gallbladder wall 

without evidence


of acute cholecystitis.


Nonvisualization of the pancreas.


Nonvisualization of the abdominal aorta and inferior vena cava.


Right renal simple cyst measuring 2.1 cm.





Abd/Pelvis CT:


IMPRESSION:


Constipation.


Mild fullness of the collecting system with a dilated bladder which could 

represent reflux.


Paraumbilical hernia with no stranding.


Hiatal hernia seen previously.


Renal cysts.





Head CT:


IMPRESSION:


No significant interval change


Moderate to marked ventricular dilatation with a lateral and third ventricles 

are relatively


more dilated than for. Degree of the ventricular dilatation is more than 

expected for the


degree of cortical atrophy.


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 

Sylvius


stenosis/narrowing.


Otherwise, no interval acute intracranial pathology is identified.


A preliminary report was forwarded by the [facility] service,








HOSPITAL COURSE:





Date of Admission:10/06/19





Date of Discharge: 11/05/19





Pt admitted to hospital on 10/6/19 due to HHS. Pt was placed on insulin gtt and 

HHS protocol until which he was able to transition to the floor alongside of 

subcutaneously insulin. Pt was seen by endocrinology with final insulin 

regiment being Levemir 15U BID, Novolog 2U before Lunch and dinner, alongside 

of ISS for further coverage if needed. During patient's workup he was found to 

have MRSA UTI which is being treated with Doxycycline 100mg BID for a total of 

5 days. Unfortunately patient was given trial of void period where he did not 

void for 24hrs. Camacho was placed, tamsulosin was increased to 0.4mg BID 

alongside of newly added Proscar 5mg qdaily. Pt's hospitalization was prolonged 

2/2 to insurance denial and denial of appeal. Unfortunately at this point pt 

began to have gross hematuria for which urology was consulted. Pt was assessed 

and underwent cystoscopy with Dr. BRANDT Durán revealing blood near prostate with 

clots. Pt received a TURP and was placed on CBI which has now been ceased. 

Unfortunately pt developed hematuria at this point and had urology recalled. 

CBI was initiated and after 24h pt had CBI ceased without hematuria and camacho 

removed. Unfortunately pt failed ToV and had camacho reinsert. Pt is to follow 

with outpatient urology. In addition, pt's glycemic control remains difficult 

to control and has been increased to Levemir 35U AM and pre-lunch/dinner 2U 

with ISS as breakthrough. Pt had notable hypoglycemic events in AM fasting 

levels and glucose control was decreased to Levemir 24U AM and pre-lung/dinner 

2U with ISS as breakthrough. Glucose levels have been 100-150 while on this 

regiment.


Minutes to complete discharge: 33





Discharge Summary


Problems reviewed: Yes


Reason For Visit: ACUTE KIDNEY INJURY, DIABETIC KETOACIDOSIS, COLITI


Current Active Problems





DEB (acute kidney injury) (Acute)


DKA (diabetic ketoacidoses) (Acute)


Obesity (BMI 30-39.9) (Chronic)








Condition: Stable





- Instructions


Diet, Activity, Other Instructions: 


Please STOP using the insulin pump


Instead you will be using Levemir 24 qAM


Novolog 2U with Lunch and Dinner


Sliding scale as below for AS NEEDED coverage:


   100-150 0units


   151-200 4units


   201-250 6units


   251-300 8units


   301-350 10units


   351-400 12 units


   >400 14 units and go to the ER or call a doctor





You have completed antibiotics while in the hospital


Also continue taking Aricept 10mg daily and Lexapro 10mg daily as suggested by 

the neurologist


Continue Proscar 5mg daily.


We increased your Tamsulosin to 0.4mg TWICE daily and please use compression 

stockings to help with any orthostatic hypotension seen


You failed a voiding trial and had the catheter reinserted. Urology stated that 

there is no inpatient treatment and will have you follow-up in 3-5 days.





Diet: Puree diet diabetic and sodium controlled with thin liquids





Follow-up with Dr. Melvin Durán regarding the camacho catheter within 3-5 days. 

Please maintain the camacho catheter 


Follow-up with Dr. uBrnett for the diabetes.


Follow-up with Dr. Elkins in 3-5 days to update them on your care


Follow-up with the rest of the referrals as above


Referrals: 


Shanel Elkins MD [Staff Physician] - 


Ramírez Burnett MD [Staff Physician] - 2 Weeks


Melvin Durán MD [Staff Physician] - 1 Week


Mustapha Stokes MD [Staff Physician] - 1 Week


Nereyda Venegas DPM [Staff Physician] - 1 Month (For diabetic foot checks)


Disposition: VNS/HOME HEALTH CARE





- Home Medications


Comprehensive Discharge Medication List: 


Ambulatory Orders





Atorvastatin Ca [Lipitor] 40 mg PO DAILY 06/10/19 


Clopidogrel Bisulfate [Plavix] 75 mg PO DAILY 06/10/19 


Ezetimibe 10 mg PO HS 06/10/19 


Nebivolol HCl [Bystolic] 2.5 mg PO HS 06/10/19 


Tamsulosin HCl 0.4 mg PO DAILY 06/10/19 


Compression Socks, Medium [Futuro Restoring] 1 each MC DAILY #1 each 10/11/19 


Donepezil HCl [Aricept -] 5 mg PO DAILY  tablet 10/11/19 


Doxycycline Hyclate [Vibramycin -] 100 mg PO BID@1000,1800 3 Days #6 capsule 10/

11/19 


Escitalopram Oxalate [Lexapro -] 10 mg PO DAILY  tablet 10/11/19 


Insulin (Levemir) [Levemir Vial] 15 units SQ BID@0700,2200  units 10/11/19 


Insulin Aspart [Novolog] 2 unit SQ ACLD #1 cartridge 10/11/19 


Insulin Sliding Scale [Novolog Vial Sliding Scale -] 1 vial SQ ACHS  units 10/11

/19 


Tamsulosin HCl 0.4 mg PO BID #60 capsule 10/11/19 








This patient is new to me today: No


Emergency Visit: Yes


ED Registration Date: 10/06/19


Care time: The patient presented to the Emergency Department on the above date 

and was hospitalized for further evaluation of their emergent condition.


Critical Care patient: No





- Discharge Referral


Referred to [facility] No





ATTENDING PHYSICIAN STATEMENT





I saw and evaluated the patient.


I reviewed the resident's note and discussed the case with the resident.


I agree with the resident's findings and plan as documented.








SUBJECTIVE:








OBJECTIVE:








ASSESSMENT AND PLAN:

## 2019-11-05 NOTE — PN
Progress Note, Physician





- Current Medication List


Current Medications: 


Active Medications





Acetaminophen (Tylenol -)  650 mg PO Q4H PRN


   PRN Reason: FEVER


   Last Admin: 10/27/19 13:29 Dose:  650 mg


Albuterol/Ipratropium (Duoneb -)  1 amp NEB Q6H PRN


   PRN Reason: SHORTNESS OF BREATH


   Last Admin: 11/05/19 11:06 Dose:  1 amp


Atorvastatin Calcium (Lipitor -)  40 mg PO HS Counts include 234 beds at the [facility]


   Last Admin: 11/04/19 21:25 Dose:  40 mg


Bethanechol Chloride (Urecholine -)  25 mg PO TID Counts include 234 beds at the [facility]


   Last Admin: 11/05/19 05:39 Dose:  25 mg


Donepezil HCl (Aricept -)  10 mg PO DAILY Counts include 234 beds at the [facility]


   Last Admin: 11/05/19 11:02 Dose:  10 mg


Ezetimibe (Zetia -)  10 mg PO HS Counts include 234 beds at the [facility]


   Last Admin: 11/04/19 21:25 Dose:  10 mg


Escitalopram Oxalate (Lexapro -)  10 mg PO DAILY Counts include 234 beds at the [facility]


   Last Admin: 11/05/19 11:02 Dose:  10 mg


Ferrous Sulfate (Feosol -)  325 mg PO BIDWM Counts include 234 beds at the [facility]


   Last Admin: 11/05/19 11:02 Dose:  325 mg


Finasteride (Proscar -)  5 mg PO DAILY Counts include 234 beds at the [facility]


   Last Admin: 11/05/19 11:02 Dose:  5 mg


Insulin Aspart (Novolog Vial Sliding Scale -)  [facility]; Protocol


   Last Admin: 11/05/19 06:13 Dose:  Not Given


Insulin Aspart (Novolog Vial)  2 units SQ ACLD Counts include 234 beds at the [facility]


   Last Admin: 11/04/19 17:16 Dose:  2 units


Insulin Detemir (Levemir Vial)  24 units SQ AM Counts include 234 beds at the [facility]


Nebivolol (Bystolic -)  2.5 mg PO HS Counts include 234 beds at the [facility]


   Last Admin: 11/04/19 21:25 Dose:  2.5 mg


Nystatin (Nystop Powder -)  1 applic TP BID Counts include 234 beds at the [facility]


   Last Admin: 11/05/19 11:02 Dose:  1 applic


Ondansetron HCl (Zofran Injection)  4 mg IVPUSH Q6H PRN


   PRN Reason: NAUSEA AND/OR VOMITING


Tamsulosin HCl (Flomax -)  0.4 mg PO BID@0830,2200 Counts include 234 beds at the [facility]


   Last Admin: 11/05/19 11:02 Dose:  0.4 mg











- Objective


Vital Signs: 


 Vital Signs











Temperature  97.8 F   11/05/19 06:00


 


Pulse Rate  73   11/05/19 06:00


 


Respiratory Rate  20   11/05/19 06:00


 


Blood Pressure  133/74   11/05/19 06:00


 


O2 Sat by Pulse Oximetry (%)  100   11/04/19 21:00











Labs: 


 CBC, BMP





 11/05/19 06:10 





 11/05/19 06:10 





 INR, PTT











INR  1.42  (0.83-1.09)  H  10/19/19  06:38

## 2019-11-06 VITALS — HEART RATE: 52 BPM | TEMPERATURE: 97.6 F | DIASTOLIC BLOOD PRESSURE: 63 MMHG | SYSTOLIC BLOOD PRESSURE: 149 MMHG

## 2019-11-06 RX ADMIN — INSULIN ASPART SCH UNITS: 100 INJECTION, SOLUTION INTRAVENOUS; SUBCUTANEOUS at 06:04

## 2019-11-06 RX ADMIN — TAMSULOSIN HYDROCHLORIDE SCH MG: 0.4 CAPSULE ORAL at 09:52

## 2019-11-06 RX ADMIN — IPRATROPIUM BROMIDE AND ALBUTEROL SULFATE PRN AMP: .5; 3 SOLUTION RESPIRATORY (INHALATION) at 12:10

## 2019-11-06 RX ADMIN — BETHANECHOL CHLORIDE SCH MG: 25 TABLET ORAL at 05:38

## 2019-11-06 RX ADMIN — INSULIN ASPART SCH UNITS: 100 INJECTION, SOLUTION INTRAVENOUS; SUBCUTANEOUS at 11:53

## 2019-11-06 RX ADMIN — ESCITALOPRAM OXALATE SCH MG: 10 TABLET, FILM COATED ORAL at 09:52

## 2019-11-06 RX ADMIN — FERROUS SULFATE TAB EC 324 MG (65 MG FE EQUIVALENT) SCH MG: 324 (65 FE) TABLET DELAYED RESPONSE at 09:52

## 2019-11-06 RX ADMIN — FINASTERIDE SCH MG: 5 TABLET, FILM COATED ORAL at 09:52

## 2019-11-06 RX ADMIN — DONEPEZIL HYDROCHLORIDE SCH MG: 10 TABLET, FILM COATED ORAL at 09:52

## 2019-11-06 RX ADMIN — NYSTATIN SCH APPLIC: 100000 POWDER TOPICAL at 09:52

## 2019-11-06 NOTE — CONS
DATE OF CONSULTATION:

 

DATE OF DICTATION:  11/0/2019 

 

The patient is a 79-year-old male with multiple medical problems including receiving

anticoagulation.  He underwent a cystoscopy with evacuation of clots and

cauterization of bleeding.  Several attempts at removal of a Mckeon have been

unsuccessful.  Reinsertion of a Mckeon always reveals recurrence of his hematuria.  

 

Currently, his Mckeon is patent.  His urine is clear.  The patient is disoriented to

time, place and person and is noncompliant.  Will recommend discharging the patient

home with Mckeon to leg bag.  Will see the patient in two weeks in the office.  Will

arrange that with a family member.  Again, the patient is to keep the Mckeon and would

recommend keeping the patient on Macrodantin 100 mg p.o. nightly and vitamin C at one

gram p.o. nightly.  Will follow as outpatient. 

 

 

DIEGO ALEX M.D.

 

[id number]

DD: 11/06/2019 12:48

DT: 11/06/2019 18:24

Job #:  [id number]

## 2019-11-06 NOTE — PN
Progress Note, Physician


History of Present Illness: 





Pt seen and examined. He still has camacho. He appears comfortable. 





- Current Medication List


Current Medications: 


Active Medications





Acetaminophen (Tylenol -)  650 mg PO Q4H PRN


   PRN Reason: FEVER


   Last Admin: 10/27/19 13:29 Dose:  650 mg


Albuterol/Ipratropium (Duoneb -)  1 amp NEB Q6H PRN


   PRN Reason: SHORTNESS OF BREATH


   Last Admin: 11/06/19 12:10 Dose:  1 amp


Atorvastatin Calcium (Lipitor -)  40 mg PO HS [facility]


   Last Admin: 11/05/19 22:11 Dose:  40 mg


Bethanechol Chloride (Urecholine -)  25 mg PO TID [facility]


   Last Admin: 11/06/19 05:38 Dose:  25 mg


Donepezil HCl (Aricept -)  10 mg PO DAILY [facility]


   Last Admin: 11/06/19 09:52 Dose:  10 mg


Ezetimibe (Zetia -)  10 mg PO HS [facility]


   Last Admin: 11/05/19 22:12 Dose:  10 mg


Escitalopram Oxalate (Lexapro -)  10 mg PO DAILY [facility]


   Last Admin: 11/06/19 09:52 Dose:  10 mg


Ferrous Sulfate (Feosol -)  325 mg PO BIDWM [facility]


   Last Admin: 11/06/19 09:52 Dose:  325 mg


Finasteride (Proscar -)  5 mg PO DAILY [facility]


   Last Admin: 11/06/19 09:52 Dose:  5 mg


Insulin Aspart (Novolog Vial Sliding Scale -)  1 vial SQ ACHS [facility]; Protocol


   Last Admin: 11/06/19 11:53 Dose:  4 units


Insulin Aspart (Novolog Vial)  2 units SQ ACLD [facility]


   Last Admin: 11/06/19 11:53 Dose:  2 units


Insulin Detemir (Levemir Vial)  24 units SQ AM [facility]


   Last Admin: 11/06/19 06:05 Dose:  24 units


Nebivolol (Bystolic -)  2.5 mg PO HS [facility]


   Last Admin: 11/05/19 22:12 Dose:  2.5 mg


Nystatin (Nystop Powder -)  1 applic TP BID [facility]


   Last Admin: 11/06/19 09:52 Dose:  1 applic


Ondansetron HCl (Zofran Injection)  4 mg IVPUSH Q6H PRN


   PRN Reason: NAUSEA AND/OR VOMITING


Tamsulosin HCl (Flomax -)  0.4 mg PO BID@0830,2200 MARY


   Last Admin: 11/06/19 09:52 Dose:  0.4 mg











- Objective


Vital Signs: 


 Vital Signs











Temperature  97.6 F   11/06/19 10:00


 


Pulse Rate  52 L  11/06/19 10:00


 


Respiratory Rate  22 H  11/06/19 10:00


 


Blood Pressure  149/63   11/06/19 10:00


 


O2 Sat by Pulse Oximetry (%)  93 L  11/06/19 09:00











Constitutional: Yes: Calm


Eyes: Yes: Conjunctiva Clear


HENT: Yes: Atraumatic


Neck: Yes: Supple


Cardiovascular: Yes: S1, S2


Respiratory: Yes: CTA Bilaterally


Gastrointestinal: Yes: Normal Bowel Sounds, Soft


Genitourinary: Yes: Camacho Present


Musculoskeletal: Yes: WNL


Neurological: Yes: Confusion


Labs: 


 CBC, BMP





 11/05/19 06:10 





 11/05/19 06:10 





 INR, PTT











INR  1.42  (0.83-1.09)  H  10/19/19  06:38    














Problem List





- Problems


(1) DEB (acute kidney injury)


Code(s): N17.9 - ACUTE KIDNEY FAILURE, UNSPECIFIED   





(2) DKA (diabetic ketoacidoses)


Code(s): E11.10 - TYPE 2 DIABETES MELLITUS WITH KETOACIDOSIS WITHOUT COMA   


Qualifiers: 


   Diabetes mellitus type: type 2   Diabetes mellitus complication detail: 

without coma   Qualified Code(s): E11.10 - Type 2 diabetes mellitus with 

ketoacidosis without coma   





(3) Acute renal failure


Code(s): N17.9 - ACUTE KIDNEY FAILURE, UNSPECIFIED   





(4) Hypernatremia


Code(s): E87.0 - HYPEROSMOLALITY AND HYPERNATREMIA   





Assessment/Plan


 Current Medications











Generic Name Dose Route Start Last Admin





  Trade Name Freq  PRN Reason Stop Dose Admin


 


Acetaminophen  650 mg  10/09/19 10:34  10/27/19 13:29





  Tylenol -  PO   650 mg





  Q4H PRN   Administration





  FEVER   





     





     





     


 


Albuterol/Ipratropium  1 amp  11/02/19 12:45  11/06/19 12:10





  Duoneb -  NEB   1 amp





  Q6H PRN   Administration





  SHORTNESS OF BREATH   





     





     





     


 


Atorvastatin Calcium  40 mg  10/07/19 22:00  11/05/19 22:11





  Lipitor -  PO   40 mg





  HS MARY   Administration





     





     





     





     


 


Bethanechol Chloride  25 mg  11/04/19 22:00  11/06/19 05:38





  Urecholine -  PO   25 mg





  TID MARY   Administration





     





     





     





     


 


Donepezil HCl  10 mg  10/16/19 10:00  11/06/19 09:52





  Aricept -  PO   10 mg





  DAILY MARY   Administration





     





     





     





     


 


Ezetimibe  10 mg  10/07/19 22:00  11/05/19 22:12





  Zetia -  PO   10 mg





  HS MARY   Administration





     





     





     





     


 


Escitalopram Oxalate  10 mg  10/16/19 10:00  11/06/19 09:52





  Lexapro -  PO   10 mg





  DAILY MARY   Administration





     





     





     





     


 


Ferrous Sulfate  325 mg  10/20/19 17:30  11/06/19 09:52





  Feosol -  PO   325 mg





  BIDWM MARY   Administration





     





     





     





     


 


Finasteride  5 mg  10/16/19 10:00  11/06/19 09:52





  Proscar -  PO   5 mg





  DAILY MARY   Administration





     





     





     





     


 


Insulin Aspart  1 vial  10/24/19 07:03  11/06/19 11:53





  Novolog Vial Sliding Scale -  SQ   4 units





  [facility] MARY   Administration





     





     





  Protocol   





     


 


Insulin Aspart  2 units  10/28/19 07:48  11/06/19 11:53





  Novolog Vial  SQ   2 units





  ACLD MARY   Administration





     





     





     





     


 


Insulin Detemir  24 units  11/05/19 07:12  11/06/19 06:05





  Levemir Vial  SQ   24 units





  AM MARY   Administration





     





     





     





     


 


Nebivolol  2.5 mg  10/07/19 22:00  11/05/19 22:12





  Bystolic -  PO   2.5 mg





  HS MARY   Administration





     





     





     





     


 


Nystatin  1 applic  10/10/19 11:30  11/06/19 09:52





  Nystop Powder -  TP   1 applic





  BID MARY   Administration





     





     





     





     


 


Ondansetron HCl  4 mg  10/18/19 17:01  





  Zofran Injection  IVPUSH   





  Q6H PRN   





  NAUSEA AND/OR VOMITING   





     





     





     


 


Tamsulosin HCl  0.4 mg  10/16/19 08:45  11/06/19 09:52





  Flomax -  PO   0.4 mg





  BID@0830,2200 MARY   Administration





     





     





     





     

















Impression


1. DEB


2. DKA


3. hypernatremia


4. DM


5. dementia


6. cad


7. hyperkalemia


8. hematuria





Plan


- no new labs


- pt will be discharged with camacho


- outpt urology follow up


- can see in office as needed


- avoid nsaids

## 2019-11-08 ENCOUNTER — HOSPITAL ENCOUNTER (INPATIENT)
Dept: HOSPITAL 74 - JER | Age: 79
LOS: 6 days | Discharge: NURSING, REHAB OR LONG-TERM CARE | DRG: 291 | End: 2019-11-14
Attending: INTERNAL MEDICINE | Admitting: INTERNAL MEDICINE
Payer: COMMERCIAL

## 2019-11-08 VITALS — BODY MASS INDEX: 27.4 KG/M2

## 2019-11-08 DIAGNOSIS — I13.0: Primary | ICD-10-CM

## 2019-11-08 DIAGNOSIS — E78.5: ICD-10-CM

## 2019-11-08 DIAGNOSIS — J44.9: ICD-10-CM

## 2019-11-08 DIAGNOSIS — J90: ICD-10-CM

## 2019-11-08 DIAGNOSIS — F03.90: ICD-10-CM

## 2019-11-08 DIAGNOSIS — J96.21: ICD-10-CM

## 2019-11-08 DIAGNOSIS — N18.3: ICD-10-CM

## 2019-11-08 DIAGNOSIS — I87.8: ICD-10-CM

## 2019-11-08 DIAGNOSIS — R31.9: ICD-10-CM

## 2019-11-08 DIAGNOSIS — J98.11: ICD-10-CM

## 2019-11-08 DIAGNOSIS — E11.65: ICD-10-CM

## 2019-11-08 DIAGNOSIS — Z95.1: ICD-10-CM

## 2019-11-08 DIAGNOSIS — N40.0: ICD-10-CM

## 2019-11-08 DIAGNOSIS — I25.119: ICD-10-CM

## 2019-11-08 DIAGNOSIS — I50.33: ICD-10-CM

## 2019-11-08 LAB
ALBUMIN SERPL-MCNC: 2.9 G/DL (ref 3.4–5)
ALP SERPL-CCNC: 137 U/L (ref 45–117)
ALT SERPL-CCNC: 22 U/L (ref 13–61)
ANION GAP SERPL CALC-SCNC: 5 MMOL/L (ref 8–16)
APPEARANCE UR: (no result)
AST SERPL-CCNC: 20 U/L (ref 15–37)
BACTERIA # UR AUTO: (no result) /HPF
BASOPHILS # BLD: 1.3 % (ref 0–2)
BILIRUB SERPL-MCNC: 0.5 MG/DL (ref 0.2–1)
BNP SERPL-MCNC: 2036.2 PG/ML (ref 5–450)
BUN SERPL-MCNC: 15.6 MG/DL (ref 7–18)
CALCIUM SERPL-MCNC: 9 MG/DL (ref 8.5–10.1)
CHLORIDE SERPL-SCNC: 106 MMOL/L (ref 98–107)
CO2 SERPL-SCNC: 28 MMOL/L (ref 21–32)
COLOR UR: (no result)
CREAT SERPL-MCNC: 1 MG/DL (ref 0.55–1.3)
DEPRECATED RDW RBC AUTO: 16 % (ref 11.9–15.9)
EOSINOPHIL # BLD: 5.6 % (ref 0–4.5)
EPITH CASTS URNS QL MICRO: (no result) /HPF
GLUCOSE SERPL-MCNC: 330 MG/DL (ref 74–106)
HCT VFR BLD CALC: 32.3 % (ref 35.4–49)
HGB BLD-MCNC: 10.4 GM/DL (ref 11.7–16.9)
INR BLD: 1.45 (ref 0.83–1.09)
LYMPHOCYTES # BLD: 18.6 % (ref 8–40)
MAGNESIUM SERPL-MCNC: 2.4 MG/DL (ref 1.8–2.4)
MCH RBC QN AUTO: 28.7 PG (ref 25.7–33.7)
MCHC RBC AUTO-ENTMCNC: 32.1 G/DL (ref 32–35.9)
MCV RBC: 89.2 FL (ref 80–96)
MONOCYTES # BLD AUTO: 9.7 % (ref 3.8–10.2)
NEUTROPHILS # BLD: 64.8 % (ref 42.8–82.8)
PH BLDV: 7.27 [PH] (ref 7.31–7.41)
PLATELET # BLD AUTO: 167 K/MM3 (ref 134–434)
PMV BLD: 8.5 FL (ref 7.5–11.1)
POTASSIUM SERPLBLD-SCNC: 5 MMOL/L (ref 3.5–5.1)
PROT SERPL-MCNC: 7 G/DL (ref 6.4–8.2)
PT PNL PPP: 17.2 SEC (ref 9.7–13)
RBC # BLD AUTO: 3.62 M/MM3 (ref 4–5.6)
RBC # BLD AUTO: >100 /HPF (ref 0–4)
SODIUM SERPL-SCNC: 139 MMOL/L (ref 136–145)
VENOUS PC02: 61.8 MMHG (ref 38–52)
VENOUS PO2: 76.8 MMHG (ref 28–48)
WBC # BLD AUTO: 8.5 K/MM3 (ref 4–10)
WBC # UR AUTO: (no result) /HPF (ref 0–5)
YEAST BUDDING URNS QL: PRESENT

## 2019-11-08 RX ADMIN — NEBIVOLOL HYDROCHLORIDE SCH MG: 2.5 TABLET ORAL at 21:51

## 2019-11-08 RX ADMIN — INSULIN ASPART SCH UNITS: 100 INJECTION, SOLUTION INTRAVENOUS; SUBCUTANEOUS at 21:53

## 2019-11-08 RX ADMIN — ATORVASTATIN CALCIUM SCH MG: 40 TABLET, FILM COATED ORAL at 21:51

## 2019-11-08 RX ADMIN — INSULIN ASPART SCH: 100 INJECTION, SOLUTION INTRAVENOUS; SUBCUTANEOUS at 17:33

## 2019-11-08 RX ADMIN — EZETIMIBE SCH MG: 10 TABLET ORAL at 21:57

## 2019-11-08 RX ADMIN — TAMSULOSIN HYDROCHLORIDE SCH MG: 0.4 CAPSULE ORAL at 21:51

## 2019-11-08 RX ADMIN — INSULIN ASPART SCH UNITS: 100 INJECTION, SOLUTION INTRAVENOUS; SUBCUTANEOUS at 17:34

## 2019-11-08 RX ADMIN — FUROSEMIDE SCH MG: 10 INJECTION, SOLUTION INTRAVENOUS at 17:34

## 2019-11-08 NOTE — PDOC
Documentation entered by Phil Kidd SCRIBE, acting as scribe for Jerrica Hernandez MD.








Jerrica Hernandez MD:  This documentation has been prepared by the Marti nettles Nirvannie, SCRIBE, under my direction and personally reviewed by me in its 

entirety.  I confirm that the documentation accurately reflects all work, 

treatment, procedures, and medical decision making performed by me.  





Attending Attestation





- Resident


Resident Name: Yakov Hutchins





- ED Attending Attestation


I have performed the following: I have examined & evaluated the patient, The 

case was reviewed & discussed with the resident, I agree w/resident's findings 

& plan





- HPI


HPI: 





11/08/19 09:48


Mr. Stiles is a 80 yo M h/o IDDM (recent admission for DKA), CAD s/p CABG, MI 

in 1998, CHF, CKD, COPD, dementia


Pt s/p recent admission for DKA


During that admission, pt noted to have urinary retention s/p cystoscopy, found 

to have a hemorrhagic prostate s/p TURP 


Pt has had a camacho cathether since then, is being treated with Doxycycline for 

MRSA UTI.


Pt was sent to [facility] due to cough and shortness of breath.  CXR performed 

on 11/7 reveals CHF, right pleural effusion and right lower lobe infiltrate.  

Pt sent to the ER for assessement of shortness of breath


 





PAST MEDICAL HISTORY:


IDDM, CAD s/p CABG, MI in 1998, CHF, CKD, COPD, dementia





PAST SURGICAL HISTORY:


CABG, R hip surgery, TURP


 


11/08/19 09:54





11/08/19 10:02


 





- Physicial Exam


PE: 





11/08/19 10:07


GENERAL: The patient is sleepy but arousable to verbal stimuli, use of 

accessory muscles


ENT: Dry mucous membranes.  


NECK: Normal range of motion, supple


LUNGS: Decreased breah sounds at the bases, no wheezes 


HEART: Regular rate and rhythm, normal S1 and S2 without murmur, rub or gallop.


ABDOMEN: Soft, nontender, protruberant    


EXTREMITIES: Normal range of motion, no edema.   


NEUROLOGICAL: Cranial nerves II through XII grossly intact.  Normal speech.  No 

focal neurological deficits. 


SKIN: Warm, Dry, normal turgor, no rashes or lesions noted.





- Critical Care Time


Total Critical Care Time: 60


Critical Care Statement: The care of this patient involved high complexity 

decision making to prevent further life threatening deterioration of the patient

's condition and/or to evaluate & treat vital organ system(s) failure or risk 

of failure.





- Medical Decision Making





11/08/19 10:16


80 yo M presenting with a complaint of shortness of breath, difficulty breathing


Pt s/p xray yesterday which demonstrates CHF, Pleural effusion, and a possible 

infiltrate





Twelve-lead EKG was performed and reviewed by me. There is normal sinus rhythm 

with a normal rate of 70bpm. The axis is normal. The intervals are normal. 

There are no ST or T wave abnormalities.





Impression: Normal twelve-lead EKG


11/08/19 10:18


 Laboratory Tests











  11/04/19 11/05/19





  06:32 06:10


 


WBC  8.1  8.7


 


Hgb  10.8 L  10.3 L


 


Hct  33.2 L  31.1 L


 


Plt Count  155  162








 Laboratory Tests











  11/08/19 11/08/19





  09:32 09:32


 


BUN   15.6


 


Creatinine   1.0


 


Creatine Kinase  58 


 


Troponin I  0.02 


 


B-Natriuretic Peptide   2036.2 H

















No leukocytosis


Pt placed on BiPap to support his work of breathing


Awaiting CMP


Will give Lasix


Will admit


Pt is more comfortable





11/09/19 08:43

## 2019-11-08 NOTE — PN
Teaching Attending Note


Name of Resident: Collins Beal





ATTENDING PHYSICIAN STATEMENT





I saw and evaluated the patient.


I reviewed the resident's note and discussed the case with the resident.


I agree with the resident's findings and plan as documented.








SUBJECTIVE: Transferred from nursing home to evaluate shortness of breath








OBJECTIVE:


 Vital Signs











Temperature  97.8 F   11/08/19 12:28


 


Pulse Rate  61   11/08/19 12:28


 


Respiratory Rate  19   11/08/19 12:28


 


Blood Pressure  145/68   11/08/19 12:28


 


O2 Sat by Pulse Oximetry (%)  100   11/08/19 12:28








General: Elderly man, sick looking in respiratory distress


HEENT; mucous membranes moist, no anemia


Neck: No JVD, supple, no bruit, thyroid palpably normal, normal carotid 

pulsations.


Chest: Patient is a respiratory distress, basal crepts


CVS: S1-S2 regular no murmur/gallop/rub


Abdomen: Non-distended, soft, bowel sounds present.


Extremities: Bilateral chronic venous stasis changes poor circulation .no edema.

,  


CNS: Alert but confused grossly non-focal.  





CBC,CMP











WBC  8.5 K/mm3 (4.0-10.0)   11/08/19  09:32    


 


RBC  3.62 M/mm3 (4.00-5.60)  L  11/08/19  09:32    


 


Hgb  10.4 GM/dL (11.7-16.9)  L  11/08/19  09:32    


 


Hct  32.3 % (35.4-49)  L  11/08/19  09:32    


 


MCV  89.2 fl (80-96)   11/08/19  09:32    


 


MCH  28.7 pg (25.7-33.7)   11/08/19  09:32    


 


MCHC  32.1 g/dl (32.0-35.9)   11/08/19  09:32    


 


RDW  16.0 % (11.9-15.9)  H  11/08/19  09:32    


 


Plt Count  167 K/MM3 (134-434)   11/08/19  09:32    


 


MPV  8.5 fl (7.5-11.1)   11/08/19  09:32    


 


Absolute Neuts (auto)  5.5 K/mm3 (1.5-8.0)   11/08/19  09:32    


 


Neutrophils %  64.8 % (42.8-82.8)   11/08/19  09:32    


 


Lymphocytes %  18.6 % (8-40)   11/08/19  09:32    


 


Monocytes %  9.7 % (3.8-10.2)   11/08/19  09:32    


 


Eosinophils %  5.6 % (0-4.5)  H  11/08/19  09:32    


 


Basophils %  1.3 % (0-2.0)   11/08/19  09:32    


 


Nucleated RBC %  0 % (0-0)   11/08/19  09:32    


 


Sodium  139 mmol/L (136-145)   11/08/19  09:32    


 


Potassium  5.0 mmol/L (3.5-5.1)   11/08/19  09:32    


 


Chloride  106 mmol/L ()   11/08/19  09:32    


 


Carbon Dioxide  28 mmol/L (21-32)   11/08/19  09:32    


 


Anion Gap  5 MMOL/L (8-16)  L  11/08/19  09:32    


 


BUN  15.6 mg/dL (7-18)   11/08/19  09:32    


 


Creatinine  1.0 mg/dL (0.55-1.3)   11/08/19  09:32    


 


Est GFR (CKD-EPI)AfAm  82.60   11/08/19  09:32    


 


Est GFR (CKD-EPI)NonAf  71.27   11/08/19  09:32    


 


Random Glucose  330 mg/dL ()  H  11/08/19  09:32    


 


Lactic Acid  0.9 mmol/L (0.4-2.0)   11/08/19  09:37    


 


Calcium  9.0 mg/dL (8.5-10.1)   11/08/19  09:32    


 


Total Bilirubin  0.5 mg/dL (0.2-1)   11/08/19  09:32    


 


AST  20 U/L (15-37)   11/08/19  09:32    


 


ALT  22 U/L (13-61)   11/08/19  09:32    


 


Alkaline Phosphatase  137 U/L ()  H  11/08/19  09:32    


 


Creatine Kinase  58 U/L ()   11/08/19  09:32    


 


Troponin I  0.02 ng/ml (0.00-0.05)   11/08/19  09:32    


 


B-Natriuretic Peptide  2036.2 pg/ml (5-450)  H  11/08/19  09:32    


 


Total Protein  7.0 g/dl (6.4-8.2)   11/08/19  09:32    


 


Albumin  2.9 g/dl (3.4-5.0)  L  11/08/19  09:32    








Chest x-ray: No acute infiltrate CP angles are not visible.





CT Chest: B/L moderte Pleural effusion with atelactasis  no PE





EKG: Normal sinus rhythm at 67 no acute ST-T changes all intervals are normal


ASSESSMENT AND PLAN: 79 years old male recently discharged from [facility] on November 6, 2019 after treated for DKA, obstructive uropathy 

underwent TURP, discharged on indwelling Mckeon catheter, treated for 

hemorrhagic cystitis urine culture grew MRSA patient was DC on doxycycline, 

today transferred from nursing home with complaint of respiratory distress, as 

per nursing home chart chest x-ray shows right lower lobe infiltrate/effusion 

patient is transferred to ED for further management, initial work-up in ED does 

not show any pleural effusion/pulmonary congestion/pneumonia, patient has has 

respiratory distress initially put on BiPAP blood partially resolved symptoms 

patient needs further work-up to establish a diagnosis including CT 

chestperformed that shows b/L moderate Pulmonary effusion. 


 Home Medications











 Medication  Instructions  Recorded


 


Atorvastatin Ca [Lipitor] 40 mg PO DAILY 06/10/19


 


Ezetimibe 10 mg PO HS 06/10/19


 


Nebivolol HCl [Bystolic] 2.5 mg PO HS 06/10/19


 


Compression Socks, Medium [Futuro 1 each MC DAILY #1 each 10/11/19





Restoring]  


 


Escitalopram Oxalate [Lexapro -] 10 mg PO DAILY  tablet 10/11/19


 


Insulin Aspart [Novolog] 2 unit SQ ACLD #1 cartridge 10/11/19


 


Insulin Sliding Scale [Novolog 1 vial SQ ACHS  units 10/11/19





Vial Sliding Scale -]  


 


Tamsulosin HCl 0.4 mg PO BID #60 capsule 10/11/19


 


Insulin (Levemir) [Levemir Vial] 24 units SQ DAILY 11/08/19











Impression: respiratory distress needs further evaluation











 








Problem List





- Problems


(1) Respiratory distress


Assessment/Plan: 


Transfer from nursing home with respiratory distress no obvious etiology BNP is 

at baseline high, normal CBC, chest x-ray, no congestion,  CT chest R/O PE  PE 

, B/L modertae effusion, pulmonary consult,  patient is on BiPAP needs 

appropriate disposition either telemetry with BiPAP or ICU.  At present patient 

is not hypoxic, but retaining CO2  F/U magnesium and phosphate,  Procalcitonin, 

CT shows B/L pleural effusion, Duoneb PRN , IV lasix 40 mg BID 


Problems reviewed: Yes   


Code(s): R06.03 - ACUTE RESPIRATORY DISTRESS   





(2) Hematuria


Assessment/Plan: 


He status post TURP and hemorrhagic cystitis on doxycycline for MRSA UTI, 

continue same repeat urine culture, serial H&H and if persistent hematuria 

consider  consult.


Problems reviewed: Yes   


Code(s): R31.9 - HEMATURIA, UNSPECIFIED   





(3) Type 2 diabetes mellitus


Assessment/Plan: 


Hypoglycemia type 2 diabetes mellitus resume nursing home insulin regimen 

optimize glycemic control.


Problems reviewed: Yes   


Code(s): E11.9 - TYPE 2 DIABETES MELLITUS WITHOUT COMPLICATIONS   





(4) CAD (coronary artery disease)


Assessment/Plan: 


Status post CABG, at present, troponin is negative, patient is congestive, no 

acute EKG resume all her home medications


Problems reviewed: Yes   


Code(s): I25.10 - ATHSCL HEART DISEASE OF NATIVE CORONARY ARTERY W/O ANG PCTRS 

  





(5) Hypertension


Assessment/Plan: 


Stable resume all home medications


Problems reviewed: Yes   


Code(s): I10 - ESSENTIAL (PRIMARY) HYPERTENSION   





(6) Hyperlipidemia


Assessment/Plan: 


Resume statin


Problems reviewed: Yes   


Code(s): E78.5 - HYPERLIPIDEMIA, UNSPECIFIED   





(7) COPD (chronic obstructive pulmonary disease)


Assessment/Plan: 


As present no wheezes continue DuoNeb every 6 hours


Problems reviewed: Yes   


Code(s): J44.9 - CHRONIC OBSTRUCTIVE PULMONARY DISEASE, UNSPECIFIED   





(8) Chronic venous stasis


Assessment/Plan: 


Continue foot care


Problems reviewed: Yes   


Code(s): I87.8 - OTHER SPECIFIED DISORDERS OF VEINS

## 2019-11-08 NOTE — HP
CHIEF COMPLAINT: SOB


PCP: Dr. Paramjit Elkins





HISTORY OF PRESENT ILLNESS:


80 y/o/m with PMHx of IDDM, CAD s/p CABG, MI in 1998, CHF, CKD, COPD, dementia 

presenting from [facility] due to increased work of breathing. Pt placed on 

BiPap and noted to have b/l pleural effusions on CXR today. Pt at time of exam 

has increased work of breathing with tachypnea. Pt is unable to provide history

, however is alert to sponaneous stimuli.





PAST MEDICAL HISTORY:


IDDM, CAD s/p CABG, MI in 1998, CHF, CKD, COPD, dementia





PAST SURGICAL HISTORY:


CABG, R hip surgery


Cystoscopy





Social History:


Smoking: none


Alcohol: none


Drugs: none








Allergies





No Known Allergies Allergy (Verified 02/15/19 11:57)


 








HOME MEDICATIONS:


 Home Medications











 Medication  Instructions  Recorded


 


Atorvastatin Ca [Lipitor] 40 mg PO DAILY 06/10/19


 


Ezetimibe 10 mg PO HS 06/10/19


 


Nebivolol HCl [Bystolic] 2.5 mg PO HS 06/10/19


 


Compression Socks, Medium [Futuro 1 each MC DAILY #1 each 10/11/19





Restoring]  


 


Escitalopram Oxalate [Lexapro -] 10 mg PO DAILY  tablet 10/11/19


 


Insulin Aspart [Novolog] 2 unit SQ ACLD #1 cartridge 10/11/19


 


Insulin Sliding Scale [Novolog 1 vial SQ ACHS  units 10/11/19





Vial Sliding Scale -]  


 


Tamsulosin HCl 0.4 mg PO BID #60 capsule 10/11/19


 


Insulin (Levemir) [Levemir Vial] 24 units SQ DAILY 11/08/19








REVIEW OF SYSTEMS


Unable to obtain due to clinical condition





PHYSICAL EXAMINATION


 Vital Signs - 24 hr











  11/08/19 11/08/19 11/08/19





  08:51 09:20 09:25


 


Temperature 98.0 F  


 


Pulse Rate 80  


 


Pulse Rate [   





Apical]   


 


Respiratory 22 H  





Rate   


 


Blood Pressure 210/84 H  


 


Blood Pressure   179/65 H





[Left Arm]   


 


O2 Sat by Pulse 100 100 





Oximetry (%)   














  11/08/19 11/08/19 11/08/19





  09:38 09:55 10:02


 


Temperature   


 


Pulse Rate   70


 


Pulse Rate [   





Apical]   


 


Respiratory   





Rate   


 


Blood Pressure   


 


Blood Pressure   





[Left Arm]   


 


O2 Sat by Pulse 100 100 100





Oximetry (%)   














  11/08/19 11/08/19 11/08/19





  11:15 12:04 12:28


 


Temperature  98.6 F 97.8 F


 


Pulse Rate   


 


Pulse Rate [  63 61





Apical]   


 


Respiratory  22 H 19





Rate   


 


Blood Pressure   


 


Blood Pressure  157/76 145/68





[Left Arm]   


 


O2 Sat by Pulse 100 100 100





Oximetry (%)   











GENERAL: Mod respiratory distress, abdominal breathing, Awake, alert


HEENT: NC/AT, MMM, no posterior oropharynx abnormalities


NECK: No JVD 


LUNGS: Rales b/l at bases with distant breath sounds. Tachypnea noted with 

abdominal breathing. No wheezes 


HEART: RRR, normal S1 and S2 without murmur 


ABDOMEN: Soft, nontender, not distended, hypoactive bowel sounds, no guarding 


: camacho draining pink fluid without any clots in collection bag.


EXTREMITIES: 2+ pulses, warm, well-perfused. No calf tenderness. No peripheral 

edema. 


SKIN: Warm, dry, no tenting, b/l venous stasis changes lower extrmeities


 Laboratory Results - last 24 hr











  11/08/19 11/08/19 11/08/19





  09:32 09:32 09:32


 


WBC   8.5 


 


RBC   3.62 L 


 


Hgb   10.4 L 


 


Hct   32.3 L 


 


MCV   89.2 


 


MCH   28.7 


 


MCHC   32.1 


 


RDW   16.0 H 


 


Plt Count   167 


 


MPV   8.5 


 


Absolute Neuts (auto)   5.5 


 


Neutrophils %   64.8 


 


Lymphocytes %   18.6 


 


Monocytes %   9.7 


 


Eosinophils %   5.6 H 


 


Basophils %   1.3 


 


Nucleated RBC %   0 


 


PT with INR   


 


INR   


 


VBG pH   


 


POC VBG pCO2   


 


POC VBG pO2   


 


VBG HCO3   


 


VBG O2 Sat (Shelli)   


 


VBG Base Excess   


 


Sodium    139


 


Potassium    5.0


 


Chloride    106


 


Carbon Dioxide    28


 


Anion Gap    5 L


 


BUN    15.6


 


Creatinine    1.0


 


Est GFR (CKD-EPI)AfAm    82.60


 


Est GFR (CKD-EPI)NonAf    71.27


 


Random Glucose    330 H


 


Lactic Acid   


 


Calcium    9.0


 


Total Bilirubin    0.5


 


AST    20


 


ALT    22


 


Alkaline Phosphatase    137 H


 


Creatine Kinase  58  


 


Troponin I  0.02  


 


B-Natriuretic Peptide    2036.2 H


 


Total Protein    7.0


 


Albumin    2.9 L


 


Urine Color   


 


Urine Appearance   


 


Urine pH   


 


Ur Specific Gravity   


 


Urine Protein   


 


Urine Glucose (UA)   


 


Urine Ketones   


 


Urine Blood   


 


Urine Nitrite   


 


Urine Bilirubin   


 


Urine Urobilinogen   


 


Ur Leukocyte Esterase   


 


Urine WBC (Auto)   


 


Urine RBC (Auto)   


 


U Epithel Cells (Auto)   


 


Urine Bacteria (Auto)   


 


Urine Yeast (Auto)   














  11/08/19 11/08/19 11/08/19





  09:32 09:32 09:37


 


WBC   


 


RBC   


 


Hgb   


 


Hct   


 


MCV   


 


MCH   


 


MCHC   


 


RDW   


 


Plt Count   


 


MPV   


 


Absolute Neuts (auto)   


 


Neutrophils %   


 


Lymphocytes %   


 


Monocytes %   


 


Eosinophils %   


 


Basophils %   


 


Nucleated RBC %   


 


PT with INR  17.20 H  


 


INR  1.45 H  


 


VBG pH   


 


POC VBG pCO2   


 


POC VBG pO2   


 


VBG HCO3   


 


VBG O2 Sat (Shelli)   


 


VBG Base Excess   


 


Sodium   


 


Potassium   


 


Chloride   


 


Carbon Dioxide   


 


Anion Gap   


 


BUN   


 


Creatinine   


 


Est GFR (CKD-EPI)AfAm   


 


Est GFR (CKD-EPI)NonAf   


 


Random Glucose   


 


Lactic Acid    0.9


 


Calcium   


 


Total Bilirubin   


 


AST   


 


ALT   


 


Alkaline Phosphatase   


 


Creatine Kinase   


 


Troponin I   


 


B-Natriuretic Peptide   


 


Total Protein   


 


Albumin   


 


Urine Color   Red 


 


Urine Appearance   Bloody 


 


Urine pH   No Result Required. 


 


Ur Specific Gravity   No Result Required. 


 


Urine Protein   No Result Required. 


 


Urine Glucose (UA)   No Result Required. 


 


Urine Ketones   No Result Required. 


 


Urine Blood   No Result Required. 


 


Urine Nitrite   No Result Required. 


 


Urine Bilirubin   No Result Required. 


 


Urine Urobilinogen   No Result Required. 


 


Ur Leukocyte Esterase   No Result Required. 


 


Urine WBC (Auto)   5-10 


 


Urine RBC (Auto)   >100 


 


U Epithel Cells (Auto)   Few 


 


Urine Bacteria (Auto)   Rare 


 


Urine Yeast (Auto)   Present 














  11/08/19





  09:37


 


WBC 


 


RBC 


 


Hgb 


 


Hct 


 


MCV 


 


MCH 


 


MCHC 


 


RDW 


 


Plt Count 


 


MPV 


 


Absolute Neuts (auto) 


 


Neutrophils % 


 


Lymphocytes % 


 


Monocytes % 


 


Eosinophils % 


 


Basophils % 


 


Nucleated RBC % 


 


PT with INR 


 


INR 


 


VBG pH  7.27 L


 


POC VBG pCO2  61.8 H


 


POC VBG pO2  76.8 H


 


VBG HCO3  27.3


 


VBG O2 Sat (Shelli)  92.4 H


 


VBG Base Excess  0


 


Sodium 


 


Potassium 


 


Chloride 


 


Carbon Dioxide 


 


Anion Gap 


 


BUN 


 


Creatinine 


 


Est GFR (CKD-EPI)AfAm 


 


Est GFR (CKD-EPI)NonAf 


 


Random Glucose 


 


Lactic Acid 


 


Calcium 


 


Total Bilirubin 


 


AST 


 


ALT 


 


Alkaline Phosphatase 


 


Creatine Kinase 


 


Troponin I 


 


B-Natriuretic Peptide 


 


Total Protein 


 


Albumin 


 


Urine Color 


 


Urine Appearance 


 


Urine pH 


 


Ur Specific Gravity 


 


Urine Protein 


 


Urine Glucose (UA) 


 


Urine Ketones 


 


Urine Blood 


 


Urine Nitrite 


 


Urine Bilirubin 


 


Urine Urobilinogen 


 


Ur Leukocyte Esterase 


 


Urine WBC (Auto) 


 


Urine RBC (Auto) 


 


U Epithel Cells (Auto) 


 


Urine Bacteria (Auto) 


 


Urine Yeast (Auto) 











ASSESSMENT/PLAN:


Acute hypoxic respiratory distress


Hematuria s/p recent TURP and Hemorrhagic MRSA Cystitis


Hyperglycemia


Type 2 DM history


CAD s/p CABG, not in ACS


Hx of HTN


Hx of HLD


Hx of COPD, not in exacerbation





--Imaging reviewed with blunting of CPA, cephalizaiton of vessels, possible 

atelectasis, cannot r/o infiltrative disease


   --CT scan to r/o air bronchograms and r/o PE


   --Lasix 40mg IVP once; pending CT can continue scheduled


   --Discontinue BiPap as patient cannot self-extubate


   --NC 3-4L; SpO2 >90% maintain


--Hold off ABX as only SIRS 1/4 without source; urine with only 5-10 WBC only 

colonization given extensive hospital course with camacho and cystoscopy w/i past 

month


--Monitor Urine output


--Daily weights


--Will keep NPO given tenuous respiratory status; re-evaluate for AM tomorrow





--Monitor H/H given hematuria


--Previously urology was seeing patient and signed off for outpatient f/u


--Corrected home medications as reports wrong: Levemir 24U AM with 2U ACLD and 

ISS breakthrough


   --Gave Novolog 8U once due to hyperglycemia to 300's


--BGM ACHS


--Continue rest of home medications as above





FEN:


   Fluids: Avoid; active diuresis


   Electrolyte abnormalities: None


   Nutrition: NPO given resp. status





PPX:


   DVT - SCDs 


   GI - Not indicated





Dispo: Admit M/S


Case discussed with Dr. Adalid Beal, DO - IM PGy-3








Visit type





- Emergency Visit


Emergency Visit: Yes


ED Registration Date: 11/08/19


Care time: The patient presented to the Emergency Department on the above date 

and was hospitalized for further evaluation of their emergent condition.





- New Patient


This patient is new to me today: Yes


Date on this admission: 11/08/19





- Critical Care


Critical Care patient: No





ATTENDING PHYSICIAN STATEMENT





I saw and evaluated the patient.


I reviewed the resident's note and discussed the case with the resident.


I agree with the resident's findings and plan as documented.








SUBJECTIVE:








OBJECTIVE:








ASSESSMENT AND PLAN:

## 2019-11-08 NOTE — PDOC
History of Present Illness





- General


Chief Complaint: Congestive Heart Failure


Stated Complaint: Shortness of Breath


Time Seen by Provider: 11/08/19 09:19


History Source: Patient, Nursing Home Records


Exam Limitations: Clinical Condition, Dementia





- History of Present Illness


Initial Comments: 





11/11/19 04:41


HPI: 


79M PMH IDDM HTN HLD CAD CKD BIBEMS from [facility] for SOB. Recently 

discharged from [facility] for HHS. Patient has dementia at baseline and is a poor 

historian. 


Per NH records: 11/07/19 CXR demonstrating cardiomegaly w/ chf and right 

pleural effusoin, RLL infiltrate vs atelectasis w/ possible superimposed pna. 

Camacho in place 2/2 bladder outlet obstruction; s/p TURP 10/18. Started on 

doxycycline 11/7/19 for MRSA UTI. 








Past History





- Past Medical History


Allergies/Adverse Reactions: 


 Allergies











Allergy/AdvReac Type Severity Reaction Status Date / Time


 


No Known Allergies Allergy   Verified 02/15/19 11:57











Home Medications: 


Ambulatory Orders





Atorvastatin Ca [Lipitor] 40 mg PO DAILY 06/10/19 


Ezetimibe 10 mg PO HS 06/10/19 


Nebivolol HCl [Bystolic] 2.5 mg PO HS 06/10/19 


Compression Socks, Medium [Futuro Restoring] 1 each MC DAILY #1 each 10/11/19 


Escitalopram Oxalate [Lexapro -] 10 mg PO DAILY  tablet 10/11/19 


Insulin Aspart [Novolog] 2 unit SQ ACLD #1 cartridge 10/11/19 


Insulin Sliding Scale [Novolog Vial Sliding Scale -] 1 vial SQ ACHS  units 10/11

/19 


Tamsulosin HCl 0.4 mg PO BID #60 capsule 10/11/19 


Insulin (Levemir) [Levemir Vial] 24 units SQ DAILY 11/08/19 








Anemia: No


Asthma: Yes


Cancer: No


Cardiac Disorders: Yes (cabg)


CVA: No


COPD: No


CHF: No


Dementia: No


Diabetes: Yes


GI Disorders: No


 Disorders: No


HTN: Yes


Hypercholesterolemia: Yes


Liver Disease: No


Seizures: No


Thyroid Disease: No





- Surgical History


Abdominal Surgery: No


Appendectomy: No


Cardiac Surgery: Yes ('96)


Cholecystectomy: No


Lung Surgery: No


Neurologic Surgery: No


Orthopedic Surgery: Yes (rt  hip orif)





- Immunization History


Immunization Up to Date: Yes





- Psycho Social/Smoking Cessation Hx


Smoking Status: No


Smoking History: Never smoked


Have you smoked in the past 12 months: No


Number of Cigarettes Smoked Daily: 0


If you are a former smoker, when did you quit?: '96


Information on smoking cessation initiated: No


Hx Alcohol Use: No


Drug/Substance Use Hx: No


Substance Use Type: None


Hx Substance Use Treatment: No





**Review of Systems





- Review of Systems


Able to Perform ROS?: No (dementia)


Comments:: 





11/11/19 04:41





ROS: 


Limited 2/2 unreliable historian - denies all ROS 














*Physical Exam





- Vital Signs


 Last Vital Signs











Temp Pulse Resp BP Pulse Ox


 


 98.0 F   80   22 H  179/65 H  100 


 


 11/08/19 08:51  11/08/19 08:51  11/08/19 08:51  11/08/19 09:25  11/08/19 09:20














- Physical Exam


Comments: 





11/11/19 04:41


PE: 


GEN: Moderate distress, AAOx1


HEENT: NC/AT. No facial asymmetry. Dyspnic speech


CV: S1/S2, RRR, no m/r/g


LUNG: Increased respiratory effort / work of breathing w/ accessory muscle use. 

Decreased air movement w/ coarse breath sounds. No wheezing appreciated 


GI: soft, ndnt, +BS


: camacho in place


EXTREMITIES: No LE edema. No obvious deformities of all extremities


SKIN: warm, dry, normal turgor 








ED Treatment Course





- LABORATORY


CBC & Chemistry Diagram: 


 11/10/19 09:15





 11/10/19 09:15





- RADIOLOGY


Radiology Studies Ordered: 














 Category Date Time Status


 


 CHEST X-RAY PORTABLE* [RAD] Stat Radiology  11/08/19 09:32 Ordered














Medical Decision Making





- Critical Care Time


Total Critical Care Time (minutes): 30


Critical Care Statement: The care of this patient involved high complexity 

decision making to prevent further life threatening deterioration of the patient

's condition and/or to evaluate & treat vital organ system(s) failure or risk 

of failure.





- Medical Decision Making





11/08/19 09:42


MDM: 


79M BIBEMS from [facility] for SOB. Limited hx 2/2 dementia. Accessory muscle 

use, poor air movement w/ coarse breath sounds. 


DDx - Sepsis 2/2 PNA vs UTI, CHF, COPD, ACS


- sepsis labs 


- BNP 


- cxr, ekg


- vanc/zosyn 





11/08/19 10:07


EKG 11/08/19 09:42 HR 70  QTS 78 QTc 444 Normal Axis, NSR





11/08/19 11:02


CXR impression - cardiomegaly, b/l pleural effusions 





11/08/19 11:15


labs and VBG (pre-bipap) reviewed


- acidosis


- elevated PCO2


- BNP elevated 2000, about the same as the prior 


Admit 





ADMITTED 














Discharge





- Discharge Information


Problems reviewed: Yes


Clinical Impression/Diagnosis: 


 Respiratory distress





UTI (urinary tract infection)


Qualifiers:


 Urinary tract infection type: site unspecified Hematuria presence: with 

hematuria Qualified Code(s): N39.0 - Urinary tract infection, site not specified





Condition: Stable





- Admission


Yes





- Follow up/Referral





- Patient Discharge Instructions





- Post Discharge Activity

## 2019-11-08 NOTE — EKG
Test Reason : 

Blood Pressure : ***/*** mmHG

Vent. Rate : 070 BPM     Atrial Rate : 070 BPM

   P-R Int : 132 ms          QRS Dur : 078 ms

    QT Int : 412 ms       P-R-T Axes : 002 046 045 degrees

   QTc Int : 444 ms

 

NORMAL SINUS RHYTHM

NORMAL ECG

WHEN COMPARED WITH ECG OF 06-OCT-2019 21:33,

BORDERLINE CRITERIA FOR INFERIOR INFARCT ARE NO LONGER PRESENT

NONSPECIFIC T WAVE ABNORMALITY NO LONGER EVIDENT IN LATERAL LEADS

Confirmed by SHIRA CLANCY MD ([id number]) on 11/8/2019 1:50:45 PM

 

Referred By:             Confirmed By:SHIRA CLANCY MD

## 2019-11-09 LAB
ALBUMIN SERPL-MCNC: 2.4 G/DL (ref 3.4–5)
ALP SERPL-CCNC: 105 U/L (ref 45–117)
ALT SERPL-CCNC: 18 U/L (ref 13–61)
ANION GAP SERPL CALC-SCNC: 5 MMOL/L (ref 8–16)
AST SERPL-CCNC: 11 U/L (ref 15–37)
BILIRUB SERPL-MCNC: 0.5 MG/DL (ref 0.2–1)
BUN SERPL-MCNC: 14.1 MG/DL (ref 7–18)
CALCIUM SERPL-MCNC: 8.9 MG/DL (ref 8.5–10.1)
CHLORIDE SERPL-SCNC: 103 MMOL/L (ref 98–107)
CO2 SERPL-SCNC: 32 MMOL/L (ref 21–32)
CREAT SERPL-MCNC: 1 MG/DL (ref 0.55–1.3)
DEPRECATED RDW RBC AUTO: 15.3 % (ref 11.9–15.9)
GLUCOSE SERPL-MCNC: 174 MG/DL (ref 74–106)
HCT VFR BLD CALC: 29 % (ref 35.4–49)
HGB BLD-MCNC: 9.5 GM/DL (ref 11.7–16.9)
MAGNESIUM SERPL-MCNC: 1.9 MG/DL (ref 1.8–2.4)
MCH RBC QN AUTO: 28.7 PG (ref 25.7–33.7)
MCHC RBC AUTO-ENTMCNC: 32.9 G/DL (ref 32–35.9)
MCV RBC: 87.4 FL (ref 80–96)
PHOSPHATE SERPL-MCNC: 3.2 MG/DL (ref 2.5–4.9)
PLATELET # BLD AUTO: 150 K/MM3 (ref 134–434)
PMV BLD: 8.4 FL (ref 7.5–11.1)
POTASSIUM SERPLBLD-SCNC: 3.7 MMOL/L (ref 3.5–5.1)
PROT SERPL-MCNC: 5.7 G/DL (ref 6.4–8.2)
RBC # BLD AUTO: 3.32 M/MM3 (ref 4–5.6)
SODIUM SERPL-SCNC: 141 MMOL/L (ref 136–145)
WBC # BLD AUTO: 7.4 K/MM3 (ref 4–10)

## 2019-11-09 RX ADMIN — INSULIN ASPART SCH UNITS: 100 INJECTION, SOLUTION INTRAVENOUS; SUBCUTANEOUS at 06:07

## 2019-11-09 RX ADMIN — TAMSULOSIN HYDROCHLORIDE SCH MG: 0.4 CAPSULE ORAL at 10:17

## 2019-11-09 RX ADMIN — INSULIN ASPART SCH UNITS: 100 INJECTION, SOLUTION INTRAVENOUS; SUBCUTANEOUS at 17:00

## 2019-11-09 RX ADMIN — EZETIMIBE SCH MG: 10 TABLET ORAL at 21:30

## 2019-11-09 RX ADMIN — NEBIVOLOL HYDROCHLORIDE SCH MG: 2.5 TABLET ORAL at 21:31

## 2019-11-09 RX ADMIN — INSULIN ASPART SCH: 100 INJECTION, SOLUTION INTRAVENOUS; SUBCUTANEOUS at 11:06

## 2019-11-09 RX ADMIN — INSULIN ASPART SCH UNITS: 100 INJECTION, SOLUTION INTRAVENOUS; SUBCUTANEOUS at 21:30

## 2019-11-09 RX ADMIN — INSULIN ASPART SCH: 100 INJECTION, SOLUTION INTRAVENOUS; SUBCUTANEOUS at 11:05

## 2019-11-09 RX ADMIN — FUROSEMIDE SCH MG: 10 INJECTION, SOLUTION INTRAVENOUS at 06:12

## 2019-11-09 RX ADMIN — ATORVASTATIN CALCIUM SCH MG: 40 TABLET, FILM COATED ORAL at 21:30

## 2019-11-09 RX ADMIN — FERROUS SULFATE TAB EC 324 MG (65 MG FE EQUIVALENT) SCH MG: 324 (65 FE) TABLET DELAYED RESPONSE at 18:10

## 2019-11-09 RX ADMIN — TAMSULOSIN HYDROCHLORIDE SCH MG: 0.4 CAPSULE ORAL at 21:30

## 2019-11-09 RX ADMIN — INSULIN DETEMIR SCH UNITS: 100 INJECTION, SOLUTION SUBCUTANEOUS at 06:09

## 2019-11-09 RX ADMIN — FUROSEMIDE SCH MG: 10 INJECTION, SOLUTION INTRAVENOUS at 14:26

## 2019-11-09 RX ADMIN — INSULIN ASPART SCH UNITS: 100 INJECTION, SOLUTION INTRAVENOUS; SUBCUTANEOUS at 16:59

## 2019-11-09 NOTE — PN
Progress Note, Physician


Chief Complaint: 


Patient is saturating well more alert, still on BiPAP.





- Current Medication List


Current Medications: 


Active Medications





Atorvastatin Calcium (Lipitor -)  40 mg PO HS [facility]


   Last Admin: 11/08/19 21:51 Dose:  40 mg


Ezetimibe (Zetia -)  10 mg PO HS [facility]


   Last Admin: 11/08/19 21:57 Dose:  10 mg


Escitalopram Oxalate (Lexapro -)  10 mg PO DAILY [facility]


Furosemide (Lasix Injection -)  40 mg IVPUSH BIDLASIX [facility]


   Last Admin: 11/09/19 06:12 Dose:  40 mg


Insulin Aspart (Novolog Vial)  2 units SQ ACLD [facility]


   Last Admin: 11/08/19 17:33 Dose:  Not Given


Insulin Aspart (Novolog Vial Sliding Scale -)  [facility]; Protocol


   Last Admin: 11/09/19 06:07 Dose:  4 units


Insulin Detemir (Levemir Vial)  24 units SQ DAILY@0700 [facility]


   Last Admin: 11/09/19 06:09 Dose:  24 units


Nebivolol (Bystolic -)  2.5 mg PO [facility]


   Last Admin: 11/08/19 21:51 Dose:  2.5 mg


Tamsulosin HCl (Flomax -)  0.4 mg PO BID@0830,2200 [facility]


   Last Admin: 11/08/19 21:51 Dose:  0.4 mg











- Objective


Vital Signs: 


 Vital Signs











Temperature  97.7 F   11/09/19 07:23


 


Pulse Rate  65   11/09/19 07:23


 


Respiratory Rate  19   11/09/19 07:23


 


Blood Pressure  130/77   11/09/19 07:23


 


O2 Sat by Pulse Oximetry (%)  100   11/09/19 01:00

















General: Elderly man, sick looking in respiratory distress on BiPAP


HEENT; mucous membranes moist, no anemia


Neck: No JVD, supple, no bruit, thyroid palpably normal, normal carotid 

pulsations.


Chest: Improving respiratory distress, basal crepts


CVS: S1-S2 regular no murmur/gallop/rub


Abdomen: Non-distended, soft, bowel sounds present.


Extremities: Bilateral chronic venous stasis changes poor circulation .


CNS: Alert but confused grossly non-focal. 








Labs: 


 


 CBC, BMP





 11/09/19 06:57 





 11/09/19 06:57 











 





Problem List





- Problems


(1) Respiratory distress


Assessment/Plan: 


Patient presented with respiratory failure due to decompensated congestive 

heart failure, improving on IV Lasix and BiPAP will wean off from BiPAP, Lasix 

40 mg IV access.


Code(s): R06.03 - ACUTE RESPIRATORY DISTRESS   





(2) Hematuria


Assessment/Plan: 


He status post TURP and hemorrhagic cystitis on doxycycline for MRSA UTI, 

continue same repeat urine culture, serial H&H and if persistent hematuria 

consider  consult.


Problems reviewed: Yes   


Code(s): R31.9 - HEMATURIA, UNSPECIFIED   





(3) Type 2 diabetes mellitus


Assessment/Plan: 


Hypoglycemia type 2 diabetes mellitus resume nursing home insulin regimen 

optimize glycemic control.


Problems reviewed: Yes   


Code(s): E11.9 - TYPE 2 DIABETES MELLITUS WITHOUT COMPLICATIONS   





(4) CAD (coronary artery disease)


Assessment/Plan: 


Status post CABG, at present, troponin is negative,  no acute EKG resume all 

her home medications


Code(s): I25.10 - ATHSCL HEART DISEASE OF NATIVE CORONARY ARTERY W/O ANG PCTRS 

  





(5) Hypertension


Assessment/Plan: 


Stable resume all home medications


Code(s): I10 - ESSENTIAL (PRIMARY) HYPERTENSION   





(6) Hyperlipidemia


Assessment/Plan: 


Resume statin


Code(s): E78.5 - HYPERLIPIDEMIA, UNSPECIFIED   





(7) COPD (chronic obstructive pulmonary disease)


Assessment/Plan: 


As present no wheezes continue DuoNeb every 6 hours


Code(s): J44.9 - CHRONIC OBSTRUCTIVE PULMONARY DISEASE, UNSPECIFIED   





(8) Chronic venous stasis


Assessment/Plan: 


Continue foot care


Code(s): I87.8 - OTHER SPECIFIED DISORDERS OF VEINS   





(9) Respiratory failure with hypoxia


Assessment/Plan: 


Due to CHF exacerbation improving on BiPAP.


Problems reviewed: Yes   


Code(s): J96.91 - RESPIRATORY FAILURE, UNSPECIFIED WITH HYPOXIA   


Qualifiers: 


   Chronicity: acute   Qualified Code(s): J96.01 - Acute respiratory failure 

with hypoxia

## 2019-11-10 LAB
ANION GAP SERPL CALC-SCNC: 3 MMOL/L (ref 8–16)
BASOPHILS # BLD: 1.8 % (ref 0–2)
BUN SERPL-MCNC: 17.5 MG/DL (ref 7–18)
CALCIUM SERPL-MCNC: 8.9 MG/DL (ref 8.5–10.1)
CHLORIDE SERPL-SCNC: 102 MMOL/L (ref 98–107)
CO2 SERPL-SCNC: 36 MMOL/L (ref 21–32)
CREAT SERPL-MCNC: 1.2 MG/DL (ref 0.55–1.3)
DEPRECATED RDW RBC AUTO: 15.3 % (ref 11.9–15.9)
EOSINOPHIL # BLD: 6.4 % (ref 0–4.5)
GLUCOSE SERPL-MCNC: 148 MG/DL (ref 74–106)
HCT VFR BLD CALC: 29.4 % (ref 35.4–49)
HGB BLD-MCNC: 9.7 GM/DL (ref 11.7–16.9)
LYMPHOCYTES # BLD: 13.9 % (ref 8–40)
MCH RBC QN AUTO: 28.6 PG (ref 25.7–33.7)
MCHC RBC AUTO-ENTMCNC: 32.8 G/DL (ref 32–35.9)
MCV RBC: 87 FL (ref 80–96)
MONOCYTES # BLD AUTO: 14.1 % (ref 3.8–10.2)
NEUTROPHILS # BLD: 63.8 % (ref 42.8–82.8)
PLATELET # BLD AUTO: 153 K/MM3 (ref 134–434)
PMV BLD: 8.1 FL (ref 7.5–11.1)
POTASSIUM SERPLBLD-SCNC: 3.6 MMOL/L (ref 3.5–5.1)
RBC # BLD AUTO: 3.38 M/MM3 (ref 4–5.6)
SODIUM SERPL-SCNC: 140 MMOL/L (ref 136–145)
WBC # BLD AUTO: 7.9 K/MM3 (ref 4–10)

## 2019-11-10 RX ADMIN — INSULIN ASPART SCH: 100 INJECTION, SOLUTION INTRAVENOUS; SUBCUTANEOUS at 17:16

## 2019-11-10 RX ADMIN — FERROUS SULFATE TAB EC 324 MG (65 MG FE EQUIVALENT) SCH MG: 324 (65 FE) TABLET DELAYED RESPONSE at 09:47

## 2019-11-10 RX ADMIN — INSULIN ASPART SCH UNITS: 100 INJECTION, SOLUTION INTRAVENOUS; SUBCUTANEOUS at 21:49

## 2019-11-10 RX ADMIN — FUROSEMIDE SCH MG: 10 INJECTION, SOLUTION INTRAVENOUS at 06:01

## 2019-11-10 RX ADMIN — FUROSEMIDE SCH MG: 10 INJECTION, SOLUTION INTRAVENOUS at 14:19

## 2019-11-10 RX ADMIN — INSULIN ASPART SCH UNITS: 100 INJECTION, SOLUTION INTRAVENOUS; SUBCUTANEOUS at 11:45

## 2019-11-10 RX ADMIN — ATORVASTATIN CALCIUM SCH MG: 40 TABLET, FILM COATED ORAL at 21:48

## 2019-11-10 RX ADMIN — EZETIMIBE SCH MG: 10 TABLET ORAL at 21:48

## 2019-11-10 RX ADMIN — NEBIVOLOL HYDROCHLORIDE SCH MG: 2.5 TABLET ORAL at 21:49

## 2019-11-10 RX ADMIN — INSULIN ASPART SCH: 100 INJECTION, SOLUTION INTRAVENOUS; SUBCUTANEOUS at 06:04

## 2019-11-10 RX ADMIN — TAMSULOSIN HYDROCHLORIDE SCH MG: 0.4 CAPSULE ORAL at 09:48

## 2019-11-10 RX ADMIN — INSULIN DETEMIR SCH UNITS: 100 INJECTION, SOLUTION SUBCUTANEOUS at 06:02

## 2019-11-10 RX ADMIN — INSULIN ASPART SCH: 100 INJECTION, SOLUTION INTRAVENOUS; SUBCUTANEOUS at 17:17

## 2019-11-10 RX ADMIN — TAMSULOSIN HYDROCHLORIDE SCH MG: 0.4 CAPSULE ORAL at 21:49

## 2019-11-10 NOTE — PN
Progress Note, Physician


Chief Complaint: 


Patient is saturating well more alert, off BiPAP asking for rangers not in 

distress





- Current Medication List


Current Medications: 


Active Medications





Atorvastatin Calcium (Lipitor -)  40 mg PO [facility]


   Last Admin: 11/09/19 21:30 Dose:  40 mg


Ezetimibe (Zetia -)  10 mg PO HS [facility]


   Last Admin: 11/09/19 21:30 Dose:  10 mg


Ferrous Sulfate (Feosol -)  325 mg PO DAILY [facility]


   Last Admin: 11/10/19 09:47 Dose:  325 mg


Furosemide (Lasix Injection -)  40 mg IVPUSH BIDLASIX [facility]


   Last Admin: 11/10/19 06:01 Dose:  40 mg


Insulin Aspart (Novolog Vial)  2 units SQ ACLD [facility]


   Last Admin: 11/10/19 11:45 Dose:  2 units


Insulin Aspart (Novolog Vial Sliding Scale -)  1 vial SQ ACHS [facility]; Protocol


   Last Admin: 11/10/19 11:45 Dose:  4 units


Insulin Detemir (Levemir Vial)  24 units SQ DAILY@0700 [facility]


   Last Admin: 11/10/19 06:02 Dose:  24 units


Nebivolol (Bystolic -)  2.5 mg PO [facility]


   Last Admin: 11/09/19 21:31 Dose:  2.5 mg


Tamsulosin HCl (Flomax -)  0.4 mg PO BID@0830,2200 [facility]


   Last Admin: 11/10/19 09:48 Dose:  0.4 mg











- Objective


Vital Signs: 


 Vital Signs











Temperature  98.4 F   11/10/19 05:13


 


Pulse Rate  86   11/10/19 08:05


 


Respiratory Rate  22 H  11/10/19 05:13


 


Blood Pressure  126/76   11/10/19 05:13


 


O2 Sat by Pulse Oximetry (%)  96   11/10/19 08:05








General: Elderly man, more alert, off BiPAP saturating well on nasal ooking in 

respiratory distress


HEENT; mucous membranes moist, no anemia


Neck: No JVD, supple, no bruit, thyroid palpably normal, normal carotid 

pulsations.


Chest: Patient is a respiratory distress, basal crepts


CVS: S1-S2 regular no murmur/gallop/rub


Abdomen: Non-distended, soft, bowel sounds present.


Extremities: Bilateral chronic venous stasis changes poor circulation .no edema.

,  


CNS: Alert communicating grossly nonfocal.  








Labs: 


 CBC, BMP





 11/10/19 09:15 





 11/10/19 09:15 





 INR, PTT











INR  1.45  (0.83-1.09)  H  11/08/19  09:32    














Problem List





- Problems


(1) Respiratory distress


Assessment/Plan: 


Presented with respiratory failure BNP at baseline initial chest x-ray does not 

show any pulmonary infiltrate or effusion no fever, WBC normal, considering 

high risk CT with PE protocol performed that showed bilateral moderate effusion 

no PE, patient was put on BiPAP subsequently switched to nasal cannula 3 L, 

patient saturating well respiratory status improved, on IV Lasix 40 mg twice 

daily follow BMP daily.


Code(s): R06.03 - ACUTE RESPIRATORY DISTRESS   





(2) Hematuria


Assessment/Plan: 


Patient is status post TURP and hemorrhagic cystitis completed doxycycline for 

MRSA UTI, clearing H&H remained stable so far no growth and urine culture..


Problems reviewed: Yes   


Code(s): R31.9 - HEMATURIA, UNSPECIFIED   





(3) Type 2 diabetes mellitus


Assessment/Plan: 


Hypoglycemia type 2 diabetes mellitus resume nursing home insulin regimen 

optimize glycemic control.


Problems reviewed: Yes   


Code(s): E11.9 - TYPE 2 DIABETES MELLITUS WITHOUT COMPLICATIONS   





(4) CAD (coronary artery disease)


Assessment/Plan: 


Status post CABG, at present, troponin is negative,  no acute EKG resume all 

her home medications


Problems reviewed: Yes   


Code(s): I25.10 - ATHSCL HEART DISEASE OF NATIVE CORONARY ARTERY W/O ANG PCTRS 

  





(5) Hypertension


Assessment/Plan: 


Stable resume all home medications


Problems reviewed: Yes   


Code(s): I10 - ESSENTIAL (PRIMARY) HYPERTENSION   





(6) Hyperlipidemia


Assessment/Plan: 


Resume statin


Problems reviewed: Yes   


Code(s): E78.5 - HYPERLIPIDEMIA, UNSPECIFIED   





(7) COPD (chronic obstructive pulmonary disease)


Assessment/Plan: 


As present no wheezes continue DuoNeb every 6 hours


Problems reviewed: Yes   


Code(s): J44.9 - CHRONIC OBSTRUCTIVE PULMONARY DISEASE, UNSPECIFIED   





(8) Chronic venous stasis


Assessment/Plan: 


Continue foot care


Code(s): I87.8 - OTHER SPECIFIED DISORDERS OF VEINS   





(9) Respiratory failure with hypoxia


Assessment/Plan: 


Due to diastolic heart failure on IV Lasix last hospitalization echo showed 

normal EF continue beta-blockers consider adding Aldactone follow BMP magnesium 

plus, right now patient is on nasal cannula BiPAP removed.


Code(s): J96.91 - RESPIRATORY FAILURE, UNSPECIFIED WITH HYPOXIA   


Qualifiers: 


   Chronicity: acute   Qualified Code(s): J96.01 - Acute respiratory failure 

with hypoxia   





(10) Heart failure, diastolic, with acute decompensation


Assessment/Plan: 


Improving on IV Lasix, repeat chest x-ray upright  film, present echo shows 

normal ejection fraction, consider adding Aldactone on discharge.


Problems reviewed: Yes   


Code(s): I50.33 - ACUTE ON CHRONIC DIASTOLIC (CONGESTIVE) HEART FAILURE   





(11) Dementia


Assessment/Plan: 


Chronic no acute issues.


Problems reviewed: Yes   


Code(s): F03.90 - UNSPECIFIED DEMENTIA WITHOUT BEHAVIORAL DISTURBANCE   





(12) Anemia


Assessment/Plan: 


Chronic H&H stable continue p.o. iron.


Problems reviewed: Yes   


Code(s): D64.9 - ANEMIA, UNSPECIFIED

## 2019-11-11 RX ADMIN — INSULIN ASPART SCH: 100 INJECTION, SOLUTION INTRAVENOUS; SUBCUTANEOUS at 17:27

## 2019-11-11 RX ADMIN — INSULIN ASPART SCH UNITS: 100 INJECTION, SOLUTION INTRAVENOUS; SUBCUTANEOUS at 11:55

## 2019-11-11 RX ADMIN — FERROUS SULFATE TAB EC 324 MG (65 MG FE EQUIVALENT) SCH MG: 324 (65 FE) TABLET DELAYED RESPONSE at 10:08

## 2019-11-11 RX ADMIN — INSULIN ASPART SCH: 100 INJECTION, SOLUTION INTRAVENOUS; SUBCUTANEOUS at 06:09

## 2019-11-11 RX ADMIN — INSULIN DETEMIR SCH UNITS: 100 INJECTION, SOLUTION SUBCUTANEOUS at 06:08

## 2019-11-11 RX ADMIN — NEBIVOLOL HYDROCHLORIDE SCH MG: 2.5 TABLET ORAL at 22:09

## 2019-11-11 RX ADMIN — TAMSULOSIN HYDROCHLORIDE SCH MG: 0.4 CAPSULE ORAL at 22:10

## 2019-11-11 RX ADMIN — ATORVASTATIN CALCIUM SCH MG: 40 TABLET, FILM COATED ORAL at 22:10

## 2019-11-11 RX ADMIN — INSULIN ASPART SCH: 100 INJECTION, SOLUTION INTRAVENOUS; SUBCUTANEOUS at 22:10

## 2019-11-11 RX ADMIN — EZETIMIBE SCH MG: 10 TABLET ORAL at 22:10

## 2019-11-11 RX ADMIN — TAMSULOSIN HYDROCHLORIDE SCH MG: 0.4 CAPSULE ORAL at 10:08

## 2019-11-11 RX ADMIN — INSULIN ASPART SCH: 100 INJECTION, SOLUTION INTRAVENOUS; SUBCUTANEOUS at 17:28

## 2019-11-11 RX ADMIN — FUROSEMIDE SCH MG: 10 INJECTION, SOLUTION INTRAVENOUS at 06:08

## 2019-11-11 RX ADMIN — FUROSEMIDE SCH MG: 40 TABLET ORAL at 10:08

## 2019-11-11 NOTE — CON.CARD
Consult


Consult Specialty:: Cardiology


Referred by:: Hospitalist Medicine


Reason for Consultation:: Dyspnea





- History of Present Illness


Chief Complaint: Dyspnea


History of Present Illness: 


78 y/o/m with PMHx of IDDM, CAD s/p CABG, MI in 1998, CHF, CKD, COPD, dementia 

presenting from [facility] due to increased work of breathing and tachypnea. Pt 

placed on BiPap and noted to have b/l pleural effusions on CXR. Pt is unable to 

provide history, however is alert to sponaneous stimuli.








- Past Medical History


CNS: Yes: Dementia


Cardio/Vascular: Yes: CAD, HTN, Hyperlipdemia


Pulmonary: Yes: Asthma, COPD


Renal/: Yes: Renal Inusuff





- Past Surgical History


Past Surgical History: Yes: CABG, Joint Replacement (R hip ORIF)





- Alcohol/Substance Use


Hx Alcohol Use: No





- Smoking History


Smoking history: Never smoked


Have you smoked in the past 12 months: No


Aproximately how many cigarettes per day: 0


If you are a former smoker, when did you quit?: '96





- Social History


Usual Living Arrangement: With Spouse


ADL: Family Assistance


History of Recent Travel: No





Home Medications





- Allergies


Allergies/Adverse Reactions: 


 Allergies











Allergy/AdvReac Type Severity Reaction Status Date / Time


 


No Known Allergies Allergy   Verified 02/15/19 11:57














- Home Medications


Home Medications: 


Ambulatory Orders





Atorvastatin Ca [Lipitor] 40 mg PO DAILY 06/10/19 


Ezetimibe 10 mg PO HS 06/10/19 


Nebivolol HCl [Bystolic] 2.5 mg PO HS 06/10/19 


Compression Socks, Medium [Futuro Restoring] 1 each MC DAILY #1 each 10/11/19 


Escitalopram Oxalate [Lexapro -] 10 mg PO DAILY  tablet 10/11/19 


Insulin Aspart [Novolog] 2 unit SQ ACLD #1 cartridge 10/11/19 


Insulin Sliding Scale [Novolog Vial Sliding Scale -] 1 vial SQ ACHS  units 10/11

/19 


Tamsulosin HCl 0.4 mg PO BID #60 capsule 10/11/19 


Insulin (Levemir) [Levemir Vial] 24 units SQ DAILY 11/08/19 








Vital Signs: 


 Vital Signs











Temperature  98.9 F   11/11/19 06:00


 


Pulse Rate  61   11/11/19 06:00


 


Respiratory Rate  20   11/11/19 06:00


 


Blood Pressure  128/53 L  11/11/19 06:00


 


O2 Sat by Pulse Oximetry (%)  98   11/10/19 21:00











Constitutional: Yes: No Distress, Calm


Neck: Yes: Supple


Respiratory: Yes: Regular


Gastrointestinal: Yes: Normal Bowel Sounds, Soft


Cardiovascular: Yes: Regular Rate and Rhythm


JVD: No


Carotid Bruit: No


Heart Sounds: Yes: S1, S2


Murmur: Yes: Systolic Murmur, Grade 1


Edema: No





- Other Data


Labs, Other Data: 


 CBC, BMP





 11/10/19 09:15 





 11/10/19 09:15 





 INR, PTT











INR  1.45  (0.83-1.09)  H  11/08/19  09:32    











NSR @ 70 





Ejection Fraction %: LVEF > or = 40 %





Imaging





- Results


Chest X-ray: Report Reviewed (Congestion with left base ATX)





Problem List





- Problems


(1) S/P CABG (coronary artery bypass graft)


Code(s): Z95.1 - PRESENCE OF AORTOCORONARY BYPASS GRAFT   





(2) Heart failure, diastolic, with acute decompensation


Code(s): I50.33 - ACUTE ON CHRONIC DIASTOLIC (CONGESTIVE) HEART FAILURE   





(3) Respiratory failure with hypoxia


Code(s): J96.91 - RESPIRATORY FAILURE, UNSPECIFIED WITH HYPOXIA   


Qualifiers: 


   Chronicity: acute   Qualified Code(s): J96.01 - Acute respiratory failure 

with hypoxia   





(4) CAD (coronary artery disease)


Code(s): I25.10 - ATHSCL HEART DISEASE OF NATIVE CORONARY ARTERY W/O ANG PCTRS 

  


Qualifiers: 


   Coronary Disease-Associated Artery/Lesion type: native artery   Native vs. 

transplanted heart: native heart   Associated angina: without angina   

Qualified Code(s): I25.10 - Atherosclerotic heart disease of native coronary 

artery without angina pectoris   





(5) CKD stage 3 due to type 2 diabetes mellitus


Code(s): E11.22 - TYPE 2 DIABETES MELLITUS W DIABETIC CHRONIC KIDNEY DISEASE; 

N18.3 - CHRONIC KIDNEY DISEASE, STAGE 3 (MODERATE)   





(6) Hyperlipidemia


Code(s): E78.5 - HYPERLIPIDEMIA, UNSPECIFIED   


Qualifiers: 


   Hyperlipidemia type: pure hypercholesterolemia   Qualified Code(s): E78.00 - 

Pure hypercholesterolemia, unspecified; E78.0 - Pure hypercholesterolemia   





(7) Hypertension


Code(s): I10 - ESSENTIAL (PRIMARY) HYPERTENSION   


Qualifiers: 


   Hypertension type: essential hypertension   Qualified Code(s): I10 - 

Essential (primary) hypertension   





(8) Type 2 diabetes mellitus


Code(s): E11.9 - TYPE 2 DIABETES MELLITUS WITHOUT COMPLICATIONS   





Assessment/Plan


11/08/2019 Chest CT: Moderate bilateral pleural effusions with lower lobe ATX


10/15/2019 Echo: Mild cLVH with normal LV fxn, grade I diastolic dysfunction, 

normal RV size and fxn, tr MR





1. Acute in chronic hypoxic respiratory failure


2. Acute on chronic LV diastolic failure


3. ASHD S/P CABG, angina pectoris


4. HTN


5. Hypercholesterolemia


6. Type 2 DM poorly controlled


7. CKD





PLAN:


1. IV diuresis with monitor diuretic response, renal fxn and electrolytes


2. Continue bronchodilators, O2 as needed


3. Continue Bystolic 2.5 qd, Zetia 10 qd, Lipitor 40 qd


4. DVT and GI prophylaxis


5. Thank you for consultative opportunity

## 2019-11-11 NOTE — PN
Progress Note (short form)





- Note


Progress Note: 








Seen and examined; please see resident note for further discussion.  Agree with 

their note including assessment and plan as outlined aside from as supplemented 

myself.  Independently reviewed and verified all key historical and PE findings 

as well as labs and imaging.  Discussed at length with resident and consulting 

services. 





Aside from above no overnight events indicated





VS, labs, imaging reviewed


NAD AAO resting in bed


NC AT EOMI PERRLA


RRR s1/2


Lungs CTAB, w/ sym exp


NT ND +BS


CN2-12 wnl, no fnd


Normal mood, appropriate behavior


No new rashes or skin breakdown noted


Trachea midline without lymphadenopathy





A/P:











<Tiburcio Molina - Last Filed: 11/11/19 10:03>





- Note


Progress Note: 





HPI: Limited due to clinical condition. Pt without any respiratory distress 

noted.





 Vital Signs











Temperature  98.9 F   11/11/19 06:00


 


Pulse Rate  61   11/11/19 06:00


 


Respiratory Rate  20   11/11/19 06:00


 


Blood Pressure  128/53 L  11/11/19 06:00


 


O2 Sat by Pulse Oximetry (%)  98   11/10/19 21:00








PE:


Gen: NAD, awake, alert


HEENT: RUBIO, sclera anicteric, no conjunctival pallor MMM


LUNG: Improved aeration to the bases b/l. No wheezes appreciated


CARD: RRR no murmurs


ABD: Soft, NT/ND, no guarding, normoactive BS


: Hematuria noted today with clot formation. Mckeon continues to have steady 

drainage


EXT: no edema, pulses strong b/l





 CBC, BMP





 11/10/19 09:15 





 11/10/19 09:15 





 Microbiology





11/08/19 09:37   Blood - Peripheral Venous   Blood Culture - Preliminary


                            NO GROWTH OBTAINED AFTER 72 HOURS, INCUBATION TO 

CONTINUE


                            FOR 2 DAYS.


11/08/19 09:37   Blood - Peripheral Venous   Blood Culture - Preliminary


                            NO GROWTH OBTAINED AFTER 72 HOURS, INCUBATION TO 

CONTINUE


                            FOR 2 DAYS.


11/08/19 09:37   Urine - Urine Clean Catch   Urine Culture - Final


                            Yeast Like Organism





Active Medications





Atorvastatin Calcium (Lipitor -)  40 mg PO HS [facility]


   Last Admin: 11/10/19 21:48 Dose:  40 mg


Ezetimibe (Zetia -)  10 mg PO HS [facility]


   Last Admin: 11/10/19 21:48 Dose:  10 mg


Ferrous Sulfate (Feosol -)  325 mg PO DAILY [facility]


   Last Admin: 11/11/19 10:08 Dose:  325 mg


Furosemide (Lasix -)  40 mg PO DAILY [facility]


   Last Admin: 11/11/19 10:08 Dose:  40 mg


Insulin Aspart (Novolog Vial)  2 units SQ ACLD [facility]


   Last Admin: 11/11/19 11:55 Dose:  2 units


Insulin Aspart (Novolog Vial Sliding Scale -)  1 vial SQ ACHS [facility]; Protocol


   Last Admin: 11/11/19 11:55 Dose:  8 units


Insulin Detemir (Levemir Vial)  24 units SQ DAILY@0700 [facility]


   Last Admin: 11/11/19 06:08 Dose:  24 units


Nebivolol (Bystolic -)  2.5 mg PO HS [facility]


   Last Admin: 11/10/19 21:49 Dose:  2.5 mg


Tamsulosin HCl (Flomax -)  0.4 mg PO BID@0830,2200 [facility]


   Last Admin: 11/11/19 10:08 Dose:  0.4 mg





Assessment and Plan:


Acute hypoxic respiratory distress (improved)


Hematuria s/p recent TURP and Hemorrhagic MRSA Cystitis


Hyperglycemia


Type 2 DM history


CAD s/p CABG, not in ACS


Hx of HTN


Hx of HLD


Hx of COPD, not in exacerbation





--Lasix changed to 40mg PO qdaily due to improvement


--Monitor weights


--Given hematuria with clot formation will recall Urology


   --H/H remains stable at this time


   --Continue Iron supplementation as well and stool softeners as needed


--Levemir 24U AM with 2U ACLD and ISS breakthrough to continue


--BGM ACHS; BGM ranges 120-140's regularly; continue to monitor fingersticks


--Continue rest of home medications as above





FEN:


   Fluids: Avoid; active diuresis


   Electrolyte abnormalities: None


   Nutrition:  Puree diet; diabetic and sodium controlled


PPX:


   DVT - SCDs 


   GI - Not indicated





Left message for son Grant as he wanted and update on the care of his father. 

Left number to unit who can alert me to his callback.


Dispo: Monitor M/S


Case discussed with Dr. Tracy Beal, DO - IM PGy-3








<Collins Beal - Last Filed: 11/11/19 13:29>

## 2019-11-12 LAB
ANION GAP SERPL CALC-SCNC: 5 MMOL/L (ref 8–16)
BUN SERPL-MCNC: 18.5 MG/DL (ref 7–18)
CALCIUM SERPL-MCNC: 8.7 MG/DL (ref 8.5–10.1)
CHLORIDE SERPL-SCNC: 100 MMOL/L (ref 98–107)
CO2 SERPL-SCNC: 33 MMOL/L (ref 21–32)
CREAT SERPL-MCNC: 1 MG/DL (ref 0.55–1.3)
DEPRECATED RDW RBC AUTO: 14.9 % (ref 11.9–15.9)
GLUCOSE SERPL-MCNC: 177 MG/DL (ref 74–106)
HCT VFR BLD CALC: 32.6 % (ref 35.4–49)
HGB BLD-MCNC: 10.5 GM/DL (ref 11.7–16.9)
MCH RBC QN AUTO: 28.2 PG (ref 25.7–33.7)
MCHC RBC AUTO-ENTMCNC: 32.3 G/DL (ref 32–35.9)
MCV RBC: 87.3 FL (ref 80–96)
PLATELET # BLD AUTO: 169 K/MM3 (ref 134–434)
PMV BLD: 8.6 FL (ref 7.5–11.1)
POTASSIUM SERPLBLD-SCNC: 3.9 MMOL/L (ref 3.5–5.1)
RBC # BLD AUTO: 3.73 M/MM3 (ref 4–5.6)
SODIUM SERPL-SCNC: 138 MMOL/L (ref 136–145)
WBC # BLD AUTO: 7.6 K/MM3 (ref 4–10)

## 2019-11-12 RX ADMIN — FUROSEMIDE SCH MG: 40 TABLET ORAL at 10:14

## 2019-11-12 RX ADMIN — TAMSULOSIN HYDROCHLORIDE SCH MG: 0.4 CAPSULE ORAL at 10:15

## 2019-11-12 RX ADMIN — INSULIN ASPART SCH UNITS: 100 INJECTION, SOLUTION INTRAVENOUS; SUBCUTANEOUS at 11:49

## 2019-11-12 RX ADMIN — INSULIN ASPART SCH UNITS: 100 INJECTION, SOLUTION INTRAVENOUS; SUBCUTANEOUS at 17:03

## 2019-11-12 RX ADMIN — INSULIN ASPART SCH UNITS: 100 INJECTION, SOLUTION INTRAVENOUS; SUBCUTANEOUS at 06:13

## 2019-11-12 RX ADMIN — ATORVASTATIN CALCIUM SCH MG: 40 TABLET, FILM COATED ORAL at 22:00

## 2019-11-12 RX ADMIN — EZETIMIBE SCH MG: 10 TABLET ORAL at 22:00

## 2019-11-12 RX ADMIN — INSULIN ASPART SCH UNITS: 100 INJECTION, SOLUTION INTRAVENOUS; SUBCUTANEOUS at 21:59

## 2019-11-12 RX ADMIN — CLOPIDOGREL BISULFATE SCH MG: 75 TABLET, FILM COATED ORAL at 10:15

## 2019-11-12 RX ADMIN — ENALAPRIL MALEATE SCH MG: 2.5 TABLET ORAL at 10:14

## 2019-11-12 RX ADMIN — NEBIVOLOL HYDROCHLORIDE SCH MG: 2.5 TABLET ORAL at 22:00

## 2019-11-12 RX ADMIN — TAMSULOSIN HYDROCHLORIDE SCH MG: 0.4 CAPSULE ORAL at 22:00

## 2019-11-12 RX ADMIN — INSULIN DETEMIR SCH UNITS: 100 INJECTION, SOLUTION SUBCUTANEOUS at 06:08

## 2019-11-12 RX ADMIN — FERROUS SULFATE TAB EC 324 MG (65 MG FE EQUIVALENT) SCH MG: 324 (65 FE) TABLET DELAYED RESPONSE at 10:14

## 2019-11-12 NOTE — CONS
DATE OF CONSULTATION:  

 

DATE OF DICTATION:  11/12/2019

 

HISTORY:  Patient is a 79-year-old male admitted via the emergency room on November 8, 2019, with shortness of breath and possible pneumonia.  Patient has been having

chronic, intermittent, gross hematuria.  Two weeks earlier he underwent a cystoscopy

and a TUR of bleeding prostate tissue.  He developed several bouts of recurrent

retention and now has a Mckeon catheter.  The urine is blood tinged.  The abdomen is

soft.  The patient's prostate procedure was performed on October 18, 2019.  He denies

any allergies.  He has history of diabetes, high blood pressure, heart failure.  His

BUN and creatinine on admission were BUN 17.50, creatinine 1.2.  A urine culture

revealed yeast-like organisms.  Blood cultures revealed no growth.  The patient's PT

is 17.2, INR 1.45.  

 

IMPRESSION:  At present is a very friable 79-year-old diabetic, hypertensive, heart

failure patient with possible pneumonia still with intermittent gross hematuria.

 

We will continue with Mckeon drainage.  We will irrigate bladder p.r.n.  We will

follow with you.

 

 

BALJIT [id number]

DD: 11/12/2019 16:12

DT: 11/12/2019 17:28

Job #:  [id number]

## 2019-11-12 NOTE — PN
Progress Note (short form)





- Note


Progress Note: 





HPI: Limited due to clinical condition. Pt without any respiratory distress 

noted, sleeping comfortably without NC.


 Vital Signs











Temperature  98.3 F   11/12/19 19:17


 


Pulse Rate  65   11/12/19 19:17


 


Respiratory Rate  20   11/12/19 19:17


 


Blood Pressure  110/50 L  11/12/19 19:17


 


O2 Sat by Pulse Oximetry (%)  95   11/12/19 10:00














PE:


Gen: NAD, awake, alert


HEENT: RUBIO, sclera anicteric, no conjunctival pallor MMM


LUNG: Aearation to bases b/l. Poor inspiratory effort. No wheezes appreciated


CARD: RRR no murmurs


ABD: Soft, NT/ND, no guarding, normoactive BS


: Hematuria clearing in camacho with clot formation in camacho with continued 

drainage


EXT: no edema, pulses strong b/l


 CBC, BMP





 11/12/19 07:30 





 11/12/19 07:30 








 Microbiology





11/08/19 09:37   Blood - Peripheral Venous   Blood Culture - Preliminary


                            NO GROWTH OBTAINED AFTER 72 HOURS, INCUBATION TO 

CONTINUE


                            FOR 2 DAYS.


11/08/19 09:37   Blood - Peripheral Venous   Blood Culture - Preliminary


                            NO GROWTH OBTAINED AFTER 72 HOURS, INCUBATION TO 

CONTINUE


                            FOR 2 DAYS.


11/08/19 09:37   Urine - Urine Clean Catch   Urine Culture - Final


                            Yeast Like Organism





Active Medications





Atorvastatin Calcium (Lipitor -)  40 mg PO HS [facility]


   Last Admin: 11/11/19 22:10 Dose:  40 mg


Clopidogrel Bisulfate (Plavix -)  75 mg PO DAILY [facility]


   Last Admin: 11/12/19 10:15 Dose:  75 mg


Ezetimibe (Zetia -)  10 mg PO HS [facility]


   Last Admin: 11/11/19 22:10 Dose:  10 mg


Enalapril Maleate (Vasotec -)  2.5 mg PO DAILY [facility]


   Last Admin: 11/12/19 10:14 Dose:  2.5 mg


Ferrous Sulfate (Feosol -)  325 mg PO DAILY [facility]


   Last Admin: 11/12/19 10:14 Dose:  325 mg


Furosemide (Lasix -)  40 mg PO DAILY [facility]


   Last Admin: 11/12/19 10:14 Dose:  40 mg


Insulin Aspart (Novolog Vial)  2 units SQ ACLD [facility]


   Last Admin: 11/12/19 17:03 Dose:  2 units


Insulin Aspart (Novolog Vial Sliding Scale -)  1 vial SQ ACHS [facility]; Protocol


   Last Admin: 11/12/19 17:03 Dose:  6 units


Insulin Detemir (Levemir Vial)  24 units SQ DAILY@0700 [facility]


   Last Admin: 11/12/19 06:08 Dose:  24 units


Nebivolol (Bystolic -)  2.5 mg PO HS [facility]


   Last Admin: 11/11/19 22:09 Dose:  2.5 mg


Polyethylene Glycol (Miralax (For Daily Use) -)  17 gm PO DAILY PRN


   PRN Reason: CONSTIPATION


Tamsulosin HCl (Flomax -)  0.4 mg PO BID@0830,2200 [facility]


   Last Admin: 11/12/19 10:15 Dose:  0.4 mg





Assessment and Plan:


Acute hypoxic respiratory distress (improved)


Hematuria s/p recent TURP and Hemorrhagic MRSA Cystitis


Hyperglycemia


Type 2 DM history


CAD s/p CABG, not in ACS


Hx of HTN


Hx of HLD


Hx of COPD, not in exacerbation





--Continue Lasix 40mg qdaily PO


--Continue daily weights for diuretic effect





--Urology recalled due to hematuria


   --H/H improved today to 10.4


   --Continue Ferrous sulfate PO with stool softeners PRN


--Levemir 24U AM with 2U ACLD and ISS breakthrough to continue


--BGM ACHS; BGM ranges 120-140's regularly; BGM ACHS


--Continue rest of home medications as above





--Discussed at length with son (Grant) about pt's improved H/H and that he did 

not require NIPPV for about 2 days. Discussed how anticipated discharge is back 

to [facility]





FEN:


   Fluids: Avoid


   Electrolyte abnormalities: None


   Nutrition:  Puree diet; diabetic and sodium controlled


PPX:


   DVT - SCDs 


   GI - Not indicated





Dispo: Monitor M/S


Case discussed with Dr. Tracy Beal, DO - IM PGy-3

## 2019-11-12 NOTE — PN
Physical Exam: 


SUBJECTIVE: Patient seen and examined; no new events overnight.  Discussed 

events at length with Savanna Perera regarding the placement.  Plan to discuss with 

son.  He is concerned about the blood in camacho which is improved with stable 

Hb.  Glucose, especially compared to his last admission, is exceedingly 

improved.  Noted that no family is bringing him outside sweets.  His mentation 

remains the same as it was last time; no indication of followup with OP NSGY.





10 sys ROS done and negative aside from HPI








OBJECTIVE:





 Vital Signs











 Period  Temp  Pulse  Resp  BP Sys/Ramírez  Pulse Ox


 


 Last 24 Hr  97.5 F-98.4 F  59-66  20-20  110-151/50-83  94-95











VS, labs, imaging reviewed


NAD, AAOx1, language and mental status communication barrier present


Camacho inserted; pink-tinged urine in bag with clear urine without clots in 

tube.  No palpable bladder


NT ND +BS


CN2-12 wnl, no FND but poor overal participation in neurological exam due to 

underlying cognitive changes


Lungs with minimal bilateral crackles, w/ sym exp


HR wnl, s1/2+


Skin without rashes or breakdown.


Not agitated; medically restricted insight and judgment, flat and somewhat 

confused affect.


Neck with trachea midline, no lymphadenopathy, no JVD

















 Laboratory Results - last 24 hr











  11/11/19 11/12/19 11/12/19





  22:08 06:12 07:30


 


WBC    7.6


 


RBC    3.73 L


 


Hgb    10.5 L


 


Hct    32.6 L


 


MCV    87.3


 


MCH    28.2


 


MCHC    32.3


 


RDW    14.9


 


Plt Count    169


 


MPV    8.6


 


Sodium   


 


Potassium   


 


Chloride   


 


Carbon Dioxide   


 


Anion Gap   


 


BUN   


 


Creatinine   


 


Est GFR (CKD-EPI)AfAm   


 


Est GFR (CKD-EPI)NonAf   


 


POC Glucometer  128  185 


 


Random Glucose   


 


Calcium   














  11/12/19 11/12/19 11/12/19





  07:30 11:48 17:01


 


WBC   


 


RBC   


 


Hgb   


 


Hct   


 


MCV   


 


MCH   


 


MCHC   


 


RDW   


 


Plt Count   


 


MPV   


 


Sodium  138  


 


Potassium  3.9  


 


Chloride  100  


 


Carbon Dioxide  33 H  


 


Anion Gap  5 L  


 


BUN  18.5 H  


 


Creatinine  1.0  


 


Est GFR (CKD-EPI)AfAm  82.60  


 


Est GFR (CKD-EPI)NonAf  71.27  


 


POC Glucometer   206  225


 


Random Glucose  177 H  


 


Calcium  8.7  














  11/12/19





  20:28


 


WBC 


 


RBC 


 


Hgb 


 


Hct 


 


MCV 


 


MCH 


 


MCHC 


 


RDW 


 


Plt Count 


 


MPV 


 


Sodium 


 


Potassium 


 


Chloride 


 


Carbon Dioxide 


 


Anion Gap 


 


BUN 


 


Creatinine 


 


Est GFR (CKD-EPI)AfAm 


 


Est GFR (CKD-EPI)NonAf 


 


POC Glucometer  217


 


Random Glucose 


 


Calcium 








Active Medications











Generic Name Dose Route Start Last Admin





  Trade Name Freq  PRN Reason Stop Dose Admin


 


Atorvastatin Calcium  40 mg  11/08/19 22:00  11/11/19 22:10





  Lipitor -  PO   40 mg





  HS MARY   Administration





     





     





     





     


 


Clopidogrel Bisulfate  75 mg  11/12/19 10:00  11/12/19 10:15





  Plavix -  PO   75 mg





  DAILY MARY   Administration





     





     





     





     


 


Ezetimibe  10 mg  11/08/19 22:00  11/11/19 22:10





  Zetia -  PO   10 mg





  HS MARY   Administration





     





     





     





     


 


Enalapril Maleate  2.5 mg  11/12/19 10:00  11/12/19 10:14





  Vasotec -  PO   2.5 mg





  DAILY MARY   Administration





     





     





     





     


 


Ferrous Sulfate  325 mg  11/09/19 17:30  11/12/19 10:14





  Feosol -  PO   325 mg





  DAILY MARY   Administration





     





     





     





     


 


Furosemide  40 mg  11/11/19 10:00  11/12/19 10:14





  Lasix -  PO   40 mg





  DAILY MARY   Administration





     





     





     





     


 


Insulin Aspart  2 units  11/08/19 16:30  11/12/19 17:03





  Novolog Vial  SQ   2 units





  ACLD MARY   Administration





     





     





     





     


 


Insulin Aspart  1 vial  11/08/19 16:30  11/12/19 17:03





  Novolog Vial Sliding Scale -  SQ   6 units





  ACHS MARY   Administration





     





     





  Protocol   





     


 


Insulin Detemir  24 units  11/09/19 07:00  11/12/19 06:08





  Levemir Vial  SQ   24 units





  DAILY@0700 MARY   Administration





     





     





     





     


 


Nebivolol  2.5 mg  11/08/19 22:00  11/11/19 22:09





  Bystolic -  PO   2.5 mg





  HS MARY   Administration





     





     





     





     


 


Polyethylene Glycol  17 gm  11/12/19 15:52  





  Miralax (For Daily Use) -  PO   





  DAILY PRN   





  CONSTIPATION   





     





     





     


 


Tamsulosin HCl  0.4 mg  11/08/19 22:00  11/12/19 10:15





  Flomax -  PO   0.4 mg





  BID@0830,2200 MARY   Administration





     





     





     





     











ASSESSMENT/PLAN:


Patient presents for near-resolved HFpEF exacerbation; he has underlying 

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  In addition he had hemorrhagic 

cystitis on last admission with 2x cystoscopy and resolution of all bleeds with 

discontinuation of his home AC upon his DC to the nursing home.  He is on 

tamsulosin for BPH and had obstructions secondary to clot formation that was 

relieved in the last admission with CBI and the aformentioned procedures.  he 

is hemodynamically stable and weanable.  Changing to PO Lasix in anticipation 

of DC and will clear with subspecialty services.  Likely dispo back to [facility] in 24-48 hours. 





Problems include:


-Acute Respiratory Failure (resolved)


-HFpEF Exacerbation (40 PO QD, check mg and k and the AM and ensure close CV 

followup; doesn't need evidence-based BB or ACE/ARB due to no mod to sig 

reduction in LVEF.  Euvolemic on exam.  Titrate lasix PRN and continue with 

fluid and salt restriction.  Monitor Is and Os and QD weights as best as 

attainable. 


-Uncontrolled DM with hyperglycemia (This represents a marked improvement over 

his baseline reflected on his last admission though the values could still be 

improved.  He is prone to hypoglycemia and his difficult to control sugars have 

proved problematic in the past.  Continue current medications and fsg checks; 

may uptitrate mealtime tomorrow if clear patterns emerge)


-Hematuria (Counts are stable; monitor.  On plavix with CV renewing-was held 

last admission due to remote time from stent with ongoing hematuria.  No 

bleeding since readding. CV following. Call uro prior to DC but this should not 

hold the DC)


-CAD s/p CABG


-Hx HTN


-Hx HLD


-Hx BPH


]


Full Code








Visit type





- Emergency Visit


Emergency Visit: Yes


ED Registration Date: 11/08/19


Care time: The patient presented to the Emergency Department on the above date 

and was hospitalized for further evaluation of their emergent condition.





- New Patient


This patient is new to me today: No





- Critical Care


Critical Care patient: No

## 2019-11-12 NOTE — CON.PULM
Consult


Consult Specialty:: PULM/CCM 


Referred by:: Hospitalist 


Reason for Consultation:: SOB





- History of Present Illness


Chief Complaint: SOB


History of Present Illness: 


79 M, IDDM, CAD s/p CABG, MI in 1998, CHF, CKD, COPD, and dementia.  Sent from 

[facility] due to increasing shortness of breath.





In the ER he was placed on NIPPV support with good response.  





No apparent travel history or sick contacts. 





No reported fever or chills. 





No reported hemoptysis or night sweats. 





CTA: No PE / moderate bilateral pleural effusions with associated compressive 

atelectasis. 











- History Source


History Provided By: Patient, Medical Record


Limitations to Obtaining History: Clinical Condition





- Past Medical History


CNS: Yes: Dementia


Cardio/Vascular: Yes: CAD, HTN, Hyperlipdemia


Pulmonary: Yes: Asthma, COPD, Pneumonia.  No: Cancer, O2 Dependent, Previously 

Intubated, Pulmonary Embolus, Pulmonary Fibrosis, Sleep Apnea


Renal/: Yes: Renal Inusuff





- Past Surgical History


Past Surgical History: Yes: CABG, Joint Replacement (R hip ORIF)





- Alcohol/Substance Use


Hx Alcohol Use: No





- Smoking History


Smoking history: Never smoked


Have you smoked in the past 12 months: No


Aproximately how many cigarettes per day: 0


If you are a former smoker, when did you quit?: '96





- Social History


Usual Living Arrangement: With Spouse


ADL: Family Assistance


History of Recent Travel: No





Home Medications





- Allergies


Allergies/Adverse Reactions: 


 Allergies











Allergy/AdvReac Type Severity Reaction Status Date / Time


 


No Known Allergies Allergy   Verified 02/15/19 11:57














- Home Medications


Home Medications: 


Ambulatory Orders





Atorvastatin Ca [Lipitor] 40 mg PO DAILY 06/10/19 


Ezetimibe 10 mg PO HS 06/10/19 


Nebivolol HCl [Bystolic] 2.5 mg PO HS 06/10/19 


Compression Socks, Medium [Futuro Restoring] 1 each MC DAILY #1 each 10/11/19 


Escitalopram Oxalate [Lexapro -] 10 mg PO DAILY  tablet 10/11/19 


Insulin Aspart [Novolog] 2 unit SQ ACLD #1 cartridge 10/11/19 


Insulin Sliding Scale [Novolog Vial Sliding Scale -] 1 vial SQ ACHS  units 10/11

/19 


Tamsulosin HCl 0.4 mg PO BID #60 capsule 10/11/19 


Insulin (Levemir) [Levemir Vial] 24 units SQ DAILY 11/08/19 











Review of Systems


Unable to obtain ROS, reason: Confused / cannot provide





Physical Exam


Vital Sings: 


 Vital Signs











Temperature  97.5 F L  11/12/19 05:55


 


Pulse Rate  63   11/12/19 05:55


 


Respiratory Rate  20   11/12/19 05:55


 


Blood Pressure  151/64   11/12/19 05:55


 


O2 Sat by Pulse Oximetry (%)  94 L  11/12/19 08:02











Constitutional: Yes: No Distress


Eyes: Yes: Conjunctiva Clear, EOM Intact


HENT: Yes: Atraumatic, Normocephalic


Neck: Yes: Supple, Trachea Midline


Cardiovascular: Yes: Regular Rate and Rhythm


Respiratory: Yes: Cough, Diminished, Rales, Rhonchi.  No: Accessory Muscle Use, 

SOB, SOB on Exertion, Stridor, Tachypnea, Wheezes


...Inspection: Yes: WNL


...Clubbing: No


Gastrointestinal: Yes: Normal Bowel Sounds, Soft


Renal/: Yes: WNL


Musculoskeletal: Yes: WNL


Extremities: Yes: WNL


Edema: No


Peripheral Pulses WNL: Yes


Integumentary: Yes: WNL


Neurological: Yes: Confusion


Labs: 


 CBC, BMP





 11/12/19 07:30 





 11/12/19 07:30 











Imaging





- Results


Chest X-ray: Report Reviewed, Image Reviewed


Cat Scan: Report Reviewed, Image Reviewed





Problem List





- Problems


(1) Pleural effusion


Code(s): J90 - PLEURAL EFFUSION, NOT ELSEWHERE CLASSIFIED   





(2) Atelectasis of both lungs


Code(s): J98.11 - ATELECTASIS   





(3) Anemia


Code(s): D64.9 - ANEMIA, UNSPECIFIED   





(4) COPD (chronic obstructive pulmonary disease)


Code(s): J44.9 - CHRONIC OBSTRUCTIVE PULMONARY DISEASE, UNSPECIFIED   





(5) Dementia


Code(s): F03.90 - UNSPECIFIED DEMENTIA WITHOUT BEHAVIORAL DISTURBANCE   





(6) Heart failure, diastolic, with acute decompensation


Code(s): I50.33 - ACUTE ON CHRONIC DIASTOLIC (CONGESTIVE) HEART FAILURE   





(7) Respiratory distress


Code(s): R06.03 - ACUTE RESPIRATORY DISTRESS   





(8) S/P CABG (coronary artery bypass graft)


Code(s): Z95.1 - PRESENCE OF AORTOCORONARY BYPASS GRAFT   





(9) CAD (coronary artery disease)


Code(s): I25.10 - ATHSCL HEART DISEASE OF NATIVE CORONARY ARTERY W/O ANG PCTRS 

  


Qualifiers: 


   Coronary Disease-Associated Artery/Lesion type: native artery   Native vs. 

transplanted heart: native heart   Associated angina: without angina   

Qualified Code(s): I25.10 - Atherosclerotic heart disease of native coronary 

artery without angina pectoris   





(10) Hyperlipidemia


Code(s): E78.5 - HYPERLIPIDEMIA, UNSPECIFIED   


Qualifiers: 


   Hyperlipidemia type: pure hypercholesterolemia   Qualified Code(s): E78.00 - 

Pure hypercholesterolemia, unspecified; E78.0 - Pure hypercholesterolemia   





(11) Hypertension


Code(s): I10 - ESSENTIAL (PRIMARY) HYPERTENSION   


Qualifiers: 


   Hypertension type: essential hypertension   Qualified Code(s): I10 - 

Essential (primary) hypertension   





(12) Type 2 diabetes mellitus


Code(s): E11.9 - TYPE 2 DIABETES MELLITUS WITHOUT COMPLICATIONS   





Assessment/Plan


Lasix 


Would continue to monitor off ABX 


O2 as needed


Aspiration precautions 


No indication for systemic steroids 


VTE prophylaxis


Can follow weights for diuretic response 


Will follow 


DC planning 





Thank you.





Dr Sotelo

## 2019-11-13 LAB
ANION GAP SERPL CALC-SCNC: 3 MMOL/L (ref 8–16)
BUN SERPL-MCNC: 17 MG/DL (ref 7–18)
CALCIUM SERPL-MCNC: 8.9 MG/DL (ref 8.5–10.1)
CHLORIDE SERPL-SCNC: 103 MMOL/L (ref 98–107)
CO2 SERPL-SCNC: 33 MMOL/L (ref 21–32)
CREAT SERPL-MCNC: 1 MG/DL (ref 0.55–1.3)
DEPRECATED RDW RBC AUTO: 15.1 % (ref 11.9–15.9)
GLUCOSE SERPL-MCNC: 111 MG/DL (ref 74–106)
HCT VFR BLD CALC: 29.9 % (ref 35.4–49)
HGB BLD-MCNC: 10.1 GM/DL (ref 11.7–16.9)
MCH RBC QN AUTO: 28.7 PG (ref 25.7–33.7)
MCHC RBC AUTO-ENTMCNC: 33.7 G/DL (ref 32–35.9)
MCV RBC: 85.3 FL (ref 80–96)
PLATELET # BLD AUTO: 170 K/MM3 (ref 134–434)
PMV BLD: 8.1 FL (ref 7.5–11.1)
POTASSIUM SERPLBLD-SCNC: 3.7 MMOL/L (ref 3.5–5.1)
RBC # BLD AUTO: 3.5 M/MM3 (ref 4–5.6)
SODIUM SERPL-SCNC: 139 MMOL/L (ref 136–145)
WBC # BLD AUTO: 7.7 K/MM3 (ref 4–10)

## 2019-11-13 RX ADMIN — ENALAPRIL MALEATE SCH MG: 2.5 TABLET ORAL at 09:49

## 2019-11-13 RX ADMIN — INSULIN DETEMIR SCH UNITS: 100 INJECTION, SOLUTION SUBCUTANEOUS at 06:03

## 2019-11-13 RX ADMIN — FUROSEMIDE SCH MG: 40 TABLET ORAL at 09:49

## 2019-11-13 RX ADMIN — EZETIMIBE SCH MG: 10 TABLET ORAL at 21:48

## 2019-11-13 RX ADMIN — INSULIN ASPART SCH: 100 INJECTION, SOLUTION INTRAVENOUS; SUBCUTANEOUS at 06:04

## 2019-11-13 RX ADMIN — POLYETHYLENE GLYCOL 3350 PRN GM: 17 POWDER, FOR SOLUTION ORAL at 09:52

## 2019-11-13 RX ADMIN — INSULIN ASPART SCH UNITS: 100 INJECTION, SOLUTION INTRAVENOUS; SUBCUTANEOUS at 12:18

## 2019-11-13 RX ADMIN — ATORVASTATIN CALCIUM SCH MG: 40 TABLET, FILM COATED ORAL at 21:48

## 2019-11-13 RX ADMIN — INSULIN ASPART SCH UNITS: 100 INJECTION, SOLUTION INTRAVENOUS; SUBCUTANEOUS at 17:30

## 2019-11-13 RX ADMIN — INSULIN ASPART SCH UNITS: 100 INJECTION, SOLUTION INTRAVENOUS; SUBCUTANEOUS at 21:49

## 2019-11-13 RX ADMIN — TAMSULOSIN HYDROCHLORIDE SCH MG: 0.4 CAPSULE ORAL at 21:48

## 2019-11-13 RX ADMIN — FERROUS SULFATE TAB EC 324 MG (65 MG FE EQUIVALENT) SCH MG: 324 (65 FE) TABLET DELAYED RESPONSE at 09:49

## 2019-11-13 RX ADMIN — CLOPIDOGREL BISULFATE SCH MG: 75 TABLET, FILM COATED ORAL at 09:49

## 2019-11-13 RX ADMIN — POLYETHYLENE GLYCOL 3350 PRN GM: 17 POWDER, FOR SOLUTION ORAL at 21:54

## 2019-11-13 RX ADMIN — TAMSULOSIN HYDROCHLORIDE SCH MG: 0.4 CAPSULE ORAL at 09:49

## 2019-11-13 RX ADMIN — NEBIVOLOL HYDROCHLORIDE SCH MG: 2.5 TABLET ORAL at 21:48

## 2019-11-13 RX ADMIN — INSULIN ASPART SCH: 100 INJECTION, SOLUTION INTRAVENOUS; SUBCUTANEOUS at 11:47

## 2019-11-13 NOTE — PN
Progress Note, Physician


History of Present Illness: 


Hematuria in chronic indwelling Camacho, no clots.








- Current Medication List


Current Medications: 


Active Medications





Atorvastatin Calcium (Lipitor -)  40 mg PO [facility]


   Last Admin: 11/12/19 22:00 Dose:  40 mg


Clopidogrel Bisulfate (Plavix -)  75 mg PO DAILY [facility]


   Last Admin: 11/13/19 09:49 Dose:  75 mg


Ezetimibe (Zetia -)  10 mg PO [facility]


   Last Admin: 11/12/19 22:00 Dose:  10 mg


Enalapril Maleate (Vasotec -)  2.5 mg PO DAILY [facility]


   Last Admin: 11/13/19 09:49 Dose:  2.5 mg


Ferrous Sulfate (Feosol -)  325 mg PO DAILY [facility]


   Last Admin: 11/13/19 09:49 Dose:  325 mg


Furosemide (Lasix -)  40 mg PO DAILY [facility]


   Last Admin: 11/13/19 09:49 Dose:  40 mg


Insulin Aspart (Novolog Vial)  2 units SQ ACLD [facility]


   Last Admin: 11/12/19 17:03 Dose:  2 units


Insulin Aspart (Novolog Vial Sliding Scale -)  1 vial SQ [facility]; Protocol


   Last Admin: 11/13/19 06:04 Dose:  Not Given


Insulin Detemir (Levemir Vial)  24 units SQ DAILY@0700 [facility]


   Last Admin: 11/13/19 06:03 Dose:  24 units


Nebivolol (Bystolic -)  2.5 mg PO [facility]


   Last Admin: 11/12/19 22:00 Dose:  2.5 mg


Polyethylene Glycol (Miralax (For Daily Use) -)  17 gm PO DAILY PRN


   PRN Reason: CONSTIPATION


   Last Admin: 11/13/19 09:52 Dose:  17 gm


Tamsulosin HCl (Flomax -)  0.4 mg PO BID@0830,2200 [facility]


   Last Admin: 11/13/19 09:49 Dose:  0.4 mg











- Objective


Vital Signs: 


 Vital Signs











Temperature  98.3 F   11/13/19 09:10


 


Pulse Rate  63   11/13/19 06:00


 


Respiratory Rate  20   11/13/19 09:10


 


Blood Pressure  124/54 L  11/13/19 09:10


 


O2 Sat by Pulse Oximetry (%)  95   11/12/19 21:00











Constitutional: Yes: No Distress, Calm, Thin


Neck: Yes: Supple


Cardiovascular: Yes: Regular Rate and Rhythm


Respiratory: Yes: Regular, Diminished


Gastrointestinal: Yes: Soft, Hypoactive Bowel Sounds


Genitourinary: Yes: Camacho Present, Hematuria


Edema: No


Labs: 


 CBC, BMP





 11/13/19 07:43 





 11/13/19 07:43 





 INR, PTT











INR  1.45  (0.83-1.09)  H  11/08/19  09:32    














Problem List





- Problems


(1) S/P CABG (coronary artery bypass graft)


Code(s): Z95.1 - PRESENCE OF AORTOCORONARY BYPASS GRAFT   





(2) Heart failure, diastolic, with acute decompensation


Code(s): I50.33 - ACUTE ON CHRONIC DIASTOLIC (CONGESTIVE) HEART FAILURE   





(3) Respiratory failure with hypoxia


Code(s): J96.91 - RESPIRATORY FAILURE, UNSPECIFIED WITH HYPOXIA   


Qualifiers: 


   Chronicity: acute   Qualified Code(s): J96.01 - Acute respiratory failure 

with hypoxia   





(4) CAD (coronary artery disease)


Code(s): I25.10 - ATHSCL HEART DISEASE OF NATIVE CORONARY ARTERY W/O ANG PCTRS 

  


Qualifiers: 


   Coronary Disease-Associated Artery/Lesion type: native artery   Native vs. 

transplanted heart: native heart   Associated angina: without angina   

Qualified Code(s): I25.10 - Atherosclerotic heart disease of native coronary 

artery without angina pectoris   





(5) CKD stage 3 due to type 2 diabetes mellitus


Code(s): E11.22 - TYPE 2 DIABETES MELLITUS W DIABETIC CHRONIC KIDNEY DISEASE; 

N18.3 - CHRONIC KIDNEY DISEASE, STAGE 3 (MODERATE)   





(6) Hyperlipidemia


Code(s): E78.5 - HYPERLIPIDEMIA, UNSPECIFIED   


Qualifiers: 


   Hyperlipidemia type: pure hypercholesterolemia   Qualified Code(s): E78.00 - 

Pure hypercholesterolemia, unspecified; E78.0 - Pure hypercholesterolemia   





(7) Hypertension


Code(s): I10 - ESSENTIAL (PRIMARY) HYPERTENSION   


Qualifiers: 


   Hypertension type: essential hypertension   Qualified Code(s): I10 - 

Essential (primary) hypertension   





(8) Type 2 diabetes mellitus


Code(s): E11.9 - TYPE 2 DIABETES MELLITUS WITHOUT COMPLICATIONS   





(9) Hematuria


Code(s): R31.9 - HEMATURIA, UNSPECIFIED   


Qualifiers: 


   Hematuria type: gross   Qualified Code(s): R31.0 - Gross hematuria   





Assessment/Plan


11/08/2019 Chest CT: Moderate bilateral pleural effusions with lower lobe ATX


10/15/2019 Echo: Mild cLVH with normal LV fxn, grade I diastolic dysfunction, 

normal RV size and fxn, tr MR





1. Acute in chronic hypoxic respiratory failure


2. Acute on chronic LV diastolic failure resolved


3. ASHD S/P CABG, angina pectoris


4. HTN


5. Hypercholesterolemia


6. Type 2 DM poorly controlled


7. CKD


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


9. BPH with h/o hemorrhagic cystitis





PLAN:


1. Resumed oral diuresis (Lasix 40 qd) with monitor diuretic response, renal 

fxn and electrolytes


2. Continue bronchodilators, O2 as needed, camacho 


3. Continue Bystolic 2.5 qd, Zetia 10 qd, Lipitor 40 qd, vasotec 2.5 qd, d/c 

Plavix due to recurrent hematuria, urology f/u


4. DVT and GI prophylaxis








\

## 2019-11-13 NOTE — PN
Progress Note, Physician


History of Present Illness: 





PULMONARY








SLEEPY,CONFUSED,-RESP DISTRESS





- Current Medication List


Current Medications: 


Active Medications





Atorvastatin Calcium (Lipitor -)  40 mg PO [facility]


   Last Admin: 11/12/19 22:00 Dose:  40 mg


Ezetimibe (Zetia -)  10 mg PO [facility]


   Last Admin: 11/12/19 22:00 Dose:  10 mg


Enalapril Maleate (Vasotec -)  2.5 mg PO DAILY [facility]


   Last Admin: 11/13/19 09:49 Dose:  2.5 mg


Ferrous Sulfate (Feosol -)  325 mg PO DAILY [facility]


   Last Admin: 11/13/19 09:49 Dose:  325 mg


Furosemide (Lasix -)  40 mg PO DAILY [facility]


   Last Admin: 11/13/19 09:49 Dose:  40 mg


Insulin Aspart (Novolog Vial)  2 units SQ ACLD [facility]


   Last Admin: 11/13/19 12:18 Dose:  2 units


Insulin Aspart (Novolog Vial Sliding Scale -)  1 vial SQ [facility]; Protocol


   Last Admin: 11/13/19 11:47 Dose:  Not Given


Insulin Detemir (Levemir Vial)  24 units SQ DAILY@0700 [facility]


   Last Admin: 11/13/19 06:03 Dose:  24 units


Nebivolol (Bystolic -)  2.5 mg PO [facility]


   Last Admin: 11/12/19 22:00 Dose:  2.5 mg


Polyethylene Glycol (Miralax (For Daily Use) -)  17 gm PO DAILY PRN


   PRN Reason: CONSTIPATION


   Last Admin: 11/13/19 09:52 Dose:  17 gm


Tamsulosin HCl (Flomax -)  0.4 mg PO BID@0830,2200 [facility]


   Last Admin: 11/13/19 09:49 Dose:  0.4 mg











- Objective


Vital Signs: 


 Vital Signs











Temperature  98.6 F   11/13/19 14:07


 


Pulse Rate  63   11/13/19 14:07


 


Respiratory Rate  20   11/13/19 09:10


 


Blood Pressure  126/60   11/13/19 14:07


 


O2 Sat by Pulse Oximetry (%)  95   11/12/19 21:00











Constitutional: Yes: Well Nourished, Other (SLEEPY)


Eyes: Yes: WNL


HENT: Yes: WNL


Neck: Yes: WNL


Cardiovascular: Yes: Regular Rate and Rhythm, S1, S2


Respiratory: Yes: Diminished (POOR INSPIRATORY EFFORT)


Gastrointestinal: Yes: Normal Bowel Sounds, Soft


Extremities: Yes: WNL


Edema: No


Labs: 


 CBC, BMP





 11/13/19 07:43 





 11/13/19 07:43 





 INR, PTT











INR  1.45  (0.83-1.09)  H  11/08/19  09:32    














Assessment/Plan








Problem List





- Problems


(1) Pleural effusion


Code(s): J90 - PLEURAL EFFUSION, NOT ELSEWHERE CLASSIFIED   





(2) Atelectasis of both lungs


Code(s): J98.11 - ATELECTASIS   





(3) Anemia


Code(s): D64.9 - ANEMIA, UNSPECIFIED   





(4) COPD (chronic obstructive pulmonary disease)


Code(s): J44.9 - CHRONIC OBSTRUCTIVE PULMONARY DISEASE, UNSPECIFIED   





(5) Dementia


Code(s): F03.90 - UNSPECIFIED DEMENTIA WITHOUT BEHAVIORAL DISTURBANCE   





(6) Heart failure, diastolic, with acute decompensation


Code(s): I50.33 - ACUTE ON CHRONIC DIASTOLIC (CONGESTIVE) HEART FAILURE   





(7) Respiratory distress


Code(s): R06.03 - ACUTE RESPIRATORY DISTRESS   





(8) S/P CABG (coronary artery bypass graft)


Code(s): Z95.1 - PRESENCE OF AORTOCORONARY BYPASS GRAFT   





(9) CAD (coronary artery disease)


Code(s): I25.10 - ATHSCL HEART DISEASE OF NATIVE CORONARY ARTERY W/O ANG PCTRS 

  


Qualifiers: 


   Coronary Disease-Associated Artery/Lesion type: native artery   Native vs. 

transplanted heart: native heart   Associated angina: without angina   

Qualified Code(s): I25.10 - Atherosclerotic heart disease of native coronary 

artery without angina pectoris   





(10) Hyperlipidemia


Code(s): E78.5 - HYPERLIPIDEMIA, UNSPECIFIED   


Qualifiers: 


   Hyperlipidemia type: pure hypercholesterolemia   Qualified Code(s): E78.00 - 

Pure hypercholesterolemia, unspecified; E78.0 - Pure hypercholesterolemia   





(11) Hypertension


Code(s): I10 - ESSENTIAL (PRIMARY) HYPERTENSION   


Qualifiers: 


   Hypertension type: essential hypertension   Qualified Code(s): I10 - 

Essential (primary) hypertension   





(12) Type 2 diabetes mellitus


Code(s): E11.9 - TYPE 2 DIABETES MELLITUS WITHOUT COMPLICATIONS   





Assessment/Plan


Lasix 


O2 as needed


Aspiration precautions 


VTE prophylaxis


Daily wts








DR RAMOS

## 2019-11-13 NOTE — PN
Progress Note (short form)





- Note


Progress Note: 





HPI: Limited due to clinical condition. No acute events.


 Vital Signs











Temperature  98.6 F   11/13/19 14:07


 


Pulse Rate  63   11/13/19 14:07


 


Respiratory Rate  20   11/13/19 09:10


 


Blood Pressure  126/60   11/13/19 14:07


 


O2 Sat by Pulse Oximetry (%)  96   11/13/19 09:00

















PE:


Gen: NAD, awake, alert


HEENT: RUBIO, sclera anicteric, no conjunctival pallor MMM


LUNG: Aearation to bases b/l. Poor inspiratory effort. No wheezes appreciated


CARD: RRR no murmurs


ABD: Soft, NT/ND, no guarding, normoactive BS


: Hematuria clearing in camacho with clot formation in camacho with continued 

drainage


EXT: no edema, pulses strong b/l


 CBC, BMP





 11/13/19 07:43 





 11/13/19 07:43 











 Microbiology





11/08/19 09:37   Blood - Peripheral Venous   Blood Culture - Preliminary


                            NO GROWTH OBTAINED AFTER 72 HOURS, INCUBATION TO 

CONTINUE


                            FOR 2 DAYS.


11/08/19 09:37   Blood - Peripheral Venous   Blood Culture - Preliminary


                            NO GROWTH OBTAINED AFTER 72 HOURS, INCUBATION TO 

CONTINUE


                            FOR 2 DAYS.


11/08/19 09:37   Urine - Urine Clean Catch   Urine Culture - Final


                            Yeast Like Organism








Assessment and Plan:


Acute hypoxic respiratory distress (improved)


Hematuria s/p recent TURP and Hemorrhagic MRSA Cystitis


Hyperglycemia


Type 2 DM history


CAD s/p CABG, not in ACS


Hx of HTN


Hx of HLD


Hx of COPD, not in exacerbation





--Continue Lasix 40mg qdaily PO


--Continue daily weights for diuretic effect


--H/H improved today stable


--Continue Ferrous sulfate PO with stool softeners PRN


--Levemir 24U AM with 2U ACLD and ISS breakthrough to continue


--BGM ACHS; BGM ranges 120-140's regularly; BGM ACHS


--Continue rest of home medications as above








FEN:


   Fluids: Avoid


   Electrolyte abnormalities: None


   Nutrition:  Puree diet; diabetic and sodium controlled


PPX:


   DVT - SCDs 


   GI - Not indicated





Dispo: anticipate d/c in 24hrs


Case discussed with Dr. Tracy Beal, DO - IM PGy-3

## 2019-11-14 VITALS — SYSTOLIC BLOOD PRESSURE: 134 MMHG | DIASTOLIC BLOOD PRESSURE: 58 MMHG | HEART RATE: 72 BPM | TEMPERATURE: 98 F

## 2019-11-14 LAB
DEPRECATED RDW RBC AUTO: 15.3 % (ref 11.9–15.9)
HCT VFR BLD CALC: 31 % (ref 35.4–49)
HGB BLD-MCNC: 10.3 GM/DL (ref 11.7–16.9)
MCH RBC QN AUTO: 28.4 PG (ref 25.7–33.7)
MCHC RBC AUTO-ENTMCNC: 33.1 G/DL (ref 32–35.9)
MCV RBC: 85.7 FL (ref 80–96)
PLATELET # BLD AUTO: 185 K/MM3 (ref 134–434)
PMV BLD: 8.1 FL (ref 7.5–11.1)
RBC # BLD AUTO: 3.61 M/MM3 (ref 4–5.6)
WBC # BLD AUTO: 7.8 K/MM3 (ref 4–10)

## 2019-11-14 RX ADMIN — TAMSULOSIN HYDROCHLORIDE SCH MG: 0.4 CAPSULE ORAL at 09:58

## 2019-11-14 RX ADMIN — INSULIN ASPART SCH: 100 INJECTION, SOLUTION INTRAVENOUS; SUBCUTANEOUS at 07:11

## 2019-11-14 RX ADMIN — INSULIN DETEMIR SCH UNITS: 100 INJECTION, SOLUTION SUBCUTANEOUS at 07:09

## 2019-11-14 RX ADMIN — ENALAPRIL MALEATE SCH MG: 2.5 TABLET ORAL at 09:58

## 2019-11-14 RX ADMIN — FUROSEMIDE SCH MG: 40 TABLET ORAL at 09:58

## 2019-11-14 RX ADMIN — INSULIN ASPART SCH UNITS: 100 INJECTION, SOLUTION INTRAVENOUS; SUBCUTANEOUS at 11:42

## 2019-11-14 RX ADMIN — FERROUS SULFATE TAB EC 324 MG (65 MG FE EQUIVALENT) SCH MG: 324 (65 FE) TABLET DELAYED RESPONSE at 09:58

## 2019-11-14 RX ADMIN — INSULIN ASPART SCH UNITS: 100 INJECTION, SOLUTION INTRAVENOUS; SUBCUTANEOUS at 17:07

## 2019-11-14 NOTE — PN
Progress Note, Physician


History of Present Illness: 


Hematuria in chronic indwelling Camacho, no clots.








- Current Medication List


Current Medications: 


Active Medications





Atorvastatin Calcium (Lipitor -)  40 mg PO [facility]


   Last Admin: 11/13/19 21:48 Dose:  40 mg


Ezetimibe (Zetia -)  10 mg PO [facility]


   Last Admin: 11/13/19 21:48 Dose:  10 mg


Enalapril Maleate (Vasotec -)  2.5 mg PO DAILY [facility]


   Last Admin: 11/14/19 09:58 Dose:  2.5 mg


Ferrous Sulfate (Feosol -)  325 mg PO DAILY [facility]


   Last Admin: 11/14/19 09:58 Dose:  325 mg


Furosemide (Lasix -)  40 mg PO DAILY [facility]


   Last Admin: 11/14/19 09:58 Dose:  40 mg


Insulin Aspart (Novolog Vial)  2 units SQ ACLD [facility]


   Last Admin: 11/13/19 17:30 Dose:  2 units


Insulin Aspart (Novolog Vial Sliding Scale -)  1 vial SQ [facility]; Protocol


   Last Admin: 11/14/19 07:11 Dose:  Not Given


Insulin Detemir (Levemir Vial)  24 units SQ DAILY@0700 [facility]


   Last Admin: 11/14/19 07:09 Dose:  24 units


Nebivolol (Bystolic -)  2.5 mg PO [facility]


   Last Admin: 11/13/19 21:48 Dose:  2.5 mg


Polyethylene Glycol (Miralax (For Daily Use) -)  17 gm PO DAILY PRN


   PRN Reason: CONSTIPATION


   Last Admin: 11/13/19 21:54 Dose:  17 gm


Tamsulosin HCl (Flomax -)  0.4 mg PO BID@0830,2200 [facility]


   Last Admin: 11/14/19 09:58 Dose:  0.4 mg











- Objective


Vital Signs: 


 Vital Signs











Temperature  98.4 F   11/14/19 08:20


 


Pulse Rate  67   11/14/19 08:20


 


Respiratory Rate  18   11/14/19 08:20


 


Blood Pressure  108/60   11/14/19 08:20


 


O2 Sat by Pulse Oximetry (%)  96   11/13/19 09:00











Constitutional: Yes: No Distress, Calm


Neck: Yes: Supple


Cardiovascular: Yes: Regular Rate and Rhythm


Respiratory: Yes: Regular, Diminished


Gastrointestinal: Yes: Normal Bowel Sounds, Soft


Genitourinary: Yes: Camacho Present, Hematuria


Edema: No


Labs: 


 CBC, BMP





 11/14/19 07:10 





 11/13/19 07:43 





 INR, PTT











INR  1.45  (0.83-1.09)  H  11/08/19  09:32    














Problem List





- Problems


(1) S/P CABG (coronary artery bypass graft)


Code(s): Z95.1 - PRESENCE OF AORTOCORONARY BYPASS GRAFT   





(2) Heart failure, diastolic, with acute decompensation


Code(s): I50.33 - ACUTE ON CHRONIC DIASTOLIC (CONGESTIVE) HEART FAILURE   





(3) Respiratory failure with hypoxia


Code(s): J96.91 - RESPIRATORY FAILURE, UNSPECIFIED WITH HYPOXIA   


Qualifiers: 


   Chronicity: acute   Qualified Code(s): J96.01 - Acute respiratory failure 

with hypoxia   





(4) CAD (coronary artery disease)


Code(s): I25.10 - ATHSCL HEART DISEASE OF NATIVE CORONARY ARTERY W/O ANG PCTRS 

  


Qualifiers: 


   Coronary Disease-Associated Artery/Lesion type: native artery   Native vs. 

transplanted heart: native heart   Associated angina: without angina   

Qualified Code(s): I25.10 - Atherosclerotic heart disease of native coronary 

artery without angina pectoris   





(5) CKD stage 3 due to type 2 diabetes mellitus


Code(s): E11.22 - TYPE 2 DIABETES MELLITUS W DIABETIC CHRONIC KIDNEY DISEASE; 

N18.3 - CHRONIC KIDNEY DISEASE, STAGE 3 (MODERATE)   





(6) Hyperlipidemia


Code(s): E78.5 - HYPERLIPIDEMIA, UNSPECIFIED   


Qualifiers: 


   Hyperlipidemia type: pure hypercholesterolemia   Qualified Code(s): E78.00 - 

Pure hypercholesterolemia, unspecified; E78.0 - Pure hypercholesterolemia   





(7) Hypertension


Code(s): I10 - ESSENTIAL (PRIMARY) HYPERTENSION   


Qualifiers: 


   Hypertension type: essential hypertension   Qualified Code(s): I10 - 

Essential (primary) hypertension   





(8) Type 2 diabetes mellitus


Code(s): E11.9 - TYPE 2 DIABETES MELLITUS WITHOUT COMPLICATIONS   





(9) Hematuria


Code(s): R31.9 - HEMATURIA, UNSPECIFIED   


Qualifiers: 


   Hematuria type: gross   Qualified Code(s): R31.0 - Gross hematuria   





Assessment/Plan


11/08/2019 Chest CT: Moderate bilateral pleural effusions with lower lobe ATX


10/15/2019 Echo: Mild cLVH with normal LV fxn, grade I diastolic dysfunction, 

normal RV size and fxn, tr MR





1. Acute in chronic hypoxic respiratory failure


2. Acute on chronic LV diastolic failure resolved


3. ASHD S/P CABG, angina pectoris


4. HTN


5. Hypercholesterolemia


6. Type 2 DM poorly controlled


7. CKD


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


9. BPH with h/o hemorrhagic cystitis





PLAN:


1. Resumed oral diuresis (Lasix 40 qd) with monitor diuretic response, renal 

fxn and electrolytes


2. Continue bronchodilators, O2 as needed, camacho 


3. Continue Bystolic 2.5 qd, Zetia 10 qd, Lipitor 40 qd, vasotec 2.5 qd, d/ynes 

Plavix due to recurrent hematuria, urology f/u as outpatient


4. DVT and GI prophylaxis








\

## 2019-11-14 NOTE — DS
Physical Exam: 


SUBJECTIVE: No acute events noted. HPI limited due to clinical condition, 

however has been off oxygen and is sleeping comfortably at bedside. Pt had BM 

last night





OBJECTIVE:





 Vital Signs











 Period  Temp  Pulse  Resp  BP Sys/Ramírez  Pulse Ox


 


 Last 24 Hr  98.3 F-99.0 F  63-68  20-20  124-147/54-72  96








PHYSICAL EXAM





Gen: NAD, sleeping comfortably, arousable


HEENT: RUBIO, sclera anicteric, no conjunctival pallor, MMM


Neck: No JVD


LUNG: CTA b/l. Poor inspiratory effort. No wheezes appreciated. 97% on RA


CARD: RRR no murmurs


ABD: Soft, NT/ND, normoactive BS, no guarding, normoactive BS


: Hematuria clearing in camacho without any clots. Collection bag was recently 

drained


EXT: no edema, pulses strong b/l





LABS


 Laboratory Results - last 24 hr











  11/13/19 11/13/19 11/13/19





  07:43 11:37 17:12


 


WBC   


 


RBC   


 


Hgb   


 


Hct   


 


MCV   


 


MCH   


 


MCHC   


 


RDW   


 


Plt Count   


 


MPV   


 


Sodium  139  


 


Potassium  3.7  


 


Chloride  103  


 


Carbon Dioxide  33 H  


 


Anion Gap  3 L  


 


BUN  17.0  


 


Creatinine  1.0  


 


Est GFR (CKD-EPI)AfAm  82.60  


 


Est GFR (CKD-EPI)NonAf  71.27  


 


POC Glucometer   140  176


 


Random Glucose  111 H  


 


Calcium  8.9  














  11/13/19 11/14/19 11/14/19





  21:17 06:17 07:10


 


WBC    7.8


 


RBC    3.61 L


 


Hgb    10.3 L


 


Hct    31.0 L


 


MCV    85.7


 


MCH    28.4


 


MCHC    33.1


 


RDW    15.3


 


Plt Count    185


 


MPV    8.1


 


Sodium   


 


Potassium   


 


Chloride   


 


Carbon Dioxide   


 


Anion Gap   


 


BUN   


 


Creatinine   


 


Est GFR (CKD-EPI)AfAm   


 


Est GFR (CKD-EPI)NonAf   


 


POC Glucometer  257  149 


 


Random Glucose   


 


Calcium   








IMAGING:


CXR : 11/11/2019


Single AP view of the chest reveals a prominent mediastinum with sternal 

sutures and clips,


congestive changes and some minimal pleural fluid with some left base 

atelectasis. The


bones and soft tissues are intact. Since 11/8/2019, the pulmonary and pleural 

findings have


increased slightly. Correlation recommended.





Chest CT w/ contrast: 11/08/19


IMPRESSION:


1. No evidence of pulmonary embolism.


2. Moderate bilateral pleural effusions and lower lobe atelectasis. Please see 

above


discussion.








HOSPITAL COURSE:





Date of Admission:11/08/19





Date of Discharge: 11/14/19








Pt admitted on 11/08/19 due to acute volume overload and was noted to be in mild

-moderate hypoxic respiratory distress. Pt was continued on NIPPV and IV 

diuretics. Pt's breathing improved and he was transitioned to nasal cannula. 

Unfortunately pt was noted to have hematuria and urology was reconsulted as a 

result. Pt's H/H remained stable and he was foundt o have iron deficiency 

anemia as well. Pt was started on Ferrous Sulfate 325mg qdaily and PRN Miralax 

for constipation. Pt's RBC increased without any transfusions and clots 

dissipated from camacho tubing. Pt was recommended to follow-up outpatient with 

Dr. BRANDT Durán. In addition, pt was visited by pulmonology and cardiology who 

both recommended transition to PO Lasix 40mg qdaily. Throughout his stay pt's 

glucose ranged on average from 140-170's with his previous insulin regiment. 





Pt is being discharged in stable condition, off of oxygen, and continued PO 

diuresis back to [facility] for continued placement. All instructions explained 

to Grant (son) over the phone on day of discharge. 


Minutes to complete discharge: 33





Discharge Summary


Problems reviewed: Yes


Reason For Visit: RESPIRATORY DISTRESS


Current Active Problems





Anemia (Acute)


Atelectasis of both lungs (Acute)


COPD (chronic obstructive pulmonary disease) (Acute)


Chronic venous stasis (Acute)


Dementia (Acute)


Heart failure, diastolic, with acute decompensation (Acute)


Hematuria (Acute)


Pleural effusion (Acute)


Respiratory distress (Acute)


Respiratory failure with hypoxia (Acute)


S/P CABG (coronary artery bypass graft) (Acute)


UTI (urinary tract infection) (Acute)








Condition: Stable





- Instructions


Diet, Activity, Other Instructions: 


You were seen due to having volume overload. While you were here, you were 

given diuretics and your breathing improved. You were given iron supplements to 

help with your blood counts and they remained stable





MEDICATIONS:


   Please take Lasix 40mg daily


   Please take Ferrous Sulfate 325mg daily


      --Because this is constipating you can take Miralax 17gm daily


   Please continue Levemir 24Units daily, Novolog 2U scheduled before lunch and 

dinner


      --You will have a sliding scale insulin to cover for any high glucose 

levels





Follow-up:


   Please Follow-up with Dr. BENTON Durán in 3-5 days to update them on your care


   Please follow with Dr. BRANDT Durán in 1 week for the hematuria


   Please follow-up with Dr. Styles or your own cardiologist in 1 week regarding 

the plavix


Referrals: 


Melvin Durán MD [Staff Physician] - 1 Week


Dali Durán MD [Primary Care Provider] -  (3-5 days)


Mark Styles MD [Staff Physician] - 1 Week


Disposition: SKILLED NURSING FACILITY





- Home Medications


Comprehensive Discharge Medication List: 


Ambulatory Orders





Atorvastatin Ca [Lipitor] 40 mg PO DAILY 06/10/19 


Ezetimibe 10 mg PO HS 06/10/19 


Nebivolol HCl [Bystolic] 2.5 mg PO HS 06/10/19 


Compression Socks, Medium [Futuro Restoring] 1 each MC DAILY #1 each 10/11/19 


Insulin Aspart [Novolog] 2 unit SQ ACLD #1 cartridge 10/11/19 


Insulin Sliding Scale [Novolog Vial Sliding Scale -] 1 vial SQ ACHS  units 10/11

/19 


Tamsulosin HCl 0.4 mg PO BID #60 capsule 10/11/19 


Insulin (Levemir) [Levemir Vial] 24 units SQ DAILY 11/08/19 


Clopidogrel Bisulfate [Plavix -] 75 mg PO DAILY  tablet 11/14/19 


Enalapril Maleate [Vasotec -] 2.5 mg PO DAILY  tablet 11/14/19 


Ferrous Sulfate [Feosol] 325 mg PO DAILY  ud 11/14/19 


Furosemide [Lasix -] 40 mg PO DAILY  tablet 11/14/19 


Polyethylene Glycol 3350 [Miralax 119 gm Btl -] 17 gm PO DAILY PRN  bottle 11/14 /19 








This patient is new to me today: No


Emergency Visit: Yes


ED Registration Date: 11/08/19


Care time: The patient presented to the Emergency Department on the above date 

and was hospitalized for further evaluation of their emergent condition.


Critical Care patient: No





- Discharge Referral


Referred to [facility] No





ATTENDING PHYSICIAN STATEMENT





I saw and evaluated the patient.


I reviewed the resident's note and discussed the case with the resident.


I agree with the resident's findings and plan as documented.








SUBJECTIVE:








OBJECTIVE:








ASSESSMENT AND PLAN:

## 2019-12-05 ENCOUNTER — HOSPITAL ENCOUNTER (INPATIENT)
Dept: HOSPITAL 74 - JER | Age: 79
LOS: 5 days | Discharge: HOME HEALTH | DRG: 698 | End: 2019-12-10
Attending: INTERNAL MEDICINE | Admitting: INTERNAL MEDICINE
Payer: COMMERCIAL

## 2019-12-05 VITALS — BODY MASS INDEX: 23 KG/M2

## 2019-12-05 DIAGNOSIS — N39.0: ICD-10-CM

## 2019-12-05 DIAGNOSIS — G93.41: ICD-10-CM

## 2019-12-05 DIAGNOSIS — N32.0: ICD-10-CM

## 2019-12-05 DIAGNOSIS — G91.2: ICD-10-CM

## 2019-12-05 DIAGNOSIS — N17.9: ICD-10-CM

## 2019-12-05 DIAGNOSIS — T83.518A: Primary | ICD-10-CM

## 2019-12-05 DIAGNOSIS — Z95.1: ICD-10-CM

## 2019-12-05 DIAGNOSIS — N18.3: ICD-10-CM

## 2019-12-05 DIAGNOSIS — E88.09: ICD-10-CM

## 2019-12-05 DIAGNOSIS — B35.6: ICD-10-CM

## 2019-12-05 DIAGNOSIS — R41.82: ICD-10-CM

## 2019-12-05 DIAGNOSIS — I25.10: ICD-10-CM
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
## 2019-12-05 DIAGNOSIS — E11.65: ICD-10-CM

## 2019-12-05 DIAGNOSIS — E46: ICD-10-CM

## 2019-12-05 DIAGNOSIS — I13.0: ICD-10-CM

## 2019-12-05 DIAGNOSIS — B95.2: ICD-10-CM

## 2019-12-05 DIAGNOSIS — N40.0: ICD-10-CM

## 2019-12-05 LAB
ALBUMIN SERPL-MCNC: 3 G/DL (ref 3.4–5)
ALP SERPL-CCNC: 144 U/L (ref 45–117)
ALT SERPL-CCNC: 17 U/L (ref 13–61)
ANION GAP SERPL CALC-SCNC: 7 MMOL/L (ref 8–16)
APPEARANCE UR: (no result)
AST SERPL-CCNC: 16 U/L (ref 15–37)
BACTERIA # UR AUTO: 1895.1 /HPF
BASOPHILS # BLD: 0.6 % (ref 0–2)
BILIRUB SERPL-MCNC: 0.3 MG/DL (ref 0.2–1)
BILIRUB UR STRIP.AUTO-MCNC: NEGATIVE MG/DL
BUN SERPL-MCNC: 47.6 MG/DL (ref 7–18)
CALCIUM SERPL-MCNC: 9.2 MG/DL (ref 8.5–10.1)
CASTS URNS QL MICRO: 2 /LPF (ref 0–8)
CHLORIDE SERPL-SCNC: 102 MMOL/L (ref 98–107)
CO2 SERPL-SCNC: 27 MMOL/L (ref 21–32)
COLOR UR: YELLOW
CREAT SERPL-MCNC: 1.5 MG/DL (ref 0.55–1.3)
DEPRECATED RDW RBC AUTO: 15.5 % (ref 11.9–15.9)
EOSINOPHIL # BLD: 4.7 % (ref 0–4.5)
EPITH CASTS URNS QL MICRO: 0.9 /HPF
GLUCOSE SERPL-MCNC: 345 MG/DL (ref 74–106)
HCT VFR BLD CALC: 37.1 % (ref 35.4–49)
HGB BLD-MCNC: 12.3 GM/DL (ref 11.7–16.9)
KETONES UR QL STRIP: NEGATIVE
LEUKOCYTE ESTERASE UR QL STRIP.AUTO: (no result)
LYMPHOCYTES # BLD: 18 % (ref 8–40)
MCH RBC QN AUTO: 27.6 PG (ref 25.7–33.7)
MCHC RBC AUTO-ENTMCNC: 33.2 G/DL (ref 32–35.9)
MCV RBC: 83.2 FL (ref 80–96)
MONOCYTES # BLD AUTO: 10.1 % (ref 3.8–10.2)
NEUTROPHILS # BLD: 66.6 % (ref 42.8–82.8)
NITRITE UR QL STRIP: POSITIVE
PH BLDV: 7.37 [PH] (ref 7.31–7.41)
PH UR: 5.5 [PH] (ref 5–8)
PLATELET # BLD AUTO: 187 K/MM3 (ref 134–434)
PMV BLD: 9.4 FL (ref 7.5–11.1)
POTASSIUM SERPLBLD-SCNC: 4.1 MMOL/L (ref 3.5–5.1)
PROT SERPL-MCNC: 7.5 G/DL (ref 6.4–8.2)
PROT UR QL STRIP: (no result)
PROT UR QL STRIP: (no result)
RBC # BLD AUTO: 23 /HPF (ref 0–4)
RBC # BLD AUTO: 4.46 M/MM3 (ref 4–5.6)
SODIUM SERPL-SCNC: 136 MMOL/L (ref 136–145)
SP GR UR: 1.01 (ref 1.01–1.03)
UROBILINOGEN UR STRIP-MCNC: 0.2 MG/DL (ref 0.2–1)
VENOUS PC02: 48.5 MMHG (ref 38–52)
VENOUS PO2: < 49 MMHG (ref 28–48)
WBC # BLD AUTO: 15.3 K/MM3 (ref 4–10)
WBC # UR AUTO: 268 /HPF (ref 0–5)
YEAST BUDDING URNS QL: (no result)

## 2019-12-05 NOTE — PDOC
History of Present Illness





- General


Chief Complaint: Lethargy


Stated Complaint: Weakness


Time Seen by Provider: 12/05/19 17:27


History Source: Patient


Exam Limitations: No Limitations





- History of Present Illness


Initial Comments: 





12/05/19 17:43


79M w PMH IDDM, HTN, HLD, CAD, CKD, blindness, bladder outlet obstruction w 

camacho, dementia presenting from home w lethargy and hyperglycemia. Pt has 

dementia at baseline, is poor historian. Per wife, pt has been acting lethargic 

today with elevated blood glucose. Has camacho in place for bladder outlet 

obstruction for past month, was told by urologist to come to hospital to 

replace. Wife denies pt having fevers, cough. Pt denies chest pain, SOB, nausea/

vomiting.





Pt lives at home w wife and son (health care proxy, NP)








Past History





- Past Medical History


Allergies/Adverse Reactions: 


 Allergies











Allergy/AdvReac Type Severity Reaction Status Date / Time


 


No Known Allergies Allergy   Verified 02/15/19 11:57











Home Medications: 


Ambulatory Orders





Atorvastatin Ca [Lipitor] 40 mg PO DAILY 06/10/19 


Ezetimibe 10 mg PO HS 06/10/19 


Nebivolol HCl [Bystolic] 2.5 mg PO HS 06/10/19 


Compression Socks, Medium [Futuro Restoring] 1 each MC DAILY #1 each 10/11/19 


Insulin Aspart [Novolog] 2 unit SQ ACLD #1 cartridge 10/11/19 


Insulin Sliding Scale [Novolog Vial Sliding Scale -] 1 vial SQ ACHS  units 10/11

/19 


Tamsulosin HCl 0.4 mg PO BID #60 capsule 10/11/19 


Insulin (Levemir) [Levemir Vial] 24 units SQ DAILY 11/08/19 


Enalapril Maleate [Vasotec -] 2.5 mg PO DAILY  tablet 11/14/19 


Ferrous Sulfate [Feosol] 325 mg PO DAILY  ud 11/14/19 


Furosemide [Lasix -] 40 mg PO DAILY  tablet 11/14/19 


Polyethylene Glycol 3350 [Miralax 119 gm Btl -] 17 gm PO DAILY PRN  bottle 11/14 /19 








Anemia: No


Asthma: Yes


Cancer: No


Cardiac Disorders: Yes (cabg)


CVA: No


COPD: No


CHF: No


Dementia: No


Diabetes: Yes


GI Disorders: No


 Disorders: No


HTN: Yes


Hypercholesterolemia: Yes


Liver Disease: No


Seizures: No


Thyroid Disease: No





- Surgical History


Abdominal Surgery: No


Appendectomy: No


Cardiac Surgery: Yes ('96)


Cholecystectomy: No


Lung Surgery: No


Neurologic Surgery: No


Orthopedic Surgery: Yes (rt  hip orif)





- Immunization History


Immunization Up to Date: Yes





- Psycho Social/Smoking Cessation Hx


Smoking Status: No


Smoking History: Unknown if ever smoked


Have you smoked in the past 12 months: No


Number of Cigarettes Smoked Daily: 0


If you are a former smoker, when did you quit?: '96


Hx Alcohol Use: No


Drug/Substance Use Hx: No


Substance Use Type: None


Hx Substance Use Treatment: No





**Review of Systems





- Review of Systems


Able to Perform ROS?: No (dementia)





*Physical Exam





- Vital Signs


 Last Vital Signs











Temp Pulse Resp BP Pulse Ox


 


 98.6 F   76   20   138/66   99 


 


 12/05/19 16:33  12/05/19 16:33  12/05/19 16:33  12/05/19 16:33  12/05/19 16:33














- Physical Exam


General Appearance: Yes: Nourished, Appropriately Dressed, Disheveled


HEENT: positive: Hearing Grossly Normal, Other (R injected conjunctiva).  

negative: Scleral Icterus (R), Scleral Icterus (L)


Respiratory/Chest: positive: Lungs Clear, Normal Breath Sounds.  negative: 

Chest Tender, Respiratory Distress, Crackles, Rales, Rhonchi, Stridor, Wheezing


Cardiovascular: positive: Regular Rhythm, Regular Rate, S1, S2.  negative: Edema

, Murmur


Gastrointestinal/Abdominal: positive: Normal Bowel Sounds, Flat, Soft, Other (

chest/AB excoriations).  negative: Tender, Organomegaly, Rebound, Tenderness, 

Hernia, Mass


Male Genitalia: negative: normal genitalia (erythematous genitals, camacho in 

place)


Extremity: positive: Delayed Capillary Refill (4s)


Integumentary: positive: Dry


Neurologic: positive: Alert, Responsive.  negative: Fully Oriented





ED Treatment Course





- LABORATORY


CBC & Chemistry Diagram: 


 12/05/19 18:20





 12/05/19 19:39





- ADDITIONAL ORDERS


Additional order review: 


 Laboratory  Results











  12/05/19





  16:44


 


POC Glucometer  417








 











  12/05/19





  16:44


 


POC Glucometer  417














Medical Decision Making





- Medical Decision Making





12/05/19 18:25


CBC CMP trop UA lactate blood/urine cx


CXR shows no acute lung pathology


EKG shows NSR, HR 79, QTc 472, no ST changes


2L NS, vanc, zosyn,


WBC 15, UTI on UA, trop neg


---





79M w PMH IDDM, HTN, HLD, CAD, CKD, blindness, bladder outlet obstruction w 

camacho, dementia presenting w lethargy and hyperglycemia 2/2 UTI with DEB. Low 

concern for DKA (normal pH, no urine ketones) vs ACS (trop neg, NSR EKG) vs PNA 

(clear lungs CXR). Given 2L NS, vanc, zosyn, replaced indwelling camacho.  

after 1st L NC.





Admitted to m/s Dr Avilez for UTI w DEB, hyperglycemia.








Discharge





- Discharge Information


Problems reviewed: Yes


Clinical Impression/Diagnosis: 


 DEB (acute kidney injury), Hyperglycemia





UTI (urinary tract infection)


Qualifiers:


 Urinary tract infection type: acute cystitis Hematuria presence: without 

hematuria Qualified Code(s): N30.00 - Acute cystitis without hematuria





Condition: Stable





- Follow up/Referral





- Patient Discharge Instructions





- Post Discharge Activity

## 2019-12-05 NOTE — PN
Teaching Attending Note


Name of Resident: Heladio Bailey





ATTENDING PHYSICIAN STATEMENT





I saw and evaluated the patient.


I reviewed the resident's note and discussed the case with the resident.


I agree with the resident's findings and plan as documented.








SUBJECTIVE:


Patient is a 79 year old man with PMH of Dementia, NIDDM, CHF COPD, CAD (status 

post CABG) and CKD presenting with worsening lethargy. Patient is currently at 

home with his wife and his son who is an NP states they have noted that he has 

been more lethargic recently. They were concerned that he may have a urinary 

tract infection as patient does have an indwelling catheter. He denies any 

recent nausea or vomiting no fevers at home. Patient is unable to provide any 

history due to dementia. Denies tobacco, alcohol or illicit drug use. No recent 

travel or sick contact.





OBJECTIVE:


Alert


 Vital Signs











 Period  Temp  Pulse  Resp  BP Sys/Ramírez  Pulse Ox


 


 Last 24 Hr  98.6 F  76  20  138/66  99








HEENT: No Jaundice, eye redness or discharge, PERRLA, EOMI. Normocephalic, 

atraumatic. External ears are normal and hearing is grossly intact. No nasal 

discharge.


Neck: Supple, nontender. No palpable adenopathy or thyromegaly. No JVD


Chest: Good effort. Clear to auscultation and percussion.


Heart: Regular. No S3, rub or murmur


Abdomen: Not distended, soft, nontender and no HSM. No rebound or guarding.  

Normal bowel sounds.


Ext: Peripheral pulses intact. No leg edema. Right arm contracture.


Skin: Warm and dry. No petechiae, rash or ecchymosis.


Neuro: Alert. Oriented x3. CN 2-12 grossly intact. Sensation grossly intact in 

all four extremities and DTR are symmetric.


Psych: Appropriate mood and affect. Good insight.


 Current Medications











Generic Name Dose Route Start Last Admin





  Trade Name Freq  PRN Reason Stop Dose Admin


 


Sodium Chloride  2,055 mls @ 1,027.5 mls/hr  12/05/19 20:16  





  Normal Saline -  30 ml/kg infuse over 2 hr (2055 ml)  12/05/19 22:15  





  IV   





  ONCE ONE   





     





     





     





     








 Home Medications











 Medication  Instructions  Recorded


 


Atorvastatin Ca [Lipitor] 40 mg PO DAILY 06/10/19


 


Ezetimibe 10 mg PO HS 06/10/19


 


Nebivolol HCl [Bystolic] 2.5 mg PO HS 06/10/19


 


Compression Socks, Medium [Futuro 1 each MC DAILY #1 each 10/11/19





Restoring]  


 


Insulin Aspart [Novolog] 2 unit SQ ACLD #1 cartridge 10/11/19


 


Insulin Sliding Scale [Novolog 1 vial SQ ACHS  units 10/11/19





Vial Sliding Scale -]  


 


Tamsulosin HCl 0.4 mg PO BID #60 capsule 10/11/19


 


Insulin (Levemir) [Levemir Vial] 24 units SQ DAILY 11/08/19


 


Enalapril Maleate [Vasotec -] 2.5 mg PO DAILY  tablet 11/14/19


 


Ferrous Sulfate [Feosol] 325 mg PO DAILY  ud 11/14/19


 


Furosemide [Lasix -] 40 mg PO DAILY  tablet 11/14/19


 


Polyethylene Glycol 3350 [Miralax 17 gm PO DAILY PRN  bottle 11/14/19





119 gm Btl -]  








 Abnormal Lab Results











  12/05/19 12/05/19 12/05/19





  18:20 18:20 18:20


 


WBC  15.3 H  


 


Absolute Neuts (auto)  10.2 H  


 


Eosinophils %  4.7 H  


 


POC VBG pO2    < 49 H


 


VBG O2 Sat (Shelli)    61.8 L


 


Anion Gap   


 


BUN   


 


Creatinine   


 


Random Glucose   


 


Alkaline Phosphatase   


 


Albumin   


 


Urine Protein   1+ H 


 


Urine Glucose (UA)   3+ H 


 


Urine Blood   2+ H 


 


Urine Nitrite   Positive H 


 


Ur Leukocyte Esterase   2+ H 














  12/05/19





  19:39


 


WBC 


 


Absolute Neuts (auto) 


 


Eosinophils % 


 


POC VBG pO2 


 


VBG O2 Sat (Shelli) 


 


Anion Gap  7 L


 


BUN  47.6 H


 


Creatinine  1.5 H


 


Random Glucose  345 H


 


Alkaline Phosphatase  144 H


 


Albumin  3.0 L


 


Urine Protein 


 


Urine Glucose (UA) 


 


Urine Blood 


 


Urine Nitrite 


 


Ur Leukocyte Esterase 








ASSESSMENT AND PLAN:


1. UTI - Sepsis workup done. Will treat with IV Rocephin and Linezolid pending 

urine culture. Continue IV fluids. CXR prominent mediastinum but no 

infiltrates. EKG shows NSR with no ischemic changes. Will continue 

comprehensive care for all of patients comorbid conditions.





2. Hypoalbuminemia - Possibly due to combined effects of proteinuria, 

malnutrition and inflammation associated with comorbid chronic conditions. Will 

ensure adequate dietary protein intake and also consult dietician.





3. Uncontrolled DM  For now, we will hold the home diabetes drugs and 

implement sliding scale insulin regimen. Provide comprehensive diabetes care 

with patient teaching and counseling about the importance of adherence to 

prescribed diabetes regimen, euglycemia, eye care and foot care.





4. DEB - Likely multifactorial. Will get kidney sonogram, hydrate gently and 

monitor urine output. Will consult nephrology and avoid nephrotoxic agents such 

as NSAIDS, aminoglycosides, contrast dyes and certain Alternative medicine 

products.





5. DVT prophylaxis - Heparin 5000u sq tid.





6. Advance directives - Full code

## 2019-12-05 NOTE — HP
CHIEF COMPLAINT: abdominal discomfort 





PCP: Dr. Dali Durán


Urologist: Dr. IRENE Durán





HISTORY OF PRESENT ILLNESS:


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 

diabetic keratopathy, bph, chronic bladder outlet obstruction, and dementia c/o 

abdominal pain for 1 day. Pt's wife states he had a new Mckeon catheter placed 2 

days ago. Today he began to experience lower abdominal discomfort and tactile 

fever (not measured). At home he took Tylenol with minimal symptom relief. No 

NVD and chills. Pt has chronic constipation. The wife describes an approximate 

10 lb weight loss over several months and relates it to decreased appetite. 

Denies night sweats. The wife provides pt insulin and records blood sugar daily

, saying that it is always in the 300s. She states that they saw the 

ophthalmologist and podiatrist 6 month ago. The pt has never had a colonoscopy.

  Denies HA, dizziness, numbness, tingling, SOB, and CP.





ER course was notable for:


(1) vanc zosyn given 


(2) Mckeon changed


(3) UA positive for nitrites and LE





Recent Travel: Denies





PAST MEDICAL HISTORY: Insulin treated DM, HTN, HLD, CAD, CKD 3B, BL blindness, 

chronic bladder outlet obstruction, and dementia 





PAST SURGICAL HISTORY: Open heart surgery many years ago, LEFT knee surgery 





Social History:


Smoking: remote hx in youth 


Alcohol: denies


Drugs: denies 


- Former 


- Lives at [address] [place] [address] with wife and son who is a NP





Family history: Unknown parents, siblings with DM





Allergies: No Known Allergies Allergy (Verified 02/15/19 11:57)


 


HOME MEDICATIONS:


 








 Medication  Instructions  Recorded


 


Atorvastatin Ca [Lipitor] 40 mg PO DAILY 06/10/19


 


Ezetimibe 10 mg PO HS 06/10/19


 


Nebivolol HCl [Bystolic] 2.5 mg PO HS 06/10/19


 


Compression Socks, Medium [Futuro 1 each MC DAILY #1 each 10/11/19





Restoring]  


 


Insulin Aspart [Novolog] 2 unit SQ ACLD #1 cartridge 10/11/19


 


Insulin Sliding Scale [Novolog 1 vial SQ ACHS  units 10/11/19





Vial Sliding Scale -]  


 


Tamsulosin HCl 0.4 mg PO BID #60 capsule 10/11/19


 


Insulin (Levemir) [Levemir Vial] 24 units SQ DAILY 11/08/19


 


Enalapril Maleate [Vasotec -] 2.5 mg PO DAILY  tablet 11/14/19


 


Ferrous Sulfate [Feosol] 325 mg PO DAILY  ud 11/14/19


 


Furosemide [Lasix -] 40 mg PO DAILY  tablet 11/14/19


 


Polyethylene Glycol 3350 [Miralax 17 gm PO DAILY PRN  bottle 11/14/19





119 gm Btl -]  








REVIEW OF SYSTEMS


CONSTITUTIONAL: 


Absent:  fever, chills, diaphoresis, generalized weakness, malaise, loss of 

appetite, weight change


HEENT: 


Absent:  rhinorrhea, nasal congestion, throat pain, throat swelling, difficulty 

swallowing, mouth swelling, ear pain, eye pain, visual changes


CARDIOVASCULAR: 


Absent: chest pain, syncope, palpitations, irregular heart rate, lightheadedness

, peripheral edema


RESPIRATORY: 


Absent: cough, shortness of breath, dyspnea with exertion, orthopnea, wheezing, 

stridor, hemoptysis


GASTROINTESTINAL:


Absent: abdominal pain, abdominal distension, nausea, vomiting, diarrhea, 

constipation, melena, hematochezia


GENITOURINARY: 


Absent: dysuria, frequency, urgency, hesitancy, hematuria, flank pain, genital 

pain


MUSCULOSKELETAL: 


Absent: myalgia, arthralgia, joint swelling, back pain, neck pain


SKIN: 


Absent: rash, itching, pallor


HEMATOLOGIC/IMMUNOLOGIC: 


Absent: easy bleeding, easy bruising, lymphadenopathy, frequent infections


ENDOCRINE:


Absent: unexplained weight gain, unexplained weight loss, heat intolerance, 

cold intolerance


NEUROLOGIC: 


Absent: headache, focal weakness or paresthesias, dizziness, unsteady gait, 

seizure, mental status changes, bladder or bowel incontinence


PSYCHIATRIC: 


Absent: anxiety, depression, suicidal or homicidal ideation, hallucinations.








PHYSICAL EXAMINATION


 Vital Signs - 24 hr








  12/05/19





  16:33


 


Temperature 98.6 F


 


Pulse Rate 76


 


Respiratory 20





Rate 


 


Blood Pressure 138/66


 


O2 Sat by Pulse 99





Oximetry (%) 








GENERAL: AOx2 to person and place, speaks Malayalam


HEAD: NCAT


EYES: BL diabetic keratopahy, opaque cornea R>L 


ENT: Ears normal, nares patent, oropharynx clear without exudates. Dry mucous 

membranes.


NECK: No lymphadenopathy, JVD, or masses.


LUNGS: CTAB. No wheezes, and no crackles. No accessory muscle use.


HEART: RRR s1 s2


ABDOMEN: Tender to palpation in suprapubic region and RUQ, + guarding, soft, BS 

present in all 4 quadrants, non-distended


UPPER EXTREMITIES: RIGHT UE contracted. LUE 3/5 power. 2+ pulses, warm, well-

perfused. No cyanosis. No clubbing. No peripheral edema.


LOWER EXTREMITIES: 2+ pulses, warm, well-perfused. No calf tenderness. No 

peripheral edema. Dry. 


NEUROLOGICAL: Dysarthria. Contracted RUE. 3/5 strength LUE.  


SKIN: Dry. Diffuse excorations on chest L>R. Vertical sternal scar. 





 Laboratory Results - last 24 hr








  12/05/19 12/05/19 12/05/19





  16:44 18:20 18:20


 


WBC   15.3 H 


 


RBC   4.46 


 


Hgb   12.3 


 


Hct   37.1  D 


 


MCV   83.2 


 


MCH   27.6 


 


MCHC   33.2 


 


RDW   15.5 


 


Plt Count   187 


 


MPV   9.4  D 


 


Absolute Neuts (auto)   10.2 H 


 


Neutrophils %   66.6 


 


Lymphocytes %   18.0  D 


 


Monocytes %   10.1 


 


Eosinophils %   4.7 H 


 


Basophils %   0.6 


 


Nucleated RBC %   0 


 


VBG pH   


 


POC VBG pCO2   


 


POC VBG pO2   


 


VBG HCO3   


 


VBG O2 Sat (Shelli)   


 


VBG Base Excess   


 


Sodium    Cancelled


 


Potassium    Cancelled


 


Chloride    Cancelled


 


Carbon Dioxide    Cancelled


 


Anion Gap    Cancelled


 


BUN    Cancelled


 


Creatinine    Cancelled


 


Est GFR (CKD-EPI)AfAm    Cancelled


 


Est GFR (CKD-EPI)NonAf    Cancelled


 


POC Glucometer  417  


 


Random Glucose    Cancelled


 


Lactic Acid   


 


Calcium    Cancelled


 


Total Bilirubin    Cancelled


 


AST    Cancelled


 


ALT    Cancelled


 


Alkaline Phosphatase    Cancelled


 


Creatine Kinase   


 


Troponin I   


 


Total Protein    Cancelled


 


Albumin    Cancelled


 


Urine Color   


 


Urine Appearance   


 


Urine pH   


 


Ur Specific Gravity   


 


Urine Protein   


 


Urine Glucose (UA)   


 


Urine Ketones   


 


Urine Blood   


 


Urine Nitrite   


 


Urine Bilirubin   


 


Urine Urobilinogen   


 


Ur Leukocyte Esterase   


 


Urine WBC (Auto)   


 


Urine RBC (Auto)   


 


Urine Casts (Auto)   


 


U Pathogenic Cast Auto   


 


U Epithel Cells (Auto)   


 


Urine Bacteria (Auto)   


 


Urine Yeast (Auto)   














  12/05/19 12/05/19 12/05/19





  18:20 18:20 18:20


 


WBC   


 


RBC   


 


Hgb   


 


Hct   


 


MCV   


 


MCH   


 


MCHC   


 


RDW   


 


Plt Count   


 


MPV   


 


Absolute Neuts (auto)   


 


Neutrophils %   


 


Lymphocytes %   


 


Monocytes %   


 


Eosinophils %   


 


Basophils %   


 


Nucleated RBC %   


 


VBG pH   7.37 


 


POC VBG pCO2   48.5 


 


POC VBG pO2   < 49 H 


 


VBG HCO3   27.2 


 


VBG O2 Sat (Shelli)   61.8 L 


 


VBG Base Excess   1.7 


 


Sodium   


 


Potassium   


 


Chloride   


 


Carbon Dioxide   


 


Anion Gap   


 


BUN   


 


Creatinine   


 


Est GFR (CKD-EPI)AfAm   


 


Est GFR (CKD-EPI)NonAf   


 


POC Glucometer   


 


Random Glucose   


 


Lactic Acid   


 


Calcium   


 


Total Bilirubin   


 


AST   


 


ALT   


 


Alkaline Phosphatase   


 


Creatine Kinase    Cancelled


 


Troponin I    Cancelled


 


Total Protein   


 


Albumin   


 


Urine Color  Yellow  


 


Urine Appearance  Cloudy  


 


Urine pH  5.5  


 


Ur Specific Gravity  1.013  


 


Urine Protein  1+ H  


 


Urine Glucose (UA)  3+ H  


 


Urine Ketones  Negative  


 


Urine Blood  2+ H  


 


Urine Nitrite  Positive H  


 


Urine Bilirubin  Negative  


 


Urine Urobilinogen  0.2  


 


Ur Leukocyte Esterase  2+ H  


 


Urine WBC (Auto)  268  


 


Urine RBC (Auto)  23  


 


Urine Casts (Auto)  2  


 


U Pathogenic Cast Auto  None  


 


U Epithel Cells (Auto)  0.9  


 


Urine Bacteria (Auto)  1895.1  


 


Urine Yeast (Auto)  Few  














  12/05/19 12/05/19





  19:39 21:00


 


WBC  


 


RBC  


 


Hgb  


 


Hct  


 


MCV  


 


MCH  


 


MCHC  


 


RDW  


 


Plt Count  


 


MPV  


 


Absolute Neuts (auto)  


 


Neutrophils %  


 


Lymphocytes %  


 


Monocytes %  


 


Eosinophils %  


 


Basophils %  


 


Nucleated RBC %  


 


VBG pH  


 


POC VBG pCO2  


 


POC VBG pO2  


 


VBG HCO3  


 


VBG O2 Sat (Shelli)  


 


VBG Base Excess  


 


Sodium  136 


 


Potassium  4.1 


 


Chloride  102 


 


Carbon Dioxide  27 


 


Anion Gap  7 L 


 


BUN  47.6 H 


 


Creatinine  1.5 H 


 


Est GFR (CKD-EPI)AfAm  50.59 


 


Est GFR (CKD-EPI)NonAf  43.65 


 


POC Glucometer  


 


Random Glucose  345 H 


 


Lactic Acid   1.2


 


Calcium  9.2 


 


Total Bilirubin  0.3 


 


AST  16 


 


ALT  17 


 


Alkaline Phosphatase  144 H 


 


Creatine Kinase  146 


 


Troponin I  < 0.02 


 


Total Protein  7.5 


 


Albumin  3.0 L 


 


Urine Color  


 


Urine Appearance  


 


Urine pH  


 


Ur Specific Gravity  


 


Urine Protein  


 


Urine Glucose (UA)  


 


Urine Ketones  


 


Urine Blood  


 


Urine Nitrite  


 


Urine Bilirubin  


 


Urine Urobilinogen  


 


Ur Leukocyte Esterase  


 


Urine WBC (Auto)  


 


Urine RBC (Auto)  


 


Urine Casts (Auto)  


 


U Pathogenic Cast Auto  


 


U Epithel Cells (Auto)  


 


Urine Bacteria (Auto)  


 


Urine Yeast (Auto)  








ASSESSMENT/PLAN:


80 y/o male PMH insulin treated DM, HTN, HLD, CAD, CKD 3B, BL blindness, bph, 

chronic bladder outlet obstruction, and dementia c/o abdominal pain. UA and 

clinical presentation consistent with UTI. Must explore DEB. 





# Complicated UTI


- Past culture MRSA, strep, staph caog neg


- Linezolid 600 mg IV BID


- Ceftriaxone 1 g IV QD


- Hydrate gently 


- Maintain Mckeon





# DEB on CKD 3b


- BUN/Cr 47.6/1.5


- Chronic Mckeon placement given obstructive BPH


- Hydrate gently 


- Maintain Mckeon


- Prev visit CT: several RIGHT renal 1-3 mm non-obstructive calculi; RIGHT 

renal cysts. 





# Hyperglycemia


- Poor medication compliance. 


- Educate care give/wife


- ISS


- Social work





# Elevated alk phos


- No cholecystitis on prev CT


- ?Liver/bile duct, ?CA (never had colonoscopy), never had DEXA





# Hypoalbuminemia 


- Possibly d/t proteinuria, malnutrition, and inflammation assoc. with comorbid 

chronic conditions


- Ensure adequate dietary protein intake


- Consult dietician





# DM


- Hold home regimen


- ISS ACHS





# HTN


- Cont. curent home regimen





# HLD


- Cont. curent home regimen





#F/E/N


- NS 


- Cont. to monitor


- Renal diet, low sodium, low fat/chol





# DVT prophylaxis


- Heparin SQ





# Disposition


- Admit to med/surg





Heladio Bailey MD




















Visit type





- Emergency Visit


Emergency Visit: Yes


ED Registration Date: 12/05/19


Care time: The patient presented to the Emergency Department on the above date 

and was hospitalized for further evaluation of their emergent condition.





- New Patient


This patient is new to me today: Yes


Date on this admission: 12/06/19





- Critical Care


Critical Care patient: No





ATTENDING PHYSICIAN STATEMENT





I saw and evaluated the patient.


I reviewed the resident's note and discussed the case with the resident.


I agree with the resident's findings and plan as documented.








SUBJECTIVE:








OBJECTIVE:








ASSESSMENT AND PLAN:

## 2019-12-05 NOTE — PDOC
Documentation entered by Phil Kidd SCRIBE, acting as scribe for 

Elvi Funk MD.








Elvi Funk MD:  This documentation has been prepared by the Marti nettles Nirvannie, SCRIBE, under my direction and personally reviewed by me in 

its entirety.  I confirm that the documentation accurately reflects all work, 

treatment, procedures, and medical decision making performed by me.  





Attending Attestation





- Resident


Resident Name: Rai Thayer





- ED Attending Attestation


I have performed the following: I have examined & evaluated the patient, The 

case was reviewed & discussed with the resident, I agree w/resident's findings 

& plan





- HPI


HPI: 





12/05/19 20:19


79-year-old male history of dementia diabetes CHF COPD status post CABG and CKD 

here with worsening lethargy.  Patient is currently at home with his wife and 

his son who is an NP states they have noted that he has been more lethargic 

recently he was concerned that he may have a urinary tract infection as patient 

does have an indwelling catheter denies any recent nausea or vomiting no fevers 

at home patient is unable to provide any history as he is currently demented 

answers in one-word history is provided by old chart review and the wife who is 

at the bedside in addition to the patient's son





- Physicial Exam


PE: 





12/05/19 20:20


Patient is awake appears disoriented has dry mucous membranes lungs are clear 

bilaterally heart is regular without murmurs rubs or gallops abdomen is soft 

and nontender Mckeon catheter is in place there is noted to be cloudy urine in 

the catheter.  Extremities are warm well perfused skin is warm and dry patient 

is oriented to person only





- Medical Decision Making





12/05/19 20:20


 79-year-old male history of diabetes hypertension CAD CABG COPD CHF CKD here 

with altered mental status indwelling Mckeon.





Differential includes sepsis secondary to UTI, renal failure, electrolyte 

disturbance, hyperglycemia nonketotic hyperosmolar syndrome, DKA.





Plan IV hydration with 2 L normal saline was given.  UA and urine cultures were 

sent and are pending patient will be covered with Vanco Zosyn for presumed UTI 

based on appearance of the urine the Mckeon will be changed chest x-ray ordered 

and pending





**Heart Score/ECG Review


  ** #1


General ECG Interpretation: Sinus Rhythm, Normal Rate (79), Normal Intervals, 

No acute ischemic changes

## 2019-12-06 LAB
ALBUMIN SERPL-MCNC: 2.6 G/DL (ref 3.4–5)
ALP SERPL-CCNC: 125 U/L (ref 45–117)
ALT SERPL-CCNC: 15 U/L (ref 13–61)
ANION GAP SERPL CALC-SCNC: 6 MMOL/L (ref 8–16)
ANION GAP SERPL CALC-SCNC: 6 MMOL/L (ref 8–16)
AST SERPL-CCNC: 13 U/L (ref 15–37)
BILIRUB SERPL-MCNC: 0.3 MG/DL (ref 0.2–1)
BUN SERPL-MCNC: 28.6 MG/DL (ref 7–18)
BUN SERPL-MCNC: 32.9 MG/DL (ref 7–18)
CALCIUM SERPL-MCNC: 8.9 MG/DL (ref 8.5–10.1)
CALCIUM SERPL-MCNC: 9.1 MG/DL (ref 8.5–10.1)
CHLORIDE SERPL-SCNC: 105 MMOL/L (ref 98–107)
CHLORIDE SERPL-SCNC: 106 MMOL/L (ref 98–107)
CO2 SERPL-SCNC: 27 MMOL/L (ref 21–32)
CO2 SERPL-SCNC: 28 MMOL/L (ref 21–32)
CREAT SERPL-MCNC: 1.2 MG/DL (ref 0.55–1.3)
CREAT SERPL-MCNC: 1.4 MG/DL (ref 0.55–1.3)
DEPRECATED RDW RBC AUTO: 15.2 % (ref 11.9–15.9)
DEPRECATED RDW RBC AUTO: 15.5 % (ref 11.9–15.9)
GLUCOSE SERPL-MCNC: 321 MG/DL (ref 74–106)
GLUCOSE SERPL-MCNC: 437 MG/DL (ref 74–106)
HCT VFR BLD CALC: 33 % (ref 35.4–49)
HCT VFR BLD CALC: 33.7 % (ref 35.4–49)
HGB BLD-MCNC: 11 GM/DL (ref 11.7–16.9)
HGB BLD-MCNC: 11.1 GM/DL (ref 11.7–16.9)
MAGNESIUM SERPL-MCNC: 2.5 MG/DL (ref 1.8–2.4)
MCH RBC QN AUTO: 27.3 PG (ref 25.7–33.7)
MCH RBC QN AUTO: 27.4 PG (ref 25.7–33.7)
MCHC RBC AUTO-ENTMCNC: 33 G/DL (ref 32–35.9)
MCHC RBC AUTO-ENTMCNC: 33.2 G/DL (ref 32–35.9)
MCV RBC: 82.4 FL (ref 80–96)
MCV RBC: 83.2 FL (ref 80–96)
PHOSPHATE SERPL-MCNC: 2.2 MG/DL (ref 2.5–4.9)
PLATELET # BLD AUTO: 151 K/MM3 (ref 134–434)
PLATELET # BLD AUTO: 168 K/MM3 (ref 134–434)
PMV BLD: 9.1 FL (ref 7.5–11.1)
PMV BLD: 9.3 FL (ref 7.5–11.1)
POTASSIUM SERPLBLD-SCNC: 3.8 MMOL/L (ref 3.5–5.1)
POTASSIUM SERPLBLD-SCNC: 4.2 MMOL/L (ref 3.5–5.1)
PROT SERPL-MCNC: 7 G/DL (ref 6.4–8.2)
RBC # BLD AUTO: 4.01 M/MM3 (ref 4–5.6)
RBC # BLD AUTO: 4.05 M/MM3 (ref 4–5.6)
SODIUM SERPL-SCNC: 139 MMOL/L (ref 136–145)
SODIUM SERPL-SCNC: 140 MMOL/L (ref 136–145)
WBC # BLD AUTO: 12.4 K/MM3 (ref 4–10)
WBC # BLD AUTO: 13.5 K/MM3 (ref 4–10)

## 2019-12-06 RX ADMIN — SODIUM CHLORIDE SCH MLS/HR: 9 INJECTION, SOLUTION INTRAVENOUS at 19:27

## 2019-12-06 RX ADMIN — INSULIN ASPART SCH UNIT: 100 INJECTION, SOLUTION INTRAVENOUS; SUBCUTANEOUS at 10:47

## 2019-12-06 RX ADMIN — SODIUM CHLORIDE SCH MLS/HR: 9 INJECTION, SOLUTION INTRAVENOUS at 09:45

## 2019-12-06 RX ADMIN — EZETIMIBE SCH MG: 10 TABLET ORAL at 22:53

## 2019-12-06 RX ADMIN — LINEZOLID SCH MLS/HR: 600 INJECTION, SOLUTION INTRAVENOUS at 11:00

## 2019-12-06 RX ADMIN — CEFTRIAXONE SCH MLS/HR: 1 INJECTION, POWDER, FOR SOLUTION INTRAMUSCULAR; INTRAVENOUS at 09:45

## 2019-12-06 RX ADMIN — SODIUM CHLORIDE SCH MLS/HR: 9 INJECTION, SOLUTION INTRAVENOUS at 10:00

## 2019-12-06 RX ADMIN — INSULIN ASPART SCH UNIT: 100 INJECTION, SOLUTION INTRAVENOUS; SUBCUTANEOUS at 19:26

## 2019-12-06 RX ADMIN — SODIUM CHLORIDE SCH MLS/HR: 9 INJECTION, SOLUTION INTRAVENOUS at 17:07

## 2019-12-06 RX ADMIN — NEBIVOLOL HYDROCHLORIDE SCH MG: 2.5 TABLET ORAL at 22:53

## 2019-12-06 RX ADMIN — LINEZOLID SCH MLS/HR: 600 INJECTION, SOLUTION INTRAVENOUS at 03:15

## 2019-12-06 RX ADMIN — LINEZOLID SCH MLS/HR: 600 INJECTION, SOLUTION INTRAVENOUS at 14:23

## 2019-12-06 RX ADMIN — HEPARIN SODIUM SCH UNIT: 5000 INJECTION, SOLUTION INTRAVENOUS; SUBCUTANEOUS at 17:07

## 2019-12-06 RX ADMIN — ATORVASTATIN CALCIUM SCH: 80 TABLET, FILM COATED ORAL at 22:53

## 2019-12-06 RX ADMIN — HEPARIN SODIUM SCH UNIT: 5000 INJECTION, SOLUTION INTRAVENOUS; SUBCUTANEOUS at 18:00

## 2019-12-06 RX ADMIN — INSULIN ASPART SCH UNIT: 100 INJECTION, SOLUTION INTRAVENOUS; SUBCUTANEOUS at 10:45

## 2019-12-06 NOTE — PN
Teaching Attending Note


Name of Resident: Collins Duron





ATTENDING PHYSICIAN STATEMENT





I saw and evaluated the patient.


I reviewed the resident's note and discussed the case with the resident.


I agree with the resident's findings and plan as documented.








SUBJECTIVE: Unable to provide history. History taken from son at bedside - 2 

days of lethargy and suprapubic pain after missing urology appt for camacho 

replacement.








OBJECTIVE: Afebrile, Hemodynamically Stable





 Last Vital Signs











Temp Pulse Resp BP Pulse Ox


 


 98.0 F   61   18   124/51 L  99 


 


 12/06/19 17:47  12/06/19 17:47  12/06/19 17:47  12/06/19 17:47  12/06/19 17:47








HEENT - Atraumatic, normocephalic


Heart - S1, S2, RRR


Lungs - good air entry bilaterally


Abdomen - Soft, non-tender. 


Extremtiies - chronic venous stasis skin changes LEs.


Neuro - Disoriented. Moving all extremities. No focal weakness.





 Laboratory Results - last 24 hr











  12/05/19 12/05/19 12/05/19





  18:20 18:20 18:20


 


WBC   


 


RBC   


 


Hgb   


 


Hct   


 


MCV   


 


MCH   


 


MCHC   


 


RDW   


 


Plt Count   


 


MPV   


 


Sodium  Cancelled  


 


Potassium  Cancelled  


 


Chloride  Cancelled  


 


Carbon Dioxide  Cancelled  


 


Anion Gap  Cancelled  


 


BUN  Cancelled  


 


Creatinine  Cancelled  


 


Est GFR (CKD-EPI)AfAm  Cancelled  


 


Est GFR (CKD-EPI)NonAf  Cancelled  


 


POC Glucometer   


 


Random Glucose  Cancelled  


 


Hemoglobin A1c %   


 


Lactic Acid   


 


Calcium  Cancelled  


 


Phosphorus   


 


Magnesium   


 


Total Bilirubin  Cancelled  


 


AST  Cancelled  


 


ALT  Cancelled  


 


Alkaline Phosphatase  Cancelled  


 


Creatine Kinase    Cancelled


 


Troponin I    Cancelled


 


Total Protein  Cancelled  


 


Albumin  Cancelled  


 


U Pathogenic Cast Auto   None 


 


Urine Yeast (Auto)   Few 














  12/05/19 12/05/19 12/06/19





  19:39 21:00 06:20


 


WBC   


 


RBC   


 


Hgb   


 


Hct   


 


MCV   


 


MCH   


 


MCHC   


 


RDW   


 


Plt Count   


 


MPV   


 


Sodium  136  


 


Potassium  4.1  


 


Chloride  102  


 


Carbon Dioxide  27  


 


Anion Gap  7 L  


 


BUN  47.6 H  


 


Creatinine  1.5 H  


 


Est GFR (CKD-EPI)AfAm  50.59  


 


Est GFR (CKD-EPI)NonAf  43.65  


 


POC Glucometer   


 


Random Glucose  345 H  


 


Hemoglobin A1c %    9.5 H


 


Lactic Acid   1.2 


 


Calcium  9.2  


 


Phosphorus   


 


Magnesium   


 


Total Bilirubin  0.3  


 


AST  16  


 


ALT  17  


 


Alkaline Phosphatase  144 H  


 


Creatine Kinase  146  


 


Troponin I  < 0.02  


 


Total Protein  7.5  


 


Albumin  3.0 L  


 


U Pathogenic Cast Auto   


 


Urine Yeast (Auto)   














  12/06/19 12/06/19 12/06/19





  06:20 06:20 07:54


 


WBC  13.5 H  


 


RBC  4.05  


 


Hgb  11.1 L  


 


Hct  33.7 L  


 


MCV  83.2  


 


MCH  27.4  


 


MCHC  33.0  


 


RDW  15.2  


 


Plt Count  151  


 


MPV  9.3  


 


Sodium   139 


 


Potassium   3.8 


 


Chloride   105 


 


Carbon Dioxide   27 


 


Anion Gap   6 L 


 


BUN   32.9 H 


 


Creatinine   1.2 


 


Est GFR (CKD-EPI)AfAm   66.26 


 


Est GFR (CKD-EPI)NonAf   57.17 


 


POC Glucometer   


 


Random Glucose   321 H 


 


Hemoglobin A1c %   


 


Lactic Acid    1.2


 


Calcium   8.9 


 


Phosphorus   


 


Magnesium   


 


Total Bilirubin   


 


AST   


 


ALT   


 


Alkaline Phosphatase   


 


Creatine Kinase   


 


Troponin I   


 


Total Protein   


 


Albumin   


 


U Pathogenic Cast Auto   


 


Urine Yeast (Auto)   














  12/06/19 12/06/19 12/06/19





  10:45 13:25 13:25


 


WBC   12.4 H 


 


RBC   4.01 


 


Hgb   11.0 L 


 


Hct   33.0 L 


 


MCV   82.4 


 


MCH   27.3 


 


MCHC   33.2 


 


RDW   15.5 


 


Plt Count   168 


 


MPV   9.1 


 


Sodium    140


 


Potassium    4.2


 


Chloride    106


 


Carbon Dioxide    28


 


Anion Gap    6 L


 


BUN    28.6 H


 


Creatinine    1.4 H


 


Est GFR (CKD-EPI)AfAm    54.99


 


Est GFR (CKD-EPI)NonAf    47.45


 


POC Glucometer  392  


 


Random Glucose    437 H*


 


Hemoglobin A1c %   


 


Lactic Acid   


 


Calcium    9.1


 


Phosphorus    2.2 L


 


Magnesium    2.5 H


 


Total Bilirubin    0.3


 


AST    13 L


 


ALT    15


 


Alkaline Phosphatase    125 H


 


Creatine Kinase   


 


Troponin I   


 


Total Protein    7.0


 


Albumin    2.6 L


 


U Pathogenic Cast Auto   


 


Urine Yeast (Auto)   








 Current Medications











Generic Name Dose Route Start Last Admin





  Trade Name Freq  PRN Reason Stop Dose Admin


 


Albuterol Sulfate  2 puff  12/06/19 14:14  





  Ventolin Hfa Inhaler -  IH   





  Q4H PRN   





  [facility]   





     





     





     


 


Atorvastatin Calcium  80 mg  12/06/19 22:00  





  Lipitor -  PO   





  HS [facility]   





     





     





     





     


 


Ezetimibe  10 mg  12/06/19 22:00  





  Zetia -  PO   





  [facility]   





     





     





     





     


 


Heparin Sodium (Porcine)  5,000 unit  12/06/19 18:00  12/06/19 18:00





  Heparin -  SQ   5,000 unit





  Q8H-IV MARY   Administration





     





     





     





     


 


Linezolid 600 mg/  300 mls @ 300 mls/hr  12/05/19 23:45  





  Miscellaneous  IVPB   





  Q12H [facility]   





     





     





  Protocol   





     


 


Ceftriaxone Sodium 1 gm/  50 mls @ 100 mls/hr  12/06/19 10:00  12/06/19 09:45





  Dextrose  IVPB   100 mls/hr





  DAILY [facility]   Administration





     





     





     





     


 


Sodium Chloride  1,000 mls @ 42 mls/hr  12/06/19 16:45  12/06/19 17:07





  Normal Saline -  IV   42 mls/hr





  ASDIR [facility]   Administration





     





     





     





     


 


Insulin Aspart  1 vial  12/06/19 11:00  12/06/19 10:47





  Novolog Vial Sliding Scale -  SQ   8 unit





  TIDAC [facility]   Administration





     





     





  Protocol   





     


 


Insulin Detemir  24 units  12/07/19 07:00  





  Levemir Vial  SQ   





  AM [facility]   





     





     





     





     


 


Nebivolol  2.5 mg  12/06/19 22:00  





  Bystolic -  PO   





  HS [facility]   





     





     





     





     


 


Polyethylene Glycol  17 gm  12/06/19 14:14  





  Miralax (For Daily Use) -  PO   





  DAILY PRN   





  [facility]   





     





     





     








 Home Medications











 Medication  Instructions  Recorded


 


Atorvastatin Ca [Lipitor] 80 mg PO DAILY 06/10/19


 


Ezetimibe 10 mg PO HS 06/10/19


 


Nebivolol HCl [Bystolic] 2.5 mg PO HS 06/10/19


 


Insulin Sliding Scale [Novolog 1 vial SQ ACHS  units 10/11/19





Vial Sliding Scale -]  


 


Insulin (Levemir) [Levemir Vial] 22 units SQ DAILY 11/08/19


 


Ferrous Sulfate [Feosol] 325 mg PO DAILY  ud 11/14/19


 


Polyethylene Glycol 3350 [Miralax 17 gm PO DAILY PRN  bottle 11/14/19





119 gm Btl -]  


 


Albuterol Sulfate Inhaler - 2 inh PO Q4H PRN 12/05/19





[Ventolin Hfa Inhaler -]  


 


Ipratropium 0.02% Nebulizer 1 amp NEB Q6H PRN 12/05/19





[Atrovent 0.02% Nebulizer -]  


 


Clopidogrel Bisulfate [Plavix] 75 mg PO DAILY 12/06/19


 


Furosemide [Lasix -] 80 mg PO DAILY 12/06/19

















ASSESSMENT AND PLAN:





79 year old male with Dementia, DM 2, HTN, HLD, CAD s/p CABG, CKD 3, Blindness 2

/2 diabetic keratopathy, BPH, Chronic bladder outlet obstruction s/p indwelling 

camacho presents with listlessness, lethargy, suprapubic pain for 2 days. 





1. Complicated UTI, catheter associated.


Urine Cx requested, Camacho exchanged.


Afebrile, Hemodynamically Stable.


Hx of MRSA UTI - covered with Ceftriaxone and Linezolid.


IV hydration.





2. DEB on CKD 3


CT A/P on prior visit - several RIGHT renal 1-3 mm non-obstructive calculi; 

RIGHT renal cysts. 


Renal US


Lasix held. IV Hydration





3. Hyperglycemia, background DM 2 - Insulin Levemir and Novolog as per sliding 

scale.





4. Hypoalbuminemia sec to malnutrition


Dietary consulted for nutrition recommendations.





5. HTN - Continue Nebivolol.





6. Chronic Diastolic CHF - Continue BB. Will hold Lasix held in favor of 

hydration.





7. HLD - Continue Statin.








DVT Px - Heparin SQ

## 2019-12-06 NOTE — PN
Physical Exam: 


SUBJECTIVE: Patient seen and examined at the bedside. Patient laying in bed in 

mild discomfort. Unable to provide ROS due to dementia. Is oriented x1. 


Family noting that old catheter was placed over 30 days ago and changed in ED 

on this admission. 








OBJECTIVE:





 Vital Signs











 Period  Temp  Pulse  Resp  BP Sys/Ramírez  Pulse Ox


 


 Last 24 Hr  98.1 F-98.7 F  70-76  18-22  130-156/54-68  











GENERAL: AOx1 to person, speaks Malayalam


HEAD: Normocephalic and atraumatic


EYES: BL diabetic keratopahy, opaque cornea R>L 


ENT: Oropharynx clear without exudates. Moist mucous membranes.


NECK: No lymphadenopathy, JVD, or masses.


LUNGS: Clear to auscultation bilaterally. No wheezes, and no crackles. No 

accessory muscle use.


HEART: Regular rate and rhythm, no murmurs, rubs appreciated. 


ABDOMEN: Tender to palpation in suprapubic region, guarding, soft, BS present 

in all 4 quadrants, non-distended


UPPER EXTREMITIES: RIGHT UE contracted.  2+ pulses, warm, well-perfused. No 

cyanosis. No clubbing. No peripheral edema.


LOWER EXTREMITIES: 2+ pulses, warm, well-perfused. No calf tenderness. No 

peripheral edema. Dry. 


NEUROLOGICAL: Dysarthria. Contracted RUE. LUE 4/5 power. Unable to comply with 

rest of neurological exam


SKIN: Dry. Diffuse excorations on chest L>R. Vertical sternal scar. 














 Laboratory Results - last 24 hr











  12/05/19 12/05/19 12/05/19





  16:44 18:20 18:20


 


WBC   15.3 H 


 


RBC   4.46 


 


Hgb   12.3 


 


Hct   37.1  D 


 


MCV   83.2 


 


MCH   27.6 


 


MCHC   33.2 


 


RDW   15.5 


 


Plt Count   187 


 


MPV   9.4  D 


 


Absolute Neuts (auto)   10.2 H 


 


Neutrophils %   66.6 


 


Lymphocytes %   18.0  D 


 


Monocytes %   10.1 


 


Eosinophils %   4.7 H 


 


Basophils %   0.6 


 


Nucleated RBC %   0 


 


VBG pH   


 


POC VBG pCO2   


 


POC VBG pO2   


 


VBG HCO3   


 


VBG O2 Sat (Shelli)   


 


VBG Base Excess   


 


Sodium    Cancelled


 


Potassium    Cancelled


 


Chloride    Cancelled


 


Carbon Dioxide    Cancelled


 


Anion Gap    Cancelled


 


BUN    Cancelled


 


Creatinine    Cancelled


 


Est GFR (CKD-EPI)AfAm    Cancelled


 


Est GFR (CKD-EPI)NonAf    Cancelled


 


POC Glucometer  417  


 


Random Glucose    Cancelled


 


Hemoglobin A1c %   


 


Lactic Acid   


 


Calcium    Cancelled


 


Phosphorus   


 


Magnesium   


 


Total Bilirubin    Cancelled


 


AST    Cancelled


 


ALT    Cancelled


 


Alkaline Phosphatase    Cancelled


 


Creatine Kinase   


 


Troponin I   


 


Total Protein    Cancelled


 


Albumin    Cancelled


 


Urine Color   


 


Urine Appearance   


 


Urine pH   


 


Ur Specific Gravity   


 


Urine Protein   


 


Urine Glucose (UA)   


 


Urine Ketones   


 


Urine Blood   


 


Urine Nitrite   


 


Urine Bilirubin   


 


Urine Urobilinogen   


 


Ur Leukocyte Esterase   


 


Urine WBC (Auto)   


 


Urine RBC (Auto)   


 


Urine Casts (Auto)   


 


U Pathogenic Cast Auto   


 


U Epithel Cells (Auto)   


 


Urine Bacteria (Auto)   


 


Urine Yeast (Auto)   














  12/05/19 12/05/19 12/05/19





  18:20 18:20 18:20


 


WBC   


 


RBC   


 


Hgb   


 


Hct   


 


MCV   


 


MCH   


 


MCHC   


 


RDW   


 


Plt Count   


 


MPV   


 


Absolute Neuts (auto)   


 


Neutrophils %   


 


Lymphocytes %   


 


Monocytes %   


 


Eosinophils %   


 


Basophils %   


 


Nucleated RBC %   


 


VBG pH   7.37 


 


POC VBG pCO2   48.5 


 


POC VBG pO2   < 49 H 


 


VBG HCO3   27.2 


 


VBG O2 Sat (Shelli)   61.8 L 


 


VBG Base Excess   1.7 


 


Sodium   


 


Potassium   


 


Chloride   


 


Carbon Dioxide   


 


Anion Gap   


 


BUN   


 


Creatinine   


 


Est GFR (CKD-EPI)AfAm   


 


Est GFR (CKD-EPI)NonAf   


 


POC Glucometer   


 


Random Glucose   


 


Hemoglobin A1c %   


 


Lactic Acid   


 


Calcium   


 


Phosphorus   


 


Magnesium   


 


Total Bilirubin   


 


AST   


 


ALT   


 


Alkaline Phosphatase   


 


Creatine Kinase    Cancelled


 


Troponin I    Cancelled


 


Total Protein   


 


Albumin   


 


Urine Color  Yellow  


 


Urine Appearance  Cloudy  


 


Urine pH  5.5  


 


Ur Specific Gravity  1.013  


 


Urine Protein  1+ H  


 


Urine Glucose (UA)  3+ H  


 


Urine Ketones  Negative  


 


Urine Blood  2+ H  


 


Urine Nitrite  Positive H  


 


Urine Bilirubin  Negative  


 


Urine Urobilinogen  0.2  


 


Ur Leukocyte Esterase  2+ H  


 


Urine WBC (Auto)  268  


 


Urine RBC (Auto)  23  


 


Urine Casts (Auto)  2  


 


U Pathogenic Cast Auto  None  


 


U Epithel Cells (Auto)  0.9  


 


Urine Bacteria (Auto)  1895.1  


 


Urine Yeast (Auto)  Few  














  12/05/19 12/05/19 12/06/19





  19:39 21:00 06:20


 


WBC   


 


RBC   


 


Hgb   


 


Hct   


 


MCV   


 


MCH   


 


MCHC   


 


RDW   


 


Plt Count   


 


MPV   


 


Absolute Neuts (auto)   


 


Neutrophils %   


 


Lymphocytes %   


 


Monocytes %   


 


Eosinophils %   


 


Basophils %   


 


Nucleated RBC %   


 


VBG pH   


 


POC VBG pCO2   


 


POC VBG pO2   


 


VBG HCO3   


 


VBG O2 Sat (Shelli)   


 


VBG Base Excess   


 


Sodium  136  


 


Potassium  4.1  


 


Chloride  102  


 


Carbon Dioxide  27  


 


Anion Gap  7 L  


 


BUN  47.6 H  


 


Creatinine  1.5 H  


 


Est GFR (CKD-EPI)AfAm  50.59  


 


Est GFR (CKD-EPI)NonAf  43.65  


 


POC Glucometer   


 


Random Glucose  345 H  


 


Hemoglobin A1c %    9.5 H


 


Lactic Acid   1.2 


 


Calcium  9.2  


 


Phosphorus   


 


Magnesium   


 


Total Bilirubin  0.3  


 


AST  16  


 


ALT  17  


 


Alkaline Phosphatase  144 H  


 


Creatine Kinase  146  


 


Troponin I  < 0.02  


 


Total Protein  7.5  


 


Albumin  3.0 L  


 


Urine Color   


 


Urine Appearance   


 


Urine pH   


 


Ur Specific Gravity   


 


Urine Protein   


 


Urine Glucose (UA)   


 


Urine Ketones   


 


Urine Blood   


 


Urine Nitrite   


 


Urine Bilirubin   


 


Urine Urobilinogen   


 


Ur Leukocyte Esterase   


 


Urine WBC (Auto)   


 


Urine RBC (Auto)   


 


Urine Casts (Auto)   


 


U Pathogenic Cast Auto   


 


U Epithel Cells (Auto)   


 


Urine Bacteria (Auto)   


 


Urine Yeast (Auto)   














  12/06/19 12/06/19 12/06/19





  06:20 06:20 07:54


 


WBC  13.5 H  


 


RBC  4.05  


 


Hgb  11.1 L  


 


Hct  33.7 L  


 


MCV  83.2  


 


MCH  27.4  


 


MCHC  33.0  


 


RDW  15.2  


 


Plt Count  151  


 


MPV  9.3  


 


Absolute Neuts (auto)   


 


Neutrophils %   


 


Lymphocytes %   


 


Monocytes %   


 


Eosinophils %   


 


Basophils %   


 


Nucleated RBC %   


 


VBG pH   


 


POC VBG pCO2   


 


POC VBG pO2   


 


VBG HCO3   


 


VBG O2 Sat (Shelli)   


 


VBG Base Excess   


 


Sodium   139 


 


Potassium   3.8 


 


Chloride   105 


 


Carbon Dioxide   27 


 


Anion Gap   6 L 


 


BUN   32.9 H 


 


Creatinine   1.2 


 


Est GFR (CKD-EPI)AfAm   66.26 


 


Est GFR (CKD-EPI)NonAf   57.17 


 


POC Glucometer   


 


Random Glucose   321 H 


 


Hemoglobin A1c %   


 


Lactic Acid    1.2


 


Calcium   8.9 


 


Phosphorus   


 


Magnesium   


 


Total Bilirubin   


 


AST   


 


ALT   


 


Alkaline Phosphatase   


 


Creatine Kinase   


 


Troponin I   


 


Total Protein   


 


Albumin   


 


Urine Color   


 


Urine Appearance   


 


Urine pH   


 


Ur Specific Gravity   


 


Urine Protein   


 


Urine Glucose (UA)   


 


Urine Ketones   


 


Urine Blood   


 


Urine Nitrite   


 


Urine Bilirubin   


 


Urine Urobilinogen   


 


Ur Leukocyte Esterase   


 


Urine WBC (Auto)   


 


Urine RBC (Auto)   


 


Urine Casts (Auto)   


 


U Pathogenic Cast Auto   


 


U Epithel Cells (Auto)   


 


Urine Bacteria (Auto)   


 


Urine Yeast (Auto)   














  12/06/19 12/06/19 12/06/19





  10:45 13:25 13:25


 


WBC   12.4 H 


 


RBC   4.01 


 


Hgb   11.0 L 


 


Hct   33.0 L 


 


MCV   82.4 


 


MCH   27.3 


 


MCHC   33.2 


 


RDW   15.5 


 


Plt Count   168 


 


MPV   9.1 


 


Absolute Neuts (auto)   


 


Neutrophils %   


 


Lymphocytes %   


 


Monocytes %   


 


Eosinophils %   


 


Basophils %   


 


Nucleated RBC %   


 


VBG pH   


 


POC VBG pCO2   


 


POC VBG pO2   


 


VBG HCO3   


 


VBG O2 Sat (Shelli)   


 


VBG Base Excess   


 


Sodium    140


 


Potassium    4.2


 


Chloride    106


 


Carbon Dioxide    28


 


Anion Gap    6 L


 


BUN    28.6 H


 


Creatinine    1.4 H


 


Est GFR (CKD-EPI)AfAm    54.99


 


Est GFR (CKD-EPI)NonAf    47.45


 


POC Glucometer  392  


 


Random Glucose    437 H*


 


Hemoglobin A1c %   


 


Lactic Acid   


 


Calcium    9.1


 


Phosphorus    2.2 L


 


Magnesium    2.5 H


 


Total Bilirubin    0.3


 


AST    13 L


 


ALT    15


 


Alkaline Phosphatase    125 H


 


Creatine Kinase   


 


Troponin I   


 


Total Protein    7.0


 


Albumin    2.6 L


 


Urine Color   


 


Urine Appearance   


 


Urine pH   


 


Ur Specific Gravity   


 


Urine Protein   


 


Urine Glucose (UA)   


 


Urine Ketones   


 


Urine Blood   


 


Urine Nitrite   


 


Urine Bilirubin   


 


Urine Urobilinogen   


 


Ur Leukocyte Esterase   


 


Urine WBC (Auto)   


 


Urine RBC (Auto)   


 


Urine Casts (Auto)   


 


U Pathogenic Cast Auto   


 


U Epithel Cells (Auto)   


 


Urine Bacteria (Auto)   


 


Urine Yeast (Auto)   








Active Medications











Generic Name Dose Route Start Last Admin





  Trade Name Freq  PRN Reason Stop Dose Admin


 


Albuterol Sulfate  2 puff  12/06/19 14:14  





  Ventolin Hfa Inhaler -  IH   





  Q4H PRN   





  WHEEZING   





     





     





     


 


Atorvastatin Calcium  80 mg  12/06/19 22:00  





  Lipitor -  PO   





  [facility]   





     





     





     





     


 


Ezetimibe  10 mg  12/06/19 22:00  





  Zetia -  PO   





  [facility]   





     





     





     





     


 


Furosemide  80 mg  12/07/19 10:00  





  Lasix -  PO   





  DAILY MARY   





     





     





     





     


 


Heparin Sodium (Porcine)  5,000 unit  12/06/19 18:00  





  Heparin -  SQ   





  Q8H-IV [facility]   





     





     





     





     


 


Linezolid 600 mg/  300 mls @ 300 mls/hr  12/05/19 23:45  





  Miscellaneous  IVPB   





  Q12H [facility]   





     





     





  Protocol   





     


 


Ceftriaxone Sodium 1 gm/  50 mls @ 100 mls/hr  12/06/19 10:00  12/06/19 09:45





  Dextrose  IVPB   100 mls/hr





  DAILY [facility]   Administration





     





     





     





     


 


Insulin Aspart  1 vial  12/06/19 11:00  12/06/19 10:47





  Novolog Vial Sliding Scale -  SQ   8 unit





  TIDAC [facility]   Administration





     





     





  Protocol   





     


 


Insulin Detemir  22 units  12/07/19 07:00  





  Levemir Vial  SQ   





  [facility]   





     





     





     





     


 


Nebivolol  2.5 mg  12/06/19 22:00  





  Bystolic -  PO   





  [facility]   





     





     





     





     


 


Polyethylene Glycol  17 gm  12/06/19 14:14  





  Miralax (For Daily Use) -  PO   





  DAILY PRN   





  [facility]   





     





     





     











ASSESSMENT/PLAN:


Nicolas Stiles is a 79 year old male with a past medical history of IDDM, HTN

, HLD, CAD, CKD 3B, BL blindness, bph, chronic bladder outlet obstruction, and 

dementia c/o abdominal pain. UA and clinical presentation consistent with UTI. 

Must explore DEB. 





Altered mental status, likely in setting of UTI


- Past culture MRSA, strep, staph caog neg


- Linezolid 600 mg IV BID


- Ceftriaxone 1 g IV QD


- leukocytosis trending down since admission


- Maintain Camacho


- UA + for nitrites, 2+ LE, 268 WBC, 1895 bacteria


- UCx pending


- recent slurred speech as noted by family, head CT negative for acute pathology

, + for NPH


- Neurology, Dr. Krishnan consulted


- Physical therapy





DEB on CKD 3b


- CRE 1.5 on admission, currently 1.4


- Camacho placement given obstructive BPH, not chronic camacho


- Maintain Camacho


- restart home Lasix in the morning, monitor volume status


- BP within normal limits


- NS @42cc/hr, careful with fluids in setting of patient's hx of grade I 

diastolic dysfunction and LVH


- Renal U/S noting R kidney is normal, cannot evaluate L kidney due to patient 

non-cooperation





Elevated alk phos


- No cholecystitis on prev CT


- unknown origin, trending down, no evidence of liver/gallbladder pathology, 

continue to monitor





Hypoalbuminemia 


- Possibly d/t proteinuria, malnutrition, and inflammation assoc. with comorbid 

chronic conditions


- Ensure adequate dietary protein intake


- Consult dietician





DM


- Home Levemir 24 units subq as per family member


- ISS ACHS


- BGM


- A1c 9.5





HTN


- Cont. curent home regimen





HLD


- Cont. curent home regimen





Hx of CAD


- holding Plavix due to family request as patient previously had hematuria


- watch for hematria while patient on heparin





F/E/N


- NS at 42cc/hr, careful with fluid in setting patient's diastolic dysfunction


- Continue to monitor electrolytes and replete as necessary


- Diabetic diet





DVT prophylaxis


- Heparin 5000 units Subq tid





Disposition


- Continue to monitor on med/surg





Visit type





- Emergency Visit


Emergency Visit: Yes


ED Registration Date: 12/05/19


Care time: The patient presented to the Emergency Department on the above date 

and was hospitalized for further evaluation of their emergent condition.





- New Patient


This patient is new to me today: Yes


Date on this admission: 12/06/19





- Critical Care


Critical Care patient: No

## 2019-12-06 NOTE — EKG
Test Reason : 

Blood Pressure : ***/*** mmHG

Vent. Rate : 079 BPM     Atrial Rate : 079 BPM

   P-R Int : 154 ms          QRS Dur : 086 ms

    QT Int : 412 ms       P-R-T Axes : 010 045 071 degrees

   QTc Int : 472 ms

 

NORMAL SINUS RHYTHM

NORMAL ECG

WHEN COMPARED WITH ECG OF 08-NOV-2019 09:42,

NONSPECIFIC T WAVE ABNORMALITY NO LONGER EVIDENT IN INFERIOR LEADS

Confirmed by SHIRA CLANCY MD ([id number]) on 12/6/2019 11:42:16 AM

 

Referred By:             Confirmed By:SHIRA CLANCY MD

## 2019-12-07 LAB
ANION GAP SERPL CALC-SCNC: 10 MMOL/L (ref 8–16)
BUN SERPL-MCNC: 23.6 MG/DL (ref 7–18)
CALCIUM SERPL-MCNC: 8.9 MG/DL (ref 8.5–10.1)
CHLORIDE SERPL-SCNC: 109 MMOL/L (ref 98–107)
CO2 SERPL-SCNC: 24 MMOL/L (ref 21–32)
CREAT SERPL-MCNC: 1.2 MG/DL (ref 0.55–1.3)
DEPRECATED RDW RBC AUTO: 15.3 % (ref 11.9–15.9)
GLUCOSE SERPL-MCNC: 321 MG/DL (ref 74–106)
HCT VFR BLD CALC: 31.7 % (ref 35.4–49)
HGB BLD-MCNC: 10.4 GM/DL (ref 11.7–16.9)
MAGNESIUM SERPL-MCNC: 2.5 MG/DL (ref 1.8–2.4)
MCH RBC QN AUTO: 27.6 PG (ref 25.7–33.7)
MCHC RBC AUTO-ENTMCNC: 32.7 G/DL (ref 32–35.9)
MCV RBC: 84.5 FL (ref 80–96)
PHOSPHATE SERPL-MCNC: 2.3 MG/DL (ref 2.5–4.9)
PLATELET # BLD AUTO: 164 K/MM3 (ref 134–434)
PMV BLD: 9.5 FL (ref 7.5–11.1)
POTASSIUM SERPLBLD-SCNC: 3.9 MMOL/L (ref 3.5–5.1)
RBC # BLD AUTO: 3.76 M/MM3 (ref 4–5.6)
SODIUM SERPL-SCNC: 144 MMOL/L (ref 136–145)
WBC # BLD AUTO: 12 K/MM3 (ref 4–10)

## 2019-12-07 RX ADMIN — ATORVASTATIN CALCIUM SCH MG: 80 TABLET, FILM COATED ORAL at 23:13

## 2019-12-07 RX ADMIN — EZETIMIBE SCH MG: 10 TABLET ORAL at 23:13

## 2019-12-07 RX ADMIN — NEBIVOLOL HYDROCHLORIDE SCH MG: 2.5 TABLET ORAL at 23:13

## 2019-12-07 RX ADMIN — HEPARIN SODIUM SCH UNIT: 5000 INJECTION, SOLUTION INTRAVENOUS; SUBCUTANEOUS at 09:55

## 2019-12-07 RX ADMIN — INSULIN ASPART SCH UNIT: 100 INJECTION, SOLUTION INTRAVENOUS; SUBCUTANEOUS at 06:57

## 2019-12-07 RX ADMIN — CEFTRIAXONE SCH MLS/HR: 1 INJECTION, POWDER, FOR SOLUTION INTRAMUSCULAR; INTRAVENOUS at 09:55

## 2019-12-07 RX ADMIN — INSULIN ASPART SCH UNIT: 100 INJECTION, SOLUTION INTRAVENOUS; SUBCUTANEOUS at 11:54

## 2019-12-07 RX ADMIN — HEPARIN SODIUM SCH UNIT: 5000 INJECTION, SOLUTION INTRAVENOUS; SUBCUTANEOUS at 18:11

## 2019-12-07 RX ADMIN — SODIUM CHLORIDE SCH: 9 INJECTION, SOLUTION INTRAVENOUS at 11:20

## 2019-12-07 RX ADMIN — SODIUM CHLORIDE SCH MLS/HR: 9 INJECTION, SOLUTION INTRAVENOUS at 06:58

## 2019-12-07 RX ADMIN — HEPARIN SODIUM SCH: 5000 INJECTION, SOLUTION INTRAVENOUS; SUBCUTANEOUS at 01:46

## 2019-12-07 RX ADMIN — Medication SCH ML: at 18:11

## 2019-12-07 RX ADMIN — INSULIN ASPART SCH UNIT: 100 INJECTION, SOLUTION INTRAVENOUS; SUBCUTANEOUS at 17:06

## 2019-12-07 RX ADMIN — FUROSEMIDE SCH MG: 40 TABLET ORAL at 11:19

## 2019-12-07 RX ADMIN — INSULIN DETEMIR SCH UNITS: 100 INJECTION, SOLUTION SUBCUTANEOUS at 06:56

## 2019-12-07 NOTE — PN
Physical Exam: 


SUBJECTIVE: Patient seen and examined at the bedside. Patient is awake and 

oriented to person and hospital (does not know name of hospital). Does not 

endorse any discomfort or pain. Intermittently answers questions. Follows some 

basic commands. 








OBJECTIVE:





 Vital Signs











 Period  Temp  Pulse  Resp  BP Sys/Ramírez  Pulse Ox


 


 Last 24 Hr  98.0 F-98.3 F  61-73  18-20  103-138/49-69  99-99











GENERAL: AOx1 to person, speaks Malayalam


HEAD: Normocephalic and atraumatic


EYES: BL diabetic keratopahy, opaque cornea R>L 


ENT: Oropharynx clear without exudates. Moist mucous membranes.


NECK: No lymphadenopathy, JVD.


LUNGS: Poor effort on respirations. No auscultated wheezes and no crackles. No 

accessory muscle use.


HEART: Regular rate and rhythm, no murmurs, rubs appreciated. 


ABDOMEN: Non-tender, no guarding, soft, BS present in all 4 quadrants, non-

distended


UPPER EXTREMITIES: Moves arms spontaneously.  2+ pulses, warm, well-perfused. 

No cyanosis. No clubbing. No peripheral edema.


LOWER EXTREMITIES: 2+ pulses, warm, well-perfused. No calf tenderness. Trace 

peripheral edema in feet. Dry. 


NEUROLOGICAL: Dysarthria. LUE 4/5 power. Unable to comply with rest of 

neurological exam


SKIN: Dry. Diffuse excorations on chest L>R. Vertical sternal scar. Chronic 

venous stasis dermatitis on bilateral leg. 














 Laboratory Results - last 24 hr











  12/06/19 12/06/19 12/06/19





  13:25 13:25 19:18


 


WBC  12.4 H  


 


RBC  4.01  


 


Hgb  11.0 L  


 


Hct  33.0 L  


 


MCV  82.4  


 


MCH  27.3  


 


MCHC  33.2  


 


RDW  15.5  


 


Plt Count  168  


 


MPV  9.1  


 


Sodium   140 


 


Potassium   4.2 


 


Chloride   106 


 


Carbon Dioxide   28 


 


Anion Gap   6 L 


 


BUN   28.6 H 


 


Creatinine   1.4 H 


 


Est GFR (CKD-EPI)AfAm   54.99 


 


Est GFR (CKD-EPI)NonAf   47.45 


 


POC Glucometer    356


 


Random Glucose   437 H* 


 


Calcium   9.1 


 


Phosphorus   2.2 L 


 


Magnesium   2.5 H 


 


Total Bilirubin   0.3 


 


AST   13 L 


 


ALT   15 


 


Alkaline Phosphatase   125 H 


 


Total Protein   7.0 


 


Albumin   2.6 L 














  12/06/19 12/07/19 12/07/19





  22:17 06:24 08:00


 


WBC   


 


RBC   


 


Hgb   


 


Hct   


 


MCV   


 


MCH   


 


MCHC   


 


RDW   


 


Plt Count   


 


MPV   


 


Sodium    144


 


Potassium    3.9


 


Chloride    109 H


 


Carbon Dioxide    24


 


Anion Gap    10


 


BUN    23.6 H


 


Creatinine    1.2


 


Est GFR (CKD-EPI)AfAm    66.26


 


Est GFR (CKD-EPI)NonAf    57.17


 


POC Glucometer  267  330 


 


Random Glucose    321 H


 


Calcium    8.9


 


Phosphorus    2.3 L


 


Magnesium    2.5 H


 


Total Bilirubin   


 


AST   


 


ALT   


 


Alkaline Phosphatase   


 


Total Protein   


 


Albumin   














  12/07/19 12/07/19





  08:01 11:15


 


WBC  12.0 H 


 


RBC  3.76 L 


 


Hgb  10.4 L 


 


Hct  31.7 L 


 


MCV  84.5 


 


MCH  27.6 


 


MCHC  32.7 


 


RDW  15.3 


 


Plt Count  164 


 


MPV  9.5 


 


Sodium  


 


Potassium  


 


Chloride  


 


Carbon Dioxide  


 


Anion Gap  


 


BUN  


 


Creatinine  


 


Est GFR (CKD-EPI)AfAm  


 


Est GFR (CKD-EPI)NonAf  


 


POC Glucometer   278


 


Random Glucose  


 


Calcium  


 


Phosphorus  


 


Magnesium  


 


Total Bilirubin  


 


AST  


 


ALT  


 


Alkaline Phosphatase  


 


Total Protein  


 


Albumin  








Active Medications











Generic Name Dose Route Start Last Admin





  Trade Name Freq  PRN Reason Stop Dose Admin


 


Albuterol Sulfate  2 puff  12/06/19 14:14  





  Ventolin Hfa Inhaler -  IH   





  Q4H PRN   





  [facility]   





     





     





     


 


Atorvastatin Calcium  80 mg  12/06/19 22:00  12/06/19 22:53





  Lipitor -  PO   Not Given





  [facility]   





     





     





     





     


 


Ezetimibe  10 mg  12/06/19 22:00  12/06/19 22:53





  Zetia -  PO   10 mg





  [facility]   Administration





     





     





     





     


 


Furosemide  40 mg  12/07/19 10:45  12/07/19 11:19





  Lasix -  PO   40 mg





  [facility]   Administration





     





     





     





     


 


Heparin Sodium (Porcine)  5,000 unit  12/06/19 18:00  12/07/19 09:55





  Heparin -  SQ   5,000 unit





  Q8H-[facility]   Administration





     





     





     





     


 


Ceftriaxone Sodium 1 gm/  50 mls @ 100 mls/hr  12/06/19 10:00  12/07/19 09:55





  Dextrose  IVPB   100 mls/hr





  [facility]   Administration





     





     





     





     


 


Potassium Phosphate 30 mm/  510 mls @ 62.5 mls/hr  12/07/19 11:00  





  Sodium Chloride  IVPB  12/07/19 19:09  





  ONCE ONE   





     





     





     





     


 


Insulin Aspart  1 vial  12/06/19 11:00  12/07/19 11:54





  Novolog Vial Sliding Scale -  SQ   4 unit





  T[facility]AC [facility]   Administration





     





     





  Protocol   





     


 


Insulin Detemir  24 units  12/07/19 07:00  12/07/19 06:56





  Levemir Vial  SQ   24 units





  [facility]   Administration





     





     





     





     


 


Nebivolol  2.5 mg  12/06/19 22:00  12/06/19 22:53





  Bystolic -  PO   2.5 mg





  [facility]   Administration





     





     





     





     


 


Polyethylene Glycol  17 gm  12/06/19 14:14  





  Miralax (For Daily Use) -  PO   





  DAILY PRN   





  [facility]   





     





     





     











ASSESSMENT/PLAN:


Nicolas Stiles is a 79 year old male with a past medical history of IDDM, HTN

, HLD, CAD, CKD 3B, BL blindness, bph, chronic bladder outlet obstruction, and 

dementia c/o abdominal pain. UA and clinical presentation consistent with UTI. 

Must explore DEB. 





Altered mental status, likely in setting of UTI


- Ceftriaxone 1 g IV QD


- linezolid d/c (originally ordered due to hx of MRSA in urine)


- leukocytosis trending down since admission


- Maintain Camacho


- UA + for nitrites, 2+ LE, 268 WBC, 1895 bacteria


- UCx growing lactose fermenting GNB


- recent slurred speech as noted by family, head CT negative for acute pathology

, + for NPH


- Neurology, Dr. Krishnan consulted


- Physical therapy


- TSH, B12, RPR





DEB on CKD 3b


- CRE 1.5 on admission, currently 1.2 (baseline


- Camacho placement given obstructive BPH, not chronic camacho


- Maintain Camacho, will need to f/u with urology for camacho care


- home Lasix, monitor volume status


- BP within normal limits


- careful with fluids in setting of patient's hx of grade I diastolic 

dysfunction and LVH


- Renal U/S noting R kidney is normal, cannot evaluate L kidney due to patient 

non-cooperation





Elevated alk phos


- No cholecystitis on prev CT


- unknown origin, trending down, no evidence of liver/gallbladder pathology, 

continue to monitor





Hypoalbuminemia 


- Possibly d/t proteinuria, malnutrition, and inflammation assoc. with comorbid 

chronic conditions


- Ensure adequate dietary protein intake


- Consult dietician





DM


- Home Levemir 24 units subq as per family member


- ISS ACHS


- BGM


- A1c 9.5





HTN


- Cont. curent home regimen





HLD


- Cont. curent home regimen





Hx of CAD


- holding Plavix due to family request as patient previously had hematuria


- watch for hematria while patient on heparin





F/E/N


- no standing fluids


- Continue to monitor electrolytes and replete as necessary. Hypophosphatemia 

noted and repleted


- Diabetic puree diet





DVT prophylaxis


- Heparin 5000 units Subq tid





Disposition


- Continue to monitor on med/surg


- zi Mccarthy [phone] for any updates on patient








Visit type





- Emergency Visit


Emergency Visit: Yes


ED Registration Date: 12/05/19


Care time: The patient presented to the Emergency Department on the above date 

and was hospitalized for further evaluation of their emergent condition.





- New Patient


This patient is new to me today: No





- Critical Care


Critical Care patient: No

## 2019-12-07 NOTE — PN
Teaching Attending Note


Name of Resident: Collins Duron





ATTENDING PHYSICIAN STATEMENT





I saw and evaluated the patient.


I reviewed the resident's note and discussed the case with the resident.


I agree with the resident's findings and plan as documented.








SUBJECTIVE: Unable to provide history. 








OBJECTIVE: Tmax 99.9, Hemodynamically Stable. Appears comfortable. Fole 

draining clear urine.





 Last Vital Signs











Temp Pulse Resp BP Pulse Ox


 


 98.9 F   64   20   138/55 L  99 


 


 12/07/19 12:00  12/07/19 12:00  12/07/19 12:00  12/07/19 12:00  12/07/19 09:00











Heart - S1, S2, RRR


Lungs - good air entry bilaterally


Abdomen - Soft, non-tender. 


Extremtiies - chronic venous stasis skin changes LEs. Trace edema, 


Neuro - Disoriented. Moving all extremities. No focal weakness.





 Laboratory Results - last 24 hr











  12/06/19 12/06/19 12/07/19





  19:18 22:17 06:24


 


WBC   


 


RBC   


 


Hgb   


 


Hct   


 


MCV   


 


MCH   


 


MCHC   


 


RDW   


 


Plt Count   


 


MPV   


 


Sodium   


 


Potassium   


 


Chloride   


 


Carbon Dioxide   


 


Anion Gap   


 


BUN   


 


Creatinine   


 


Est GFR (CKD-EPI)AfAm   


 


Est GFR (CKD-EPI)NonAf   


 


POC Glucometer  356  267  330


 


Random Glucose   


 


Calcium   


 


Phosphorus   


 


Magnesium   














  12/07/19 12/07/19 12/07/19





  08:00 08:01 11:15


 


WBC   12.0 H 


 


RBC   3.76 L 


 


Hgb   10.4 L 


 


Hct   31.7 L 


 


MCV   84.5 


 


MCH   27.6 


 


MCHC   32.7 


 


RDW   15.3 


 


Plt Count   164 


 


MPV   9.5 


 


Sodium  144  


 


Potassium  3.9  


 


Chloride  109 H  


 


Carbon Dioxide  24  


 


Anion Gap  10  


 


BUN  23.6 H  


 


Creatinine  1.2  


 


Est GFR (CKD-EPI)AfAm  66.26  


 


Est GFR (CKD-EPI)NonAf  57.17  


 


POC Glucometer    278


 


Random Glucose  321 H  


 


Calcium  8.9  


 


Phosphorus  2.3 L  


 


Magnesium  2.5 H  








 Current Medications











Generic Name Dose Route Start Last Admin





  Trade Name Freq  PRN Reason Stop Dose Admin


 


Albuterol Sulfate  2 puff  12/06/19 14:14  





  Ventolin Hfa Inhaler -  IH   





  Q4H PRN   





  WHEEZING   





     





     





     


 


Atorvastatin Calcium  80 mg  12/06/19 22:00  12/06/19 22:53





  Lipitor -  PO   Not Given





  HS MARY   





     





     





     





     


 


Ezetimibe  10 mg  12/06/19 22:00  12/06/19 22:53





  Zetia -  PO   10 mg





  HS MARY   Administration





     





     





     





     


 


Furosemide  40 mg  12/07/19 10:45  12/07/19 11:19





  Lasix -  PO   40 mg





  DAILY MARY   Administration





     





     





     





     


 


Heparin Sodium (Porcine)  5,000 unit  12/06/19 18:00  12/07/19 09:55





  Heparin -  SQ   5,000 unit





  Q8H-IV MARY   Administration





     





     





     





     


 


Ceftriaxone Sodium 1 gm/  50 mls @ 100 mls/hr  12/06/19 10:00  12/07/19 09:55





  Dextrose  IVPB   100 mls/hr





  DAILY MARY   Administration





     





     





     





     


 


Potassium Phosphate 30 mm/  510 mls @ 62.5 mls/hr  12/07/19 11:00  12/07/19 12:

46





  Sodium Chloride  IVPB  12/07/19 19:09  62.5 mls/hr





  ONCE ONE   Administration





     





     





     





     


 


Insulin Aspart  1 vial  12/06/19 11:00  12/07/19 11:54





  Novolog Vial Sliding Scale -  SQ   4 unit





  TIDAC MARY   Administration





     





     





  Protocol   





     


 


Insulin Detemir  24 units  12/07/19 07:00  12/07/19 06:56





  Levemir Vial  SQ   24 units





  AM MARY   Administration





     





     





     





     


 


Nebivolol  2.5 mg  12/06/19 22:00  12/06/19 22:53





  Bystolic -  PO   2.5 mg





  HS MARY   Administration





     





     





     





     


 


Polyethylene Glycol  17 gm  12/06/19 14:14  





  Miralax (For Daily Use) -  PO   





  DAILY PRN   





  CONSTIPATION   





     





     





     








 Home Medications











 Medication  Instructions  Recorded


 


Atorvastatin Ca [Lipitor] 80 mg PO DAILY 06/10/19


 


Ezetimibe 10 mg PO HS 06/10/19


 


Nebivolol HCl [Bystolic] 2.5 mg PO HS 06/10/19


 


Insulin Sliding Scale [Novolog 1 vial SQ ACHS  units 10/11/19





Vial Sliding Scale -]  


 


Insulin (Levemir) [Levemir Vial] 22 units SQ DAILY 11/08/19


 


Ferrous Sulfate [Feosol] 325 mg PO DAILY  ud 11/14/19


 


Polyethylene Glycol 3350 [Miralax 17 gm PO DAILY PRN  bottle 11/14/19





119 gm Btl -]  


 


Albuterol Sulfate Inhaler - 2 inh PO Q4H PRN 12/05/19





[Ventolin Hfa Inhaler -]  


 


Ipratropium 0.02% Nebulizer 1 amp NEB Q6H PRN 12/05/19





[Atrovent 0.02% Nebulizer -]  


 


Clopidogrel Bisulfate [Plavix] 75 mg PO DAILY 12/06/19


 


Furosemide [Lasix -] 80 mg PO DAILY 12/06/19

















ASSESSMENT AND PLAN:





79 year old male with Dementia, DM 2, HTN, HLD, CAD s/p CABG, Chronic Diastolic 

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 

obstruction s/p indwelling camacho presents with listlessness, lethargy, 

suprapubic pain for 2 days. 





1. Complicated UTI, catheter associated (Hx Bladder Outlet obstruction s/p 

indwelling camacho).


Urine Cx pos for LFNB, Camacho exchanged in ED.


Afebrile, Hemodynamically Stable. Leukocytosis improving. 


Continue Ceftriaxone and discontinue Linezolid (given empirically for Hx MRSA 

UTI).





2. DEB on CKD 3 - resolved, Creat back to baseline


CT A/P on prior visit - several RIGHT renal 1-3 mm non-obstructive calculi; 

RIGHT renal cysts. 


Renal US - no obstruction, medical renal disease


Lasix resumed and IV hydration held.





3. Hyperglycemia, background uncontrolled DM 2 (A1C 9.5) - Insulin Levemir and 

Novolog as per sliding scale.





4. Acute Metabolic Encephalopathy sec to UTI on background Dementia


CT Brain shows volume loss and ventricular dilatation, sggestive of NPH.


Neurology consulted - suggest possible Alzheimer's, B12/TSH/RPR levels. 


ST recommends Dysphagia pureed diet.





5. Hypoalbuminemia sec to malnutrition


Dietary consulted for nutrition recommendations.





6. HTN - Continue Nebivolol.





7. Chronic Diastolic CHF - Continue BB. Lasix resumed.





8. HLD - Continue Statin/Zetia.








DVT Px - Heparin SQ

## 2019-12-07 NOTE — CONSULT
Consult - text type





- Consultation


Consultation Note: 








NEUROLOGY CONSULTATION is greatly appreciated:





Event reviewed and discussed with RN last night. Patient examined with his wife 

at the bedside.


This 78 yo RH  male NH resident with h/o HTN, Chol, Diabetes, blindness 

due to retinopathy and advanced dementia 


Is maintained on: Atorvastatin; Ezetimibe; Bystolic; Insulins; Tamsulosin; 

Enalapril; Feosol; Furosemide; Miralax.


Now admitted with lethargy. WBC=15K and Urine FUZ=125.





CT of head (reviewed): Severe, diffuse, atrophy and ex vacuo hydrocephalus.


On ceftriaxone.





RALPH: Neck rigid in all directions. Kernig- No bruits. Cor reg. Feley cath in 

situ





Neuro: Awake. Resting with eyes closed.


         opens eyes and gibberish speech to sternal pressure.


         Full EOM's. No response to threat. No facial. Gag OK


         Purposeful movements of both arms, symmetrical


         Contractures both knees. Withdraws both feet symmetrically


         Decreased KJ's absent AJ's. Plantars silent.


         Feels pinch throughout.





IMP: Severe, B/L cerebral dysfunction (OMS/Chronic). Most likely Alzheimer's 

disease.


       Toxic-metabolic Encephalopathy (UTI/Urosepsis)





SUGGEST: Continue antibiotics and hydration.


               If Mckeon is to be left in place, D/C tamsulosin. Surveillance UA'

s.


               Check B12, TSH, RPR


               Review prior neuroimaging to exclude primary hydrocephalus.


               Bedside PT vs. Contractures.


               No role for cholinesterase inhibitors at this juncture. 





Thank you very much,


Yakov Krishnan MD

## 2019-12-08 LAB
ALBUMIN SERPL-MCNC: 2.5 G/DL (ref 3.4–5)
ALP SERPL-CCNC: 106 U/L (ref 45–117)
ALT SERPL-CCNC: 19 U/L (ref 13–61)
ANION GAP SERPL CALC-SCNC: 8 MMOL/L (ref 8–16)
AST SERPL-CCNC: 16 U/L (ref 15–37)
BILIRUB SERPL-MCNC: 0.4 MG/DL (ref 0.2–1)
BUN SERPL-MCNC: 22.7 MG/DL (ref 7–18)
CALCIUM SERPL-MCNC: 8.9 MG/DL (ref 8.5–10.1)
CHLORIDE SERPL-SCNC: 109 MMOL/L (ref 98–107)
CO2 SERPL-SCNC: 25 MMOL/L (ref 21–32)
CREAT SERPL-MCNC: 1.2 MG/DL (ref 0.55–1.3)
DEPRECATED RDW RBC AUTO: 15.3 % (ref 11.9–15.9)
GLUCOSE SERPL-MCNC: 342 MG/DL (ref 74–106)
HCT VFR BLD CALC: 32.9 % (ref 35.4–49)
HGB BLD-MCNC: 11 GM/DL (ref 11.7–16.9)
MAGNESIUM SERPL-MCNC: 2.3 MG/DL (ref 1.8–2.4)
MCH RBC QN AUTO: 27.8 PG (ref 25.7–33.7)
MCHC RBC AUTO-ENTMCNC: 33.3 G/DL (ref 32–35.9)
MCV RBC: 83.5 FL (ref 80–96)
PHOSPHATE SERPL-MCNC: 3.3 MG/DL (ref 2.5–4.9)
PLATELET # BLD AUTO: 182 K/MM3 (ref 134–434)
PMV BLD: 9.1 FL (ref 7.5–11.1)
POTASSIUM SERPLBLD-SCNC: 3.9 MMOL/L (ref 3.5–5.1)
PROT SERPL-MCNC: 6.6 G/DL (ref 6.4–8.2)
RBC # BLD AUTO: 3.94 M/MM3 (ref 4–5.6)
SODIUM SERPL-SCNC: 143 MMOL/L (ref 136–145)
WBC # BLD AUTO: 10.4 K/MM3 (ref 4–10)

## 2019-12-08 RX ADMIN — HEPARIN SODIUM SCH UNIT: 5000 INJECTION, SOLUTION INTRAVENOUS; SUBCUTANEOUS at 02:05

## 2019-12-08 RX ADMIN — CEFTRIAXONE SCH MLS/HR: 1 INJECTION, POWDER, FOR SOLUTION INTRAMUSCULAR; INTRAVENOUS at 10:19

## 2019-12-08 RX ADMIN — ACETAMINOPHEN PRN MG: 325 TABLET ORAL at 10:22

## 2019-12-08 RX ADMIN — ATORVASTATIN CALCIUM SCH MG: 80 TABLET, FILM COATED ORAL at 23:02

## 2019-12-08 RX ADMIN — INSULIN ASPART SCH UNIT: 100 INJECTION, SOLUTION INTRAVENOUS; SUBCUTANEOUS at 06:07

## 2019-12-08 RX ADMIN — INSULIN ASPART SCH UNIT: 100 INJECTION, SOLUTION INTRAVENOUS; SUBCUTANEOUS at 17:11

## 2019-12-08 RX ADMIN — INSULIN ASPART SCH UNIT: 100 INJECTION, SOLUTION INTRAVENOUS; SUBCUTANEOUS at 12:23

## 2019-12-08 RX ADMIN — NEBIVOLOL HYDROCHLORIDE SCH MG: 2.5 TABLET ORAL at 23:02

## 2019-12-08 RX ADMIN — HEPARIN SODIUM SCH UNIT: 5000 INJECTION, SOLUTION INTRAVENOUS; SUBCUTANEOUS at 10:20

## 2019-12-08 RX ADMIN — NYSTATIN SCH APPLIC: 100000 POWDER TOPICAL at 15:16

## 2019-12-08 RX ADMIN — Medication SCH ML: at 10:00

## 2019-12-08 RX ADMIN — EZETIMIBE SCH MG: 10 TABLET ORAL at 23:03

## 2019-12-08 RX ADMIN — INSULIN DETEMIR SCH UNITS: 100 INJECTION, SOLUTION SUBCUTANEOUS at 06:07

## 2019-12-08 RX ADMIN — HEPARIN SODIUM SCH UNIT: 5000 INJECTION, SOLUTION INTRAVENOUS; SUBCUTANEOUS at 18:25

## 2019-12-08 RX ADMIN — NYSTATIN SCH APPLIC: 100000 POWDER TOPICAL at 23:03

## 2019-12-08 RX ADMIN — INSULIN ASPART SCH UNITS: 100 INJECTION, SOLUTION INTRAVENOUS; SUBCUTANEOUS at 23:02

## 2019-12-08 RX ADMIN — FUROSEMIDE SCH MG: 40 TABLET ORAL at 10:20

## 2019-12-08 RX ADMIN — Medication SCH: at 18:27

## 2019-12-08 NOTE — PN
Progress Note (short form)





- Note


Progress Note: 





SUBJECTIVE: Cognitively impaired, Unable to provide history. 








OBJECTIVE: Afebrile, Hemodynamically Stable. Appears comfortable. Camacho 

draining clear urine.





 Last Vital Signs











Temp Pulse Resp BP Pulse Ox


 


 99.3 F   72   20   110/62   98 


 


 12/08/19 14:00  12/08/19 14:00  12/08/19 14:00  12/08/19 14:00  12/08/19 09:00











Heart - S1, S2, RRR


Lungs - good air entry bilaterally


Abdomen - Soft, non-tender. 


Extremities - chronic venous stasis skin changes LEs. Trace edema, 


Neuro - Disoriented. Moving all extremities. No focal weakness.


  - Camacho in situ, fungal rash in groin and scrotum with some excoriation of 

scrotal skin on R, no evidence of cellulitis.





 Laboratory Results - last 24 hr











  12/08/19 12/08/19 12/08/19





  06:06 07:07 07:07


 


WBC   10.4 H 


 


RBC   3.94 L 


 


Hgb   11.0 L 


 


Hct   32.9 L 


 


MCV   83.5 


 


MCH   27.8 


 


MCHC   33.3 


 


RDW   15.3 


 


Plt Count   182 


 


MPV   9.1 


 


Sodium    143


 


Potassium    3.9


 


Chloride    109 H


 


Carbon Dioxide    25


 


Anion Gap    8


 


BUN    22.7 H


 


Creatinine    1.2


 


Est GFR (CKD-EPI)AfAm    66.26


 


Est GFR (CKD-EPI)NonAf    57.17


 


POC Glucometer  355  


 


Random Glucose    342 H


 


Calcium    8.9


 


Phosphorus    3.3


 


Magnesium    2.3


 


Total Bilirubin    0.4


 


AST    16


 


ALT    19


 


Alkaline Phosphatase    106


 


Total Protein    6.6


 


Albumin    2.5 L


 


Vitamin B12   


 


TSH    0.97


 


RPR Titer   














  12/08/19 12/08/19 12/08/19





  07:07 07:07 12:14


 


WBC   


 


RBC   


 


Hgb   


 


Hct   


 


MCV   


 


MCH   


 


MCHC   


 


RDW   


 


Plt Count   


 


MPV   


 


Sodium   


 


Potassium   


 


Chloride   


 


Carbon Dioxide   


 


Anion Gap   


 


BUN   


 


Creatinine   


 


Est GFR (CKD-EPI)AfAm   


 


Est GFR (CKD-EPI)NonAf   


 


POC Glucometer    > 600


 


Random Glucose   


 


Calcium   


 


Phosphorus   


 


Magnesium   


 


Total Bilirubin   


 


AST   


 


ALT   


 


Alkaline Phosphatase   


 


Total Protein   


 


Albumin   


 


Vitamin B12  606  


 


TSH   


 


RPR Titer   Nonreactive 














  12/08/19 12/08/19





  14:41 17:08


 


WBC  


 


RBC  


 


Hgb  


 


Hct  


 


MCV  


 


MCH  


 


MCHC  


 


RDW  


 


Plt Count  


 


MPV  


 


Sodium  


 


Potassium  


 


Chloride  


 


Carbon Dioxide  


 


Anion Gap  


 


BUN  


 


Creatinine  


 


Est GFR (CKD-EPI)AfAm  


 


Est GFR (CKD-EPI)NonAf  


 


POC Glucometer  583  582


 


Random Glucose  


 


Calcium  


 


Phosphorus  


 


Magnesium  


 


Total Bilirubin  


 


AST  


 


ALT  


 


Alkaline Phosphatase  


 


Total Protein  


 


Albumin  


 


Vitamin B12  


 


TSH  


 


RPR Titer  








 Current Medications











Generic Name Dose Route Start Last Admin





  Trade Name Freq  PRN Reason Stop Dose Admin


 


Acetaminophen  650 mg  12/08/19 09:23  12/08/19 10:22





  Tylenol -  PO   650 mg





  Q6H PRN   Administration





  PAIN 1-3   





     





     





     


 


Albuterol Sulfate  2 puff  12/06/19 14:14  





  Ventolin Hfa Inhaler -  IH   





  Q4H PRN   





  WHEEZING   





     





     





     


 


Amino Acids  30 ml  12/07/19 17:30  12/08/19 18:27





  Prosource No Carb Liquid Pkt  PO   Not Given





  BID@0800,1730 MARY   





     





     





     





     


 


Atorvastatin Calcium  80 mg  12/06/19 22:00  12/07/19 23:13





  Lipitor -  PO   80 mg





  HS MARY   Administration





     





     





     





     


 


Ezetimibe  10 mg  12/06/19 22:00  12/07/19 23:13





  Zetia -  PO   10 mg





  HS MARY   Administration





     





     





     





     


 


Furosemide  40 mg  12/07/19 10:45  12/08/19 10:20





  Lasix -  PO   40 mg





  DAILY MARY   Administration





     





     





     





     


 


Heparin Sodium (Porcine)  5,000 unit  12/06/19 18:00  12/08/19 18:25





  Heparin -  SQ   5,000 unit





  Q8H-IV MARY   Administration





     





     





     





     


 


Ceftriaxone Sodium 1 gm/  50 mls @ 100 mls/hr  12/06/19 10:00  12/08/19 10:19





  Dextrose  IVPB   100 mls/hr





  DAILY MARY   Administration





     





     





     





     


 


Insulin Aspart  1 vial  12/08/19 22:00  





  Novolog Vial Sliding Scale -  SQ   





  [facility]   





     





     





  Protocol   





     


 


Insulin Detemir  24 units  12/07/19 07:00  12/08/19 06:07





  Levemir Vial  SQ   24 units





  AM MARY   Administration





     





     





     





     


 


Nebivolol  2.5 mg  12/06/19 22:00  12/07/19 23:13





  Bystolic -  PO   2.5 mg





  HS MARY   Administration





     





     





     





     


 


Nystatin  1 applic  12/08/19 15:00  12/08/19 15:16





  Nystop Powder -  TP   1 applic





  TID MARY   Administration





     





     





     





     


 


Polyethylene Glycol  17 gm  12/06/19 14:14  





  Miralax (For Daily Use) -  PO   





  DAILY PRN   





  CONSTIPATION   





     





     





     








 Home Medications











 Medication  Instructions  Recorded


 


Atorvastatin Ca [Lipitor] 80 mg PO DAILY 06/10/19


 


Ezetimibe 10 mg PO HS 06/10/19


 


Nebivolol HCl [Bystolic] 2.5 mg PO HS 06/10/19


 


Insulin Sliding Scale [Novolog 1 vial SQ ACHS  units 10/11/19





Vial Sliding Scale -]  


 


Insulin (Levemir) [Levemir Vial] 22 units SQ DAILY 11/08/19


 


Ferrous Sulfate [Feosol] 325 mg PO DAILY  ud 11/14/19


 


Polyethylene Glycol 3350 [Miralax 17 gm PO DAILY PRN  bottle 11/14/19





119 gm Btl -]  


 


Albuterol Sulfate Inhaler - 2 inh PO Q4H PRN 12/05/19





[Ventolin Hfa Inhaler -]  


 


Ipratropium 0.02% Nebulizer 1 amp NEB Q6H PRN 12/05/19





[Atrovent 0.02% Nebulizer -]  


 


Clopidogrel Bisulfate [Plavix] 75 mg PO DAILY 12/06/19


 


Furosemide [Lasix -] 80 mg PO DAILY 12/06/19




















ASSESSMENT AND PLAN:





79 year old male with Dementia, DM 2, HTN, HLD, CAD s/p CABG, Chronic Diastolic 

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 

obstruction s/p indwelling camacho presents with listlessness, lethargy, 

suprapubic pain for 2 days. 





1. Complicated UTI, catheter associated (Hx Bladder Outlet obstruction s/p 

indwelling camacho).


Urine Cx pos for Enterobacter > 100,000 CFU/ml, Camacho exchanged in ED.


Afebrile, Hemodynamically Stable. Leukocytosis improving. 


Continue Ceftriaxone and discontinue Linezolid (given empirically for Hx MRSA 

UTI).





2. DEB on CKD 3 - resolved, Creat back to baseline


CT A/P on prior visit - several RIGHT renal 1-3 mm non-obstructive calculi; 

RIGHT renal cysts. 


Renal US - no obstruction, medical renal disease


Lasix resumed and IV hydration held.





3. Hyperglycemia, very high today. Background uncontrolled DM 2 (A1C 9.5) - 

Insulin Levemir (increase to 26 units daily) and Novolog as per sliding scale. 

Will increase tier of novolog sliding scale.





4. Acute Metabolic Encephalopathy sec to UTI on background Dementia


CT Brain shows volume loss and ventricular dilatation, suggestive of NPH.


Neurology consulted - suggest possible Alzheimer's, B12/TSH levels normal, RPR 

non-reactive.  


ST recommends Dysphagia pureed diet.





5. Hypoalbuminemia sec to malnutrition


Dietary consulted for nutrition recommendations.





6. HTN - Continue Nebivolol.





7. Chronic Diastolic CHF - Continue BB. Lasix resumed.





8. HLD - Continue Statin/Zetia.





9. Fungal Rash in groin with skin excoriation R inguinal fold/scrotal junction, 

no cellulitis. Nystatin powder TID.








DVT Px - Heparin SQ











Visit type





- Emergency Visit


Emergency Visit: Yes


ED Registration Date: 12/05/19


Care time: The patient presented to the Emergency Department on the above date 

and was hospitalized for further evaluation of their emergent condition.





- New Patient


This patient is new to me today: No





- Critical Care


Critical Care patient: No





- Discharge Referral


Referred to [facility] No

## 2019-12-09 LAB
ANION GAP SERPL CALC-SCNC: 5 MMOL/L (ref 8–16)
BUN SERPL-MCNC: 27.4 MG/DL (ref 7–18)
CALCIUM SERPL-MCNC: 9.5 MG/DL (ref 8.5–10.1)
CHLORIDE SERPL-SCNC: 107 MMOL/L (ref 98–107)
CO2 SERPL-SCNC: 32 MMOL/L (ref 21–32)
CREAT SERPL-MCNC: 1.2 MG/DL (ref 0.55–1.3)
GLUCOSE SERPL-MCNC: 230 MG/DL (ref 74–106)
POTASSIUM SERPLBLD-SCNC: 4.1 MMOL/L (ref 3.5–5.1)
SODIUM SERPL-SCNC: 144 MMOL/L (ref 136–145)

## 2019-12-09 RX ADMIN — Medication SCH ML: at 09:02

## 2019-12-09 RX ADMIN — EZETIMIBE SCH MG: 10 TABLET ORAL at 22:05

## 2019-12-09 RX ADMIN — NEBIVOLOL HYDROCHLORIDE SCH MG: 2.5 TABLET ORAL at 22:05

## 2019-12-09 RX ADMIN — HEPARIN SODIUM SCH UNIT: 5000 INJECTION, SOLUTION INTRAVENOUS; SUBCUTANEOUS at 10:06

## 2019-12-09 RX ADMIN — INSULIN ASPART SCH UNITS: 100 INJECTION, SOLUTION INTRAVENOUS; SUBCUTANEOUS at 06:29

## 2019-12-09 RX ADMIN — CEFTRIAXONE SCH MLS/HR: 1 INJECTION, POWDER, FOR SOLUTION INTRAMUSCULAR; INTRAVENOUS at 10:06

## 2019-12-09 RX ADMIN — INSULIN DETEMIR SCH UNITS: 100 INJECTION, SOLUTION SUBCUTANEOUS at 06:29

## 2019-12-09 RX ADMIN — INSULIN ASPART SCH UNITS: 100 INJECTION, SOLUTION INTRAVENOUS; SUBCUTANEOUS at 16:59

## 2019-12-09 RX ADMIN — NYSTATIN SCH APPLIC: 100000 POWDER TOPICAL at 22:05

## 2019-12-09 RX ADMIN — ACETAMINOPHEN PRN MG: 325 TABLET ORAL at 10:05

## 2019-12-09 RX ADMIN — INSULIN ASPART SCH UNITS: 100 INJECTION, SOLUTION INTRAVENOUS; SUBCUTANEOUS at 11:30

## 2019-12-09 RX ADMIN — FUROSEMIDE SCH MG: 40 TABLET ORAL at 10:06

## 2019-12-09 RX ADMIN — INSULIN ASPART SCH UNITS: 100 INJECTION, SOLUTION INTRAVENOUS; SUBCUTANEOUS at 22:04

## 2019-12-09 RX ADMIN — Medication SCH: at 17:51

## 2019-12-09 RX ADMIN — NYSTATIN SCH APPLIC: 100000 POWDER TOPICAL at 06:34

## 2019-12-09 RX ADMIN — NYSTATIN SCH APPLIC: 100000 POWDER TOPICAL at 14:17

## 2019-12-09 RX ADMIN — HEPARIN SODIUM SCH UNIT: 5000 INJECTION, SOLUTION INTRAVENOUS; SUBCUTANEOUS at 02:45

## 2019-12-09 RX ADMIN — ATORVASTATIN CALCIUM SCH MG: 80 TABLET, FILM COATED ORAL at 22:05

## 2019-12-09 RX ADMIN — HEPARIN SODIUM SCH UNIT: 5000 INJECTION, SOLUTION INTRAVENOUS; SUBCUTANEOUS at 17:49

## 2019-12-09 NOTE — PN
Physical Exam: 


SUBJECTIVE: Patient seen and examined at the bedside. Patient confused at 

baseline but is able to follow commands and answer yes/no to basic requests. 

Denied cp, sob, abd pain, n/v.








OBJECTIVE:





 Vital Signs











 Period  Temp  Pulse  Resp  BP Sys/Ramírez  Pulse Ox


 


 Last 24 Hr  97.5 F-98.5 F  66-69  20-20  132-148/61-67  94-97











GENERAL: AOx1 to person, speaks Malayalam


HEAD: Normocephalic and atraumatic


EYES: BL diabetic keratopahy, opaque cornea R>L 


ENT: Oropharynx clear without exudates. Moist mucous membranes.


NECK: No lymphadenopathy, JVD.


LUNGS: Bilaterally clear to auscultation. No auscultated wheezes and no 

crackles. No accessory muscle use.


HEART: Regular rate and rhythm, no murmurs, rubs appreciated. 


ABDOMEN: Non-tender, no guarding, soft, BS present in all 4 quadrants, non-

distended


UPPER EXTREMITIES: Moves arms spontaneously.  2+ pulses, warm, well-perfused. 

No cyanosis. No clubbing. No peripheral edema.


LOWER EXTREMITIES: 2+ pulses, warm, well-perfused. No calf tenderness. Trace 

peripheral edema in feet. Dry. 


NEUROLOGICAL: Dysarthria. LUE 4/5 power. Unable to comply with rest of 

neurological exam


SKIN: Dry. Diffuse excorations on chest L>R. Vertical sternal scar. Chronic 

venous stasis dermatitis on bilateral leg. 











 Laboratory Results - last 24 hr











  12/08/19 12/08/19 12/09/19





  20:21 23:00 06:28


 


Sodium   


 


Potassium   


 


Chloride   


 


Carbon Dioxide   


 


Anion Gap   


 


BUN   


 


Creatinine   


 


Est GFR (CKD-EPI)AfAm   


 


Est GFR (CKD-EPI)NonAf   


 


POC Glucometer  282  143  264


 


Random Glucose   


 


Calcium   














  12/09/19 12/09/19 12/09/19





  09:35 11:32 16:16


 


Sodium  144  


 


Potassium  4.1  


 


Chloride  107  


 


Carbon Dioxide  32  


 


Anion Gap  5 L  


 


BUN  27.4 H  


 


Creatinine  1.2  


 


Est GFR (CKD-EPI)AfAm  66.26  


 


Est GFR (CKD-EPI)NonAf  57.17  


 


POC Glucometer   370  529


 


Random Glucose  230 H  


 


Calcium  9.5  








Active Medications











Generic Name Dose Route Start Last Admin





  Trade Name Freq  PRN Reason Stop Dose Admin


 


Acetaminophen  650 mg  12/08/19 09:23  12/09/19 10:05





  Tylenol -  PO   650 mg





  Q6H PRN   Administration





  PAIN 1-3   





     





     





     


 


Albuterol Sulfate  2 puff  12/06/19 14:14  





  Ventolin Hfa Inhaler -  IH   





  Q4H PRN   





  WHEEZING   





     





     





     


 


Amino Acids  30 ml  12/07/19 17:30  12/09/19 17:51





  Prosource No Carb Liquid Pkt  PO   Not Given





  BID@0800,1730 MARY   





     





     





     





     


 


Atorvastatin Calcium  80 mg  12/06/19 22:00  12/08/19 23:02





  Lipitor -  PO   80 mg





  HS MARY   Administration





     





     





     





     


 


Ezetimibe  10 mg  12/06/19 22:00  12/08/19 23:03





  Zetia -  PO   10 mg





  HS MARY   Administration





     





     





     





     


 


Furosemide  40 mg  12/07/19 10:45  12/09/19 10:06





  Lasix -  PO   40 mg





  DAILY MARY   Administration





     





     





     





     


 


Heparin Sodium (Porcine)  5,000 unit  12/06/19 18:00  12/09/19 17:49





  Heparin -  SQ   5,000 unit





  Q8H-IV MARY   Administration





     





     





     





     


 


Ceftriaxone Sodium 1 gm/  50 mls @ 100 mls/hr  12/06/19 10:00  12/09/19 10:06





  Dextrose  IVPB   100 mls/hr





  DAILY MARY   Administration





     





     





     





     


 


Insulin Aspart  1 vial  12/08/19 22:00  12/09/19 16:59





  Novolog Vial Sliding Scale -  SQ   14 units





  [facility] MARY   Administration





     





     





  Protocol   





     


 


Insulin Detemir  26 units  12/09/19 07:00  12/09/19 06:29





  Levemir Vial  SQ   26 units





  AM MARY   Administration





     





     





     





     


 


Nebivolol  2.5 mg  12/06/19 22:00  12/08/19 23:02





  Bystolic -  PO   2.5 mg





  HS MARY   Administration





     





     





     





     


 


Nystatin  1 applic  12/08/19 15:00  12/09/19 14:17





  Nystop Powder -  TP   1 applic





  TID MARY   Administration





     





     





     





     


 


Polyethylene Glycol  17 gm  12/06/19 14:14  





  Miralax (For Daily Use) -  PO   





  DAILY PRN   





  CONSTIPATION   





     





     





     











ASSESSMENT/PLAN:


Nicolas Stiles is a 79 year old male with a past medical history of IDDM, HTN

, HLD, CAD, CKD 3B, BL blindness, bph, chronic bladder outlet obstruction, and 

dementia c/o abdominal pain. UA and clinical presentation consistent with UTI.  





Altered mental status, likely in setting of UTI


- Ceftriaxone 1 g IV QD will be transitioned to Vantin for 10 days until the 

20th


- linezolid d/c (originally ordered due to hx of MRSA in urine)


- leukocytosis trending down since admission


- Maintain Camacho, will be seen by Dr. Melvin Durán on Dec 20th at 2:30PM


- UA + for nitrites, 2+ LE, 268 WBC, 1895 bacteria


- UCx growing Enterobacter Aerogenes with multiple sensitivities


- recent slurred speech as noted by family, head CT negative for acute pathology

, + for NPH


- Neurology, Dr. Krishnan consulted, recs appreciated


- Physical therapy, posturing in flexion, unable to comply, will be going home 

with care by family


- TSH, B12, RPR all negative





DEB on CKD 3b


- CRE 1.5 on admission, currently 1.2 (baseline


- Camacho placement given obstructive BPH, not chronic camacho


- Maintain Camacho, will need to f/u with urology for camacho care, appt with Dr. Melvin Durán on Dec 20th at 2:30PM


- home Lasix, monitor volume status


- BP within normal limits


- careful with fluids in setting of patient's hx of grade I diastolic 

dysfunction and LVH


- Renal U/S noting R kidney is normal, cannot evaluate L kidney due to patient 

non-cooperation





Elevated alk phos


- No cholecystitis on prev CT


- unknown origin, trending down, no evidence of liver/gallbladder pathology, 

continue to monitor





Hypoalbuminemia 


- Possibly d/t proteinuria, malnutrition, and inflammation assoc. with comorbid 

chronic conditions


- Ensure adequate dietary protein intake


- Consult dietician





DM


- Home Levemir increased to 26 units


- ISS ACHS


- BGM


- A1c 9.5


- will require good outpatient follow up, stressed importance of insulin 

administration





HTN


- Cont. curent home regimen





HLD


- Cont. curent home regimen





Hx of CAD


- holding Plavix due to family request as patient previously had hematuria


- watch for hematria while patient on heparin





F/E/N


- no standing fluids


- Continue to monitor electrolytes and replete as necessary


- Diabetic puree diet





DVT prophylaxis


- Heparin 5000 units Subq tid





Disposition


- Continue to monitor on med/surg


- zi Mccarthy [phone] for any updates on patient





Visit type





- Emergency Visit


Emergency Visit: Yes


ED Registration Date: 12/05/19


Care time: The patient presented to the Emergency Department on the above date 

and was hospitalized for further evaluation of their emergent condition.





- New Patient


This patient is new to me today: No





- Critical Care


Critical Care patient: No

## 2019-12-09 NOTE — CONSULT
Admitting History and Physical





- Primary Care Physician


PCP: César Saldaña





- Admission


History of Present Illness: 





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 

and advanced dementia 


admitted with lethargy and UTI





Neurology IMP: Severe, B/L cerebral dysfunction (OMS/Chronic). Most likely 

Alzheimer's disease.


       Toxic-metabolic Encephalopathy (UTI/Urosepsis)


 Selected Entries











  12/07/19 12/07/19 12/07/19





  10:00 14:00 18:00


 


Breakfast 75% 75% 


 


Lunch  75% 


 


Supper   75%


 


Temperature   














  12/08/19 12/08/19 12/08/19





  02:04 10:00 14:00


 


Breakfast   


 


Lunch   


 


Supper   


 


Temperature 97.6 F 98.3 F 99.3 F














  12/08/19 12/08/19 12/08/19





  15:00 21:18 23:04


 


Breakfast 75%  


 


Lunch 75%  


 


Supper   


 


Temperature  97.8 F 97.5 F L














  12/09/19 12/09/19





  06:58 09:25


 


Breakfast  


 


Lunch  


 


Supper  


 


Temperature 98.5 F 98.5 F








 Laboratory Tests











  12/05/19 12/06/19 12/07/19





  18:20 06:20 08:01


 


WBC  15.3 H  13.5 H  12.0 H














  12/08/19





  07:07


 


WBC  10.4 H








At present, pt is on puree/nectar. Doing well with good appetite.


Last seen by me 10/28/19, tolerated puree and thin liquids.


History Source: Medical Record


Limitations to Obtaining History: Clinical Condition, Dementia





- Past Medical History


CNS: Yes: Dementia


Cardiovascular: Yes: CAD, HTN, Hyperlipdemia


Pulmonary: Yes: Asthma, COPD, Pneumonia.  No: Cancer, O2 Dependent, Previously 

Intubated, Pulmonary Embolus, Pulmonary Fibrosis, Sleep Apnea


Renal/: Yes: Renal Inusuff





- Past Surgical History


Past Surgical History: Yes: CABG, Joint Replacement (R hip ORIF)





- Smoking History


Smoking history: Unknown if ever smoked


Have you smoked in the past 12 months: No


Aproximately how many cigarettes per day: 0


If you are a former smoker, when did you quit?: '96





- Alcohol/Substance Use


Hx Alcohol Use: No





- Social History


ADL: Family Assistance


History of Recent Travel: No





History





- Admission


Reason For Visit: ACUTE KIDNEY INJURY/UTI/HYPERGLYCEMIA





- Diagnostics


CT Scan: Report Reviewed ( CT of head (reviewed): Severe, diffuse, atrophy and 

ex vacuo hydrocephalus.)





- General


Mental Status: Confused, Lethargic


Attention: Moderate Impairment


Ability to Follow Directions: Poor





- Hearing


Hearing: Normal


Hearing Aide: No


With Patient: No





Speech Evaluation





- Communication


Primary Language: KORTNEY


Communication: Yes: Non-Communicable (for me but lethargic. Reported to speak, 

slowly, o x 1)


Oral Expression Ability: Yes: Non-Verbal





- Speech Characteristics


Voice Phonatory-based Quality: Yes: Dysphonia


Articulation: Yes: Imprecise





- Language/Auditory Comprehension


Observation: Comprehends Conversational Speech: Yes (limited. simple)





- Swallow Evaluation/Bedside Assessment


Current Nutritional Intake: Dysphagia Pureed, Nectar Textured Liquids


Oral Secretions: Yes: Drooling (lethargic)


Labial Seal: Impaired Bilaterally


A-P Transit: Impaired


Timing of Swallow: Delayed


Coughing/Throat Clear: Yes (thin)





Recommendations





- Speech Evaluation, Impression/Plan


Impression: Lethargy with delayed swallow and throat clearing.  Will reassess 

when more alert





- Dysphagia Impressions/Plan


Swallowing Skills: Impaired


Dysphagia Impressions: Suspect Aspiration (but assessed when pretty lethargic 

with limited participation)


*Silent aspiration: cannot be R/O at bedside


Dysphagia Treatment Plan: Small Bites, Chin Tuck/Down, Clear Pocket Food, Safe 

Rate, 1/2 tsp. at a time, Elevate HOB during feed





- Recommendations


Diet Consistency: Dysphagia Pureed


Medication Administration: Crushed with applesauce


Liquids: Nectar Thick, Other (to reassess)


Supplement: Magic Cup, Ensure Pudding

## 2019-12-09 NOTE — PN
Teaching Attending Note


Name of Resident: Collins Duron





ATTENDING PHYSICIAN STATEMENT





I saw and evaluated the patient.


I reviewed the resident's note and discussed the case with the resident.


I agree with the resident's findings and plan as documented with exceptions 

below.








SUBJECTIVE:


patient seen and examined, asking for water, oriented to self, no complaints. 





OBJECTIVE:


 Vital Signs











 Period  Temp  Pulse  Resp  BP Sys/Ramírez  Pulse Ox


 


 Last 24 Hr  97.5 F-98.5 F  66-69  20-20  132-148/61-67  97








 Intake & Output











 12/06/19 12/07/19 12/08/19 12/09/19





 23:59 23:59 23:59 23:59


 


Intake Total  1300 50 100


 


Output Total 1100 2250 2650 400


 


Balance -1100 -950 -2600 -300


 


Weight  147 lb  








General: lying in bed, no acute distress


HEENT: mildly dry skin and mucous membrane


Abdomen:Soft, Nt, no suprapubic tenderness


Extremities; no edema





 Home Medications











 Medication  Instructions  Recorded


 


Atorvastatin Ca [Lipitor] 80 mg PO DAILY 06/10/19


 


Ezetimibe 10 mg PO HS 06/10/19


 


Nebivolol HCl [Bystolic] 2.5 mg PO HS 06/10/19


 


Insulin Sliding Scale [Novolog 1 vial SQ ACHS  units 10/11/19





Vial Sliding Scale -]  


 


Insulin (Levemir) [Levemir Vial] 22 units SQ DAILY 11/08/19


 


Ferrous Sulfate [Feosol] 325 mg PO DAILY  ud 11/14/19


 


Polyethylene Glycol 3350 [Miralax 17 gm PO DAILY PRN  bottle 11/14/19





119 gm Btl -]  


 


Albuterol Sulfate Inhaler - 2 inh PO Q4H PRN 12/05/19





[Ventolin HFA Inhaler -]  


 


Ipratropium 0.02% Nebulizer 1 amp NEB Q6H PRN 12/05/19





[Atrovent 0.02% Nebulizer -]  


 


Clopidogrel Bisulfate [Plavix] 75 mg PO DAILY 12/06/19


 


Cefpodoxime Proxetil [Vantin (Nf) 100 mg PO BID #20 tablet 12/09/19





-]  


 


Furosemide [Lasix] 40 mg PO DAILY 12/09/19











 Laboratory Results - last 24 hr











  12/08/19 12/08/19 12/08/19





  17:08 20:21 23:00


 


Sodium   


 


Potassium   


 


Chloride   


 


Carbon Dioxide   


 


Anion Gap   


 


BUN   


 


Creatinine   


 


Est GFR (CKD-EPI)AfAm   


 


Est GFR (CKD-EPI)NonAf   


 


POC Glucometer  582  282  143


 


Random Glucose   


 


Calcium   














  12/09/19 12/09/19 12/09/19





  06:28 09:35 11:32


 


Sodium   144 


 


Potassium   4.1 


 


Chloride   107 


 


Carbon Dioxide   32 


 


Anion Gap   5 L 


 


BUN   27.4 H 


 


Creatinine   1.2 


 


Est GFR (CKD-EPI)AfAm   66.26 


 


Est GFR (CKD-EPI)NonAf   57.17 


 


POC Glucometer  264   370


 


Random Glucose   230 H 


 


Calcium   9.5 








 Microbiology





12/05/19 19:33   Blood - Peripheral Venous   Blood Culture - Preliminary


                            NO GROWTH OBTAINED AFTER 72 HOURS, INCUBATION TO 

CONTINUE


                            FOR 2 DAYS.


12/05/19 19:00   Blood - Peripheral Venous   Blood Culture - Preliminary


                            NO GROWTH OBTAINED AFTER 72 HOURS, INCUBATION TO 

CONTINUE


                            FOR 2 DAYS.


12/05/19 18:20   Urine - Urine Camacho   Urine Culture - Final


                            Enterobacter Aerogenes








ASSESSMENT AND PLAN:


79 year old male with Dementia, DM 2, HTN, HLD, CAD s/p CABG, Chronic Diastolic 

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 

obstruction s/p indwelling camacho presents with listlessness, lethargy, 

suprapubic pain for 2 days. 





-complicated Enterobacter UTI


-Bladder outlet obstruction s/p indwelling camacho


-DEB on CKD stage III, resolved


-Poorly controlled IDDM, hyperglycemia


-Acute metabolic encephalopathy, from above, improved


-Dementia


-Hypoalbuminemia


-HTN


-Chronic diastolic heart failure


-HLD


-Tinear cruris





Plan:


Improved, afebrile, normal WBC, camacho changed, cefpodoxime for 10 days


Urology appt arranged for 12/20. 


Discussed with wife at bedside who administers insulin per son's instructions. 


Only gives sliding scale and not standing levemir


Stressed need for standing levemir and sliding with close blood sugar checks 

with family


Also need for close outpatient PCP and endocrine follow up.


Neurology input noted, outpatient follow up. 


lasix/nebivolol/statin/zetia


dc home with outpatient BGM monitoring and urology follow up as above


Discussed with wife at bedside, all questions answered.

## 2019-12-10 VITALS — HEART RATE: 70 BPM | SYSTOLIC BLOOD PRESSURE: 138 MMHG | TEMPERATURE: 97 F | DIASTOLIC BLOOD PRESSURE: 57 MMHG

## 2019-12-10 RX ADMIN — INSULIN ASPART SCH UNITS: 100 INJECTION, SOLUTION INTRAVENOUS; SUBCUTANEOUS at 11:49

## 2019-12-10 RX ADMIN — HEPARIN SODIUM SCH: 5000 INJECTION, SOLUTION INTRAVENOUS; SUBCUTANEOUS at 01:35

## 2019-12-10 RX ADMIN — Medication SCH ML: at 08:41

## 2019-12-10 RX ADMIN — HEPARIN SODIUM SCH UNIT: 5000 INJECTION, SOLUTION INTRAVENOUS; SUBCUTANEOUS at 10:22

## 2019-12-10 RX ADMIN — INSULIN DETEMIR SCH UNITS: 100 INJECTION, SOLUTION SUBCUTANEOUS at 06:55

## 2019-12-10 RX ADMIN — INSULIN ASPART SCH UNITS: 100 INJECTION, SOLUTION INTRAVENOUS; SUBCUTANEOUS at 06:56

## 2019-12-10 RX ADMIN — ACETAMINOPHEN PRN MG: 325 TABLET ORAL at 10:23

## 2019-12-10 RX ADMIN — NYSTATIN SCH APPLIC: 100000 POWDER TOPICAL at 06:55

## 2019-12-10 RX ADMIN — FUROSEMIDE SCH MG: 40 TABLET ORAL at 10:22

## 2019-12-10 NOTE — PN
Teaching Attending Note


Name of Resident: Collins Duron





ATTENDING PHYSICIAN STATEMENT





I saw and evaluated the patient.


I reviewed the resident's note and discussed the case with the resident.


I agree with the resident's findings and plan as documented with exceptions 

below.








SUBJECTIVE:


patient seen and examined. asking for water, no complaints. 





OBJECTIVE:


 Vital Signs











 Period  Temp  Pulse  Resp  BP Sys/Ramírez  Pulse Ox


 


 Last 24 Hr  97.0 F-98.9 F  67-70  20-20  114-140/43-58  97-99








 Intake & Output











 12/07/19 12/08/19 12/09/19 12/10/19





 23:59 23:59 23:59 23:59


 


Intake Total 1300 50 225 125


 


Output Total 2250 2650 8764 880


 


Balance -451 -4967 -1460 -186


 


Weight 147 lb   








General: lying in bed, more awake


Chest: decreased effort, no rales or wheezing


Abdomen:soft, obese, NT


Extremities: no edema





 Home Medications











 Medication  Instructions  Recorded


 


Atorvastatin Ca [Lipitor] 80 mg PO DAILY 06/10/19


 


Ezetimibe 10 mg PO HS 06/10/19


 


Nebivolol HCl [Bystolic] 2.5 mg PO HS 06/10/19


 


Insulin Sliding Scale [Novolog 1 vial SQ ACHS  units 10/11/19





Vial Sliding Scale -]  


 


Ferrous Sulfate [Feosol] 325 mg PO DAILY  ud 11/14/19


 


Polyethylene Glycol 3350 [Miralax 17 gm PO DAILY PRN  bottle 11/14/19





119 gm Btl -]  


 


Albuterol Sulfate Inhaler - 2 inh PO Q4H PRN 12/05/19





[Ventolin HFA Inhaler -]  


 


Ipratropium 0.02% Nebulizer 1 amp NEB Q6H PRN 12/05/19





[Atrovent 0.02% Nebulizer -]  


 


Clopidogrel Bisulfate [Plavix] 75 mg PO DAILY 12/06/19


 


Cefpodoxime Proxetil [Vantin (Nf) 100 mg PO BID #20 tablet 12/09/19





-]  


 


Furosemide [Lasix] 40 mg PO DAILY 12/09/19


 


Insulin (Levemir) [Levemir Vial] 30 units SQ AM  units 12/10/19


 


Insulin (Levemir) [Levemir Vial] 30 units SQ DAILY #1 vial 12/10/19


 


Nystatin Powder [Nystop Powder -] 1 applic TP TID #15 grams 12/10/19











 Laboratory Results - last 24 hr











  12/09/19 12/09/19 12/10/19





  16:16 21:08 06:15


 


POC Glucometer  529  367  224














  12/10/19





  11:43


 


POC Glucometer  377











ASSESSMENT AND PLAN:


79 year old male with Dementia, DM 2, HTN, HLD, CAD s/p CABG, Chronic Diastolic 

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 

obstruction s/p indwelling camacho presents with listlessness, lethargy, 

suprapubic pain for 2 days. 





-complicated Enterobacter UTI


-Bladder outlet obstruction s/p indwelling camacho


-DEB on CKD stage III, resolved


-Poorly controlled IDDM, hyperglycemia


-Acute metabolic encephalopathy, from above, improved


-Dementia


-Hypoalbuminemia


-HTN


-Chronic diastolic heart failure


-HLD


-Tinear cruris





Plan:


Improved, afebrile, normal WBC, camacho changed, cefpodoxime for 10 days


Urology appt arranged for 12/20. 


Discussed with wife at bedside who administers insulin per son's instructions. 


Only gives sliding scale and not standing levemir


Stressed need for standing levemir and sliding with close blood sugar checks 

with family


Increase levemir to 30 units daily. 


Also need for close outpatient PCP and endocrine follow up.


Neurology input noted, outpatient follow up. 


lasix/nebivolol/statin/zetia


dc home with outpatient BGM monitoring and urology follow up as above


Discussed with wife at bedside, all questions answered.

## 2019-12-10 NOTE — DS
Physical Exam: 


SUBJECTIVE: Patient seen and examined at the bedside. Patient is alert and 

oriented to self and that this is not his home. States he is thirsty and wants 

to drink. Denies acute cp, sob, abd pain, n/v/c/d, fevers. 








OBJECTIVE:





 Vital Signs











 Period  Temp  Pulse  Resp  BP Sys/Ramírez  Pulse Ox


 


 Last 24 Hr  97.0 F-98.9 F  67-70  20-20  114-140/43-58  97-99








PHYSICAL EXAM





GENERAL: AOx1 to person, speaks Malayalam


HEAD: Normocephalic and atraumatic


EYES: BL diabetic keratopahy, opaque cornea R>L 


ENT: Oropharynx clear without exudates. Moist mucous membranes.


NECK: No lymphadenopathy, JVD.


LUNGS: Bilaterally clear to auscultation. No auscultated wheezes and no 

crackles. No accessory muscle use.


HEART: Regular rate and rhythm, no murmurs, rubs appreciated. 


ABDOMEN: Non-tender, no guarding, soft, BS present in all 4 quadrants, non-

distended


UPPER EXTREMITIES: Moves arms spontaneously.  2+ pulses, warm, well-perfused. 

No cyanosis. No clubbing. No peripheral edema.


LOWER EXTREMITIES: 2+ pulses, warm, well-perfused. No calf tenderness. Trace 

peripheral edema in feet. Dry. 


NEUROLOGICAL: Dysarthria. LUE 4/5 power. Unable to comply with rest of 

neurological exam


SKIN: Dry. Diffuse excorations on chest L>R. Vertical sternal scar. Chronic 

venous stasis dermatitis on bilateral leg. 





LABS


 Laboratory Results - last 24 hr











  12/09/19 12/09/19 12/10/19





  16:16 21:08 06:15


 


POC Glucometer  529  367  224














  12/10/19





  11:43


 


POC Glucometer  377











HOSPITAL COURSE:





Nicolas Stiles is a 79 year old male with a past medical history of IDDM, HTN

, HLD, CAD, CKD 3B, BL blindness, bph, chronic bladder outlet obstruction, and 

dementia admitted for altered mental status in the setting UTI. Patient was 

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 

Urine cultures showed that patient had Enterobacter Aerogenes with multiple 

sensitivities and had linezolid discontinued and was switched from ceftriaxone 

to Vantin and will complete a course of outpatient antibiotics. Due to 

decreased mental status, patient had a head CT which was negative for any acute 

pathology and showed NPH. Neurology was consulted and recommended to continue 

UTI treatment. Patient will follow up with neurology outpatient.


Patient had camacho replaced in the ED and will need to follow up for camacho 

catheter care with Dr. Durán. Appointment was made for the patient. 


During admission, patient had elevated glucose and had Levemir increased to 30 

units daily in the morning with sliding scale. Patient will need adequate 

outpatient follow up for diabetes. Patient will need to follow up with his 

primary care physician and endocrinologist. 


Patient's son Fabio was told about the plan at discharge, was in agreement, and 

reiterated the plan.


Patient was discharged in stable medical condition. 








Date of Admission:12/05/19





Date of Discharge: 12/10/19











Minutes to complete discharge: 35





Discharge Summary


Problems reviewed: Yes


Reason For Visit: ACUTE KIDNEY INJURY/UTI/HYPERGLYCEMIA


Condition: Stable





- Instructions


Diet, Activity, Other Instructions: 


YOUR VISIT


You came to the hospital because you were feeling  You were admitted to the 

hospital for a urinary tract infection that was causing you to become more 

confused. You were treated with antibiotics and will complete a course of 

antibiotics when you leave the hospital. You had a ultrasound of your kidneys 

which showed a normal right kidney. You had a CT scan of your head which showed 

some enlargement of the ventricles (fluid filled spaced of the brain). You were 

seen by a neurologist, who recommended to continue antibiotics and ensure 

adequate hydration. 


You had elevated blood sugars while you were in the hospital and your Levemir 

dose was increased.





MEDICATIONS





START to take Vantin 100mg twice a day for an additional 10 days. Your last 

dose will be on December 20th. 


INCREASE your Levemir dose to 30 units in the morning. Follow up with your 

endocrinologist. 


Please check your blood sugars before all meals and continue with sliding scale 

as instructed by your doctor. 





Please continue to use nystatin powder three times a day and apply it to the 

groin area. 





Please continue to take your home medications as prescribed.





ADDITIONAL CARE





Please make an appointment to see your primary care provider, Dr. Dali Durán, 

1 week from today.





Please follow up with the neurologist, Dr. Yakov Krishnan, within 1 week. 





Please follow up with your endocrinologist within 1 week. If you do not have one

, please follow up with Dr. Ramírez Burnett. 





We have made an appointment to see your urologist Dr. Melvin Durán, on December 20th at 2:30PM. It is important to you follow up for proper care of your camacho 

catheter. 





ADDITIONAL INFORMATION





Please make sure to go to your urologist appointment to ensure adequate care of 

the camacho catheter and for evaluation if the camacho catheter can be removed. 


Please ensure that you are checking the blood sugar at least 4 times a day, 

before meals and at bedtime, and administering an adequate amount of insulin to 

prevent high blood sugars. Follow up with your primary care doctor and 

endocrinologist for any adjustments of insulin as necessary. 





Please call 911 or come directly to the emergency department if you experience 

unusual headache, vision change, shortness of breath, chest pain, numbness, 

tingling, loss of alertness/awareness, loss of function, unusual bleeding or 

any alarming symptoms.





Referrals: 


Yakov Krishnan MD [Staff Physician] - 1 Week


Ramírez Burnett MD [Staff Physician] - 1 Week


Melvin Durán MD [Staff Physician] - 12/20/19 2:30 am


Dali Durán MD [Primary Care Provider] - 1 Week


Disposition: VNS/HOME HEALTH CARE





- Home Medications


Comprehensive Discharge Medication List: 


Ambulatory Orders





Atorvastatin Ca [Lipitor] 80 mg PO DAILY 06/10/19 


Ezetimibe 10 mg PO HS 06/10/19 


Nebivolol HCl [Bystolic] 2.5 mg PO HS 06/10/19 


Insulin Sliding Scale [Novolog Vial Sliding Scale -] 1 vial SQ ACHS  units 10/11

/19 


Ferrous Sulfate [Feosol] 325 mg PO DAILY  ud 11/14/19 


Polyethylene Glycol 3350 [Miralax 119 gm Btl -] 17 gm PO DAILY PRN  bottle 11/14 /19 


Albuterol Sulfate Inhaler - [Ventolin HFA Inhaler -] 2 inh PO Q4H PRN 12/05/19 


Ipratropium 0.02% Nebulizer [Atrovent 0.02% Nebulizer -] 1 amp NEB Q6H PRN 12/05 /19 


Clopidogrel Bisulfate [Plavix] 75 mg PO DAILY 12/06/19 


Cefpodoxime Proxetil [Vantin (Nf) -] 100 mg PO BID #20 tablet 12/09/19 


Furosemide [Lasix] 40 mg PO DAILY 12/09/19 


Insulin (Levemir) [Levemir Vial] 30 units SQ AM  units 12/10/19 


Nystatin Powder [Nystop Powder -] 1 applic TP TID #15 grams 12/10/19 











Problem List





- Problems


(1) Anemia


Code(s): D64.9 - ANEMIA, UNSPECIFIED   





(2) Asthma


Code(s): J45.909 - UNSPECIFIED ASTHMA, UNCOMPLICATED   





(3) CAD (coronary artery disease)


Code(s): I25.10 - ATHSCL HEART DISEASE OF NATIVE CORONARY ARTERY W/O ANG PCTRS 

  


Qualifiers: 


   Coronary Disease-Associated Artery/Lesion type: native artery   Native vs. 

transplanted heart: native heart   Associated angina: without angina   

Qualified Code(s): I25.10 - Atherosclerotic heart disease of native coronary 

artery without angina pectoris   





(4) CKD stage 3 due to type 2 diabetes mellitus


Code(s): E11.22 - TYPE 2 DIABETES MELLITUS W DIABETIC CHRONIC KIDNEY DISEASE; 

N18.3 - CHRONIC KIDNEY DISEASE, STAGE 3 (MODERATE)   





(5) COPD (chronic obstructive pulmonary disease)


Code(s): J44.9 - CHRONIC OBSTRUCTIVE PULMONARY DISEASE, UNSPECIFIED   





(6) Chronic venous stasis


Code(s): I87.8 - OTHER SPECIFIED DISORDERS OF VEINS   





(7) Dementia


Code(s): F03.90 - UNSPECIFIED DEMENTIA WITHOUT BEHAVIORAL DISTURBANCE   





(8) Hyperglycemia


Code(s): R73.9 - HYPERGLYCEMIA, UNSPECIFIED   





(9) Hyperlipidemia


Code(s): E78.5 - HYPERLIPIDEMIA, UNSPECIFIED   


Qualifiers: 


   Hyperlipidemia type: pure hypercholesterolemia   Qualified Code(s): E78.00 - 

Pure hypercholesterolemia, unspecified; E78.0 - Pure hypercholesterolemia   





(10) Hypertension


Code(s): I10 - ESSENTIAL (PRIMARY) HYPERTENSION   


Qualifiers: 


   Hypertension type: essential hypertension   Qualified Code(s): I10 - 

Essential (primary) hypertension   





(11) S/P CABG (coronary artery bypass graft)


Code(s): Z95.1 - PRESENCE OF AORTOCORONARY BYPASS GRAFT   





(12) Type 2 diabetes mellitus


Code(s): E11.9 - TYPE 2 DIABETES MELLITUS WITHOUT COMPLICATIONS   





(13) Urinary retention


Code(s): R33.9 - RETENTION OF URINE, UNSPECIFIED   





(14) DEB (acute kidney injury)


Code(s): N17.9 - ACUTE KIDNEY FAILURE, UNSPECIFIED   





(15) Acute renal failure


Code(s): N17.9 - ACUTE KIDNEY FAILURE, UNSPECIFIED   


This patient is new to me today: No


Emergency Visit: Yes


ED Registration Date: 12/05/19


Care time: The patient presented to the Emergency Department on the above date 

and was hospitalized for further evaluation of their emergent condition.


Critical Care patient: No





- Discharge Referral


Referred to [facility] No
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
